# Patient Record
Sex: MALE | Race: WHITE | Employment: OTHER | URBAN - NONMETROPOLITAN AREA
[De-identification: names, ages, dates, MRNs, and addresses within clinical notes are randomized per-mention and may not be internally consistent; named-entity substitution may affect disease eponyms.]

---

## 2017-02-06 RX ORDER — TAMSULOSIN HYDROCHLORIDE 0.4 MG/1
CAPSULE ORAL
Qty: 60 CAPSULE | Refills: 0 | Status: SHIPPED | OUTPATIENT
Start: 2017-02-06 | End: 2017-04-23 | Stop reason: SDUPTHER

## 2017-03-02 RX ORDER — MELOXICAM 7.5 MG/1
TABLET ORAL
Qty: 30 TABLET | Refills: 5 | Status: SHIPPED | OUTPATIENT
Start: 2017-03-02 | End: 2017-06-19

## 2017-03-27 RX ORDER — CARVEDILOL 12.5 MG/1
TABLET ORAL
Qty: 60 TABLET | Refills: 6 | Status: SHIPPED | OUTPATIENT
Start: 2017-03-27 | End: 2017-10-09 | Stop reason: SDUPTHER

## 2017-04-24 RX ORDER — TAMSULOSIN HYDROCHLORIDE 0.4 MG/1
CAPSULE ORAL
Qty: 60 CAPSULE | Refills: 0 | Status: SHIPPED | OUTPATIENT
Start: 2017-04-24 | End: 2017-06-15 | Stop reason: SDUPTHER

## 2017-05-05 ENCOUNTER — OFFICE VISIT (OUTPATIENT)
Dept: FAMILY MEDICINE CLINIC | Age: 72
End: 2017-05-05

## 2017-05-05 VITALS
SYSTOLIC BLOOD PRESSURE: 120 MMHG | DIASTOLIC BLOOD PRESSURE: 76 MMHG | HEART RATE: 59 BPM | BODY MASS INDEX: 29.92 KG/M2 | HEIGHT: 69 IN | OXYGEN SATURATION: 96 % | WEIGHT: 202 LBS

## 2017-05-05 DIAGNOSIS — Z00.00 ROUTINE GENERAL MEDICAL EXAMINATION AT A HEALTH CARE FACILITY: ICD-10-CM

## 2017-05-05 DIAGNOSIS — I25.10 CORONARY ARTERY DISEASE INVOLVING NATIVE CORONARY ARTERY OF NATIVE HEART WITHOUT ANGINA PECTORIS: ICD-10-CM

## 2017-05-05 DIAGNOSIS — Z12.5 SPECIAL SCREENING FOR MALIGNANT NEOPLASM OF PROSTATE: ICD-10-CM

## 2017-05-05 DIAGNOSIS — N40.1 BENIGN NON-NODULAR PROSTATIC HYPERPLASIA WITH LOWER URINARY TRACT SYMPTOMS: ICD-10-CM

## 2017-05-05 DIAGNOSIS — I21.4 NON-ST ELEVATION (NSTEMI) MYOCARDIAL INFARCTION (HCC): ICD-10-CM

## 2017-05-05 DIAGNOSIS — E78.5 HYPERLIPIDEMIA, UNSPECIFIED HYPERLIPIDEMIA TYPE: Primary | ICD-10-CM

## 2017-05-05 DIAGNOSIS — I10 ESSENTIAL HYPERTENSION: ICD-10-CM

## 2017-05-05 PROCEDURE — 99214 OFFICE O/P EST MOD 30 MIN: CPT | Performed by: FAMILY MEDICINE

## 2017-05-05 ASSESSMENT — PATIENT HEALTH QUESTIONNAIRE - PHQ9
2. FEELING DOWN, DEPRESSED OR HOPELESS: 0
1. LITTLE INTEREST OR PLEASURE IN DOING THINGS: 0
SUM OF ALL RESPONSES TO PHQ9 QUESTIONS 1 & 2: 0
SUM OF ALL RESPONSES TO PHQ QUESTIONS 1-9: 0

## 2017-05-09 ENCOUNTER — OFFICE VISIT (OUTPATIENT)
Dept: UROLOGY | Age: 72
End: 2017-05-09

## 2017-05-09 VITALS
SYSTOLIC BLOOD PRESSURE: 120 MMHG | HEART RATE: 65 BPM | HEIGHT: 69 IN | WEIGHT: 196 LBS | BODY MASS INDEX: 29.03 KG/M2 | DIASTOLIC BLOOD PRESSURE: 72 MMHG

## 2017-05-09 DIAGNOSIS — N13.8 BPH WITH OBSTRUCTION/LOWER URINARY TRACT SYMPTOMS: Primary | ICD-10-CM

## 2017-05-09 DIAGNOSIS — N40.1 BPH WITH OBSTRUCTION/LOWER URINARY TRACT SYMPTOMS: Primary | ICD-10-CM

## 2017-05-09 LAB
BILIRUBIN, POC: ABNORMAL
BLOOD URINE, POC: ABNORMAL
CLARITY, POC: CLEAR
COLOR, POC: YELLOW
GLUCOSE URINE, POC: ABNORMAL
KETONES, POC: ABNORMAL
LEUKOCYTE EST, POC: ABNORMAL
NITRITE, POC: ABNORMAL
PH, POC: 6.5
PROTEIN, POC: 30
SPECIFIC GRAVITY, POC: 1.02
UROBILINOGEN, POC: 0.2

## 2017-05-09 PROCEDURE — 99214 OFFICE O/P EST MOD 30 MIN: CPT | Performed by: UROLOGY

## 2017-05-09 PROCEDURE — 51798 US URINE CAPACITY MEASURE: CPT | Performed by: UROLOGY

## 2017-05-09 PROCEDURE — 51741 ELECTRO-UROFLOWMETRY FIRST: CPT | Performed by: UROLOGY

## 2017-05-09 PROCEDURE — 81003 URINALYSIS AUTO W/O SCOPE: CPT | Performed by: UROLOGY

## 2017-05-09 RX ORDER — FINASTERIDE 5 MG/1
5 TABLET, FILM COATED ORAL DAILY
Qty: 90 TABLET | Refills: 3 | Status: SHIPPED | OUTPATIENT
Start: 2017-05-09 | End: 2018-05-10 | Stop reason: SDUPTHER

## 2017-05-09 ASSESSMENT — ENCOUNTER SYMPTOMS
SHORTNESS OF BREATH: 0
ABDOMINAL DISTENTION: 0
ABDOMINAL PAIN: 0

## 2017-05-10 ENCOUNTER — TELEPHONE (OUTPATIENT)
Dept: UROLOGY | Age: 72
End: 2017-05-10

## 2017-05-11 LAB — URINE CULTURE, ROUTINE: NORMAL

## 2017-05-12 ENCOUNTER — HOSPITAL ENCOUNTER (OUTPATIENT)
Dept: CT IMAGING | Age: 72
Discharge: HOME | End: 2017-05-12
Payer: MEDICARE

## 2017-05-12 VITALS
HEART RATE: 85 BPM | DIASTOLIC BLOOD PRESSURE: 76 MMHG | SYSTOLIC BLOOD PRESSURE: 124 MMHG | HEIGHT: 69 IN | BODY MASS INDEX: 28.88 KG/M2 | RESPIRATION RATE: 16 BRPM | WEIGHT: 195 LBS

## 2017-05-12 DIAGNOSIS — N13.8 BPH WITH OBSTRUCTION/LOWER URINARY TRACT SYMPTOMS: ICD-10-CM

## 2017-05-12 DIAGNOSIS — Z00.00 ROUTINE GENERAL MEDICAL EXAMINATION AT A HEALTH CARE FACILITY: ICD-10-CM

## 2017-05-12 DIAGNOSIS — I10 ESSENTIAL HYPERTENSION: ICD-10-CM

## 2017-05-12 DIAGNOSIS — Z12.5 SPECIAL SCREENING FOR MALIGNANT NEOPLASM OF PROSTATE: ICD-10-CM

## 2017-05-12 DIAGNOSIS — N40.1 BPH WITH OBSTRUCTION/LOWER URINARY TRACT SYMPTOMS: ICD-10-CM

## 2017-05-12 DIAGNOSIS — E78.5 HYPERLIPIDEMIA, UNSPECIFIED HYPERLIPIDEMIA TYPE: ICD-10-CM

## 2017-05-12 LAB
ALBUMIN SERPL-MCNC: 4 G/DL (ref 3.9–4.9)
ALP BLD-CCNC: 49 U/L (ref 35–104)
ALT SERPL-CCNC: 17 U/L (ref 0–41)
ANION GAP SERPL CALCULATED.3IONS-SCNC: 12 MEQ/L (ref 7–13)
AST SERPL-CCNC: 17 U/L (ref 0–40)
BILIRUB SERPL-MCNC: 0.5 MG/DL (ref 0–1.2)
BUN BLDV-MCNC: 18 MG/DL (ref 8–23)
CALCIUM SERPL-MCNC: 8.7 MG/DL (ref 8.6–10.2)
CHLORIDE BLD-SCNC: 102 MEQ/L (ref 98–107)
CHOLESTEROL, TOTAL: 174 MG/DL (ref 0–199)
CO2: 24 MEQ/L (ref 22–29)
CREAT SERPL-MCNC: 0.99 MG/DL (ref 0.7–1.2)
GFR AFRICAN AMERICAN: >60
GFR NON-AFRICAN AMERICAN: >60
GLOBULIN: 2.5 G/DL (ref 2.3–3.5)
GLUCOSE BLD-MCNC: 104 MG/DL (ref 74–109)
HCT VFR BLD CALC: 41.7 % (ref 42–52)
HDLC SERPL-MCNC: 43 MG/DL (ref 40–59)
HEMOGLOBIN: 14.1 G/DL (ref 14–18)
LDL CHOLESTEROL CALCULATED: 114 MG/DL (ref 0–129)
MCH RBC QN AUTO: 29.9 PG (ref 27–31.3)
MCHC RBC AUTO-ENTMCNC: 33.9 % (ref 33–37)
MCV RBC AUTO: 88 FL (ref 80–100)
PDW BLD-RTO: 13.4 % (ref 11.5–14.5)
PLATELET # BLD: 180 K/UL (ref 130–400)
POTASSIUM SERPL-SCNC: 4.6 MEQ/L (ref 3.5–5.1)
PROSTATE SPECIFIC ANTIGEN: 0.21 NG/ML (ref 0–6.22)
RBC # BLD: 4.74 M/UL (ref 4.7–6.1)
SODIUM BLD-SCNC: 138 MEQ/L (ref 132–144)
TOTAL PROTEIN: 6.5 G/DL (ref 6.4–8.1)
TRIGL SERPL-MCNC: 85 MG/DL (ref 0–200)
WBC # BLD: 5.5 K/UL (ref 4.8–10.8)

## 2017-05-12 PROCEDURE — 74176 CT ABD & PELVIS W/O CONTRAST: CPT

## 2017-05-17 ENCOUNTER — TELEPHONE (OUTPATIENT)
Dept: UROLOGY | Age: 72
End: 2017-05-17

## 2017-05-22 RX ORDER — LOSARTAN POTASSIUM 25 MG/1
TABLET ORAL
Qty: 30 TABLET | Refills: 5 | Status: SHIPPED | OUTPATIENT
Start: 2017-05-22 | End: 2017-11-12 | Stop reason: SDUPTHER

## 2017-05-22 RX ORDER — SIMVASTATIN 40 MG
TABLET ORAL
Qty: 30 TABLET | Refills: 5 | Status: SHIPPED | OUTPATIENT
Start: 2017-05-22 | End: 2017-11-12 | Stop reason: SDUPTHER

## 2017-05-22 RX ORDER — HYDROCHLOROTHIAZIDE 25 MG/1
TABLET ORAL
Qty: 30 TABLET | Refills: 5 | Status: SHIPPED | OUTPATIENT
Start: 2017-05-22 | End: 2017-11-12 | Stop reason: SDUPTHER

## 2017-06-06 ENCOUNTER — OFFICE VISIT (OUTPATIENT)
Dept: UROLOGY | Age: 72
End: 2017-06-06

## 2017-06-06 VITALS
HEART RATE: 70 BPM | HEIGHT: 69 IN | SYSTOLIC BLOOD PRESSURE: 122 MMHG | DIASTOLIC BLOOD PRESSURE: 62 MMHG | WEIGHT: 200 LBS | BODY MASS INDEX: 29.62 KG/M2

## 2017-06-06 DIAGNOSIS — R33.9 INCOMPLETE BLADDER EMPTYING: ICD-10-CM

## 2017-06-06 DIAGNOSIS — N13.8 BPH WITH OBSTRUCTION/LOWER URINARY TRACT SYMPTOMS: Primary | ICD-10-CM

## 2017-06-06 DIAGNOSIS — N40.1 BPH WITH OBSTRUCTION/LOWER URINARY TRACT SYMPTOMS: Primary | ICD-10-CM

## 2017-06-06 PROCEDURE — 99214 OFFICE O/P EST MOD 30 MIN: CPT | Performed by: UROLOGY

## 2017-06-06 PROCEDURE — 51798 US URINE CAPACITY MEASURE: CPT | Performed by: UROLOGY

## 2017-06-06 PROCEDURE — 51741 ELECTRO-UROFLOWMETRY FIRST: CPT | Performed by: UROLOGY

## 2017-06-06 ASSESSMENT — ENCOUNTER SYMPTOMS
ABDOMINAL PAIN: 0
SHORTNESS OF BREATH: 0
ABDOMINAL DISTENTION: 0

## 2017-06-13 ENCOUNTER — TELEPHONE (OUTPATIENT)
Dept: UROLOGY | Age: 72
End: 2017-06-13

## 2017-06-18 RX ORDER — TAMSULOSIN HYDROCHLORIDE 0.4 MG/1
CAPSULE ORAL
Qty: 180 CAPSULE | Refills: 3 | Status: SHIPPED | OUTPATIENT
Start: 2017-06-18 | End: 2018-06-18 | Stop reason: SDUPTHER

## 2017-06-19 ENCOUNTER — OFFICE VISIT (OUTPATIENT)
Dept: FAMILY MEDICINE CLINIC | Age: 72
End: 2017-06-19

## 2017-06-19 VITALS
HEIGHT: 69 IN | SYSTOLIC BLOOD PRESSURE: 126 MMHG | WEIGHT: 208.4 LBS | DIASTOLIC BLOOD PRESSURE: 64 MMHG | HEART RATE: 70 BPM | BODY MASS INDEX: 30.87 KG/M2 | OXYGEN SATURATION: 96 %

## 2017-06-19 DIAGNOSIS — M25.561 CHRONIC PAIN OF RIGHT KNEE: Primary | ICD-10-CM

## 2017-06-19 DIAGNOSIS — M25.551 RIGHT HIP PAIN: ICD-10-CM

## 2017-06-19 DIAGNOSIS — G89.29 CHRONIC PAIN OF RIGHT KNEE: Primary | ICD-10-CM

## 2017-06-19 DIAGNOSIS — M17.11 PRIMARY OSTEOARTHRITIS OF RIGHT KNEE: ICD-10-CM

## 2017-06-19 PROCEDURE — 99213 OFFICE O/P EST LOW 20 MIN: CPT | Performed by: FAMILY MEDICINE

## 2017-06-19 RX ORDER — NABUMETONE 500 MG/1
500 TABLET, FILM COATED ORAL 2 TIMES DAILY
Qty: 60 TABLET | Refills: 3 | Status: ON HOLD | OUTPATIENT
Start: 2017-06-19 | End: 2017-07-17 | Stop reason: HOSPADM

## 2017-06-20 ENCOUNTER — OFFICE VISIT (OUTPATIENT)
Dept: CARDIOLOGY | Age: 72
End: 2017-06-20

## 2017-06-20 VITALS
SYSTOLIC BLOOD PRESSURE: 120 MMHG | DIASTOLIC BLOOD PRESSURE: 60 MMHG | BODY MASS INDEX: 30.81 KG/M2 | WEIGHT: 208 LBS | HEART RATE: 83 BPM | HEIGHT: 69 IN

## 2017-06-20 DIAGNOSIS — Z01.810 PRE-OPERATIVE CARDIOVASCULAR EXAMINATION: Primary | ICD-10-CM

## 2017-06-20 PROCEDURE — 99213 OFFICE O/P EST LOW 20 MIN: CPT | Performed by: INTERNAL MEDICINE

## 2017-06-20 PROCEDURE — 93000 ELECTROCARDIOGRAM COMPLETE: CPT | Performed by: INTERNAL MEDICINE

## 2017-07-10 ENCOUNTER — HOSPITAL ENCOUNTER (OUTPATIENT)
Dept: PREADMISSION TESTING | Age: 72
Discharge: HOME | End: 2017-07-10
Payer: MEDICARE

## 2017-07-10 VITALS
OXYGEN SATURATION: 94 % | TEMPERATURE: 97.6 F | HEART RATE: 68 BPM | HEIGHT: 69 IN | DIASTOLIC BLOOD PRESSURE: 63 MMHG | WEIGHT: 198.2 LBS | RESPIRATION RATE: 16 BRPM | SYSTOLIC BLOOD PRESSURE: 136 MMHG | BODY MASS INDEX: 29.36 KG/M2

## 2017-07-10 LAB
ABO/RH: NORMAL
ANION GAP SERPL CALCULATED.3IONS-SCNC: 12 MEQ/L (ref 7–13)
ANTIBODY SCREEN: NORMAL
BACTERIA: ABNORMAL /HPF
BILIRUBIN URINE: NEGATIVE
BLOOD, URINE: NEGATIVE
BUN BLDV-MCNC: 18 MG/DL (ref 8–23)
CALCIUM SERPL-MCNC: 8.7 MG/DL (ref 8.6–10.2)
CHLORIDE BLD-SCNC: 97 MEQ/L (ref 98–107)
CLARITY: CLEAR
CO2: 27 MEQ/L (ref 22–29)
COLOR: YELLOW
CREAT SERPL-MCNC: 0.82 MG/DL (ref 0.7–1.2)
EPITHELIAL CELLS, UA: ABNORMAL /HPF
GFR AFRICAN AMERICAN: >60
GFR NON-AFRICAN AMERICAN: >60
GLUCOSE BLD-MCNC: 85 MG/DL (ref 74–109)
GLUCOSE URINE: NEGATIVE MG/DL
HCT VFR BLD CALC: 40 % (ref 42–52)
HEMOGLOBIN: 13.3 G/DL (ref 14–18)
INR BLD: 1
KETONES, URINE: NEGATIVE MG/DL
LEUKOCYTE ESTERASE, URINE: ABNORMAL
MCH RBC QN AUTO: 29.5 PG (ref 27–31.3)
MCHC RBC AUTO-ENTMCNC: 33.3 % (ref 33–37)
MCV RBC AUTO: 88.7 FL (ref 80–100)
NITRITE, URINE: NEGATIVE
PDW BLD-RTO: 13.4 % (ref 11.5–14.5)
PH UA: 7 (ref 5–9)
PLATELET # BLD: 255 K/UL (ref 130–400)
POTASSIUM SERPL-SCNC: 3.6 MEQ/L (ref 3.5–5.1)
PROTEIN UA: NEGATIVE MG/DL
PROTHROMBIN TIME: 10.8 SEC (ref 8.1–13.7)
RBC # BLD: 4.51 M/UL (ref 4.7–6.1)
RBC UA: ABNORMAL /HPF (ref 0–2)
SODIUM BLD-SCNC: 136 MEQ/L (ref 132–144)
SPECIFIC GRAVITY UA: 1.01 (ref 1–1.03)
UROBILINOGEN, URINE: 0.2 E.U./DL
WBC # BLD: 6.7 K/UL (ref 4.8–10.8)
WBC UA: ABNORMAL /HPF (ref 0–5)

## 2017-07-10 PROCEDURE — 87077 CULTURE AEROBIC IDENTIFY: CPT

## 2017-07-10 PROCEDURE — 85610 PROTHROMBIN TIME: CPT

## 2017-07-10 PROCEDURE — 85027 COMPLETE CBC AUTOMATED: CPT

## 2017-07-10 PROCEDURE — 86900 BLOOD TYPING SEROLOGIC ABO: CPT

## 2017-07-10 PROCEDURE — 86901 BLOOD TYPING SEROLOGIC RH(D): CPT

## 2017-07-10 PROCEDURE — 87186 SC STD MICRODIL/AGAR DIL: CPT

## 2017-07-10 PROCEDURE — 87086 URINE CULTURE/COLONY COUNT: CPT

## 2017-07-10 PROCEDURE — 81001 URINALYSIS AUTO W/SCOPE: CPT

## 2017-07-10 PROCEDURE — 86850 RBC ANTIBODY SCREEN: CPT

## 2017-07-10 PROCEDURE — 80048 BASIC METABOLIC PNL TOTAL CA: CPT

## 2017-07-10 RX ORDER — SODIUM CHLORIDE 0.9 % (FLUSH) 0.9 %
10 SYRINGE (ML) INJECTION PRN
Status: CANCELLED | OUTPATIENT
Start: 2017-07-10

## 2017-07-10 RX ORDER — SODIUM CHLORIDE, SODIUM LACTATE, POTASSIUM CHLORIDE, CALCIUM CHLORIDE 600; 310; 30; 20 MG/100ML; MG/100ML; MG/100ML; MG/100ML
INJECTION, SOLUTION INTRAVENOUS CONTINUOUS
Status: CANCELLED | OUTPATIENT
Start: 2017-07-10

## 2017-07-10 RX ORDER — SODIUM CHLORIDE 0.9 % (FLUSH) 0.9 %
10 SYRINGE (ML) INJECTION EVERY 12 HOURS SCHEDULED
Status: CANCELLED | OUTPATIENT
Start: 2017-07-10

## 2017-07-10 RX ORDER — LIDOCAINE HYDROCHLORIDE 10 MG/ML
1 INJECTION, SOLUTION EPIDURAL; INFILTRATION; INTRACAUDAL; PERINEURAL
Status: CANCELLED | OUTPATIENT
Start: 2017-07-10 | End: 2017-07-10

## 2017-07-10 RX ORDER — CIPROFLOXACIN 2 MG/ML
400 INJECTION, SOLUTION INTRAVENOUS ONCE
Status: CANCELLED | OUTPATIENT
Start: 2017-07-17

## 2017-07-12 DIAGNOSIS — N39.0 URINARY TRACT INFECTION, SITE UNSPECIFIED: Primary | ICD-10-CM

## 2017-07-12 LAB
ORGANISM: ABNORMAL
URINE CULTURE, ROUTINE: ABNORMAL

## 2017-07-12 RX ORDER — CIPROFLOXACIN 500 MG/1
500 TABLET, FILM COATED ORAL 2 TIMES DAILY
Qty: 14 TABLET | Refills: 0 | Status: SHIPPED | OUTPATIENT
Start: 2017-07-12 | End: 2017-07-27 | Stop reason: CLARIF

## 2017-07-17 ENCOUNTER — ANESTHESIA (OUTPATIENT)
Dept: OPERATING ROOM | Age: 72
End: 2017-07-17
Payer: MEDICARE

## 2017-07-17 ENCOUNTER — ANESTHESIA EVENT (OUTPATIENT)
Dept: OPERATING ROOM | Age: 72
End: 2017-07-17
Payer: MEDICARE

## 2017-07-17 ENCOUNTER — HOSPITAL ENCOUNTER (OUTPATIENT)
Age: 72
Setting detail: OUTPATIENT SURGERY
Discharge: HOME | End: 2017-07-17
Attending: UROLOGY | Admitting: UROLOGY
Payer: MEDICARE

## 2017-07-17 VITALS
SYSTOLIC BLOOD PRESSURE: 141 MMHG | DIASTOLIC BLOOD PRESSURE: 80 MMHG | RESPIRATION RATE: 18 BRPM | OXYGEN SATURATION: 97 % | HEART RATE: 59 BPM | TEMPERATURE: 97.7 F

## 2017-07-17 VITALS — OXYGEN SATURATION: 100 % | SYSTOLIC BLOOD PRESSURE: 119 MMHG | DIASTOLIC BLOOD PRESSURE: 75 MMHG | TEMPERATURE: 94.8 F

## 2017-07-17 PROCEDURE — 3700000000 HC ANESTHESIA ATTENDED CARE: Performed by: UROLOGY

## 2017-07-17 PROCEDURE — 6360000002 HC RX W HCPCS: Performed by: UROLOGY

## 2017-07-17 PROCEDURE — 3600000014 HC SURGERY LEVEL 4 ADDTL 15MIN: Performed by: UROLOGY

## 2017-07-17 PROCEDURE — 7100000000 HC PACU RECOVERY - FIRST 15 MIN: Performed by: UROLOGY

## 2017-07-17 PROCEDURE — 2580000003 HC RX 258: Performed by: STUDENT IN AN ORGANIZED HEALTH CARE EDUCATION/TRAINING PROGRAM

## 2017-07-17 PROCEDURE — 2500000003 HC RX 250 WO HCPCS: Performed by: NURSE ANESTHETIST, CERTIFIED REGISTERED

## 2017-07-17 PROCEDURE — 2580000003 HC RX 258: Performed by: UROLOGY

## 2017-07-17 PROCEDURE — 7100000001 HC PACU RECOVERY - ADDTL 15 MIN: Performed by: UROLOGY

## 2017-07-17 PROCEDURE — 52648 LASER SURGERY OF PROSTATE: CPT | Performed by: UROLOGY

## 2017-07-17 PROCEDURE — 7100000010 HC PHASE II RECOVERY - FIRST 15 MIN: Performed by: UROLOGY

## 2017-07-17 PROCEDURE — 2500000003 HC RX 250 WO HCPCS: Performed by: STUDENT IN AN ORGANIZED HEALTH CARE EDUCATION/TRAINING PROGRAM

## 2017-07-17 PROCEDURE — 7100000011 HC PHASE II RECOVERY - ADDTL 15 MIN: Performed by: UROLOGY

## 2017-07-17 PROCEDURE — 3600000004 HC SURGERY LEVEL 4 BASE: Performed by: UROLOGY

## 2017-07-17 PROCEDURE — 3700000001 HC ADD 15 MINUTES (ANESTHESIA): Performed by: UROLOGY

## 2017-07-17 PROCEDURE — 6360000002 HC RX W HCPCS: Performed by: NURSE ANESTHETIST, CERTIFIED REGISTERED

## 2017-07-17 PROCEDURE — 6370000000 HC RX 637 (ALT 250 FOR IP): Performed by: UROLOGY

## 2017-07-17 RX ORDER — LIDOCAINE HYDROCHLORIDE 20 MG/ML
INJECTION, SOLUTION INFILTRATION; PERINEURAL PRN
Status: DISCONTINUED | OUTPATIENT
Start: 2017-07-17 | End: 2017-07-17 | Stop reason: SDUPTHER

## 2017-07-17 RX ORDER — SODIUM CHLORIDE 0.9 % (FLUSH) 0.9 %
10 SYRINGE (ML) INJECTION PRN
Status: DISCONTINUED | OUTPATIENT
Start: 2017-07-17 | End: 2017-07-17 | Stop reason: HOSPADM

## 2017-07-17 RX ORDER — HYDROCODONE BITARTRATE AND ACETAMINOPHEN 5; 325 MG/1; MG/1
2 TABLET ORAL PRN
Status: DISCONTINUED | OUTPATIENT
Start: 2017-07-17 | End: 2017-07-17 | Stop reason: HOSPADM

## 2017-07-17 RX ORDER — SODIUM CHLORIDE, SODIUM LACTATE, POTASSIUM CHLORIDE, CALCIUM CHLORIDE 600; 310; 30; 20 MG/100ML; MG/100ML; MG/100ML; MG/100ML
INJECTION, SOLUTION INTRAVENOUS CONTINUOUS
Status: DISCONTINUED | OUTPATIENT
Start: 2017-07-17 | End: 2017-07-17 | Stop reason: HOSPADM

## 2017-07-17 RX ORDER — MEPERIDINE HYDROCHLORIDE 25 MG/ML
12.5 INJECTION INTRAMUSCULAR; INTRAVENOUS; SUBCUTANEOUS EVERY 5 MIN PRN
Status: DISCONTINUED | OUTPATIENT
Start: 2017-07-17 | End: 2017-07-17 | Stop reason: HOSPADM

## 2017-07-17 RX ORDER — LIDOCAINE HYDROCHLORIDE 10 MG/ML
1 INJECTION, SOLUTION EPIDURAL; INFILTRATION; INTRACAUDAL; PERINEURAL
Status: COMPLETED | OUTPATIENT
Start: 2017-07-17 | End: 2017-07-17

## 2017-07-17 RX ORDER — SODIUM CHLORIDE 0.9 % (FLUSH) 0.9 %
10 SYRINGE (ML) INJECTION EVERY 12 HOURS SCHEDULED
Status: DISCONTINUED | OUTPATIENT
Start: 2017-07-17 | End: 2017-07-17 | Stop reason: HOSPADM

## 2017-07-17 RX ORDER — ONDANSETRON 2 MG/ML
4 INJECTION INTRAMUSCULAR; INTRAVENOUS EVERY 6 HOURS PRN
Status: DISCONTINUED | OUTPATIENT
Start: 2017-07-17 | End: 2017-07-17 | Stop reason: HOSPADM

## 2017-07-17 RX ORDER — FENTANYL CITRATE 50 UG/ML
50 INJECTION, SOLUTION INTRAMUSCULAR; INTRAVENOUS EVERY 10 MIN PRN
Status: DISCONTINUED | OUTPATIENT
Start: 2017-07-17 | End: 2017-07-17 | Stop reason: HOSPADM

## 2017-07-17 RX ORDER — FUROSEMIDE 10 MG/ML
INJECTION INTRAMUSCULAR; INTRAVENOUS PRN
Status: DISCONTINUED | OUTPATIENT
Start: 2017-07-17 | End: 2017-07-17 | Stop reason: SDUPTHER

## 2017-07-17 RX ORDER — EPHEDRINE SULFATE 50 MG/ML
INJECTION, SOLUTION INTRAVENOUS PRN
Status: DISCONTINUED | OUTPATIENT
Start: 2017-07-17 | End: 2017-07-17 | Stop reason: SDUPTHER

## 2017-07-17 RX ORDER — MAGNESIUM HYDROXIDE 1200 MG/15ML
LIQUID ORAL PRN
Status: DISCONTINUED | OUTPATIENT
Start: 2017-07-17 | End: 2017-07-17 | Stop reason: HOSPADM

## 2017-07-17 RX ORDER — PROPOFOL 10 MG/ML
INJECTION, EMULSION INTRAVENOUS PRN
Status: DISCONTINUED | OUTPATIENT
Start: 2017-07-17 | End: 2017-07-17 | Stop reason: SDUPTHER

## 2017-07-17 RX ORDER — CIPROFLOXACIN 2 MG/ML
400 INJECTION, SOLUTION INTRAVENOUS ONCE
Status: COMPLETED | OUTPATIENT
Start: 2017-07-17 | End: 2017-07-17

## 2017-07-17 RX ORDER — ACETAMINOPHEN 325 MG/1
650 TABLET ORAL EVERY 4 HOURS PRN
Status: DISCONTINUED | OUTPATIENT
Start: 2017-07-17 | End: 2017-07-17 | Stop reason: HOSPADM

## 2017-07-17 RX ORDER — DIPHENHYDRAMINE HYDROCHLORIDE 50 MG/ML
12.5 INJECTION INTRAMUSCULAR; INTRAVENOUS
Status: DISCONTINUED | OUTPATIENT
Start: 2017-07-17 | End: 2017-07-17 | Stop reason: HOSPADM

## 2017-07-17 RX ORDER — ATROPA BELLADONNA AND OPIUM 16.2; 6 MG/1; MG/1
SUPPOSITORY RECTAL PRN
Status: DISCONTINUED | OUTPATIENT
Start: 2017-07-17 | End: 2017-07-17 | Stop reason: HOSPADM

## 2017-07-17 RX ORDER — METOCLOPRAMIDE HYDROCHLORIDE 5 MG/ML
10 INJECTION INTRAMUSCULAR; INTRAVENOUS
Status: DISCONTINUED | OUTPATIENT
Start: 2017-07-17 | End: 2017-07-17 | Stop reason: HOSPADM

## 2017-07-17 RX ORDER — HYDROCODONE BITARTRATE AND ACETAMINOPHEN 5; 325 MG/1; MG/1
1 TABLET ORAL PRN
Status: DISCONTINUED | OUTPATIENT
Start: 2017-07-17 | End: 2017-07-17 | Stop reason: HOSPADM

## 2017-07-17 RX ORDER — MAGNESIUM HYDROXIDE 1200 MG/15ML
LIQUID ORAL CONTINUOUS PRN
Status: DISCONTINUED | OUTPATIENT
Start: 2017-07-17 | End: 2017-07-17 | Stop reason: HOSPADM

## 2017-07-17 RX ORDER — ONDANSETRON 2 MG/ML
INJECTION INTRAMUSCULAR; INTRAVENOUS PRN
Status: DISCONTINUED | OUTPATIENT
Start: 2017-07-17 | End: 2017-07-17 | Stop reason: SDUPTHER

## 2017-07-17 RX ORDER — FENTANYL CITRATE 50 UG/ML
INJECTION, SOLUTION INTRAMUSCULAR; INTRAVENOUS PRN
Status: DISCONTINUED | OUTPATIENT
Start: 2017-07-17 | End: 2017-07-17 | Stop reason: SDUPTHER

## 2017-07-17 RX ORDER — ONDANSETRON 2 MG/ML
4 INJECTION INTRAMUSCULAR; INTRAVENOUS
Status: DISCONTINUED | OUTPATIENT
Start: 2017-07-17 | End: 2017-07-17 | Stop reason: HOSPADM

## 2017-07-17 RX ADMIN — FUROSEMIDE 10 MG: 10 INJECTION, SOLUTION INTRAMUSCULAR; INTRAVENOUS at 08:58

## 2017-07-17 RX ADMIN — FENTANYL CITRATE 25 MCG: 50 INJECTION, SOLUTION INTRAMUSCULAR; INTRAVENOUS at 08:19

## 2017-07-17 RX ADMIN — FUROSEMIDE 10 MG: 10 INJECTION, SOLUTION INTRAMUSCULAR; INTRAVENOUS at 08:50

## 2017-07-17 RX ADMIN — FENTANYL CITRATE 50 MCG: 50 INJECTION, SOLUTION INTRAMUSCULAR; INTRAVENOUS at 07:57

## 2017-07-17 RX ADMIN — CIPROFLOXACIN 400 MG: 2 INJECTION, SOLUTION INTRAVENOUS at 07:54

## 2017-07-17 RX ADMIN — EPHEDRINE SULFATE 10 MG: 50 INJECTION, SOLUTION INTRAMUSCULAR; INTRAVENOUS; SUBCUTANEOUS at 08:50

## 2017-07-17 RX ADMIN — FENTANYL CITRATE 25 MCG: 50 INJECTION, SOLUTION INTRAMUSCULAR; INTRAVENOUS at 08:41

## 2017-07-17 RX ADMIN — SODIUM CHLORIDE, POTASSIUM CHLORIDE, SODIUM LACTATE AND CALCIUM CHLORIDE: 600; 310; 30; 20 INJECTION, SOLUTION INTRAVENOUS at 07:26

## 2017-07-17 RX ADMIN — ONDANSETRON 4 MG: 2 INJECTION, SOLUTION INTRAMUSCULAR; INTRAVENOUS at 08:56

## 2017-07-17 RX ADMIN — PROPOFOL 150 MG: 10 INJECTION, EMULSION INTRAVENOUS at 07:57

## 2017-07-17 RX ADMIN — LIDOCAINE HYDROCHLORIDE 0.1 ML: 10 INJECTION, SOLUTION EPIDURAL; INFILTRATION; INTRACAUDAL; PERINEURAL at 07:25

## 2017-07-17 RX ADMIN — LIDOCAINE HYDROCHLORIDE 5 ML: 20 INJECTION, SOLUTION INFILTRATION; PERINEURAL at 07:57

## 2017-07-20 ENCOUNTER — TELEPHONE (OUTPATIENT)
Dept: UROLOGY | Age: 72
End: 2017-07-20

## 2017-07-25 ENCOUNTER — PROCEDURE VISIT (OUTPATIENT)
Dept: UROLOGY | Age: 72
End: 2017-07-25

## 2017-07-25 ENCOUNTER — TELEPHONE (OUTPATIENT)
Dept: UROLOGY | Age: 72
End: 2017-07-25

## 2017-07-25 VITALS
SYSTOLIC BLOOD PRESSURE: 128 MMHG | BODY MASS INDEX: 30.31 KG/M2 | HEART RATE: 75 BPM | HEIGHT: 68 IN | WEIGHT: 200 LBS | DIASTOLIC BLOOD PRESSURE: 78 MMHG

## 2017-07-25 DIAGNOSIS — N40.1 BENIGN PROSTATIC HYPERTROPHY (BPH) WITH INCOMPLETE BLADDER EMPTYING: Primary | ICD-10-CM

## 2017-07-25 DIAGNOSIS — R39.14 BENIGN PROSTATIC HYPERTROPHY (BPH) WITH INCOMPLETE BLADDER EMPTYING: Primary | ICD-10-CM

## 2017-07-25 PROCEDURE — 99024 POSTOP FOLLOW-UP VISIT: CPT | Performed by: UROLOGY

## 2017-07-25 RX ORDER — NABUMETONE 500 MG/1
TABLET, FILM COATED ORAL
Refills: 0 | COMMUNITY
Start: 2017-07-19 | End: 2017-10-20

## 2017-07-25 ASSESSMENT — ENCOUNTER SYMPTOMS: ABDOMINAL DISTENTION: 0

## 2017-07-27 ENCOUNTER — OFFICE VISIT (OUTPATIENT)
Dept: UROLOGY | Age: 72
End: 2017-07-27

## 2017-07-27 VITALS
DIASTOLIC BLOOD PRESSURE: 70 MMHG | SYSTOLIC BLOOD PRESSURE: 128 MMHG | HEART RATE: 66 BPM | WEIGHT: 197 LBS | BODY MASS INDEX: 29.18 KG/M2 | HEIGHT: 69 IN

## 2017-07-27 DIAGNOSIS — R33.9 URINARY RETENTION: Primary | ICD-10-CM

## 2017-07-27 PROCEDURE — 99024 POSTOP FOLLOW-UP VISIT: CPT | Performed by: UROLOGY

## 2017-07-27 PROCEDURE — 51798 US URINE CAPACITY MEASURE: CPT | Performed by: UROLOGY

## 2017-07-27 PROCEDURE — 51741 ELECTRO-UROFLOWMETRY FIRST: CPT | Performed by: UROLOGY

## 2017-07-27 ASSESSMENT — ENCOUNTER SYMPTOMS
ABDOMINAL DISTENTION: 0
ABDOMINAL PAIN: 0

## 2017-08-10 ENCOUNTER — TELEPHONE (OUTPATIENT)
Dept: FAMILY MEDICINE CLINIC | Age: 72
End: 2017-08-10

## 2017-09-13 ENCOUNTER — TELEPHONE (OUTPATIENT)
Dept: UROLOGY | Age: 72
End: 2017-09-13

## 2017-09-14 ENCOUNTER — OFFICE VISIT (OUTPATIENT)
Dept: UROLOGY | Age: 72
End: 2017-09-14

## 2017-09-14 VITALS
DIASTOLIC BLOOD PRESSURE: 64 MMHG | HEART RATE: 64 BPM | WEIGHT: 200 LBS | SYSTOLIC BLOOD PRESSURE: 122 MMHG | BODY MASS INDEX: 30.31 KG/M2 | HEIGHT: 68 IN

## 2017-09-14 DIAGNOSIS — N39.0 URINARY TRACT INFECTION WITHOUT HEMATURIA, SITE UNSPECIFIED: ICD-10-CM

## 2017-09-14 DIAGNOSIS — R33.9 INCOMPLETE EMPTYING OF BLADDER: Primary | ICD-10-CM

## 2017-09-14 LAB
BILIRUBIN, POC: ABNORMAL
BLOOD URINE, POC: ABNORMAL
CLARITY, POC: ABNORMAL
COLOR, POC: YELLOW
GLUCOSE URINE, POC: ABNORMAL
KETONES, POC: ABNORMAL
LEUKOCYTE EST, POC: ABNORMAL
NITRITE, POC: ABNORMAL
PH, POC: 5.5
PROTEIN, POC: ABNORMAL
SPECIFIC GRAVITY, POC: 1.01
UROBILINOGEN, POC: 0.2

## 2017-09-14 PROCEDURE — 81003 URINALYSIS AUTO W/O SCOPE: CPT | Performed by: UROLOGY

## 2017-09-14 PROCEDURE — 51798 US URINE CAPACITY MEASURE: CPT | Performed by: UROLOGY

## 2017-09-14 PROCEDURE — 99024 POSTOP FOLLOW-UP VISIT: CPT | Performed by: UROLOGY

## 2017-09-14 PROCEDURE — 51741 ELECTRO-UROFLOWMETRY FIRST: CPT | Performed by: UROLOGY

## 2017-09-14 RX ORDER — CIPROFLOXACIN 500 MG/1
500 TABLET, FILM COATED ORAL 2 TIMES DAILY
Qty: 20 TABLET | Refills: 0 | Status: SHIPPED | OUTPATIENT
Start: 2017-09-14 | End: 2017-09-27

## 2017-09-14 ASSESSMENT — ENCOUNTER SYMPTOMS
ABDOMINAL PAIN: 0
ABDOMINAL DISTENTION: 0

## 2017-09-17 LAB
ORGANISM: ABNORMAL
URINE CULTURE, ROUTINE: ABNORMAL

## 2017-09-27 ENCOUNTER — OFFICE VISIT (OUTPATIENT)
Dept: UROLOGY | Age: 72
End: 2017-09-27

## 2017-09-27 VITALS
WEIGHT: 200 LBS | HEIGHT: 69 IN | SYSTOLIC BLOOD PRESSURE: 130 MMHG | BODY MASS INDEX: 29.62 KG/M2 | HEART RATE: 62 BPM | DIASTOLIC BLOOD PRESSURE: 72 MMHG

## 2017-09-27 DIAGNOSIS — N13.8 BPH WITH OBSTRUCTION/LOWER URINARY TRACT SYMPTOMS: ICD-10-CM

## 2017-09-27 DIAGNOSIS — N39.0 URINARY TRACT INFECTION WITHOUT HEMATURIA, SITE UNSPECIFIED: ICD-10-CM

## 2017-09-27 DIAGNOSIS — R33.9 INCOMPLETE EMPTYING OF BLADDER: Primary | ICD-10-CM

## 2017-09-27 DIAGNOSIS — N40.1 BPH WITH OBSTRUCTION/LOWER URINARY TRACT SYMPTOMS: ICD-10-CM

## 2017-09-27 LAB
BILIRUBIN, POC: NORMAL
BLOOD URINE, POC: NORMAL
CLARITY, POC: CLEAR
COLOR, POC: YELLOW
GLUCOSE URINE, POC: NORMAL
KETONES, POC: NORMAL
LEUKOCYTE EST, POC: NORMAL
NITRITE, POC: NORMAL
PH, POC: 6.5
PROTEIN, POC: NORMAL
SPECIFIC GRAVITY, POC: 1.01
UROBILINOGEN, POC: 0.2

## 2017-09-27 PROCEDURE — 99024 POSTOP FOLLOW-UP VISIT: CPT | Performed by: UROLOGY

## 2017-09-27 PROCEDURE — 81003 URINALYSIS AUTO W/O SCOPE: CPT | Performed by: UROLOGY

## 2017-09-27 PROCEDURE — 51741 ELECTRO-UROFLOWMETRY FIRST: CPT | Performed by: UROLOGY

## 2017-09-27 PROCEDURE — 51798 US URINE CAPACITY MEASURE: CPT | Performed by: UROLOGY

## 2017-09-27 ASSESSMENT — ENCOUNTER SYMPTOMS
ABDOMINAL PAIN: 0
ABDOMINAL DISTENTION: 0

## 2017-09-29 LAB — URINE CULTURE, ROUTINE: NORMAL

## 2017-10-03 ENCOUNTER — PROCEDURE VISIT (OUTPATIENT)
Dept: UROLOGY | Age: 72
End: 2017-10-03

## 2017-10-03 VITALS
SYSTOLIC BLOOD PRESSURE: 110 MMHG | WEIGHT: 202 LBS | HEART RATE: 73 BPM | DIASTOLIC BLOOD PRESSURE: 70 MMHG | HEIGHT: 69 IN | BODY MASS INDEX: 29.92 KG/M2

## 2017-10-03 DIAGNOSIS — R31.9 HEMATURIA: ICD-10-CM

## 2017-10-03 DIAGNOSIS — N35.9 URETHRAL STRICTURE, UNSPECIFIED STRICTURE TYPE: ICD-10-CM

## 2017-10-03 DIAGNOSIS — N40.1 BPH WITH OBSTRUCTION/LOWER URINARY TRACT SYMPTOMS: Primary | ICD-10-CM

## 2017-10-03 DIAGNOSIS — N13.8 BPH WITH OBSTRUCTION/LOWER URINARY TRACT SYMPTOMS: Primary | ICD-10-CM

## 2017-10-03 LAB
BILIRUBIN, POC: ABNORMAL
BLOOD URINE, POC: ABNORMAL
CLARITY, POC: CLEAR
COLOR, POC: YELLOW
GLUCOSE URINE, POC: ABNORMAL
KETONES, POC: ABNORMAL
LEUKOCYTE EST, POC: ABNORMAL
NITRITE, POC: ABNORMAL
PH, POC: 6
PROTEIN, POC: ABNORMAL
SPECIFIC GRAVITY, POC: 1.01
UROBILINOGEN, POC: 0.2

## 2017-10-03 PROCEDURE — 81003 URINALYSIS AUTO W/O SCOPE: CPT | Performed by: UROLOGY

## 2017-10-03 PROCEDURE — 52281 CYSTOSCOPY AND TREATMENT: CPT | Performed by: UROLOGY

## 2017-10-03 PROCEDURE — 99024 POSTOP FOLLOW-UP VISIT: CPT | Performed by: UROLOGY

## 2017-10-03 RX ORDER — CIPROFLOXACIN 500 MG/1
500 TABLET, FILM COATED ORAL ONCE
Qty: 1 TABLET | Refills: 0 | COMMUNITY
Start: 2017-10-03 | End: 2017-10-03

## 2017-10-03 ASSESSMENT — ENCOUNTER SYMPTOMS
ABDOMINAL DISTENTION: 0
ABDOMINAL PAIN: 0

## 2017-10-03 NOTE — PROGRESS NOTES
Subjective:      Patient ID: Rhoda Reyes is a 71 y.o. male. HPI This is a 70 yo male with h/o HTN, OA, CAD/MI/ASA (Dr Fabrice Sims), and BPH with LUTs (flomax BID and Proscar) and 3 prior episodes of urinary retention back in follow-up after Laser TURP on 7/17/17 and back with several weeks of hesitancy and slower stream with some intermittent dribbling. He was diagnosed with a UTI and started on Cipro and used CIC a few times with some resistance in the distal urethra in two areas and then passes into bladder. He notes that after he used the catheter, the flow improves for some time. He has no pain, no hematuria or dysuria. He finished Cipro. He has no incontinence. He is taking Proscar and Flomax BID.  He has no other complaints and wants to proceed with cystoscopy today.      Past Medical History:   Diagnosis Date    Actinic keratosis     BPH (benign prostatic hyperplasia)     CAD (coronary artery disease) 2012    Heart attack ([facility] [address]     Hyperlipidemia     Hypertension     Myocardial infarct ([facility] [address] 11/2012    Osteoarthritis     Status post coronary angioplasty      Past Surgical History:   Procedure Laterality Date    CARPAL TUNNEL RELEASE Bilateral 2015    COLONOSCOPY  3/23/15        CORONARY ANGIOPLASTY WITH STENT PLACEMENT  2012    CYSTOSCOPY  05/17/2017    W/COMPLEX CYSTOMETROGRAM-COMPLEX UROFLOW-TRANSRECTAL US PROSTATE    JOINT REPLACEMENT Left     ROTATOR CUFF REPAIR Right     twice    SHOULDER SURGERY Right     TURP N/A 7/17/2017    GREEN LIGHT LASER TRANSURETHRAL RESECTIOIN PROSTATE performed by Ana Shaw MD at [address] [place] [address] PROSTATE/TRANSRECTAL  06/03/15    Cystoscop, cath rem, US prostate     Social History     Social History    Marital status:      Spouse name: N/A    Number of children: N/A    Years of education: N/A     Social History Main Topics    Smoking status: Former Smoker     Packs/day: 1.00     Years: 10.00     Start date: 6/22/1961 Quit date: 3/17/1967    Smokeless tobacco: Never Used      Comment: Quit for [employer]    Alcohol use Yes     0 Glasses of wine, 30 Cans of beer, 0 Shots of liquor, 0 Standard drinks or equivalent per week      Comment: No alcohol  since February, 1987    Drug use: No    Sexual activity: Not Asked     Other Topics Concern    None     Social History Narrative    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     Family History   Problem Relation Age of Onset    Kidney Disease Father     Other Mother      Perforated intestine    No Known Problems Sister     No Known Problems Brother     No Known Problems Sister     No Known Problems Brother     No Known Problems Son      Current Outpatient Prescriptions   Medication Sig Dispense Refill    nabumetone (RELAFEN) 500 MG tablet   0    tamsulosin (FLOMAX) 0.4 MG capsule take 1 capsule by mouth twice a day 180 capsule 3    losartan (COZAAR) 25 MG tablet take 1 tablet by mouth once daily 30 tablet 5    hydrochlorothiazide (HYDRODIURIL) 25 MG tablet take 1 tablet by mouth once daily 30 tablet 5    simvastatin (ZOCOR) 40 MG tablet take 1 tablet by mouth every evening 30 tablet 5    finasteride (PROSCAR) 5 MG tablet Take 1 tablet by mouth daily 90 tablet 3    carvedilol (COREG) 12.5 MG tablet take 1 tablet by mouth twice a day with meals 60 tablet 6    nitroGLYCERIN (NITROSTAT) 0.4 MG SL tablet Place 1 tablet under the tongue every 5 minutes as needed for Chest pain Dissolve 1 tab under tongue at first sign of chest pain every 5 minutes as needed. If pain persists after taking 3 tabs in a 15-minute period, or the pain is different than is typically experienced, call 9-1-1 immediately. 25 tablet 1     No current facility-administered medications for this visit. Ace inhibitors and Lisinopril  reviewed      Review of Systems   Constitutional: Negative for fever. Gastrointestinal: Negative for abdominal distention and abdominal pain. Genitourinary: Negative for dysuria, enuresis, flank pain and hematuria. Objective:   Physical Exam   Constitutional: He appears well-developed and well-nourished. Abdominal: Soft. He exhibits no distension. Neurological: He is alert. Psychiatric: He has a normal mood and affect. His behavior is normal.       9/29/2017  7:17 AM - Paot, Chpo Incoming Lab Results From Soft   Component Results   Component Collected Lab   Urine Culture, Routine 09/27/2017 10:24 AM  - [facility]   No growth 24 hours     10/3/2017  3:44 PM - Laura Barronphy   Component Results   Component Collected Lab   Color, UA 10/03/2017  3:44 PM Unknown   yellow   Clarity, UA 10/03/2017  3:44 PM Unknown   clear   Glucose, UA POC 10/03/2017  3:44 PM Unknown   neg   Bilirubin, UA 10/03/2017  3:44 PM Unknown   neg   Ketones, UA 10/03/2017  3:44 PM Unknown   neg   Spec Grav, UA 10/03/2017  3:44 PM Unknown   1.015   Blood, UA POC 10/03/2017  3:44 PM Unknown   small   pH, UA 10/03/2017  3:44 PM Unknown   6.0   Protein, UA POC 10/03/2017  3:44 PM Unknown   neg   Urobilinogen, UA 10/03/2017  3:44 PM Unknown   0.2   Leukocytes, UA 10/03/2017  3:44 PM Unknown   moderate   Nitrite, UA 10/03/2017  3:44 PM Unknown   neg     Cystoscopy Procedure Note    Pre-operative Diagnosis: difficulty voiding possible stricture    Post-operative Diagnosis: same plus meatal stenosis and bulbous stricture both dilated    Surgeon: Christina Garcia     Assistants: Althea Martines MA    Anesthesia: Local anesthesia topical 2% lidocaine gel    Procedure Details   The risks, benefits, complications, treatment options, and expected outcomes were discussed with the patient. The patient concurred with the proposed plan, giving informed consent. Cystoscopy was performed today under local anesthesia, using sterile technique. The patient was placed in the supine position, prepped and draped in the usual sterile fashion.  A flexible cystoscope was used to inspect

## 2017-10-03 NOTE — MR AVS SNAPSHOT
estimate of body fat, calculated from your height and weight. The higher your BMI, the greater your risk of heart disease, high blood pressure, type 2 diabetes, stroke, gallstones, arthritis, sleep apnea, and certain cancers. BMI is not perfect. It may overestimate body fat in athletes and people who are more muscular. If your body fat is high you can improve your BMI by decreasing your calorie intake and becoming more physically active. Learn more at: [employer].             Medications and Orders      Your Current Medications Are              nabumetone (RELAFEN) 500 MG tablet     tamsulosin (FLOMAX) 0.4 MG capsule take 1 capsule by mouth twice a day    losartan (COZAAR) 25 MG tablet take 1 tablet by mouth once daily    hydrochlorothiazide (HYDRODIURIL) 25 MG tablet take 1 tablet by mouth once daily    simvastatin (ZOCOR) 40 MG tablet take 1 tablet by mouth every evening    finasteride (PROSCAR) 5 MG tablet Take 1 tablet by mouth daily    carvedilol (COREG) 12.5 MG tablet take 1 tablet by mouth twice a day with meals    nitroGLYCERIN (NITROSTAT) 0.4 MG SL tablet Place 1 tablet under the tongue every 5 minutes as needed for Chest pain Dissolve 1 tab under tongue at first sign of chest pain every 5 minutes as needed. If pain persists after taking 3 tabs in a 15-minute period, or the pain is different than is typically experienced, call 9-1-1 immediately.       Allergies              Ace Inhibitors Other (See Comments)    Cough    Lisinopril Other (See Comments)    Cough      We Ordered/Performed the following           POCT Urinalysis No Micro (Auto)     CT CYSTOSCOPY,DIL URETHRAL STRICTURE          Result Summary for POCT Urinalysis No Micro (Auto)      Result Information       Status          Abnormal Final result (Collected: 10/3/2017  3:44 PM)           10/3/2017  3:44 PM      Component Results     Component Value    Color, UA yellow    Clarity, UA clear Glucose, UA POC neg    Bilirubin, UA neg    Ketones, UA neg    Spec Grav, UA 1.015    Blood, UA POC small    pH, UA 6.0    Protein, UA POC neg    Urobilinogen, UA 0.2    Leukocytes, UA moderate    Nitrite, UA neg               Additional Information        Basic Information     Date Of Birth Sex Race Ethnicity Preferred Language    1945 Male White Non-/Non  English      Problem List as of 10/3/2017  Date Reviewed: 10/3/2017                BPH with obstruction/lower urinary tract symptoms    Urinary retention    Actinic keratosis    Hyperlipidemia    Hypertension    BPH (benign prostatic hyperplasia)    Osteoarthritis    Myocardial infarct (HCC)    CAD (coronary artery disease)      Immunizations as of 10/3/2017     Name Date    Influenza, High Dose 9/20/2016, 8/27/2015    Pneumococcal Polysaccharide ([id number]) 5/12/2016, 2/18/2015    Zoster 7/11/2015      Preventive Care        Date Due    Tetanus Combination Vaccine (1 - Tdap) 6/22/1965    Pneumococcal Vaccines (two) for all adults aged 72 and over (2 of 2 - PCV13) 5/12/2017    Yearly Flu Vaccine (1) 9/1/2017    Cholesterol Screening 5/12/2022    Colonoscopy 3/23/2025            [employer] Signup           Our records indicate that you have an active [employer] account. You can view your After Visit Summary by going to [email or web address] and logging in with your [employer] username and password. If you don't have a [employer] username and password but a parent or guardian has access to your record, the parent or guardian should login with their own [employer] username and password and access your record to view the After Visit Summary. Additional Information  If you have questions, please contact the physician practice where you receive care. Remember, [employer] is NOT to be used for urgent needs. For medical emergencies, dial 911. For questions regarding your [employer] account call [phone].  If you

## 2017-10-09 DIAGNOSIS — M17.11 PRIMARY OSTEOARTHRITIS OF RIGHT KNEE: ICD-10-CM

## 2017-10-09 DIAGNOSIS — M25.551 RIGHT HIP PAIN: ICD-10-CM

## 2017-10-09 DIAGNOSIS — G89.29 CHRONIC PAIN OF RIGHT KNEE: ICD-10-CM

## 2017-10-09 DIAGNOSIS — M25.561 CHRONIC PAIN OF RIGHT KNEE: ICD-10-CM

## 2017-10-09 RX ORDER — CARVEDILOL 12.5 MG/1
TABLET ORAL
Qty: 60 TABLET | Refills: 6 | Status: SHIPPED | OUTPATIENT
Start: 2017-10-09 | End: 2018-05-09 | Stop reason: SDUPTHER

## 2017-10-09 RX ORDER — NABUMETONE 500 MG/1
TABLET, FILM COATED ORAL
Qty: 60 TABLET | Refills: 3 | Status: SHIPPED | OUTPATIENT
Start: 2017-10-09 | End: 2017-10-20

## 2017-10-20 ENCOUNTER — OFFICE VISIT (OUTPATIENT)
Dept: FAMILY MEDICINE CLINIC | Age: 72
End: 2017-10-20

## 2017-10-20 VITALS
HEART RATE: 65 BPM | BODY MASS INDEX: 30.96 KG/M2 | SYSTOLIC BLOOD PRESSURE: 130 MMHG | WEIGHT: 209 LBS | DIASTOLIC BLOOD PRESSURE: 69 MMHG | HEIGHT: 69 IN | OXYGEN SATURATION: 96 %

## 2017-10-20 DIAGNOSIS — G89.29 CHRONIC PAIN OF RIGHT KNEE: ICD-10-CM

## 2017-10-20 DIAGNOSIS — R06.81 WITNESSED APNEIC SPELLS: Primary | ICD-10-CM

## 2017-10-20 DIAGNOSIS — M19.90 OSTEOARTHRITIS, UNSPECIFIED OSTEOARTHRITIS TYPE, UNSPECIFIED SITE: ICD-10-CM

## 2017-10-20 DIAGNOSIS — M25.561 CHRONIC PAIN OF RIGHT KNEE: ICD-10-CM

## 2017-10-20 PROCEDURE — 99213 OFFICE O/P EST LOW 20 MIN: CPT | Performed by: FAMILY MEDICINE

## 2017-10-20 PROCEDURE — 20610 DRAIN/INJ JOINT/BURSA W/O US: CPT | Performed by: FAMILY MEDICINE

## 2017-10-20 RX ORDER — TRIAMCINOLONE ACETONIDE 40 MG/ML
40 INJECTION, SUSPENSION INTRA-ARTICULAR; INTRAMUSCULAR ONCE
Status: COMPLETED | OUTPATIENT
Start: 2017-10-20 | End: 2017-10-20

## 2017-10-20 RX ORDER — ETODOLAC 400 MG/1
400 TABLET, FILM COATED ORAL 2 TIMES DAILY
Qty: 60 TABLET | Refills: 3 | Status: SHIPPED | OUTPATIENT
Start: 2017-10-20 | End: 2017-12-14

## 2017-10-20 RX ADMIN — TRIAMCINOLONE ACETONIDE 40 MG: 40 INJECTION, SUSPENSION INTRA-ARTICULAR; INTRAMUSCULAR at 15:27

## 2017-10-20 NOTE — PROGRESS NOTES
Subjective:     Chief Complaint   Patient presents with    Knee Pain     right knee pain, x 1.5 year, corstisone wearing off     Fatigue     sleep apnea, wife states he stops breathing at night        Zahra Ochoa is a 71 y.o. male who presents with knee pain involving the right knee. Also has been having snoring and witnessed apeneic episodes    Some SOB and feels heart pounding at night sometimes    Patient's medications, allergies, past medical, surgical, social and family histories were reviewed and updated as appropriate. Review of Systems  Otherwise physically feels healthy without sob/palpitations/chest pain/f/c/n/v/weight gain/weight loss/abd pain      Objective:     Physical Exam:  /69 (Site: Left Arm)   Pulse 65   Ht 5' 9\" (1.753 m)   Wt 209 lb (94.8 kg)   SpO2 96%   BMI 30.86 kg/m²     Gen: Well, NAD, Alert, Oriented x 3   HEENT: EOMI, eyes clear, MMM  Skin: without rash or jaundice  Neck: no significant lymphadenopathy or thyromegaly  Lungs: CTA B w/out Rales/Wheezes/Rhonchi, Good respiratory effort   Heart: RRR, S1S2, w/out M/R/G, non-displaced PMI            Assessment:     1. Witnessed apneic spells     2. Chronic pain of right knee  External Referral - Ivon Rice MD - Joint Replacement, Arthroscopic Surgery, Sports Med    32868 - [place] DRAIN/INJECT LARGE JOINT/BURSA    triamcinolone acetonide (KENALOG-40) injection 40 mg   3. Osteoarthritis, unspecified osteoarthritis type, unspecified site  External Referral - Ivon Rice MD - Joint Replacement, Arthroscopic Surgery, Sports Med    18835 - [place] DRAIN/INJECT LARGE JOINT/BURSA    triamcinolone acetonide (KENALOG-40) injection 40 mg        Plan:      Rest, ice, compression, and elevation (RICE) therapy. Reduction in offending activity. OTC analgesics as needed. Arthrocentesis. See procedure note.       Injection Procedure Note    Pre-operative Diagnosis:  knee DJD with pain    Post-operative Diagnosis:

## 2017-11-13 RX ORDER — SIMVASTATIN 40 MG
TABLET ORAL
Qty: 30 TABLET | Refills: 5 | Status: SHIPPED | OUTPATIENT
Start: 2017-11-13 | End: 2018-05-09 | Stop reason: SDUPTHER

## 2017-11-13 RX ORDER — LOSARTAN POTASSIUM 25 MG/1
TABLET ORAL
Qty: 30 TABLET | Refills: 5 | Status: SHIPPED | OUTPATIENT
Start: 2017-11-13 | End: 2018-05-09 | Stop reason: SDUPTHER

## 2017-11-13 RX ORDER — HYDROCHLOROTHIAZIDE 25 MG/1
TABLET ORAL
Qty: 30 TABLET | Refills: 5 | Status: SHIPPED | OUTPATIENT
Start: 2017-11-13 | End: 2018-05-09 | Stop reason: SDUPTHER

## 2017-11-28 ENCOUNTER — OFFICE VISIT (OUTPATIENT)
Dept: UROLOGY | Age: 72
End: 2017-11-28

## 2017-11-28 VITALS
DIASTOLIC BLOOD PRESSURE: 76 MMHG | SYSTOLIC BLOOD PRESSURE: 120 MMHG | BODY MASS INDEX: 30.31 KG/M2 | WEIGHT: 200 LBS | HEIGHT: 68 IN | HEART RATE: 71 BPM

## 2017-11-28 DIAGNOSIS — N13.8 BPH WITH OBSTRUCTION/LOWER URINARY TRACT SYMPTOMS: Primary | ICD-10-CM

## 2017-11-28 DIAGNOSIS — N40.1 BPH WITH OBSTRUCTION/LOWER URINARY TRACT SYMPTOMS: Primary | ICD-10-CM

## 2017-11-28 PROCEDURE — 99213 OFFICE O/P EST LOW 20 MIN: CPT | Performed by: UROLOGY

## 2017-11-28 PROCEDURE — 51798 US URINE CAPACITY MEASURE: CPT | Performed by: UROLOGY

## 2017-11-28 PROCEDURE — 51741 ELECTRO-UROFLOWMETRY FIRST: CPT | Performed by: UROLOGY

## 2017-11-28 ASSESSMENT — ENCOUNTER SYMPTOMS
ABDOMINAL PAIN: 0
ABDOMINAL DISTENTION: 0

## 2017-11-28 NOTE — PROGRESS NOTES
Subjective:      Patient ID: Chen Kwon is a 71 y.o. male. HPI   This is a 70 yo male with h/o HTN, OA, CAD/MI/ASA (Dr Isis Adhikari), and BPH with LUTs (flomax BID and Proscar) and 3 prior episodes of urinary retention back in follow-up after Laser TURP on 7/17/17. Since last seen on 10/3/17, he feels the flow has stabilized with intermittent use of CIC and is having less difficult placing the catheter. He is now doing this every 2 weeks. He has no hematuria or UTI's. When last seen he had cystoscopy that confirmed meatal stenosis and bulbous stricture both dilated at the time of cystoscopy. He has no incontinence or dysuria or pain. He has no interval medical or surgical problems.        Past Medical History:   Diagnosis Date    Actinic keratosis     BPH (benign prostatic hyperplasia)     CAD (coronary artery disease) 2012    Heart attack     Hyperlipidemia     Hypertension     Myocardial infarct 11/2012    Osteoarthritis     Status post coronary angioplasty      Past Surgical History:   Procedure Laterality Date    CARPAL TUNNEL RELEASE Bilateral 2015    COLONOSCOPY  3/23/15        CORONARY ANGIOPLASTY WITH STENT PLACEMENT  2012    CYSTOSCOPY  05/17/2017    W/COMPLEX CYSTOMETROGRAM-COMPLEX UROFLOW-TRANSRECTAL US PROSTATE    JOINT REPLACEMENT Left     ROTATOR CUFF REPAIR Right     twice    SHOULDER SURGERY Right     TURP N/A 7/17/2017    GREEN LIGHT LASER TRANSURETHRAL RESECTIOIN PROSTATE performed by Ellyn Kramer MD at [address] [place] [address] PROSTATE/TRANSRECTAL  06/03/15    Cystoscop, cath rem, US prostate     Social History     Social History    Marital status:      Spouse name: N/A    Number of children: N/A    Years of education: N/A     Social History Main Topics    Smoking status: Former Smoker     Packs/day: 1.00     Years: 10.00     Start date: 6/22/1961     Quit date: 3/17/1967    Smokeless tobacco: Never Used      Comment: Quit for [employer]  Alcohol use Yes     30 Cans of beer per week      Comment: No alcohol  since February, 1987    Drug use: No    Sexual activity: Not Asked     Other Topics Concern    None     Social History Narrative    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     Family History   Problem Relation Age of Onset    Kidney Disease Father     Other Mother      Perforated intestine    No Known Problems Sister     No Known Problems Brother     No Known Problems Sister     No Known Problems Brother     No Known Problems Son      Current Outpatient Prescriptions   Medication Sig Dispense Refill    losartan (COZAAR) 25 MG tablet take 1 tablet by mouth once daily 30 tablet 5    simvastatin (ZOCOR) 40 MG tablet take 1 tablet by mouth every evening 30 tablet 5    hydrochlorothiazide (HYDRODIURIL) 25 MG tablet take 1 tablet by mouth once daily 30 tablet 5    etodolac (LODINE) 400 MG tablet Take 1 tablet by mouth 2 times daily 60 tablet 3    carvedilol (COREG) 12.5 MG tablet take 1 tablet by mouth twice a day with food 60 tablet 6    tamsulosin (FLOMAX) 0.4 MG capsule take 1 capsule by mouth twice a day 180 capsule 3    finasteride (PROSCAR) 5 MG tablet Take 1 tablet by mouth daily 90 tablet 3    nitroGLYCERIN (NITROSTAT) 0.4 MG SL tablet Place 1 tablet under the tongue every 5 minutes as needed for Chest pain Dissolve 1 tab under tongue at first sign of chest pain every 5 minutes as needed. If pain persists after taking 3 tabs in a 15-minute period, or the pain is different than is typically experienced, call 9-1-1 immediately. 25 tablet 1     No current facility-administered medications for this visit. Ace inhibitors and Lisinopril  reviewed    Review of Systems   Constitutional: Negative for fever and unexpected weight change. Gastrointestinal: Negative for abdominal distention and abdominal pain. Genitourinary: Negative for dysuria, enuresis, flank pain and hematuria.        Objective:   Physical Exam

## 2017-12-04 ENCOUNTER — OFFICE VISIT (OUTPATIENT)
Dept: FAMILY MEDICINE CLINIC | Age: 72
End: 2017-12-04

## 2017-12-04 VITALS
BODY MASS INDEX: 31.25 KG/M2 | HEART RATE: 69 BPM | SYSTOLIC BLOOD PRESSURE: 126 MMHG | DIASTOLIC BLOOD PRESSURE: 72 MMHG | WEIGHT: 211 LBS | HEIGHT: 69 IN | OXYGEN SATURATION: 96 %

## 2017-12-04 DIAGNOSIS — C44.519 BASAL CELL CARCINOMA, TRUNK: ICD-10-CM

## 2017-12-04 DIAGNOSIS — L57.0 ACTINIC KERATOSIS: Primary | ICD-10-CM

## 2017-12-04 PROCEDURE — 17000 DESTRUCT PREMALG LESION: CPT | Performed by: FAMILY MEDICINE

## 2017-12-04 PROCEDURE — 17003 DESTRUCT PREMALG LES 2-14: CPT | Performed by: FAMILY MEDICINE

## 2017-12-04 ASSESSMENT — ENCOUNTER SYMPTOMS
SHORTNESS OF BREATH: 0
ABDOMINAL PAIN: 0

## 2017-12-04 NOTE — PROGRESS NOTES
Subjective  Erica Ar, 71 y.o. male presents today with:  Chief Complaint   Patient presents with    Lesion(s)    1 Year Follow Up       HPI    Pt of Dr. Zhou Samples here for 1 year  f/u on AK's. Had LN2 treatment of AK's in the past. Denies personal/family h/o skin cancer. Also spot on left upper back. Review of Systems   Constitutional: Negative for fever. Respiratory: Negative for shortness of breath. Cardiovascular: Negative for chest pain. Gastrointestinal: Negative for abdominal pain. Skin: Negative for rash. Past Medical History:   Diagnosis Date    Actinic keratosis     Basal cell carcinoma, trunk 12/2017    left upper back    BPH (benign prostatic hyperplasia)     CAD (coronary artery disease) 2012    Heart attack     Hyperlipidemia     Hypertension     Myocardial infarct 11/2012    Osteoarthritis     Status post coronary angioplasty      Past Surgical History:   Procedure Laterality Date    CARPAL TUNNEL RELEASE Bilateral 2015    COLONOSCOPY  3/23/15        CORONARY ANGIOPLASTY WITH STENT PLACEMENT  2012    CYSTOSCOPY  05/17/2017    W/COMPLEX CYSTOMETROGRAM-COMPLEX UROFLOW-TRANSRECTAL US PROSTATE    JOINT REPLACEMENT Left     ROTATOR CUFF REPAIR Right     twice    SHOULDER SURGERY Right     TURP N/A 7/17/2017    GREEN LIGHT LASER TRANSURETHRAL RESECTIOIN PROSTATE performed by Oli Ballard MD at [address] [place] [address] PROSTATE/TRANSRECTAL  06/03/15    Cystoscop, cath rem, US prostate     Social History     Social History    Marital status:      Spouse name: N/A    Number of children: N/A    Years of education: N/A     Occupational History    Not on file.      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     Family History   Problem Relation Age of Onset    Kidney Disease Father     Other Mother      Perforated intestine    No Known Problems Sister     No Known Problems Brother     No Known Problems Sister     No Known Problems Brother     No Known Problems Son      Allergies   Allergen Reactions    Ace Inhibitors Other (See Comments)     Cough      Lisinopril Other (See Comments)     Cough       Current Outpatient Prescriptions   Medication Sig Dispense Refill    aspirin 81 MG tablet Take 81 mg by mouth daily      losartan (COZAAR) 25 MG tablet take 1 tablet by mouth once daily 30 tablet 5    simvastatin (ZOCOR) 40 MG tablet take 1 tablet by mouth every evening 30 tablet 5    hydrochlorothiazide (HYDRODIURIL) 25 MG tablet take 1 tablet by mouth once daily 30 tablet 5    carvedilol (COREG) 12.5 MG tablet take 1 tablet by mouth twice a day with food 60 tablet 6    tamsulosin (FLOMAX) 0.4 MG capsule take 1 capsule by mouth twice a day 180 capsule 3    finasteride (PROSCAR) 5 MG tablet Take 1 tablet by mouth daily 90 tablet 3    nitroGLYCERIN (NITROSTAT) 0.4 MG SL tablet Place 1 tablet under the tongue every 5 minutes as needed for Chest pain Dissolve 1 tab under tongue at first sign of chest pain every 5 minutes as needed. If pain persists after taking 3 tabs in a 15-minute period, or the pain is different than is typically experienced, call 9-1-1 immediately. 25 tablet 1    etodolac (LODINE) 400 MG tablet Take 1 tablet by mouth 2 times daily 60 tablet 3     No current facility-administered medications for this visit.         Objective    Vitals:    12/04/17 0803   BP: 126/72   Pulse: 69   SpO2: 96%   Weight: 211 lb (95.7 kg)   Height: 5' 9\" (1.753 m)       Physical Exam   Constitutional: He appears well-developed and well-nourished. HENT:   Head: Normocephalic and atraumatic. Skin: Skin is warm and dry. Erythematous scaly papules in the following locations:  Scalp x 11, right earlobe  X 1, left temple x 2, left cheek x 2, left temple x 4, left cheek x 3. Left upper back with 0.8 cm pink scaly papule       Assessment & Plan   1. Actinic keratosis  61094 - GA DESTRUC PREMALIGNANT,2-14 LESIONS    CANCELED: 00764 - GA DESTRUCTION BENIGN LESIONS UP TO 14   2. Basal cell carcinoma, trunk  Specimen to Pathology Outpatient     LN2 times 3 seconds to affected areas times 23 lesion(s). Informed consent given was given. Risks including infection, bleeding, scarring discussed. No guarantee how scars will look. Post op instructions given. Best to leave blisters alone if they form. If blister(s) pop or patient pops with a sterilized needed, apply antibiotic ointment and a bandage to affected area(s). Left upper back excision, r/o BCC. 1% Lidocaine with epi used for anesthesia. A 15 blade scalpel was used to remove the lesion. A 0.3 cm margin surrounded the entire lesion creating a total surgical size of 1.4 cm in diameter. Hyfercation at the base of the lesion. Informed consent given by the patient. Risks including infection, bleeding and scarring were discussed with the patient. No guarantee how the scar will look. Post op instructions were given. The patient will clean the area daily with antibacterial soap and water. Apply vaseline or antibiotic ointment and a clean bandage daily for 2 weeks. The use of sunscreen with an SPF of 30 or higher was discussed and recommended.      Orders Placed This Encounter   Procedures    Specimen to Pathology Outpatient     Excision left upper back  Margins requested on all specimens  R/o SCC     Order Specific Question:   PREVIOUS BIOPSY     Answer:   No     Order Specific Question:   PREOP DIAGNOSIS     Answer:   r/o SCC     Order Specific Question:

## 2017-12-09 PROBLEM — C44.519 BASAL CELL CARCINOMA, TRUNK: Status: ACTIVE | Noted: 2017-12-01

## 2017-12-14 ENCOUNTER — OFFICE VISIT (OUTPATIENT)
Dept: FAMILY MEDICINE CLINIC | Age: 72
End: 2017-12-14

## 2017-12-14 VITALS
BODY MASS INDEX: 30.96 KG/M2 | HEIGHT: 69 IN | DIASTOLIC BLOOD PRESSURE: 80 MMHG | OXYGEN SATURATION: 96 % | WEIGHT: 209 LBS | SYSTOLIC BLOOD PRESSURE: 134 MMHG | HEART RATE: 68 BPM

## 2017-12-14 DIAGNOSIS — Z81.8 FAMILY HISTORY OF DEMENTIA: ICD-10-CM

## 2017-12-14 DIAGNOSIS — R41.3 MEMORY LOSS: Primary | ICD-10-CM

## 2017-12-14 DIAGNOSIS — R41.3 MEMORY LOSS: ICD-10-CM

## 2017-12-14 PROCEDURE — 99213 OFFICE O/P EST LOW 20 MIN: CPT | Performed by: FAMILY MEDICINE

## 2017-12-14 RX ORDER — ETODOLAC 400 MG/1
400 TABLET, FILM COATED ORAL 2 TIMES DAILY
Qty: 60 TABLET | Refills: 3 | Status: SHIPPED | OUTPATIENT
Start: 2017-12-14 | End: 2018-05-17 | Stop reason: SDUPTHER

## 2017-12-14 RX ORDER — DONEPEZIL HYDROCHLORIDE 5 MG/1
5 TABLET, FILM COATED ORAL NIGHTLY
Qty: 30 TABLET | Refills: 3 | Status: SHIPPED | OUTPATIENT
Start: 2017-12-14 | End: 2018-02-27 | Stop reason: ALTCHOICE

## 2017-12-14 NOTE — PROGRESS NOTES
Chief Complaint   Patient presents with    Memory Loss     has been getting more forgetful, mother had dementia, started to notice 2 years ago       HPI:  Lisbeth Valencia is a 71 y.o. 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    He describes the situations well and in great detail and doesn't report any worrisome behaviors or agitation      Past Medical History:   Diagnosis Date    Actinic keratosis     Basal cell carcinoma, trunk 12/2017    left upper back    BPH (benign prostatic hyperplasia)     CAD (coronary artery disease) 2012    Heart attack     Hyperlipidemia     Hypertension     Myocardial infarct 11/2012    Osteoarthritis     Status post coronary angioplasty      Past Surgical History:   Procedure Laterality Date    CARPAL TUNNEL RELEASE Bilateral 2015    COLONOSCOPY  3/23/15        CORONARY ANGIOPLASTY WITH STENT PLACEMENT  2012    CYSTOSCOPY  05/17/2017    W/COMPLEX CYSTOMETROGRAM-COMPLEX UROFLOW-TRANSRECTAL US PROSTATE    JOINT REPLACEMENT Left     ROTATOR CUFF REPAIR Right     twice    SHOULDER SURGERY Right     TURP N/A 7/17/2017    GREEN LIGHT LASER TRANSURETHRAL RESECTIOIN PROSTATE performed by Bruce Nguyen MD at [address] [place] [address] PROSTATE/TRANSRECTAL  06/03/15    Cystoscop, cath rem, US prostate     Family History   Problem Relation Age of Onset    Kidney Disease Father     Other Mother      Perforated intestine    No Known Problems Sister     No Known Problems Brother     No Known Problems Sister     No Known Problems Brother     No Known Problems Son      Social History     Social History    Marital status:      Spouse name: N/A    Number of children: N/A    Years of education: N/A     Social History Main Topics    Smoking status: Former Smoker     Packs/day: 1.00     Years: 10.00 Start date: 6/22/1961     Quit date: 3/17/1967    Smokeless tobacco: Never Used      Comment: Quit for [email or web address]    Alcohol use Yes     30 Cans of beer per week      Comment: No alcohol  since February, 1987    Drug use: No    Sexual activity: Not Asked     Other Topics Concern    None     Social History Narrative    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     Current Outpatient Prescriptions   Medication Sig Dispense Refill    etodolac (LODINE) 400 MG tablet Take 1 tablet by mouth 2 times daily 60 tablet 3    aspirin 81 MG tablet Take 81 mg by mouth daily      losartan (COZAAR) 25 MG tablet take 1 tablet by mouth once daily 30 tablet 5    simvastatin (ZOCOR) 40 MG tablet take 1 tablet by mouth every evening 30 tablet 5    hydrochlorothiazide (HYDRODIURIL) 25 MG tablet take 1 tablet by mouth once daily 30 tablet 5    carvedilol (COREG) 12.5 MG tablet take 1 tablet by mouth twice a day with food 60 tablet 6    tamsulosin (FLOMAX) 0.4 MG capsule take 1 capsule by mouth twice a day 180 capsule 3    finasteride (PROSCAR) 5 MG tablet Take 1 tablet by mouth daily 90 tablet 3    nitroGLYCERIN (NITROSTAT) 0.4 MG SL tablet Place 1 tablet under the tongue every 5 minutes as needed for Chest pain Dissolve 1 tab under tongue at first sign of chest pain every 5 minutes as needed. If pain persists after taking 3 tabs in a 15-minute period, or the pain is different than is typically experienced, call 9-1-1 immediately. 25 tablet 1     No current facility-administered medications for this visit.       Allergies   Allergen Reactions    Ace Inhibitors Other (See Comments)     Cough      Lisinopril Other (See Comments)     Cough         Review of Systems:   General ROS: some fatigue negative for - chills,fever, malaise, weight gain or weight loss  Respiratory ROS: no cough, shortness of breath, or wheezing  Cardiovascular ROS: no chest pain or dyspnea on exertion  Gastrointestinal ROS: no abdominal

## 2017-12-15 DIAGNOSIS — E03.9 HYPOTHYROIDISM, UNSPECIFIED TYPE: Primary | ICD-10-CM

## 2017-12-15 LAB
C-REACTIVE PROTEIN: 1.9 MG/L (ref 0–5)
TSH SERPL DL<=0.05 MIU/L-ACNC: 5.25 UIU/ML (ref 0.27–4.2)
VITAMIN B-12: 441 PG/ML (ref 211–946)

## 2017-12-15 RX ORDER — LEVOTHYROXINE SODIUM 0.05 MG/1
50 TABLET ORAL DAILY
Qty: 30 TABLET | Refills: 3 | Status: SHIPPED | OUTPATIENT
Start: 2017-12-15 | End: 2018-04-10 | Stop reason: SDUPTHER

## 2017-12-17 LAB — RPR: NORMAL

## 2017-12-22 ENCOUNTER — OFFICE VISIT (OUTPATIENT)
Dept: CARDIOLOGY | Age: 72
End: 2017-12-22

## 2017-12-22 VITALS
HEIGHT: 69 IN | HEART RATE: 64 BPM | OXYGEN SATURATION: 96 % | BODY MASS INDEX: 31.84 KG/M2 | SYSTOLIC BLOOD PRESSURE: 130 MMHG | WEIGHT: 215 LBS | DIASTOLIC BLOOD PRESSURE: 80 MMHG

## 2017-12-22 DIAGNOSIS — E66.3 OVERWEIGHT: ICD-10-CM

## 2017-12-22 DIAGNOSIS — E78.5 HYPERLIPIDEMIA, UNSPECIFIED HYPERLIPIDEMIA TYPE: Primary | ICD-10-CM

## 2017-12-22 DIAGNOSIS — I25.10 CORONARY ARTERY DISEASE INVOLVING NATIVE CORONARY ARTERY OF NATIVE HEART WITHOUT ANGINA PECTORIS: ICD-10-CM

## 2017-12-22 DIAGNOSIS — I10 ESSENTIAL HYPERTENSION: ICD-10-CM

## 2017-12-22 PROCEDURE — 99213 OFFICE O/P EST LOW 20 MIN: CPT | Performed by: INTERNAL MEDICINE

## 2017-12-22 NOTE — PROGRESS NOTES
Chief Complaint   Patient presents with    Coronary Artery Disease    Hypertension    Hyperlipidemia       3-20-15: Patient presents for initial medical evaluation. Patient is followed on a regular basis by Dr. Jeremy Cobb MD. Hx of MI/CAD in 2012 in [facility] s/p PCIx 4 stents. Does feel cold at times and numbness in hands at times. Pt denies chest pain, dyspnea, dyspnea on exertion, change in exercise capacity,  nausea, vomiting, diarrhea, constipation, motor weakness, insomnia, weight loss, syncope, dizziness, lightheadedness, palpitations, PND, orthopnea. No LE ulcers or discoloration. 4-24-15: as above, s/p echo with EF of 60%, mild MR, no PTHN. States he feels tired at times. Pt denies chest pain, dyspnea, dyspnea on exertion, change in exercise capacity, nausea, vomiting, diarrhea, constipation, motor weakness, insomnia, weight loss, syncope, dizziness, lightheadedness, palpitations, PND, orthopnea, or claudication. 10-30-15: as above, doing well overall. Pt denies chest pain, dyspnea, dyspnea on exertion, change in exercise capacity, fatigue,  nausea, vomiting, diarrhea, constipation, motor weakness, insomnia, weight loss, syncope, dizziness, lightheadedness, palpitations, PND, orthopnea. BP is under control. Did have a cough with Lisinopril. Doing fine with losartan. No bleeding issues. Having some low back pain. Lipid profile in normal range. 6-20-17: Pt denies chest pain, dyspnea, dyspnea on exertion, change in exercise capacity, fatigue,  nausea, vomiting, diarrhea, constipation, motor weakness, insomnia, weight loss, syncope, dizziness, lightheadedness, palpitations, PND, orthopnea, or claudication. No nitro use. BP and hr are good. CAD is stable. No LE discoloration or ulcers. No LE edema. No CHF type symptoms. Lipid profile is normal.  Needs pre op clearance for urological surgery with dr. Fred Murray. Compliant with meds. Not having any symptoms with doing heavy yard work.        12-22-17: involving native coronary artery of native heart without angina pectoris     4. Overweight           PLAN:     Patient will need to continue to follow up with you for their general medical care     As always, aggressive risk factor modification is strongly recommended. We should adhere to the [address] VII guidelines for HTN management and the NCEP ATP III guidelines for LDL-C management. Cardiac diet is always recommended with low fat, cholesterol, calories and sodium. Continue medications at current doses. Patient is an intermediate risk patient for the proposed procedure/surgery. No active cardiac conditions such as ischemia, CHF or arrhythmias. Recomment peripoperative BBlocker, GI/DVT prophylaxis. Patient was advised and encouraged to check blood pressure at home or at a pharmacy, maintain a logbook, and also call us back if blood pressure are above the target ranges or if it is low. Patient clearly understands and agrees to the instructions. We will need to continue to monitor muscle and liver enzymes, BUN, CR, and electrolytes. Details of medical condition explained and patient was warned about adverse consequences of uncontrolled medical conditions and possible side effects of prescribed medications. Patient was advised to go to the ER if he starts experiencing adverse effects of the medications. patient was instructed to call us back or go to nearby emergency room immediately if symptoms get worse or do not improve. Thank you for allowing me to participate in the care of your patient, please don't hesitate to contact me if you have any further questions.

## 2018-02-01 ENCOUNTER — OFFICE VISIT (OUTPATIENT)
Dept: FAMILY MEDICINE CLINIC | Age: 73
End: 2018-02-01
Payer: MEDICARE

## 2018-02-01 VITALS
OXYGEN SATURATION: 98 % | HEART RATE: 77 BPM | HEIGHT: 69 IN | TEMPERATURE: 97.3 F | DIASTOLIC BLOOD PRESSURE: 72 MMHG | WEIGHT: 216 LBS | SYSTOLIC BLOOD PRESSURE: 122 MMHG | RESPIRATION RATE: 18 BRPM | BODY MASS INDEX: 31.99 KG/M2

## 2018-02-01 DIAGNOSIS — L03.031 PARONYCHIA OF GREAT TOE, RIGHT: Primary | ICD-10-CM

## 2018-02-01 PROCEDURE — 99213 OFFICE O/P EST LOW 20 MIN: CPT | Performed by: NURSE PRACTITIONER

## 2018-02-01 RX ORDER — DICLOXACILLIN SODIUM 250 MG/1
250 CAPSULE ORAL 4 TIMES DAILY
Qty: 28 CAPSULE | Refills: 0 | Status: SHIPPED | OUTPATIENT
Start: 2018-02-01 | End: 2018-02-08

## 2018-02-01 NOTE — PROGRESS NOTES
MD at [address] [place] [address] PROSTATE/TRANSRECTAL  06/03/15    Cystoscop, cath rem, US prostate     Social History     Social History    Marital status:      Spouse name: N/A    Number of children: N/A    Years of education: N/A     Occupational History    Not on file. 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  1 Tube 0    dicloxacillin (DYNAPEN) 250 MG capsule Take 1 capsule by mouth 4 times daily for 7 days 28 capsule 0    levothyroxine (SYNTHROID) 50 MCG tablet Take 1 tablet by mouth Daily 30 tablet 3    etodolac (LODINE) 400 MG tablet Take 1 tablet by mouth 2 times daily 60 tablet 3    donepezil (ARICEPT) 5 MG tablet Take 1 tablet by mouth nightly 30 tablet 3    aspirin 81 MG tablet Take 81 mg by mouth daily      losartan (COZAAR) 25 MG tablet take 1 tablet by mouth once daily 30 tablet 5    simvastatin (ZOCOR) 40 MG tablet take 1 tablet by mouth every evening 30 tablet 5    hydrochlorothiazide (HYDRODIURIL) 25 Dispense:  1 Tube     Refill:  0    dicloxacillin (DYNAPEN) 250 MG capsule     Sig: Take 1 capsule by mouth 4 times daily for 7 days     Dispense:  28 capsule     Refill:  0     There are no discontinued medications. Return in about 1 week (around 2/8/2018), or if symptoms worsen or fail to improve. Reviewed with the patient: current clinical status, medications, activities and diet. Side effects, adverse effects of the medication prescribed today, as well as treatment plan/ rationale and result expectations have been discussed with the patient who expresses understanding and desires to proceed. Close follow up to evaluate treatment results and for coordination of care. I have reviewed the patient's medical history in detail and updated the computerized patient record.     Layman Dakins, NP

## 2018-02-06 ENCOUNTER — OFFICE VISIT (OUTPATIENT)
Dept: FAMILY MEDICINE CLINIC | Age: 73
End: 2018-02-06
Payer: MEDICARE

## 2018-02-06 VITALS
SYSTOLIC BLOOD PRESSURE: 138 MMHG | WEIGHT: 218 LBS | HEIGHT: 69 IN | BODY MASS INDEX: 32.29 KG/M2 | OXYGEN SATURATION: 93 % | HEART RATE: 73 BPM | DIASTOLIC BLOOD PRESSURE: 52 MMHG

## 2018-02-06 DIAGNOSIS — L57.0 ACTINIC KERATOSIS: ICD-10-CM

## 2018-02-06 DIAGNOSIS — C44.519 BASAL CELL CARCINOMA, TRUNK: Primary | ICD-10-CM

## 2018-02-06 PROCEDURE — 99212 OFFICE O/P EST SF 10 MIN: CPT | Performed by: FAMILY MEDICINE

## 2018-02-06 PROCEDURE — 17004 DESTROY PREMAL LESIONS 15/>: CPT | Performed by: FAMILY MEDICINE

## 2018-02-06 ASSESSMENT — ENCOUNTER SYMPTOMS
SHORTNESS OF BREATH: 0
ABDOMINAL PAIN: 0

## 2018-02-06 NOTE — PATIENT INSTRUCTIONS
For new scars, use mederma or vitamin E capsules broken open to massage scar daily for one month. Do not get a sun burn on a new scar. Patient Education   Patient Education     The use of sunscreen with an SPF of 30 or higher was discussed and recommended. If you have a history of precancers or skin cancer, you should be seen AT LEAST once yearly for a skin check or sooner if recommended by your doctor. You should follow up immediately for any new or changing lesions. Instructions after treatment with liquid nitrogen. Clean the area(s) once daily with antibacterial soap and water. Keep the area(s) moist with vaseline or polysporin. If the area(s) blister, it is best to leave them alone. However, you may take a sterilzed needle and pop the blister or if the blister pops on its own, apply polysporin and cover with a bandage. Change the bandage daily. Learning About Basal Cell Skin Cancer  Your Care Instructions    Basal cell skin cancer (carcinoma) is a type of skin cancer. It most often appears on areas of the body that have been exposed to the sun. These areas include the head, face, neck, back, chest, or shoulders. The nose is the most common site. This cancer grows slowly and does not usually spread, or metastasize, to other parts of the body. It is almost always cured when it is found early and treated. This skin cancer is usually caused by too much sun. Using tanning beds or sunlamps can also cause it. What are the symptoms? Signs of basal cell carcinoma include:  · Any firm, pearly bump with tiny blood vessels that look spidery. · Any red, tender, flat spot that bleeds easily. · Any small, fleshy bump with a smooth, pearly appearance. It may have a sunken center. · Any smooth, shiny bump that may look like a mole or cyst.  · Any patch of skin, especially on the face, that looks like a scar and is firm to the touch.   · Any bump that itches, bleeds, crusts over, and then repeats the cycle growth or mole that:  ¨ Grows bigger. This may happen slowly. ¨ Changes color. ¨ Changes shape. ¨ Starts to bleed easily. Follow-up care is a key part of your treatment and safety. Be sure to make and go to all appointments, and call your doctor if you are having problems. It's also a good idea to know your test results and keep a list of the medicines you take. Where can you learn more? Go to https://[employer] Evianpepiceweb.Spitog[employer].gr. org and sign in to your [employer] account. Enter [phone] in the mGenerator box to learn more about \"Learning About Basal Cell Skin Cancer. \"     If you do not have an account, please click on the \"Sign Up Now\" link. Current as of: May 12, 2017  Content Version: 11.5  © 4268-1367 [employer]. Care instructions adapted under license by [facility] ([facility]). If you have questions about a medical condition or this instruction, always ask your healthcare professional. [place] [address] any warranty or liability for your use of this information. Actinic Keratosis: Care Instructions  Your Care Instructions  Actinic keratosis is a skin growth caused by sun damage. It can turn into skin cancer, but this isn't common. Actinic keratoses, also called solar keratoses, are small red, brown, or skin-colored scaly patches. They are most common on the face, neck, hands, and forearms. Your doctor can remove these growths by freezing or scraping them off or by putting medicines on them. Follow-up care is a key part of your treatment and safety. Be sure to make and go to all appointments, and call your doctor if you are having problems. It's also a good idea to know your test results and keep a list of the medicines you take. How can you care for yourself at home? · If your doctor told you how to care for your cut (incision), follow your doctor's instructions.  If you did not get instructions, follow this general advice:  ¨ Wash around the incision with clean WDL

## 2018-02-16 LAB
FOLATE: 6.3 NG/ML (ref 7.3–26.1)
HBA1C MFR BLD: 5.6 % (ref 4.8–5.9)
TSH SERPL DL<=0.05 MIU/L-ACNC: 3.08 UIU/ML (ref 0.27–4.2)
VITAMIN B-12: 429 PG/ML (ref 232–1245)
VITAMIN D 25-HYDROXY: 20.5 NG/ML (ref 30–100)

## 2018-02-27 ENCOUNTER — OFFICE VISIT (OUTPATIENT)
Dept: FAMILY MEDICINE CLINIC | Age: 73
End: 2018-02-27
Payer: MEDICARE

## 2018-02-27 ENCOUNTER — OFFICE VISIT (OUTPATIENT)
Dept: CARDIOLOGY CLINIC | Age: 73
End: 2018-02-27
Payer: MEDICARE

## 2018-02-27 VITALS
WEIGHT: 209.4 LBS | RESPIRATION RATE: 16 BRPM | OXYGEN SATURATION: 96 % | TEMPERATURE: 96.2 F | DIASTOLIC BLOOD PRESSURE: 74 MMHG | HEART RATE: 67 BPM | HEIGHT: 69 IN | SYSTOLIC BLOOD PRESSURE: 132 MMHG | BODY MASS INDEX: 31.01 KG/M2

## 2018-02-27 VITALS
HEART RATE: 71 BPM | HEIGHT: 69 IN | BODY MASS INDEX: 30.84 KG/M2 | SYSTOLIC BLOOD PRESSURE: 130 MMHG | DIASTOLIC BLOOD PRESSURE: 86 MMHG | WEIGHT: 208.2 LBS | OXYGEN SATURATION: 95 %

## 2018-02-27 DIAGNOSIS — Z01.818 PRE-OP EXAM: ICD-10-CM

## 2018-02-27 DIAGNOSIS — I25.10 CORONARY ARTERY DISEASE INVOLVING NATIVE CORONARY ARTERY OF NATIVE HEART WITHOUT ANGINA PECTORIS: Primary | ICD-10-CM

## 2018-02-27 DIAGNOSIS — E78.5 HYPERLIPIDEMIA, UNSPECIFIED HYPERLIPIDEMIA TYPE: ICD-10-CM

## 2018-02-27 DIAGNOSIS — I25.10 CORONARY ARTERY DISEASE INVOLVING NATIVE CORONARY ARTERY OF NATIVE HEART WITHOUT ANGINA PECTORIS: ICD-10-CM

## 2018-02-27 DIAGNOSIS — N13.8 BPH WITH OBSTRUCTION/LOWER URINARY TRACT SYMPTOMS: ICD-10-CM

## 2018-02-27 DIAGNOSIS — Z23 NEED FOR PROPHYLACTIC VACCINATION AGAINST STREPTOCOCCUS PNEUMONIAE (PNEUMOCOCCUS): ICD-10-CM

## 2018-02-27 DIAGNOSIS — R33.9 URINARY RETENTION: ICD-10-CM

## 2018-02-27 DIAGNOSIS — M17.11 PRIMARY OSTEOARTHRITIS OF RIGHT KNEE: Primary | ICD-10-CM

## 2018-02-27 DIAGNOSIS — I10 ESSENTIAL HYPERTENSION: ICD-10-CM

## 2018-02-27 DIAGNOSIS — N40.1 BPH WITH OBSTRUCTION/LOWER URINARY TRACT SYMPTOMS: ICD-10-CM

## 2018-02-27 PROCEDURE — 99213 OFFICE O/P EST LOW 20 MIN: CPT | Performed by: FAMILY MEDICINE

## 2018-02-27 PROCEDURE — G0009 ADMIN PNEUMOCOCCAL VACCINE: HCPCS | Performed by: FAMILY MEDICINE

## 2018-02-27 PROCEDURE — 99213 OFFICE O/P EST LOW 20 MIN: CPT | Performed by: INTERNAL MEDICINE

## 2018-02-27 PROCEDURE — 90670 PCV13 VACCINE IM: CPT | Performed by: FAMILY MEDICINE

## 2018-02-27 PROCEDURE — 93000 ELECTROCARDIOGRAM COMPLETE: CPT | Performed by: INTERNAL MEDICINE

## 2018-02-27 NOTE — PROGRESS NOTES
Chief Complaint   Patient presents with    Pre-op Exam     surgery clearence        HPI:  Frida Sands is a 71 y.o. male    Pre op exam  Right TKA for 3/15/18 at [address] [facility], Dr. Beatrice Hall    Had cardiac clearance earlier today    HTN, hyperlipidemia, BPH, arthritis pain  meds reviewed  Refills utd  Compliant with meds  Denies cardiac symptoms  Refer to ROS      Wt Readings from Last 3 Encounters:   02/27/18 208 lb 3.2 oz (94.4 kg)   02/27/18 209 lb 6.4 oz (95 kg)   02/06/18 218 lb (98.9 kg)     Follows with urology for BPH  Follows with Dr. Maricarmen Richardson for cardiology          Past Medical History:   Diagnosis Date    Actinic keratosis     Basal cell carcinoma, trunk 12/2017    left upper back    Basal cell carcinoma, trunk     BPH (benign prostatic hyperplasia)     CAD (coronary artery disease) 2012    Heart attack     Hyperlipidemia     Hypertension     Myocardial infarct 11/2012    Osteoarthritis     Overweight 12/22/2017    Status post coronary angioplasty      Past Surgical History:   Procedure Laterality Date    CARPAL TUNNEL RELEASE Bilateral 2015    COLONOSCOPY  3/23/15        CORONARY ANGIOPLASTY WITH STENT PLACEMENT  2012    CYSTOSCOPY  05/17/2017    W/COMPLEX CYSTOMETROGRAM-COMPLEX UROFLOW-TRANSRECTAL US PROSTATE    JOINT REPLACEMENT Left     ROTATOR CUFF REPAIR Right     twice    SHOULDER SURGERY Right     TURP N/A 7/17/2017    GREEN LIGHT LASER TRANSURETHRAL RESECTIOIN PROSTATE performed by Franchesca Lino MD at [address] [place] [address] PROSTATE/TRANSRECTAL  06/03/15    Cystoscop, cath rem, US prostate     Family History   Problem Relation Age of Onset    Kidney Disease Father     Other Mother      Perforated intestine    No Known Problems Sister     No Known Problems Brother     No Known Problems Sister     No Known Problems Brother     No Known Problems Son      Social History     Social History    Marital status:      Spouse name: N/A    Number of children: N/A    Years of education: N/A     Social History Main Topics    Smoking status: Former Smoker     Packs/day: 1.00     Years: 10.00     Start date: 6/22/1961     Quit date: 3/17/1967    Smokeless tobacco: Never Used      Comment: Quit for [employer]    Alcohol use No      Comment: No alcohol  since February, 1987    Drug use: No    Sexual activity: Not Asked     Other Topics Concern    None     Social History Narrative    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     Current Outpatient Prescriptions   Medication Sig Dispense Refill    levothyroxine (SYNTHROID) 50 MCG tablet Take 1 tablet by mouth Daily 30 tablet 3    etodolac (LODINE) 400 MG tablet Take 1 tablet by mouth 2 times daily 60 tablet 3    aspirin 81 MG tablet Take 81 mg by mouth daily      losartan (COZAAR) 25 MG tablet take 1 tablet by mouth once daily 30 tablet 5    simvastatin (ZOCOR) 40 MG tablet take 1 tablet by mouth every evening 30 tablet 5    hydrochlorothiazide (HYDRODIURIL) 25 MG tablet take 1 tablet by mouth once daily 30 tablet 5    carvedilol (COREG) 12.5 MG tablet take 1 tablet by mouth twice a day with food 60 tablet 6    tamsulosin (FLOMAX) 0.4 MG capsule take 1 capsule by mouth twice a day 180 capsule 3    finasteride (PROSCAR) 5 MG tablet Take 1 tablet by mouth daily 90 tablet 3    nitroGLYCERIN (NITROSTAT) 0.4 MG SL tablet Place 1 tablet under the tongue every 5 minutes as needed for Chest pain Dissolve 1 tab under tongue at first sign of chest pain every 5 minutes as needed. If pain persists after taking 3 tabs in a 15-minute period, or the pain is different than is typically experienced, call 9-1-1 immediately. 25 tablet 1     No current facility-administered medications for this visit.       Allergies   Allergen Reactions    Ace Inhibitors Other (See Comments)     Cough      Lisinopril Other (See Comments)     Cough         Review of Systems:   General ROS:negative for - chills,fever,

## 2018-02-27 NOTE — PROGRESS NOTES
Chief Complaint   Patient presents with    Cardiac Clearance     Surgery for R knee on 3/15/18 with Dr. Herb Fernandez       3-20-15: Patient presents for initial medical evaluation. Patient is followed on a regular basis by Dr. Hesham Rodriguez MD. Hx of MI/CAD in 2012 in [facility] s/p PCIx 4 stents. Does feel cold at times and numbness in hands at times. Pt denies chest pain, dyspnea, dyspnea on exertion, change in exercise capacity,  nausea, vomiting, diarrhea, constipation, motor weakness, insomnia, weight loss, syncope, dizziness, lightheadedness, palpitations, PND, orthopnea. No LE ulcers or discoloration. 4-24-15: as above, s/p echo with EF of 60%, mild MR, no PTHN. States he feels tired at times. Pt denies chest pain, dyspnea, dyspnea on exertion, change in exercise capacity, nausea, vomiting, diarrhea, constipation, motor weakness, insomnia, weight loss, syncope, dizziness, lightheadedness, palpitations, PND, orthopnea, or claudication. 10-30-15: as above, doing well overall. Pt denies chest pain, dyspnea, dyspnea on exertion, change in exercise capacity, fatigue,  nausea, vomiting, diarrhea, constipation, motor weakness, insomnia, weight loss, syncope, dizziness, lightheadedness, palpitations, PND, orthopnea. BP is under control. Did have a cough with Lisinopril. Doing fine with losartan. No bleeding issues. Having some low back pain. Lipid profile in normal range. 6-20-17: Pt denies chest pain, dyspnea, dyspnea on exertion, change in exercise capacity, fatigue,  nausea, vomiting, diarrhea, constipation, motor weakness, insomnia, weight loss, syncope, dizziness, lightheadedness, palpitations, PND, orthopnea, or claudication. No nitro use. BP and hr are good. CAD is stable. No LE discoloration or ulcers. No LE edema. No CHF type symptoms. Lipid profile is normal.  Needs pre op clearance for urological surgery with dr. Chuy Ford. Compliant with meds. Not having any symptoms with doing heavy yard work. Bita Vazquez MD at [address] [place] [address] PROSTATE/TRANSRECTAL  06/03/15    Cystoscop, cath rem,  prostate       Social History     Social History    Marital status:      Spouse name: N/A    Number of children: N/A    Years of education: N/A     Social History Main Topics    Smoking status: Former Smoker     Packs/day: 1.00     Years: 10.00     Start date: 6/22/1961     Quit date: 3/17/1967    Smokeless tobacco: Never Used      Comment: Quit for [address] [facility] [employer]    Alcohol use No      Comment: No alcohol  since February, 1987    Drug use: No    Sexual activity: Not Asked     Other Topics Concern    None     Social History Narrative    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       Family History   Problem Relation Age of Onset    Kidney Disease Father     Other Mother      Perforated intestine    No Known Problems Sister     No Known Problems Brother     No Known Problems Sister     No Known Problems Brother     No Known Problems Son        Current Outpatient Prescriptions   Medication Sig Dispense Refill    levothyroxine (SYNTHROID) 50 MCG tablet Take 1 tablet by mouth Daily 30 tablet 3    etodolac (LODINE) 400 MG tablet Take 1 tablet by mouth 2 times daily 60 tablet 3    aspirin 81 MG tablet Take 81 mg by mouth daily      losartan (COZAAR) 25 MG tablet take 1 tablet by mouth once daily 30 tablet 5    simvastatin (ZOCOR) 40 MG tablet take 1 tablet by mouth every evening 30 tablet 5    hydrochlorothiazide (HYDRODIURIL) 25 MG tablet take 1 tablet by mouth once daily 30 tablet 5    carvedilol (COREG) 12.5 MG tablet take 1 tablet by mouth twice a day with food 60 tablet 6    tamsulosin (FLOMAX) 0.4 MG capsule take 1 capsule by mouth twice a day 180 capsule 3    finasteride (PROSCAR) 5 MG tablet Take 1 tablet by mouth daily 90 tablet 3    nitroGLYCERIN (NITROSTAT) 0.4 MG SL tablet Place 1 tablet under the tongue every 5 minutes as needed for Chest pain Dissolve 1

## 2018-03-01 ENCOUNTER — TELEPHONE (OUTPATIENT)
Dept: FAMILY MEDICINE CLINIC | Age: 73
End: 2018-03-01

## 2018-03-05 ENCOUNTER — HOSPITAL ENCOUNTER (OUTPATIENT)
Dept: MRI IMAGING | Age: 73
Discharge: HOME | End: 2018-03-07
Payer: MEDICARE

## 2018-03-05 ENCOUNTER — HOSPITAL ENCOUNTER (OUTPATIENT)
Dept: NEUROLOGY | Age: 73
Discharge: HOME | End: 2018-03-05
Payer: MEDICARE

## 2018-03-05 ENCOUNTER — OFFICE VISIT (OUTPATIENT)
Dept: FAMILY MEDICINE CLINIC | Age: 73
End: 2018-03-05
Payer: MEDICARE

## 2018-03-05 VITALS
OXYGEN SATURATION: 98 % | WEIGHT: 208 LBS | HEIGHT: 69 IN | HEART RATE: 58 BPM | BODY MASS INDEX: 30.81 KG/M2 | DIASTOLIC BLOOD PRESSURE: 74 MMHG | SYSTOLIC BLOOD PRESSURE: 120 MMHG

## 2018-03-05 DIAGNOSIS — G93.40 ENCEPHALOPATHY ACUTE: ICD-10-CM

## 2018-03-05 DIAGNOSIS — L03.031 PARONYCHIA OF GREAT TOE, RIGHT: Primary | ICD-10-CM

## 2018-03-05 PROCEDURE — 99213 OFFICE O/P EST LOW 20 MIN: CPT | Performed by: FAMILY MEDICINE

## 2018-03-05 PROCEDURE — 95816 EEG AWAKE AND DROWSY: CPT

## 2018-03-05 PROCEDURE — 70551 MRI BRAIN STEM W/O DYE: CPT

## 2018-03-05 RX ORDER — SULFAMETHOXAZOLE AND TRIMETHOPRIM 800; 160 MG/1; MG/1
1 TABLET ORAL 2 TIMES DAILY
Qty: 20 TABLET | Refills: 0 | Status: SHIPPED | OUTPATIENT
Start: 2018-03-05 | End: 2018-03-15

## 2018-03-05 NOTE — PROGRESS NOTES
SHOULDER SURGERY Right     TURP N/A 7/17/2017    GREEN LIGHT LASER TRANSURETHRAL RESECTIOIN PROSTATE performed by Warren Desouza MD at [address]  06/03/15    Cystoscop, cath rem,  prostate     Social History     Social History    Marital status:      Spouse name: N/A    Number of children: N/A    Years of education: N/A     Occupational History    Not on file.      Social History Main Topics    Smoking status: Former Smoker     Packs/day: 1.00     Years: 10.00     Start date: 6/22/1961     Quit date: 3/17/1967    Smokeless tobacco: Never Used      Comment: Quit for [employer]   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     Family History   Problem Relation Age of Onset    Kidney Disease Father     Other Mother      Perforated intestine    No Known Problems Sister     No Known Problems Brother     No Known Problems Sister     No Known Problems Brother     No Known Problems Son      Allergies   Allergen Reactions    Ace Inhibitors Other (See Comments)     Cough      Lisinopril Other (See Comments)     Cough       Current Outpatient Prescriptions   Medication Sig Dispense Refill    sulfamethoxazole-trimethoprim (BACTRIM DS) 800-160 MG per tablet Take 1 tablet by mouth 2 times daily for 10 days 20 tablet 0    levothyroxine (SYNTHROID) 50 MCG tablet Take 1 tablet by mouth Daily 30 tablet 3    etodolac (LODINE) 400 MG tablet Take 1 tablet by mouth 2 times daily 60 tablet 3    aspirin 81 MG tablet Take 81 mg by mouth daily      losartan (COZAAR) 25 MG tablet take 1 tablet by mouth once daily 30 tablet 5    simvastatin (ZOCOR) 40 MG tablet take 1 tablet by mouth every evening 30 tablet 5    hydrochlorothiazide (HYDRODIURIL) 25 MG tablet take 1 tablet by

## 2018-03-05 NOTE — PATIENT INSTRUCTIONS
Patient Education        Paronychia: Care Instructions  Your Care Instructions  Paronychia (say \"vqvk-tr-XA-sarah-uh\") is an infection of the skin around a fingernail or toenail. It happens when germs enter through a break in the skin. The doctor may have made a small cut in the infected area to drain the pus. Most cases of paronychia improve in a few days. But watch your symptoms and follow your doctor's advice. Though rare, a mild case can turn into something more serious and infect your entire finger or toe. Also, it is possible for an infection to return. Follow-up care is a key part of your treatment and safety. Be sure to make and go to all appointments, and call your doctor if you are having problems. It's also a good idea to know your test results and keep a list of the medicines you take. How can you care for yourself at home? · If your doctor told you how to care for your infected nail, follow the doctor's instructions. If you did not get instructions, follow this general advice:  ¨ Wash the area with clean water 2 times a day. Don't use hydrogen peroxide or alcohol, which can slow healing. ¨ You may cover the area with a thin layer of petroleum jelly, such as Vaseline, and a nonstick bandage. ¨ Apply more petroleum jelly and replace the bandage as needed. · If your doctor prescribed antibiotics, take them as directed. Do not stop taking them just because you feel better. You need to take the full course of antibiotics. · Take an over-the-counter pain medicine, such as acetaminophen (Tylenol), ibuprofen (Advil, Motrin), or naproxen (Aleve). Read and follow all instructions on the label. · Do not take two or more pain medicines at the same time unless the doctor told you to. Many pain medicines have acetaminophen, which is Tylenol. Too much acetaminophen (Tylenol) can be harmful. · Prop up the toe or finger so that it is higher than the level of your heart.  This will help with pain and

## 2018-03-06 ENCOUNTER — HOSPITAL ENCOUNTER (OUTPATIENT)
Dept: PREADMISSION TESTING | Age: 73
Discharge: HOME | End: 2018-03-10
Payer: MEDICARE

## 2018-03-06 VITALS
HEART RATE: 62 BPM | OXYGEN SATURATION: 94 % | WEIGHT: 209 LBS | SYSTOLIC BLOOD PRESSURE: 128 MMHG | HEIGHT: 67 IN | DIASTOLIC BLOOD PRESSURE: 66 MMHG | BODY MASS INDEX: 32.8 KG/M2 | TEMPERATURE: 97.2 F

## 2018-03-06 DIAGNOSIS — L08.9 INFECTION OF TOE: ICD-10-CM

## 2018-03-06 PROBLEM — M17.11 OSTEOARTHRITIS OF RIGHT KNEE: Status: ACTIVE | Noted: 2018-03-06

## 2018-03-06 LAB
ABO/RH: NORMAL
ANION GAP SERPL CALCULATED.3IONS-SCNC: 11 MEQ/L (ref 7–13)
ANTIBODY SCREEN: NORMAL
BACTERIA: ABNORMAL /HPF
BILIRUBIN URINE: ABNORMAL
BLOOD, URINE: ABNORMAL
BUN BLDV-MCNC: 22 MG/DL (ref 8–23)
CALCIUM SERPL-MCNC: 8.6 MG/DL (ref 8.6–10.2)
CHLORIDE BLD-SCNC: 97 MEQ/L (ref 98–107)
CLARITY: CLEAR
CO2: 28 MEQ/L (ref 22–29)
COLOR: YELLOW
CREAT SERPL-MCNC: 1.01 MG/DL (ref 0.7–1.2)
GFR AFRICAN AMERICAN: >60
GFR NON-AFRICAN AMERICAN: >60
GLUCOSE BLD-MCNC: 100 MG/DL (ref 74–109)
GLUCOSE URINE: NEGATIVE MG/DL
HCT VFR BLD CALC: 41.6 % (ref 42–52)
HEMOGLOBIN: 13.7 G/DL (ref 14–18)
KETONES, URINE: NEGATIVE MG/DL
LEUKOCYTE ESTERASE, URINE: ABNORMAL
MCH RBC QN AUTO: 29.8 PG (ref 27–31.3)
MCHC RBC AUTO-ENTMCNC: 33 % (ref 33–37)
MCV RBC AUTO: 90.3 FL (ref 80–100)
NITRITE, URINE: NEGATIVE
PDW BLD-RTO: 13.6 % (ref 11.5–14.5)
PH UA: 6.5 (ref 5–9)
PLATELET # BLD: 178 K/UL (ref 130–400)
POTASSIUM SERPL-SCNC: 4.5 MEQ/L (ref 3.5–5.1)
PROTEIN UA: NEGATIVE MG/DL
RBC # BLD: 4.61 M/UL (ref 4.7–6.1)
RBC UA: ABNORMAL /HPF (ref 0–2)
RENAL EPITHELIAL, UA: ABNORMAL /HPF
SODIUM BLD-SCNC: 136 MEQ/L (ref 132–144)
SPECIFIC GRAVITY UA: 1.01 (ref 1–1.03)
URINE REFLEX TO CULTURE: YES
UROBILINOGEN, URINE: 0.2 E.U./DL
WBC # BLD: 5.9 K/UL (ref 4.8–10.8)
WBC UA: ABNORMAL /HPF (ref 0–5)

## 2018-03-06 PROCEDURE — 85027 COMPLETE CBC AUTOMATED: CPT

## 2018-03-06 PROCEDURE — 87086 URINE CULTURE/COLONY COUNT: CPT

## 2018-03-06 PROCEDURE — 80048 BASIC METABOLIC PNL TOTAL CA: CPT

## 2018-03-06 PROCEDURE — 86850 RBC ANTIBODY SCREEN: CPT

## 2018-03-06 PROCEDURE — 86900 BLOOD TYPING SEROLOGIC ABO: CPT

## 2018-03-06 PROCEDURE — 81001 URINALYSIS AUTO W/SCOPE: CPT

## 2018-03-06 PROCEDURE — 86901 BLOOD TYPING SEROLOGIC RH(D): CPT

## 2018-03-06 RX ORDER — LIDOCAINE HYDROCHLORIDE 10 MG/ML
1 INJECTION, SOLUTION EPIDURAL; INFILTRATION; INTRACAUDAL; PERINEURAL
Status: CANCELLED | OUTPATIENT
Start: 2018-03-06 | End: 2018-03-06

## 2018-03-06 RX ORDER — SODIUM CHLORIDE 0.9 % (FLUSH) 0.9 %
10 SYRINGE (ML) INJECTION PRN
Status: CANCELLED | OUTPATIENT
Start: 2018-03-06

## 2018-03-06 RX ORDER — SODIUM CHLORIDE 0.9 % (FLUSH) 0.9 %
10 SYRINGE (ML) INJECTION EVERY 12 HOURS SCHEDULED
Status: CANCELLED | OUTPATIENT
Start: 2018-03-06

## 2018-03-06 RX ORDER — SODIUM CHLORIDE, SODIUM LACTATE, POTASSIUM CHLORIDE, CALCIUM CHLORIDE 600; 310; 30; 20 MG/100ML; MG/100ML; MG/100ML; MG/100ML
INJECTION, SOLUTION INTRAVENOUS CONTINUOUS
Status: CANCELLED | OUTPATIENT
Start: 2018-03-06

## 2018-03-07 LAB — URINE CULTURE, ROUTINE: NORMAL

## 2018-03-07 NOTE — PATIENT CARE CONFERENCE
Contraindications to TXA Therapy- per patient  no  1) Hypersensitivity to tranexamic acid?    no  2) Defective color vision at baseline?    no  3) Chronic degenerative conjunctivitis with fibrin deposits?    no  4) Blood clot WITHIN the vessels of the eye?     no  5) Recent hx of thromboembolism (DVT, PE, MI, CABG, stents, CVA,    etc.)? ( he does have hx of MI and stents form 2012)  no  6) Heart valve disease?    no  7) Hemorrhage in the subarachnoid space of brain?  no  8) Fluid accumulation in the brain?  no  9) Recent hx of seizures (well controlled seizures on meds are not a    contraindication)?  no  10) Hypercoagulable state?  no  11) Kidney impairment? Patients with serum creat gr than 1.5 will receive ONLY a single dose of TXA in O.R      Additional question for DVT prophylaxis    no         Do you have a recent history of cancer? no         Recent chemotherapy?  no         Low Ejection Fraction? no         Pulmonary Hypertension?     Per cariology note:    Patient is an intermediate risk patient for the proposed procedure/surgery. No active cardiac conditions such as ischemia, CHF or arrhythmias. Recomment peripoperative BBlocker, GI/DVT prophylaxis. He is asymptomatic and EKG is normal. Ok to proceed with surgery.      Faina Mccurdy for IV TXA  Will plan on tele monitor post op   He would like to return home with [facility]  He will need a walker

## 2018-03-09 ENCOUNTER — HOSPITAL ENCOUNTER (OUTPATIENT)
Dept: SLEEP CENTER | Age: 73
Discharge: HOME | End: 2018-03-11
Payer: MEDICARE

## 2018-03-09 PROCEDURE — 95810 POLYSOM 6/> YRS 4/> PARAM: CPT

## 2018-03-12 ENCOUNTER — PROCEDURE VISIT (OUTPATIENT)
Dept: FAMILY MEDICINE CLINIC | Age: 73
End: 2018-03-12
Payer: MEDICARE

## 2018-03-12 VITALS
BODY MASS INDEX: 30.51 KG/M2 | HEART RATE: 65 BPM | SYSTOLIC BLOOD PRESSURE: 102 MMHG | DIASTOLIC BLOOD PRESSURE: 62 MMHG | WEIGHT: 206 LBS | HEIGHT: 69 IN | OXYGEN SATURATION: 96 %

## 2018-03-12 DIAGNOSIS — L91.0 HYPERTROPHIC SCAR: Primary | ICD-10-CM

## 2018-03-12 DIAGNOSIS — L57.0 ACTINIC KERATOSIS: ICD-10-CM

## 2018-03-12 DIAGNOSIS — D23.30 BENIGN NEOPLASM OF SKIN OF FACE: ICD-10-CM

## 2018-03-12 PROCEDURE — 11100 PR BIOPSY OF SKIN LESION: CPT | Performed by: FAMILY MEDICINE

## 2018-03-12 PROCEDURE — 99212 OFFICE O/P EST SF 10 MIN: CPT | Performed by: FAMILY MEDICINE

## 2018-03-12 PROCEDURE — 17003 DESTRUCT PREMALG LES 2-14: CPT | Performed by: FAMILY MEDICINE

## 2018-03-12 PROCEDURE — 17000 DESTRUCT PREMALG LESION: CPT | Performed by: FAMILY MEDICINE

## 2018-03-12 RX ORDER — CLOBETASOL PROPIONATE 0.5 MG/G
CREAM TOPICAL
Qty: 30 G | Refills: 0 | Status: SHIPPED | OUTPATIENT
Start: 2018-03-12 | End: 2018-07-31

## 2018-03-12 ASSESSMENT — ENCOUNTER SYMPTOMS
SHORTNESS OF BREATH: 0
ABDOMINAL PAIN: 0

## 2018-03-12 NOTE — PROGRESS NOTES
Subjective  Adilene Pemberton, 71 y.o. male presents today with:  Chief Complaint   Patient presents with    Procedure       HPI    Pt of Dr. Verna Kothari here for 1 year  f/u on AK's. Had LN2 treatment of AK's in the past. Denies personal/family h/o skin cancer. Also spot on left upper back. Review of Systems   Constitutional: Negative for fever. Respiratory: Negative for shortness of breath. Cardiovascular: Negative for chest pain. Gastrointestinal: Negative for abdominal pain. Skin: Negative for rash.        Past Medical History:   Diagnosis Date    Actinic keratosis     Basal cell carcinoma, trunk 12/2017    left upper back    Basal cell carcinoma, trunk     BPH (benign prostatic hyperplasia)     CAD (coronary artery disease) 2012    has 4 cardiac stents    Heart attack     Hyperlipidemia     meds > 15 yrs    Hypertension     meds > 6 yrs / denies TIA or stroke    Hypertrophic scar     Myocardial infarct 11/2012    Osteoarthritis     all joints    Overweight 12/22/2017    Status post coronary angioplasty     Thyroid disease     meds > 1 month     Past Surgical History:   Procedure Laterality Date    CARPAL TUNNEL RELEASE Bilateral 2015    COLONOSCOPY  3/23/15        CORONARY ANGIOPLASTY WITH STENT PLACEMENT  2012    CYSTOSCOPY  05/17/2017    W/COMPLEX CYSTOMETROGRAM-COMPLEX UROFLOW-TRANSRECTAL US PROSTATE    EYE SURGERY      Lasik OU    JOINT REPLACEMENT Left 2010    LTKR    WA TOTAL KNEE ARTHROPLASTY Right 3/15/2018    RIGHT KNEE TOTAL KNEE  ARTHROPLASTY performed by Lv Reyes MD at [place] [address] Right     twice    SHOULDER SURGERY Right      RCR    TONSILLECTOMY      TURP N/A 7/17/2017    GREEN LIGHT LASER TRANSURETHRAL RESECTIOIN PROSTATE performed by Nitesh Moulton MD at [address] [place] [address] PROSTATE/TRANSRECTAL  06/03/15    Cystoscop, cath rem, US prostate    UVULECTOMY  1998    due to snoring     Social History     Social

## 2018-03-15 ENCOUNTER — ANESTHESIA EVENT (OUTPATIENT)
Dept: OPERATING ROOM | Age: 73
DRG: 470 | End: 2018-03-15
Payer: MEDICARE

## 2018-03-15 ENCOUNTER — HOSPITAL ENCOUNTER (INPATIENT)
Age: 73
LOS: 1 days | Discharge: HOME HEALTH | DRG: 470 | End: 2018-03-16
Attending: ORTHOPAEDIC SURGERY | Admitting: ORTHOPAEDIC SURGERY
Payer: MEDICARE

## 2018-03-15 ENCOUNTER — APPOINTMENT (OUTPATIENT)
Dept: GENERAL RADIOLOGY | Age: 73
DRG: 470 | End: 2018-03-15
Attending: ORTHOPAEDIC SURGERY
Payer: MEDICARE

## 2018-03-15 ENCOUNTER — ANESTHESIA (OUTPATIENT)
Dept: OPERATING ROOM | Age: 73
DRG: 470 | End: 2018-03-15
Payer: MEDICARE

## 2018-03-15 VITALS — DIASTOLIC BLOOD PRESSURE: 50 MMHG | SYSTOLIC BLOOD PRESSURE: 94 MMHG | OXYGEN SATURATION: 94 %

## 2018-03-15 DIAGNOSIS — M17.11 PRIMARY OSTEOARTHRITIS OF RIGHT KNEE: Primary | ICD-10-CM

## 2018-03-15 DIAGNOSIS — Z96.651 STATUS POST TOTAL RIGHT KNEE REPLACEMENT: ICD-10-CM

## 2018-03-15 PROCEDURE — 2500000003 HC RX 250 WO HCPCS: Performed by: NURSE PRACTITIONER

## 2018-03-15 PROCEDURE — 6370000000 HC RX 637 (ALT 250 FOR IP): Performed by: ORTHOPAEDIC SURGERY

## 2018-03-15 PROCEDURE — C1776 JOINT DEVICE (IMPLANTABLE): HCPCS | Performed by: ORTHOPAEDIC SURGERY

## 2018-03-15 PROCEDURE — 1210000000 HC MED SURG R&B

## 2018-03-15 PROCEDURE — 0SRC0J9 REPLACEMENT OF RIGHT KNEE JOINT WITH SYNTHETIC SUBSTITUTE, CEMENTED, OPEN APPROACH: ICD-10-PCS | Performed by: ORTHOPAEDIC SURGERY

## 2018-03-15 PROCEDURE — 97167 OT EVAL HIGH COMPLEX 60 MIN: CPT

## 2018-03-15 PROCEDURE — 2720000010 HC SURG SUPPLY STERILE: Performed by: ORTHOPAEDIC SURGERY

## 2018-03-15 PROCEDURE — 6360000002 HC RX W HCPCS: Performed by: ANESTHESIOLOGY

## 2018-03-15 PROCEDURE — 7100000001 HC PACU RECOVERY - ADDTL 15 MIN: Performed by: ORTHOPAEDIC SURGERY

## 2018-03-15 PROCEDURE — G8988 SELF CARE GOAL STATUS: HCPCS

## 2018-03-15 PROCEDURE — 3600000004 HC SURGERY LEVEL 4 BASE: Performed by: ORTHOPAEDIC SURGERY

## 2018-03-15 PROCEDURE — 2580000003 HC RX 258: Performed by: ORTHOPAEDIC SURGERY

## 2018-03-15 PROCEDURE — 2500000003 HC RX 250 WO HCPCS: Performed by: NURSE ANESTHETIST, CERTIFIED REGISTERED

## 2018-03-15 PROCEDURE — 6360000002 HC RX W HCPCS: Performed by: NURSE PRACTITIONER

## 2018-03-15 PROCEDURE — 2580000003 HC RX 258: Performed by: NURSE PRACTITIONER

## 2018-03-15 PROCEDURE — 73560 X-RAY EXAM OF KNEE 1 OR 2: CPT

## 2018-03-15 PROCEDURE — 6370000000 HC RX 637 (ALT 250 FOR IP): Performed by: NURSE PRACTITIONER

## 2018-03-15 PROCEDURE — 3600000014 HC SURGERY LEVEL 4 ADDTL 15MIN: Performed by: ORTHOPAEDIC SURGERY

## 2018-03-15 PROCEDURE — 3700000000 HC ANESTHESIA ATTENDED CARE: Performed by: ORTHOPAEDIC SURGERY

## 2018-03-15 PROCEDURE — 3700000001 HC ADD 15 MINUTES (ANESTHESIA): Performed by: ORTHOPAEDIC SURGERY

## 2018-03-15 PROCEDURE — 6360000002 HC RX W HCPCS: Performed by: ORTHOPAEDIC SURGERY

## 2018-03-15 PROCEDURE — 6360000002 HC RX W HCPCS: Performed by: NURSE ANESTHETIST, CERTIFIED REGISTERED

## 2018-03-15 PROCEDURE — G8979 MOBILITY GOAL STATUS: HCPCS

## 2018-03-15 PROCEDURE — 64445 NJX AA&/STRD SCIATIC NRV IMG: CPT | Performed by: ANESTHESIOLOGY

## 2018-03-15 PROCEDURE — G8987 SELF CARE CURRENT STATUS: HCPCS

## 2018-03-15 PROCEDURE — 97162 PT EVAL MOD COMPLEX 30 MIN: CPT

## 2018-03-15 PROCEDURE — C1713 ANCHOR/SCREW BN/BN,TIS/BN: HCPCS | Performed by: ORTHOPAEDIC SURGERY

## 2018-03-15 PROCEDURE — 2500000003 HC RX 250 WO HCPCS: Performed by: ORTHOPAEDIC SURGERY

## 2018-03-15 PROCEDURE — 7100000000 HC PACU RECOVERY - FIRST 15 MIN: Performed by: ORTHOPAEDIC SURGERY

## 2018-03-15 PROCEDURE — G8978 MOBILITY CURRENT STATUS: HCPCS

## 2018-03-15 DEVICE — COMPONENT FEM SZ 6 R KNEE POST STBL CEM TRIATHLON: Type: IMPLANTABLE DEVICE | Site: KNEE | Status: FUNCTIONAL

## 2018-03-15 DEVICE — BASEPLATE TIB SZ 6 KNEE TRITANIUM CEM PRI LO PROF TRIATHLON: Type: IMPLANTABLE DEVICE | Site: KNEE | Status: FUNCTIONAL

## 2018-03-15 DEVICE — CEMENT BNE 20ML 40GM ACRYL RESIN HI VISC RADPQ FAST SET: Type: IMPLANTABLE DEVICE | Site: KNEE | Status: FUNCTIONAL

## 2018-03-15 DEVICE — INSERT TIB SZ 6 THK9MM UNIV KNEE CONVENTIONAL POLYETH POST: Type: IMPLANTABLE DEVICE | Site: KNEE | Status: FUNCTIONAL

## 2018-03-15 DEVICE — COMPONENT TOT KNEE CAPPED STD STRYKNEES] STRYKER CORP]: Type: IMPLANTABLE DEVICE | Site: KNEE | Status: FUNCTIONAL

## 2018-03-15 DEVICE — COMPONENT PAT DIA35MM THK10MM SUP INFERIOR ASYM TRIATHLON: Type: IMPLANTABLE DEVICE | Site: KNEE | Status: FUNCTIONAL

## 2018-03-15 RX ORDER — ASPIRIN 81 MG/1
81 TABLET ORAL 2 TIMES DAILY
Status: DISCONTINUED | OUTPATIENT
Start: 2018-03-16 | End: 2018-03-16 | Stop reason: HOSPADM

## 2018-03-15 RX ORDER — LIDOCAINE HYDROCHLORIDE 20 MG/ML
INJECTION, SOLUTION INFILTRATION; PERINEURAL PRN
Status: DISCONTINUED | OUTPATIENT
Start: 2018-03-15 | End: 2018-03-15 | Stop reason: SDUPTHER

## 2018-03-15 RX ORDER — LIDOCAINE HYDROCHLORIDE 10 MG/ML
1 INJECTION, SOLUTION EPIDURAL; INFILTRATION; INTRACAUDAL; PERINEURAL
Status: COMPLETED | OUTPATIENT
Start: 2018-03-15 | End: 2018-03-15

## 2018-03-15 RX ORDER — SODIUM CHLORIDE, SODIUM LACTATE, POTASSIUM CHLORIDE, CALCIUM CHLORIDE 600; 310; 30; 20 MG/100ML; MG/100ML; MG/100ML; MG/100ML
INJECTION, SOLUTION INTRAVENOUS CONTINUOUS
Status: DISCONTINUED | OUTPATIENT
Start: 2018-03-15 | End: 2018-03-15

## 2018-03-15 RX ORDER — OXYCODONE HCL 10 MG/1
10 TABLET, FILM COATED, EXTENDED RELEASE ORAL ONCE
Status: COMPLETED | OUTPATIENT
Start: 2018-03-15 | End: 2018-03-15

## 2018-03-15 RX ORDER — FENTANYL CITRATE 50 UG/ML
50 INJECTION, SOLUTION INTRAMUSCULAR; INTRAVENOUS EVERY 10 MIN PRN
Status: DISCONTINUED | OUTPATIENT
Start: 2018-03-15 | End: 2018-03-15 | Stop reason: HOSPADM

## 2018-03-15 RX ORDER — CELECOXIB 200 MG/1
400 CAPSULE ORAL ONCE
Status: COMPLETED | OUTPATIENT
Start: 2018-03-15 | End: 2018-03-15

## 2018-03-15 RX ORDER — SODIUM CHLORIDE 0.9 % (FLUSH) 0.9 %
10 SYRINGE (ML) INJECTION EVERY 12 HOURS SCHEDULED
Status: DISCONTINUED | OUTPATIENT
Start: 2018-03-15 | End: 2018-03-15 | Stop reason: HOSPADM

## 2018-03-15 RX ORDER — MORPHINE SULFATE 4 MG/ML
4 INJECTION, SOLUTION INTRAMUSCULAR; INTRAVENOUS
Status: DISCONTINUED | OUTPATIENT
Start: 2018-03-15 | End: 2018-03-16 | Stop reason: HOSPADM

## 2018-03-15 RX ORDER — ACETAMINOPHEN 500 MG
1000 TABLET ORAL ONCE
Status: COMPLETED | OUTPATIENT
Start: 2018-03-15 | End: 2018-03-15

## 2018-03-15 RX ORDER — MORPHINE SULFATE 2 MG/ML
2 INJECTION, SOLUTION INTRAMUSCULAR; INTRAVENOUS
Status: DISCONTINUED | OUTPATIENT
Start: 2018-03-15 | End: 2018-03-16 | Stop reason: HOSPADM

## 2018-03-15 RX ORDER — MIDAZOLAM HYDROCHLORIDE 1 MG/ML
INJECTION INTRAMUSCULAR; INTRAVENOUS PRN
Status: DISCONTINUED | OUTPATIENT
Start: 2018-03-15 | End: 2018-03-15 | Stop reason: SDUPTHER

## 2018-03-15 RX ORDER — ONDANSETRON 2 MG/ML
4 INJECTION INTRAMUSCULAR; INTRAVENOUS EVERY 6 HOURS PRN
Status: DISCONTINUED | OUTPATIENT
Start: 2018-03-15 | End: 2018-03-16 | Stop reason: HOSPADM

## 2018-03-15 RX ORDER — SENNA AND DOCUSATE SODIUM 50; 8.6 MG/1; MG/1
1 TABLET, FILM COATED ORAL DAILY
Status: DISCONTINUED | OUTPATIENT
Start: 2018-03-15 | End: 2018-03-16 | Stop reason: HOSPADM

## 2018-03-15 RX ORDER — HYDROCODONE BITARTRATE AND ACETAMINOPHEN 5; 325 MG/1; MG/1
1 TABLET ORAL PRN
Status: DISCONTINUED | OUTPATIENT
Start: 2018-03-15 | End: 2018-03-15 | Stop reason: HOSPADM

## 2018-03-15 RX ORDER — SODIUM CHLORIDE 0.9 % (FLUSH) 0.9 %
10 SYRINGE (ML) INJECTION PRN
Status: DISCONTINUED | OUTPATIENT
Start: 2018-03-15 | End: 2018-03-16 | Stop reason: HOSPADM

## 2018-03-15 RX ORDER — KETOROLAC TROMETHAMINE 15 MG/ML
15 INJECTION, SOLUTION INTRAMUSCULAR; INTRAVENOUS EVERY 6 HOURS
Status: COMPLETED | OUTPATIENT
Start: 2018-03-15 | End: 2018-03-15

## 2018-03-15 RX ORDER — DIPHENHYDRAMINE HYDROCHLORIDE 50 MG/ML
12.5 INJECTION INTRAMUSCULAR; INTRAVENOUS
Status: DISCONTINUED | OUTPATIENT
Start: 2018-03-15 | End: 2018-03-15 | Stop reason: HOSPADM

## 2018-03-15 RX ORDER — SODIUM CHLORIDE 0.9 % (FLUSH) 0.9 %
10 SYRINGE (ML) INJECTION EVERY 12 HOURS SCHEDULED
Status: DISCONTINUED | OUTPATIENT
Start: 2018-03-15 | End: 2018-03-16 | Stop reason: HOSPADM

## 2018-03-15 RX ORDER — SODIUM CHLORIDE 0.9 % (FLUSH) 0.9 %
10 SYRINGE (ML) INJECTION PRN
Status: DISCONTINUED | OUTPATIENT
Start: 2018-03-15 | End: 2018-03-15 | Stop reason: HOSPADM

## 2018-03-15 RX ORDER — BISACODYL 10 MG
10 SUPPOSITORY, RECTAL RECTAL DAILY PRN
Status: DISCONTINUED | OUTPATIENT
Start: 2018-03-15 | End: 2018-03-16 | Stop reason: HOSPADM

## 2018-03-15 RX ORDER — HYDROCODONE BITARTRATE AND ACETAMINOPHEN 5; 325 MG/1; MG/1
2 TABLET ORAL PRN
Status: DISCONTINUED | OUTPATIENT
Start: 2018-03-15 | End: 2018-03-15 | Stop reason: HOSPADM

## 2018-03-15 RX ORDER — SODIUM CHLORIDE 9 MG/ML
INJECTION, SOLUTION INTRAVENOUS CONTINUOUS
Status: DISCONTINUED | OUTPATIENT
Start: 2018-03-15 | End: 2018-03-16 | Stop reason: HOSPADM

## 2018-03-15 RX ORDER — ROPIVACAINE HYDROCHLORIDE 5 MG/ML
INJECTION, SOLUTION EPIDURAL; INFILTRATION; PERINEURAL PRN
Status: DISCONTINUED | OUTPATIENT
Start: 2018-03-15 | End: 2018-03-15 | Stop reason: SDUPTHER

## 2018-03-15 RX ORDER — OXYCODONE HYDROCHLORIDE 5 MG/1
5 TABLET ORAL EVERY 4 HOURS PRN
Status: DISCONTINUED | OUTPATIENT
Start: 2018-03-15 | End: 2018-03-16 | Stop reason: HOSPADM

## 2018-03-15 RX ORDER — METOCLOPRAMIDE HYDROCHLORIDE 5 MG/ML
10 INJECTION INTRAMUSCULAR; INTRAVENOUS
Status: DISCONTINUED | OUTPATIENT
Start: 2018-03-15 | End: 2018-03-15 | Stop reason: HOSPADM

## 2018-03-15 RX ORDER — ACETAMINOPHEN 325 MG/1
650 TABLET ORAL EVERY 6 HOURS
Status: DISCONTINUED | OUTPATIENT
Start: 2018-03-15 | End: 2018-03-16 | Stop reason: HOSPADM

## 2018-03-15 RX ORDER — TRAMADOL HYDROCHLORIDE 50 MG/1
50 TABLET ORAL EVERY 6 HOURS PRN
Status: DISCONTINUED | OUTPATIENT
Start: 2018-03-15 | End: 2018-03-16 | Stop reason: HOSPADM

## 2018-03-15 RX ORDER — ONDANSETRON 2 MG/ML
4 INJECTION INTRAMUSCULAR; INTRAVENOUS
Status: DISCONTINUED | OUTPATIENT
Start: 2018-03-15 | End: 2018-03-15 | Stop reason: HOSPADM

## 2018-03-15 RX ORDER — PROPOFOL 10 MG/ML
INJECTION, EMULSION INTRAVENOUS CONTINUOUS PRN
Status: DISCONTINUED | OUTPATIENT
Start: 2018-03-15 | End: 2018-03-15 | Stop reason: SDUPTHER

## 2018-03-15 RX ORDER — DOCUSATE SODIUM 100 MG/1
100 CAPSULE, LIQUID FILLED ORAL 2 TIMES DAILY
Status: DISCONTINUED | OUTPATIENT
Start: 2018-03-15 | End: 2018-03-16 | Stop reason: HOSPADM

## 2018-03-15 RX ORDER — DEXAMETHASONE SODIUM PHOSPHATE 10 MG/ML
INJECTION INTRAMUSCULAR; INTRAVENOUS PRN
Status: DISCONTINUED | OUTPATIENT
Start: 2018-03-15 | End: 2018-03-15 | Stop reason: SDUPTHER

## 2018-03-15 RX ORDER — MEPERIDINE HYDROCHLORIDE 25 MG/ML
12.5 INJECTION INTRAMUSCULAR; INTRAVENOUS; SUBCUTANEOUS EVERY 5 MIN PRN
Status: DISCONTINUED | OUTPATIENT
Start: 2018-03-15 | End: 2018-03-15 | Stop reason: HOSPADM

## 2018-03-15 RX ADMIN — PROPOFOL 100 MCG/KG/MIN: 10 INJECTION, EMULSION INTRAVENOUS at 11:09

## 2018-03-15 RX ADMIN — CEFAZOLIN SODIUM 2 G: 2 SOLUTION INTRAVENOUS at 11:06

## 2018-03-15 RX ADMIN — CELECOXIB 400 MG: 200 CAPSULE ORAL at 09:45

## 2018-03-15 RX ADMIN — MIDAZOLAM HYDROCHLORIDE 4 MG: 1 INJECTION, SOLUTION INTRAMUSCULAR; INTRAVENOUS at 10:42

## 2018-03-15 RX ADMIN — KETOROLAC TROMETHAMINE 15 MG: 15 INJECTION, SOLUTION INTRAMUSCULAR; INTRAVENOUS at 20:34

## 2018-03-15 RX ADMIN — SODIUM CHLORIDE, POTASSIUM CHLORIDE, SODIUM LACTATE AND CALCIUM CHLORIDE 1000 ML: 600; 310; 30; 20 INJECTION, SOLUTION INTRAVENOUS at 09:57

## 2018-03-15 RX ADMIN — TRANEXAMIC ACID 1000 MG: 1 INJECTION, SOLUTION INTRAVENOUS at 13:20

## 2018-03-15 RX ADMIN — ROPIVACAINE HYDROCHLORIDE 15 ML: 5 INJECTION, SOLUTION EPIDURAL; INFILTRATION; PERINEURAL at 10:47

## 2018-03-15 RX ADMIN — TRANEXAMIC ACID 1000 MG: 1 INJECTION, SOLUTION INTRAVENOUS at 11:20

## 2018-03-15 RX ADMIN — SODIUM CHLORIDE, POTASSIUM CHLORIDE, SODIUM LACTATE AND CALCIUM CHLORIDE: 600; 310; 30; 20 INJECTION, SOLUTION INTRAVENOUS at 11:55

## 2018-03-15 RX ADMIN — DEXAMETHASONE SODIUM PHOSPHATE 5 MG: 10 INJECTION INTRAMUSCULAR; INTRAVENOUS at 10:47

## 2018-03-15 RX ADMIN — DOCUSATE SODIUM AND SENNOSIDES 1 TABLET: 8.6; 5 TABLET, FILM COATED ORAL at 20:34

## 2018-03-15 RX ADMIN — OXYCODONE HYDROCHLORIDE 10 MG: 10 TABLET, FILM COATED, EXTENDED RELEASE ORAL at 09:45

## 2018-03-15 RX ADMIN — LIDOCAINE HYDROCHLORIDE 700 MG: 20 INJECTION, SOLUTION INFILTRATION; PERINEURAL at 12:45

## 2018-03-15 RX ADMIN — SODIUM CHLORIDE: 9 INJECTION, SOLUTION INTRAVENOUS at 14:51

## 2018-03-15 RX ADMIN — ACETAMINOPHEN 650 MG: 325 TABLET ORAL at 20:34

## 2018-03-15 RX ADMIN — KETOROLAC TROMETHAMINE 15 MG: 15 INJECTION, SOLUTION INTRAMUSCULAR; INTRAVENOUS at 14:51

## 2018-03-15 RX ADMIN — ACETAMINOPHEN 650 MG: 325 TABLET ORAL at 14:52

## 2018-03-15 RX ADMIN — LIDOCAINE HYDROCHLORIDE 60 MG: 20 INJECTION, SOLUTION INFILTRATION; PERINEURAL at 11:09

## 2018-03-15 RX ADMIN — ACETAMINOPHEN 1000 MG: 500 TABLET ORAL at 09:44

## 2018-03-15 RX ADMIN — LIDOCAINE HYDROCHLORIDE 1 ML: 10 INJECTION, SOLUTION EPIDURAL; INFILTRATION; INTRACAUDAL; PERINEURAL at 09:57

## 2018-03-15 RX ADMIN — CEFAZOLIN SODIUM 2 G: 2 SOLUTION INTRAVENOUS at 18:51

## 2018-03-15 RX ADMIN — DOCUSATE SODIUM 100 MG: 100 CAPSULE, LIQUID FILLED ORAL at 20:34

## 2018-03-15 ASSESSMENT — PULMONARY FUNCTION TESTS
PIF_VALUE: 0
PIF_VALUE: 1
PIF_VALUE: 0
PIF_VALUE: 1
PIF_VALUE: 0

## 2018-03-15 ASSESSMENT — PAIN SCALES - GENERAL
PAINLEVEL_OUTOF10: 2
PAINLEVEL_OUTOF10: 0
PAINLEVEL_OUTOF10: 0

## 2018-03-15 ASSESSMENT — PAIN DESCRIPTION - LOCATION
LOCATION: KNEE
LOCATION: KNEE

## 2018-03-15 ASSESSMENT — PAIN - FUNCTIONAL ASSESSMENT: PAIN_FUNCTIONAL_ASSESSMENT: 0-10

## 2018-03-15 ASSESSMENT — PAIN DESCRIPTION - DESCRIPTORS
DESCRIPTORS: THROBBING
DESCRIPTORS: ACHING;CONSTANT

## 2018-03-15 ASSESSMENT — PAIN DESCRIPTION - ORIENTATION
ORIENTATION: RIGHT
ORIENTATION: RIGHT

## 2018-03-15 NOTE — CONSULTS
Inpatient consult to Hospitalist  Consult performed by: Sheron Pedro ordered by: Roberta Brito        Consult Note    Reason for Consult:  Management of htn, hld, CAD, bph    Requesting Physician:  Megha Mora MD    HISTORY OF PRESENT ILLNESS:    The patient is a 71 y.o. male who is s/p right TKA per Dr. Carlton Dent on 3/15/18. Denies cp sob ha rash anx n v d f      Past Medical History:   Diagnosis Date    Actinic keratosis     Basal cell carcinoma, trunk 12/2017    left upper back    Basal cell carcinoma, trunk     BPH (benign prostatic hyperplasia)     CAD (coronary artery disease) 2012    has 4 cardiac stents    Heart attack     Hyperlipidemia     meds > 15 yrs    Hypertension     meds > 6 yrs / denies TIA or stroke    Hypertrophic scar     Myocardial infarct 11/2012    Osteoarthritis     all joints    Overweight 12/22/2017    Status post coronary angioplasty     Thyroid disease     meds > 1 month       Past Surgical History:   Procedure Laterality Date    CARPAL TUNNEL RELEASE Bilateral 2015    COLONOSCOPY  3/23/15        CORONARY ANGIOPLASTY WITH STENT PLACEMENT  2012    CYSTOSCOPY  05/17/2017    W/COMPLEX CYSTOMETROGRAM-COMPLEX UROFLOW-TRANSRECTAL US PROSTATE    EYE SURGERY      Lasik OU    JOINT REPLACEMENT Left 2010    LTKR    ROTATOR CUFF REPAIR Right     twice    SHOULDER SURGERY Right      RCR    TONSILLECTOMY      TURP N/A 7/17/2017    GREEN LIGHT LASER TRANSURETHRAL RESECTIOIN PROSTATE performed by René Alves MD at [address] [place] [address] PROSTATE/TRANSRECTAL  06/03/15    Cystoscop, cath rem, US prostate    UVULECTOMY  1998    due to snoring       Prior to Admission medications    Medication Sig Start Date End Date Taking? Authorizing Provider   clobetasol prop emollient base (CLOBETASOL PROPIONATE E) 0.05 % CREA Apply bid to affected area(s)  Mon thru Friday only, take weekends off.  Do not use on face, groin, armpits or

## 2018-03-15 NOTE — H&P
History and physical is reviewed, patient re evaluated, no changes. Procedure, risks, benefits, and postoperative course were discussed with the patient and consent is reviewed.   Ravi Smith MD

## 2018-03-15 NOTE — PROGRESS NOTES
Physical Therapy Med Surg Initial Assessment  Facility/Department: [facility]  Room: N229/N229-01       NAME: Hetal Argueta  : 1945 (49 y.o.)  MRN: [id number]  CODE STATUS: Full Code    Date of Service: 3/15/2018    Patient Diagnosis(es): Status post total right knee replacement [Z96.651]   No chief complaint on file.     Patient Active Problem List    Diagnosis Date Noted    Status post total right knee replacement 03/15/2018    Hypertrophic scar     Osteoarthritis of right knee 2018    Infection of toe 2018    Overweight 2017    Basal cell carcinoma, trunk 2017    BPH with obstruction/lower urinary tract symptoms     Urinary retention     Actinic keratosis     Status post LASIK surgery 2015    Hyperlipidemia     Hypertension     BPH (benign prostatic hyperplasia)     Osteoarthritis     Myocardial infarct     CAD (coronary artery disease)         Past Medical History:   Diagnosis Date    Actinic keratosis     Basal cell carcinoma, trunk 2017    left upper back    Basal cell carcinoma, trunk     BPH (benign prostatic hyperplasia)     CAD (coronary artery disease)     has 4 cardiac stents    Heart attack     Hyperlipidemia     meds > 15 yrs    Hypertension     meds > 6 yrs / denies TIA or stroke    Hypertrophic scar     Myocardial infarct 2012    Osteoarthritis     all joints    Overweight 2017    Status post coronary angioplasty     Thyroid disease     meds > 1 month     Past Surgical History:   Procedure Laterality Date    CARPAL TUNNEL RELEASE Bilateral     COLONOSCOPY  3/23/15        CORONARY ANGIOPLASTY WITH STENT PLACEMENT      CYSTOSCOPY  2017    W/COMPLEX CYSTOMETROGRAM-COMPLEX UROFLOW-TRANSRECTAL US PROSTATE    EYE SURGERY      Lasik OU    JOINT REPLACEMENT Left     LTKR    ROTATOR CUFF REPAIR Right     twice    SHOULDER SURGERY Right      RCR    TONSILLECTOMY      TURP N/A 2017 Status: Impaired (diminshed R LE sensaton from anasthesia, motor control intact)    Bed mobility  Supine to Sit: Stand by assistance    Transfers  Sit to Stand: Minimal Assistance  Stand to sit: Moderate Assistance    Ambulation  Ambulation?: Yes  Ambulation 1  Surface: level tile  Device: Rolling Walker  Assistance: Moderate assistance  Quality of Gait: step to antalic  Distance: 5ft forward, turning, backwards  Comments: VC for sequencing with 2ww with fair follow through. L knee \"unsteady\" 1 post LOB with approach to chair requiring mod A for recovery    Activity Tolerance  Activity Tolerance: Patient Tolerated treatment well  PT Equipment Recommendations  Equipment Needed: Yes  Mobility Devices: Dayla Alberts: Rolling    Exercises  Comments: instruction in anti embolics     ASSESSMENT:   Body structures, Functions, Activity limitations: Decreased functional mobility ; Decreased strength;Decreased safe awareness;Decreased balance;Decreased ROM  Decision Making: Medium Complexity    Prognosis: Good  Patient Education: Pt educated in role of acute care PT, PT POC, benefits of mobility while in house, safety techniques, use of call light for assistance, d/c planning, d/c recommendation, WBAT, knee immob, anti embolics    Barriers to Learning: none    DISCHARGE RECOMMENDATIONS:  Discharge Recommendations: Continue to assess pending progress    Assessment: Pt demonstrates the above deficits s/p R TKA. Pt would benefit from skilled physical therapyto improve safety & independence with all functional mobility, improve LE ROM and strength,  improve standing balance, improve endurance to allow for decrease risk for falls, return to previous level of function, and safe return to home with decreased level of assistance, and improve QOL.     REQUIRES PT FOLLOW UP: Yes      PLAN OF CARE:  Plan  Times per week: 7  Times per day: Twice a day  Current Treatment Recommendations: Strengthening, Functional Mobility Training,

## 2018-03-15 NOTE — BRIEF OP NOTE
Brief Postoperative Note  ______________________________________________________________    Patient: Mariana Tiwari  YOB: 1945  MRN: [id number]  Date of Procedure: 3/15/2018    Pre-Op Diagnosis: RIGHT KNEE: KNEE OSTEOARTHRITIS    Post-Op Diagnosis: Same       Procedure(s):  RIGHT KNEE TOTAL KNEE  ARTHROPLASTY    Anesthesia: Spinal    Surgeon(s): Gianni Arceo MD    Staff:  Scrub Person First: April Spring Christin Allen  Physician Assistant: DEAN James     Estimated Blood Loss: * No values recorded between 3/15/2018 11:02 AM and 3/15/2018 47:87 PM * mL    Complications: None    Specimens:   * No specimens in log *    Implants:    Implant Name Type Inv.  Item Serial No.  Lot No. LRB No. Used   CEMENT FULL DOSE SIMPLEX  - -3-763 Fastener CEMENT FULL DOSE SIMPLEX HV [id number] [employer]: [employer] [id number] Right 1   CEMENT FULL DOSE SIMPLEX HV - -6-779 [employer]ener CEMENT FULL DOSE SIMPLEX  [id number] [employer] [id number] Right 1   IMPL KNEE TIB INSRT TRIATHLON SZ 6 9MM - -V-354 Knee IMPL KNEE TIB INSRT TRIATHLON SZ 6 9MM [id number] [employer] [id number] Right 1   IMPL KNEE FEM COMP CMNTD SZ 6 - -L-548 Knee IMPL KNEE FEM COMP CMNTD SZ 6 5515-F-[id number] [employer] [id number] Right 1   IMPL KNEE PATELLA ASYM [id number] - -L-430 Knee IMPL KNEE PATELLA ASYM [id number] 5551-L-350 [employer] [id number] Right 1   IMPL KNEE TIB BASEPLT PRIME SZ 6 - -C-973 Knee IMPL KNEE TIB BASEPLT PRIME SZ 6 5520-B-600 [employer] [id number] Right 1         Drains:   Urethral Catheter Straight-tip 20 fr (Active)       Findings: raj Arceo MD  Date: 3/15/2018  Time: 12:14 PM

## 2018-03-15 NOTE — ANESTHESIA PRE PROCEDURE
Department of Anesthesiology  Preprocedure Note       Name:  Blessing Hayden   Age:  71 y.o.  :  1945                                          MRN:  [id number]         Date:  3/15/2018      Surgeon: John Hahn): Desiree Montanez MD    Procedure: Procedure(s):  RIGHT KNEE TOTAL KNEE  ARTHROPLASTY, WHITNEY PS, NERVE BLOCK    Medications prior to admission:   Prior to Admission medications    Medication Sig Start Date End Date Taking? Authorizing Provider   clobetasol prop emollient base (CLOBETASOL PROPIONATE E) 0.05 % CREA Apply bid to affected area(s)  Mon thru Friday only, take weekends off. Do not use on face, groin, armpits or breast tissue. 3/12/18   Donna Thrasher MD   sulfamethoxazole-trimethoprim (BACTRIM DS) 800-160 MG per tablet Take 1 tablet by mouth 2 times daily for 10 days 3/5/18 3/15/18  Tomasa Romero MD   levothyroxine (SYNTHROID) 50 MCG tablet Take 1 tablet by mouth Daily 12/15/17   Tomasa Romero MD   etodolac (LODINE) 400 MG tablet Take 1 tablet by mouth 2 times daily 17  Tomasa Romero MD   aspirin 81 MG tablet Take 81 mg by mouth daily    Historical Provider, MD   losartan (COZAAR) 25 MG tablet take 1 tablet by mouth once daily 17   Tomasa Romero MD   simvastatin (ZOCOR) 40 MG tablet take 1 tablet by mouth every evening 17   Tomasa Romero MD   hydrochlorothiazide (HYDRODIURIL) 25 MG tablet take 1 tablet by mouth once daily 17   Tomasa Romero MD   carvedilol (COREG) 12.5 MG tablet take 1 tablet by mouth twice a day with food 10/9/17   Tomasa Romero MD   tamsulosin [facility]) 0.4 MG capsule take 1 capsule by mouth twice a day 17   Arnette Merlin, MD   finasteride (PROSCAR) 5 MG tablet Take 1 tablet by mouth daily 17   Arnette Merlin, MD   nitroGLYCERIN (NITROSTAT) 0.4 MG SL tablet Place 1 tablet under the tongue every 5 minutes as needed for Chest pain Dissolve 1 tab under tongue at first sign of chest pain every 5 minutes as needed.  If pain

## 2018-03-15 NOTE — OP NOTE
marginal cement  was removed and a deep drain placed in the knee joint. The capsule was  closed using interrupted #1, the subcutaneous tissues were closed using  #3-0 Monocryl and _____ staples were then used for the skin. Dry sterile  bulky dressing applied. The patient tolerated the procedure well, taken to  the PACU good condition without complication.         Sun Zurita MD    D: 03/15/2018 14:01:25       T: 03/15/2018 14:22:52     [email or web address]  Job#: [id number]     Doc#: [id number]    CC:

## 2018-03-16 VITALS
OXYGEN SATURATION: 97 % | DIASTOLIC BLOOD PRESSURE: 59 MMHG | TEMPERATURE: 98.1 F | BODY MASS INDEX: 32.8 KG/M2 | HEART RATE: 67 BPM | SYSTOLIC BLOOD PRESSURE: 124 MMHG | HEIGHT: 67 IN | WEIGHT: 209 LBS | RESPIRATION RATE: 18 BRPM

## 2018-03-16 LAB
ANION GAP SERPL CALCULATED.3IONS-SCNC: 11 MEQ/L (ref 7–13)
BUN BLDV-MCNC: 15 MG/DL (ref 8–23)
CALCIUM SERPL-MCNC: 7.9 MG/DL (ref 8.6–10.2)
CHLORIDE BLD-SCNC: 101 MEQ/L (ref 98–107)
CO2: 23 MEQ/L (ref 22–29)
CREAT SERPL-MCNC: 1.01 MG/DL (ref 0.7–1.2)
GFR AFRICAN AMERICAN: >60
GFR NON-AFRICAN AMERICAN: >60
GLUCOSE BLD-MCNC: 165 MG/DL (ref 74–109)
HCT VFR BLD CALC: 35.2 % (ref 42–52)
HEMOGLOBIN: 12 G/DL (ref 14–18)
MCH RBC QN AUTO: 30.3 PG (ref 27–31.3)
MCHC RBC AUTO-ENTMCNC: 34.1 % (ref 33–37)
MCV RBC AUTO: 89.1 FL (ref 80–100)
PDW BLD-RTO: 13 % (ref 11.5–14.5)
PLATELET # BLD: 178 K/UL (ref 130–400)
POTASSIUM REFLEX MAGNESIUM: 3.9 MEQ/L (ref 3.5–5.1)
RBC # BLD: 3.95 M/UL (ref 4.7–6.1)
SODIUM BLD-SCNC: 135 MEQ/L (ref 132–144)
WBC # BLD: 13.6 K/UL (ref 4.8–10.8)

## 2018-03-16 PROCEDURE — 2700000000 HC OXYGEN THERAPY PER DAY

## 2018-03-16 PROCEDURE — 6360000002 HC RX W HCPCS: Performed by: ORTHOPAEDIC SURGERY

## 2018-03-16 PROCEDURE — 36415 COLL VENOUS BLD VENIPUNCTURE: CPT

## 2018-03-16 PROCEDURE — 85027 COMPLETE CBC AUTOMATED: CPT

## 2018-03-16 PROCEDURE — 97535 SELF CARE MNGMENT TRAINING: CPT

## 2018-03-16 PROCEDURE — 6370000000 HC RX 637 (ALT 250 FOR IP): Performed by: ORTHOPAEDIC SURGERY

## 2018-03-16 PROCEDURE — 97112 NEUROMUSCULAR REEDUCATION: CPT

## 2018-03-16 PROCEDURE — 80048 BASIC METABOLIC PNL TOTAL CA: CPT

## 2018-03-16 PROCEDURE — 97116 GAIT TRAINING THERAPY: CPT

## 2018-03-16 RX ORDER — TRAMADOL HYDROCHLORIDE 50 MG/1
50 TABLET ORAL EVERY 6 HOURS PRN
Qty: 40 TABLET | Refills: 0 | Status: SHIPPED | OUTPATIENT
Start: 2018-03-16 | End: 2018-03-23

## 2018-03-16 RX ORDER — ASPIRIN 81 MG/1
81 TABLET ORAL 2 TIMES DAILY
Qty: 60 TABLET | Refills: 0 | Status: SHIPPED | OUTPATIENT
Start: 2018-03-16 | End: 2021-02-05 | Stop reason: SINTOL

## 2018-03-16 RX ADMIN — TRAMADOL HYDROCHLORIDE 50 MG: 50 TABLET, FILM COATED ORAL at 00:14

## 2018-03-16 RX ADMIN — ACETAMINOPHEN 650 MG: 325 TABLET ORAL at 08:45

## 2018-03-16 RX ADMIN — ACETAMINOPHEN 650 MG: 325 TABLET ORAL at 15:00

## 2018-03-16 RX ADMIN — TRAMADOL HYDROCHLORIDE 50 MG: 50 TABLET, FILM COATED ORAL at 12:02

## 2018-03-16 RX ADMIN — ASPIRIN 81 MG: 81 TABLET, COATED ORAL at 08:46

## 2018-03-16 RX ADMIN — TRAMADOL HYDROCHLORIDE 50 MG: 50 TABLET, FILM COATED ORAL at 06:14

## 2018-03-16 RX ADMIN — DOCUSATE SODIUM 100 MG: 100 CAPSULE, LIQUID FILLED ORAL at 08:46

## 2018-03-16 RX ADMIN — ACETAMINOPHEN 650 MG: 325 TABLET ORAL at 02:59

## 2018-03-16 RX ADMIN — CEFAZOLIN SODIUM 2 G: 2 SOLUTION INTRAVENOUS at 04:50

## 2018-03-16 ASSESSMENT — PAIN DESCRIPTION - LOCATION
LOCATION: KNEE

## 2018-03-16 ASSESSMENT — PAIN DESCRIPTION - ORIENTATION
ORIENTATION: RIGHT

## 2018-03-16 ASSESSMENT — PAIN DESCRIPTION - PAIN TYPE
TYPE: SURGICAL PAIN

## 2018-03-16 ASSESSMENT — PAIN SCALES - GENERAL
PAINLEVEL_OUTOF10: 4
PAINLEVEL_OUTOF10: 2
PAINLEVEL_OUTOF10: 3
PAINLEVEL_OUTOF10: 2
PAINLEVEL_OUTOF10: 0
PAINLEVEL_OUTOF10: 1

## 2018-03-16 NOTE — FLOWSHEET NOTE
ELOISE Figueroa wheeled patient out by wheelchair with wife at side. Patient states he is satisfied with his care and he borrowed walker from [facility] and  set it up for him to return it. Patient now leaving and discharged.

## 2018-03-16 NOTE — PROGRESS NOTES
Progress Note   TKA    Subjective:     Post-Operative Day: 1 Status Post right Total Knee Arthroplasty  Systemic or Specific Complaints:No Complaints    Objective:     CURRENT VITALS:  /63   Pulse 79   Temp 97.2 °F (36.2 °C) (Oral)   Resp 16   Ht 5' 7\" (1.702 m)   Wt 209 lb (94.8 kg)   SpO2 94%   BMI 32.73 kg/m²     General: alert, appears stated age and cooperative   Wound: Wound clean and dry no evidence of infection. Aquacel on    Motion: Painful range of Motion   DVT Exam: No evidence of DVT seen on physical exam.       Knee swollen but thigh soft to palpation. Moving foot and ankle. Good distal pulses. Data Review  Recent Labs      03/16/18   0600   WBC  13.6*   RBC  3.95*   HGB  12.0*   HCT  35.2*   MCV  89.1   MCH  30.3   MCHC  34.1   RDW  13.0   PLT  178         Assessment:     Status Post right Total Knee Arthroplasty. Doing well postoperatively.      Plan:      1: Continues current post-op course : plan to d/c home with [facility]  2:  Continue Deep venous thrombosis prophylaxis  3:  Continue physical therapy  4:  Continue Pain Control

## 2018-03-16 NOTE — PROGRESS NOTES
after donning underpants. Pt donned right sock with AE with VC not to twist knee. No physical assistance for dressing. Pt ambulated to bed with VC's to decrease pace. Plan:  [x] Continue OT per POC  [] D/C OT  [] Other:     Goals/Plan:   [x] Improve balance  [] Improve Strength   [] Improve Tate with functional transfers   []  Improve Tate with ADLs     Time In: 9:45  Time Out[de-identified]  10:40     Electronically signed by:     KELVIN Medina    3/16/2018, 10:59 AM

## 2018-03-16 NOTE — PROGRESS NOTES
Physical Therapy  Facility/Department: [facility] N229/N229-01  Daily Progress Note    NAME: Merrill Abreu    : 1945 (52 y.o.)  MRN: [id number]    Account: [de-identified]  Gender: male    Date of Service: 18    Patient Diagnosis(es):   Patient Active Problem List    Diagnosis Date Noted    Status post total right knee replacement 03/15/2018    Hypertrophic scar     Osteoarthritis of right knee 2018    Infection of toe 2018    Overweight 2017    Basal cell carcinoma, trunk 2017    BPH with obstruction/lower urinary tract symptoms     Urinary retention     Actinic keratosis     Status post LASIK surgery 2015    Hyperlipidemia     Hypertension     BPH (benign prostatic hyperplasia)     Osteoarthritis     Myocardial infarct     CAD (coronary artery disease)        Precautions/Weight Bearing Restrictions: Full weight bearing, POD #1 R TKA   Restrictions/Precautions: Weight Bearing, Fall Risk  Falls Safety Armband Present:  [x] Yes  [] No    SUBJECTIVE: I've had my L knee done so I know what to expect. Pain:   Initial Pain level: 2 /10   Location: R Knee   Description: aching  Action:  []  Pt declined intervention  [] Nursing notified     [x] Pain acceptable level for treatment  [] Other:      Reassessment of Pain: Same as caitlin     OBJECTIVE:     Bed Mobility:   Rolling: - Independent  Supine to Sit: -  Independent   Sit to Supine: -  Independent    Steady    Transfers: @WW  Sit to Stand: - Supervision/Independent   Stand to Sit: - Independent   vc's for safety to not stand or take step until he has walker   Good technique    Gait/Ambulation:   Assistive Device: Rolling Walker  Assist Level:  Independent  Distance: 200'  Surface: linoleum/hardwood  Gait Deviations: reciprocal gait vc's for emphasis on increased heel strike and scanning environment for increased safety    Exercise:  HEP - Indep    Neuromuscular Re-Education/Balance:  Wall slides with GS

## 2018-03-16 NOTE — HOME CARE
Spoke to patient regarding discharge home with home health care. Discussed home health care process. Patient asked a few questions, and verbalized understanding. Offered a list of home health care agencies, but the patient stated, \"Why would I use anyone besides [facility]. I love [facility]. \" Using [place] [facility] Choice, the patient chose [facility]. The patient will be discharged home with [facility]: SN, PT, OT.  Electronically signed by Radha Steinberg RN on 3/16/18 at 11:24 AM

## 2018-03-19 NOTE — PROGRESS NOTES
Physical Therapy  Facility/Department: [facility] N229/N229-01  Physical Therapy Discharge      NAME: Margarita Sullivan    : 1945 (65 y.o.)  MRN: [id number]    Account: [de-identified]  Gender: male      Patient has been discharged from acute care hospital. DC patient from current PT program.    Electronically signed by Rebbeca Epley, PT on 3/19/18 at 12:23 PM

## 2018-03-20 DIAGNOSIS — L57.0 SOLAR KERATOSIS: Primary | ICD-10-CM

## 2018-03-26 ENCOUNTER — OFFICE VISIT (OUTPATIENT)
Dept: FAMILY MEDICINE CLINIC | Age: 73
End: 2018-03-26
Payer: MEDICARE

## 2018-03-26 VITALS
DIASTOLIC BLOOD PRESSURE: 72 MMHG | HEIGHT: 69 IN | HEART RATE: 84 BPM | OXYGEN SATURATION: 98 % | BODY MASS INDEX: 30.96 KG/M2 | SYSTOLIC BLOOD PRESSURE: 120 MMHG | WEIGHT: 209 LBS

## 2018-03-26 DIAGNOSIS — I10 ESSENTIAL HYPERTENSION: ICD-10-CM

## 2018-03-26 DIAGNOSIS — N40.1 BPH WITH OBSTRUCTION/LOWER URINARY TRACT SYMPTOMS: ICD-10-CM

## 2018-03-26 DIAGNOSIS — N13.8 BPH WITH OBSTRUCTION/LOWER URINARY TRACT SYMPTOMS: ICD-10-CM

## 2018-03-26 DIAGNOSIS — Z96.651 STATUS POST TOTAL RIGHT KNEE REPLACEMENT: ICD-10-CM

## 2018-03-26 DIAGNOSIS — E78.5 HYPERLIPIDEMIA, UNSPECIFIED HYPERLIPIDEMIA TYPE: ICD-10-CM

## 2018-03-26 DIAGNOSIS — M17.11 PRIMARY OSTEOARTHRITIS OF RIGHT KNEE: Primary | ICD-10-CM

## 2018-03-26 PROCEDURE — 99213 OFFICE O/P EST LOW 20 MIN: CPT | Performed by: FAMILY MEDICINE

## 2018-03-27 ENCOUNTER — HOSPITAL ENCOUNTER (OUTPATIENT)
Dept: ORTHOPEDIC SURGERY | Age: 73
Discharge: HOME | End: 2018-03-29
Payer: MEDICARE

## 2018-03-27 DIAGNOSIS — M25.561 ARTHRALGIA OF RIGHT LOWER LEG: ICD-10-CM

## 2018-03-27 PROCEDURE — 73562 X-RAY EXAM OF KNEE 3: CPT

## 2018-04-10 DIAGNOSIS — E03.9 HYPOTHYROIDISM, UNSPECIFIED TYPE: ICD-10-CM

## 2018-04-10 RX ORDER — LEVOTHYROXINE SODIUM 0.05 MG/1
TABLET ORAL
Qty: 30 TABLET | Refills: 3 | Status: SHIPPED | OUTPATIENT
Start: 2018-04-10 | End: 2018-07-31 | Stop reason: SDUPTHER

## 2018-04-17 ENCOUNTER — TELEPHONE (OUTPATIENT)
Dept: PULMONOLOGY | Age: 73
End: 2018-04-17

## 2018-05-09 RX ORDER — CARVEDILOL 12.5 MG/1
TABLET ORAL
Qty: 60 TABLET | Refills: 6 | Status: SHIPPED | OUTPATIENT
Start: 2018-05-09 | End: 2018-12-09 | Stop reason: SDUPTHER

## 2018-05-09 RX ORDER — LOSARTAN POTASSIUM 25 MG/1
TABLET ORAL
Qty: 30 TABLET | Refills: 5 | Status: SHIPPED | OUTPATIENT
Start: 2018-05-09 | End: 2018-06-14

## 2018-05-09 RX ORDER — HYDROCHLOROTHIAZIDE 25 MG/1
TABLET ORAL
Qty: 30 TABLET | Refills: 5 | Status: SHIPPED | OUTPATIENT
Start: 2018-05-09 | End: 2018-10-24 | Stop reason: SDUPTHER

## 2018-05-09 RX ORDER — SIMVASTATIN 40 MG
TABLET ORAL
Qty: 30 TABLET | Refills: 5 | Status: SHIPPED | OUTPATIENT
Start: 2018-05-09 | End: 2018-06-22

## 2018-05-10 DIAGNOSIS — N40.1 BPH WITH OBSTRUCTION/LOWER URINARY TRACT SYMPTOMS: ICD-10-CM

## 2018-05-10 DIAGNOSIS — N13.8 BPH WITH OBSTRUCTION/LOWER URINARY TRACT SYMPTOMS: ICD-10-CM

## 2018-05-10 RX ORDER — FINASTERIDE 5 MG/1
TABLET, FILM COATED ORAL
Qty: 90 TABLET | Refills: 3 | Status: SHIPPED | OUTPATIENT
Start: 2018-05-10 | End: 2019-04-25 | Stop reason: SDUPTHER

## 2018-05-14 ENCOUNTER — OFFICE VISIT (OUTPATIENT)
Dept: FAMILY MEDICINE CLINIC | Age: 73
End: 2018-05-14
Payer: MEDICARE

## 2018-05-14 VITALS
WEIGHT: 212 LBS | BODY MASS INDEX: 31.4 KG/M2 | SYSTOLIC BLOOD PRESSURE: 108 MMHG | OXYGEN SATURATION: 98 % | HEART RATE: 70 BPM | HEIGHT: 69 IN | DIASTOLIC BLOOD PRESSURE: 60 MMHG

## 2018-05-14 DIAGNOSIS — L91.0 HYPERTROPHIC SCAR: ICD-10-CM

## 2018-05-14 DIAGNOSIS — L57.0 ACTINIC KERATOSIS: Primary | ICD-10-CM

## 2018-05-14 PROCEDURE — 17004 DESTROY PREMAL LESIONS 15/>: CPT | Performed by: FAMILY MEDICINE

## 2018-05-14 RX ORDER — AVOBENZONE, HOMOSALATE, OCTISALATE, OCTOCRYLENE 30; 100; 50; 25 MG/ML; MG/ML; MG/ML; MG/ML
SPRAY TOPICAL
Refills: 0 | COMMUNITY
Start: 2018-05-11 | End: 2020-04-30

## 2018-05-14 RX ORDER — FOLIC ACID 1 MG/1
TABLET ORAL
Refills: 0 | COMMUNITY
Start: 2018-05-11 | End: 2020-04-30

## 2018-05-14 ASSESSMENT — PATIENT HEALTH QUESTIONNAIRE - PHQ9
SUM OF ALL RESPONSES TO PHQ QUESTIONS 1-9: 0
2. FEELING DOWN, DEPRESSED OR HOPELESS: 0
SUM OF ALL RESPONSES TO PHQ9 QUESTIONS 1 & 2: 0
1. LITTLE INTEREST OR PLEASURE IN DOING THINGS: 0

## 2018-05-14 ASSESSMENT — ENCOUNTER SYMPTOMS
SHORTNESS OF BREATH: 0
ABDOMINAL PAIN: 0

## 2018-05-15 LAB
FOLATE: >20 NG/ML (ref 7.3–26.1)
VITAMIN D 25-HYDROXY: 20.6 NG/ML (ref 30–100)

## 2018-05-17 RX ORDER — ETODOLAC 400 MG/1
TABLET, FILM COATED ORAL
Qty: 60 TABLET | Refills: 3 | Status: SHIPPED | OUTPATIENT
Start: 2018-05-17 | End: 2018-08-29 | Stop reason: SDUPTHER

## 2018-05-29 ENCOUNTER — HOSPITAL ENCOUNTER (OUTPATIENT)
Dept: ORTHOPEDIC SURGERY | Age: 73
Discharge: HOME | End: 2018-05-31
Payer: MEDICARE

## 2018-05-29 DIAGNOSIS — M25.561 ARTHRALGIA OF RIGHT LOWER LEG: ICD-10-CM

## 2018-05-29 PROCEDURE — 73562 X-RAY EXAM OF KNEE 3: CPT

## 2018-06-04 ENCOUNTER — TELEPHONE (OUTPATIENT)
Dept: UROLOGY | Age: 73
End: 2018-06-04

## 2018-06-04 DIAGNOSIS — N13.8 BPH WITH OBSTRUCTION/LOWER URINARY TRACT SYMPTOMS: ICD-10-CM

## 2018-06-04 DIAGNOSIS — N13.8 BPH WITH OBSTRUCTION/LOWER URINARY TRACT SYMPTOMS: Primary | ICD-10-CM

## 2018-06-04 DIAGNOSIS — N40.1 BPH WITH OBSTRUCTION/LOWER URINARY TRACT SYMPTOMS: Primary | ICD-10-CM

## 2018-06-04 DIAGNOSIS — N40.1 BPH WITH OBSTRUCTION/LOWER URINARY TRACT SYMPTOMS: ICD-10-CM

## 2018-06-04 LAB — PROSTATE SPECIFIC ANTIGEN: 0.31 NG/ML (ref 0–6.22)

## 2018-06-05 ENCOUNTER — OFFICE VISIT (OUTPATIENT)
Dept: UROLOGY | Age: 73
End: 2018-06-05
Payer: MEDICARE

## 2018-06-05 VITALS
BODY MASS INDEX: 30.36 KG/M2 | DIASTOLIC BLOOD PRESSURE: 64 MMHG | HEIGHT: 69 IN | WEIGHT: 205 LBS | HEART RATE: 70 BPM | SYSTOLIC BLOOD PRESSURE: 112 MMHG

## 2018-06-05 DIAGNOSIS — N13.8 BPH WITH OBSTRUCTION/LOWER URINARY TRACT SYMPTOMS: Primary | ICD-10-CM

## 2018-06-05 DIAGNOSIS — N40.1 BPH WITH OBSTRUCTION/LOWER URINARY TRACT SYMPTOMS: Primary | ICD-10-CM

## 2018-06-05 PROCEDURE — 99213 OFFICE O/P EST LOW 20 MIN: CPT | Performed by: UROLOGY

## 2018-06-05 ASSESSMENT — ENCOUNTER SYMPTOMS
SHORTNESS OF BREATH: 0
ABDOMINAL PAIN: 0
ABDOMINAL DISTENTION: 0

## 2018-06-14 ENCOUNTER — OFFICE VISIT (OUTPATIENT)
Dept: FAMILY MEDICINE CLINIC | Age: 73
End: 2018-06-14
Payer: MEDICARE

## 2018-06-14 VITALS
WEIGHT: 212.8 LBS | HEIGHT: 69 IN | BODY MASS INDEX: 31.52 KG/M2 | OXYGEN SATURATION: 96 % | DIASTOLIC BLOOD PRESSURE: 68 MMHG | HEART RATE: 68 BPM | SYSTOLIC BLOOD PRESSURE: 110 MMHG

## 2018-06-14 DIAGNOSIS — E78.5 HYPERLIPIDEMIA, UNSPECIFIED HYPERLIPIDEMIA TYPE: ICD-10-CM

## 2018-06-14 DIAGNOSIS — E03.9 HYPOTHYROIDISM, UNSPECIFIED TYPE: ICD-10-CM

## 2018-06-14 DIAGNOSIS — I10 ESSENTIAL HYPERTENSION: ICD-10-CM

## 2018-06-14 DIAGNOSIS — R73.9 HYPERGLYCEMIA: Primary | ICD-10-CM

## 2018-06-14 DIAGNOSIS — I25.10 CORONARY ARTERY DISEASE INVOLVING NATIVE CORONARY ARTERY OF NATIVE HEART WITHOUT ANGINA PECTORIS: ICD-10-CM

## 2018-06-14 DIAGNOSIS — Z78.9 ARB INTOLERANCE: ICD-10-CM

## 2018-06-14 PROCEDURE — 99213 OFFICE O/P EST LOW 20 MIN: CPT | Performed by: FAMILY MEDICINE

## 2018-06-15 DIAGNOSIS — E03.9 HYPOTHYROIDISM, UNSPECIFIED TYPE: ICD-10-CM

## 2018-06-15 DIAGNOSIS — I25.10 CORONARY ARTERY DISEASE INVOLVING NATIVE CORONARY ARTERY OF NATIVE HEART WITHOUT ANGINA PECTORIS: ICD-10-CM

## 2018-06-15 LAB
ALBUMIN SERPL-MCNC: 4 G/DL (ref 3.9–4.9)
ALP BLD-CCNC: 61 U/L (ref 35–104)
ALT SERPL-CCNC: 18 U/L (ref 0–41)
ANION GAP SERPL CALCULATED.3IONS-SCNC: 18 MEQ/L (ref 7–13)
AST SERPL-CCNC: 20 U/L (ref 0–40)
BILIRUB SERPL-MCNC: 0.7 MG/DL (ref 0–1.2)
BUN BLDV-MCNC: 22 MG/DL (ref 8–23)
CALCIUM SERPL-MCNC: 9.5 MG/DL (ref 8.6–10.2)
CHLORIDE BLD-SCNC: 99 MEQ/L (ref 98–107)
CHOLESTEROL, TOTAL: 168 MG/DL (ref 0–199)
CO2: 25 MEQ/L (ref 22–29)
CREAT SERPL-MCNC: 1.03 MG/DL (ref 0.7–1.2)
GFR AFRICAN AMERICAN: >60
GFR NON-AFRICAN AMERICAN: >60
GLOBULIN: 2.9 G/DL (ref 2.3–3.5)
GLUCOSE BLD-MCNC: 95 MG/DL (ref 74–109)
HCT VFR BLD CALC: 43.1 % (ref 42–52)
HDLC SERPL-MCNC: 38 MG/DL (ref 40–59)
HEMOGLOBIN: 14.3 G/DL (ref 14–18)
LDL CHOLESTEROL CALCULATED: 104 MG/DL (ref 0–129)
MCH RBC QN AUTO: 29.7 PG (ref 27–31.3)
MCHC RBC AUTO-ENTMCNC: 33.1 % (ref 33–37)
MCV RBC AUTO: 89.7 FL (ref 80–100)
PDW BLD-RTO: 14 % (ref 11.5–14.5)
PLATELET # BLD: 195 K/UL (ref 130–400)
POTASSIUM SERPL-SCNC: 4.6 MEQ/L (ref 3.5–5.1)
RBC # BLD: 4.8 M/UL (ref 4.7–6.1)
SODIUM BLD-SCNC: 142 MEQ/L (ref 132–144)
TOTAL PROTEIN: 6.9 G/DL (ref 6.4–8.1)
TRIGL SERPL-MCNC: 132 MG/DL (ref 0–200)
TSH SERPL DL<=0.05 MIU/L-ACNC: 3.42 UIU/ML (ref 0.27–4.2)
WBC # BLD: 6.2 K/UL (ref 4.8–10.8)

## 2018-06-18 RX ORDER — TAMSULOSIN HYDROCHLORIDE 0.4 MG/1
CAPSULE ORAL
Qty: 180 CAPSULE | Refills: 3 | Status: SHIPPED | OUTPATIENT
Start: 2018-06-18 | End: 2019-04-25 | Stop reason: SDUPTHER

## 2018-06-19 LAB — VITAMIN D 25-HYDROXY: 22.8 NG/ML (ref 30–100)

## 2018-06-22 ENCOUNTER — OFFICE VISIT (OUTPATIENT)
Dept: CARDIOLOGY CLINIC | Age: 73
End: 2018-06-22
Payer: MEDICARE

## 2018-06-22 VITALS
OXYGEN SATURATION: 97 % | RESPIRATION RATE: 18 BRPM | WEIGHT: 210.6 LBS | SYSTOLIC BLOOD PRESSURE: 114 MMHG | HEIGHT: 69 IN | TEMPERATURE: 97.6 F | BODY MASS INDEX: 31.19 KG/M2 | DIASTOLIC BLOOD PRESSURE: 68 MMHG | HEART RATE: 69 BPM

## 2018-06-22 DIAGNOSIS — Z86.19 HISTORY OF MYCOBACTERIAL INFECTION: ICD-10-CM

## 2018-06-22 DIAGNOSIS — R53.83 OTHER FATIGUE: ICD-10-CM

## 2018-06-22 DIAGNOSIS — E78.5 HYPERLIPIDEMIA, UNSPECIFIED HYPERLIPIDEMIA TYPE: ICD-10-CM

## 2018-06-22 DIAGNOSIS — I25.10 CORONARY ARTERY DISEASE INVOLVING NATIVE CORONARY ARTERY OF NATIVE HEART WITHOUT ANGINA PECTORIS: ICD-10-CM

## 2018-06-22 DIAGNOSIS — I10 ESSENTIAL HYPERTENSION: Primary | ICD-10-CM

## 2018-06-22 PROCEDURE — 99214 OFFICE O/P EST MOD 30 MIN: CPT | Performed by: INTERNAL MEDICINE

## 2018-06-22 RX ORDER — ATORVASTATIN CALCIUM 40 MG/1
40 TABLET, FILM COATED ORAL DAILY
Qty: 30 TABLET | Refills: 5 | Status: SHIPPED | OUTPATIENT
Start: 2018-06-22 | End: 2019-01-08 | Stop reason: SDUPTHER

## 2018-07-31 ENCOUNTER — OFFICE VISIT (OUTPATIENT)
Dept: FAMILY MEDICINE CLINIC | Age: 73
End: 2018-07-31
Payer: MEDICARE

## 2018-07-31 VITALS
DIASTOLIC BLOOD PRESSURE: 66 MMHG | TEMPERATURE: 97.6 F | OXYGEN SATURATION: 97 % | SYSTOLIC BLOOD PRESSURE: 122 MMHG | HEART RATE: 72 BPM | WEIGHT: 212 LBS | BODY MASS INDEX: 31.31 KG/M2

## 2018-07-31 DIAGNOSIS — E03.9 HYPOTHYROIDISM, UNSPECIFIED TYPE: ICD-10-CM

## 2018-07-31 DIAGNOSIS — L23.7 POISON IVY: Primary | ICD-10-CM

## 2018-07-31 PROCEDURE — 99213 OFFICE O/P EST LOW 20 MIN: CPT | Performed by: NURSE PRACTITIONER

## 2018-07-31 RX ORDER — PREDNISONE 20 MG/1
40 TABLET ORAL DAILY
Qty: 8 TABLET | Refills: 0 | Status: SHIPPED | OUTPATIENT
Start: 2018-07-31 | End: 2018-08-04

## 2018-07-31 RX ORDER — METHYLPREDNISOLONE ACETATE 80 MG/ML
80 INJECTION, SUSPENSION INTRA-ARTICULAR; INTRALESIONAL; INTRAMUSCULAR; SOFT TISSUE ONCE
Status: DISCONTINUED | OUTPATIENT
Start: 2018-07-31 | End: 2020-11-13

## 2018-07-31 RX ORDER — LEVOTHYROXINE SODIUM 0.05 MG/1
TABLET ORAL
Qty: 30 TABLET | Refills: 3 | Status: SHIPPED | OUTPATIENT
Start: 2018-07-31 | End: 2018-12-09 | Stop reason: SDUPTHER

## 2018-07-31 ASSESSMENT — ENCOUNTER SYMPTOMS
SORE THROAT: 0
RHINORRHEA: 0
VOMITING: 0
COUGH: 0
DIARRHEA: 0

## 2018-07-31 NOTE — PROGRESS NOTES
Poison basilia  predniSONE (DELTASONE) 20 MG tablet    methylPREDNISolone acetate (DEPO-MEDROL) injection 80 mg           Plan:      No orders of the defined types were placed in this encounter. Orders Placed This Encounter   Medications    predniSONE (DELTASONE) 20 MG tablet     Sig: Take 2 tablets by mouth daily for 4 days     Dispense:  8 tablet     Refill:  0    methylPREDNISolone acetate (DEPO-MEDROL) injection 80 mg     Treated for poison ivy dermatitis. Advised that steroids can help with symptoms temporarily. However there is possibility of rebound rash and itching once the steroid has left body. If this occurs patient can take oral steroids sent to pharmacy. Advised to avoid touching rash areas as this can spread rash to other parts of body. If no improvement after treatment, follow up with PCP. Patient verbalized understanding. Return if symptoms worsen or fail to improve.     Harriet Bernardo, APRN - CNP

## 2018-07-31 NOTE — PATIENT INSTRUCTIONS
X Pre-Contact Skin Solution, come in lotions, sprays, or towelettes. You put the product on your skin right before you go outdoors. · If you did not use a preventive product and you have had contact with plant oil, clean it off your skin as soon as possible. Use a product such as [employer] Original Outdoor Skin Cleanser. These products can also be used to clean plant oil from clothing or tools. When should you call for help? Call your doctor now or seek immediate medical care if:    · Your rash gets worse, and you start to feel bad and have a fever, a stiff neck, nausea, and vomiting.     · You have signs of infection, such as:  ¨ Increased pain, swelling, warmth, or redness. ¨ Red streaks leading from the rash. ¨ Pus draining from the rash. ¨ A fever.    Watch closely for changes in your health, and be sure to contact your doctor if:    · You have new blisters or bruises, or the rash spreads and looks like a sunburn.     · The rash gets worse, or it comes back after nearly disappearing.     · You think a medicine you are using is making your rash worse.     · Your rash does not clear up after 1 to 2 weeks of home treatment.     · You have joint aches or body aches with your rash. Where can you learn more? Go to [email or web address]://[email or web address].TeraView. org and sign in to your [employer] account. Enter B245 in the [facility] box to learn more about \"[place] [place], [place], and [place]: Care Instructions. \"     If you do not have an account, please click on the \"Sign Up Now\" link. Current as of: October 5, 2017  Content Version: 11.6  © 3259-5499 [employer]. Care instructions adapted under license by [email or web address] ([facility]). If you have questions about a medical condition or this instruction, always ask your healthcare professional. [address]  any warranty or liability for your use of this information.

## 2018-08-14 ENCOUNTER — OFFICE VISIT (OUTPATIENT)
Dept: FAMILY MEDICINE CLINIC | Age: 73
End: 2018-08-14
Payer: MEDICARE

## 2018-08-14 VITALS
SYSTOLIC BLOOD PRESSURE: 118 MMHG | WEIGHT: 203 LBS | HEART RATE: 66 BPM | HEIGHT: 69 IN | OXYGEN SATURATION: 98 % | DIASTOLIC BLOOD PRESSURE: 76 MMHG | BODY MASS INDEX: 30.07 KG/M2

## 2018-08-14 DIAGNOSIS — L57.0 ACTINIC KERATOSIS: Primary | ICD-10-CM

## 2018-08-14 DIAGNOSIS — L29.9 PRURITUS OF SKIN: ICD-10-CM

## 2018-08-14 PROCEDURE — 17110 DESTRUCTION B9 LES UP TO 14: CPT | Performed by: FAMILY MEDICINE

## 2018-08-14 RX ORDER — FLUOROURACIL 50 MG/G
CREAM TOPICAL
Qty: 40 G | Refills: 0 | Status: SHIPPED | OUTPATIENT
Start: 2018-08-14 | End: 2018-11-23 | Stop reason: ALTCHOICE

## 2018-08-14 ASSESSMENT — ENCOUNTER SYMPTOMS
SHORTNESS OF BREATH: 0
ABDOMINAL PAIN: 0

## 2018-08-14 NOTE — PROGRESS NOTES
Subjective  Brie Staples, 68 y.o. male presents today with:  Chief Complaint   Patient presents with    3 Month Follow-Up       HPI    Pt of Dr. Sherryle Timber[place] here for 3 month  f/u on LN2. Has new itchy spots. Review of Systems   Constitutional: Negative for fever. Respiratory: Negative for shortness of breath. Cardiovascular: Negative for chest pain. Gastrointestinal: Negative for abdominal pain. Skin: Negative for rash.        Past Medical History:   Diagnosis Date    Actinic keratosis     Basal cell carcinoma, trunk 12/2017    left upper back    Basal cell carcinoma, trunk     BPH (benign prostatic hyperplasia)     CAD (coronary artery disease) 2012    has 4 cardiac stents    [facility] [address]     History of mycobacterial infection 6/22/2018    Hyperlipidemia     meds > 15 yrs    Hypertension     meds > 6 yrs / denies TIA or stroke    Hypertrophic scar     Myocardial infarct ([facility] [address] 11/2012    Osteoarthritis     all joints    Overweight 12/22/2017    Status post coronary angioplasty     Thyroid disease     meds > 1 month     Past Surgical History:   Procedure Laterality Date    CARPAL TUNNEL RELEASE Bilateral 2015    COLONOSCOPY  3/23/15        CORONARY ANGIOPLASTY WITH STENT PLACEMENT  2012    CYSTOSCOPY  05/17/2017    W/COMPLEX CYSTOMETROGRAM-COMPLEX UROFLOW-TRANSRECTAL US PROSTATE    EYE SURGERY      Lasik OU    JOINT REPLACEMENT Left 2010    LTKR    GA TOTAL KNEE ARTHROPLASTY Right 3/15/2018    RIGHT KNEE TOTAL KNEE  ARTHROPLASTY performed by Rey Barry MD at [place] [address] Right     twice    SHOULDER SURGERY Right      RCR    TONSILLECTOMY      TURP N/A 7/17/2017    GREEN LIGHT LASER TRANSURETHRAL RESECTIOIN PROSTATE performed by Agustin Carroll MD at [address] [place] [address] PROSTATE/TRANSRECTAL  06/03/15    Cystoscop, cath rem, US prostate    UVULECTOMY  1998    due to snoring     Social History     Social History    Marital 0    nitroGLYCERIN (NITROSTAT) 0.4 MG SL tablet Place 1 tablet under the tongue every 5 minutes as needed for Chest pain Dissolve 1 tab under tongue at first sign of chest pain every 5 minutes as needed. If pain persists after taking 3 tabs in a 15-minute period, or the pain is different than is typically experienced, call 9-1-1 immediately. 25 tablet 1     Current Facility-Administered Medications   Medication Dose Route Frequency Provider Last Rate Last Dose    methylPREDNISolone acetate (DEPO-MEDROL) injection 80 mg  80 mg Intramuscular Once NAOMY Victoria - CNP           Objective    Vitals:    08/14/18 1026   BP: 118/76   Pulse: 66   SpO2: 98%   Weight: 203 lb (92.1 kg)   Height: 5' 9\" (1.753 m)       Physical Exam   Constitutional: He appears well-developed and well-nourished. HENT:   Head: Normocephalic and atraumatic. Skin: Skin is warm and dry. Erythematous scaly papules in the following locations:  Right lateral cheek x 4, right mid cheek x 1, left mid cheek x 1, left lateral cheek x 2, right temple x 2, right forehead x 4, left forehead x 2, left temple x 2, left elbow x 1. Assessment & Plan    Diagnosis Orders   1. Actinic keratosis  fluorouracil (EFUDEX) 5 % cream    34764 - [place],2-14 LESIONS   2. Pruritus of skin  30713 - [place] PREMALIG[place],2-14 LESIONS     LN2 times 3 seconds to affected areas times 19 lesion(s). Informed consent given was given. Risks including infection, bleeding, scarring discussed. No guarantee how scars will look. Post op instructions given. Best to leave blisters alone if they form. If blister(s) pop or patient pops with a sterilized needed, apply antibiotic ointment and a bandage to affected area(s). The use of sunscreen with an SPF of 30 or higher was discussed and recommended.      Orders Placed This Encounter   Procedures    [address] - [place] PREMALIGNANT,2-14 LESIONS     Orders Placed This Encounter   Medications    fluorouracil (EFUDEX) 5 % cream     Sig: Apply topically 2 times daily for 2 weeks. Dispense:  40 g     Refill:  0     There are no discontinued medications. Return in about 4 months (around 12/14/2018).     Chiuqis Castro MD

## 2018-08-29 RX ORDER — ETODOLAC 400 MG/1
TABLET, FILM COATED ORAL
Qty: 60 TABLET | Refills: 3 | Status: SHIPPED | OUTPATIENT
Start: 2018-08-29 | End: 2018-09-26

## 2018-09-26 ENCOUNTER — OFFICE VISIT (OUTPATIENT)
Dept: FAMILY MEDICINE CLINIC | Age: 73
End: 2018-09-26
Payer: MEDICARE

## 2018-09-26 VITALS
OXYGEN SATURATION: 95 % | SYSTOLIC BLOOD PRESSURE: 130 MMHG | HEART RATE: 66 BPM | DIASTOLIC BLOOD PRESSURE: 72 MMHG | BODY MASS INDEX: 29.83 KG/M2 | HEIGHT: 69 IN | WEIGHT: 201.4 LBS

## 2018-09-26 DIAGNOSIS — N40.1 BENIGN PROSTATIC HYPERPLASIA WITH LOWER URINARY TRACT SYMPTOMS, SYMPTOM DETAILS UNSPECIFIED: ICD-10-CM

## 2018-09-26 DIAGNOSIS — I21.4 NON-ST ELEVATION (NSTEMI) MYOCARDIAL INFARCTION (HCC): ICD-10-CM

## 2018-09-26 DIAGNOSIS — I10 ESSENTIAL HYPERTENSION: ICD-10-CM

## 2018-09-26 DIAGNOSIS — L30.9 DERMATITIS: ICD-10-CM

## 2018-09-26 DIAGNOSIS — E78.5 HYPERLIPIDEMIA, UNSPECIFIED HYPERLIPIDEMIA TYPE: Primary | ICD-10-CM

## 2018-09-26 DIAGNOSIS — I25.10 CORONARY ARTERY DISEASE INVOLVING NATIVE CORONARY ARTERY OF NATIVE HEART WITHOUT ANGINA PECTORIS: ICD-10-CM

## 2018-09-26 PROCEDURE — 99213 OFFICE O/P EST LOW 20 MIN: CPT | Performed by: FAMILY MEDICINE

## 2018-09-26 RX ORDER — ETODOLAC 400 MG/1
TABLET, FILM COATED ORAL
Qty: 60 TABLET | Refills: 3 | COMMUNITY
Start: 2018-09-26 | End: 2018-12-11

## 2018-09-26 RX ORDER — TRIAMCINOLONE ACETONIDE 0.25 MG/G
CREAM TOPICAL
Qty: 80 G | Refills: 1 | Status: SHIPPED | OUTPATIENT
Start: 2018-09-26 | End: 2018-11-23 | Stop reason: ALTCHOICE

## 2018-09-26 NOTE — PROGRESS NOTES
Chief Complaint   Patient presents with    Hyperglycemia     6 month        HPI:  Claudia Vargas is a 68 y.o. male    HTN, hyperlipidemia, BPH, arthritis pain  meds reviewed  Refills utd  Compliant with meds  Denies cardiac symptoms  Refer to ROS    Already had flu shot    He is taking lodine for OA pain  Wanted to know if anything else tier 1 to avoid copay    Taking vit D supplement and folic acid    Wt Readings from Last 3 Encounters:   09/26/18 201 lb 6.4 oz (91.4 kg)   08/14/18 203 lb (92.1 kg)   07/31/18 212 lb (96.2 kg)     Follows with urology for BPH  Follows with Dr. William Conley for cardiology    Knee continues to improve following replacement        Past Medical History:   Diagnosis Date    Actinic keratosis     Basal cell carcinoma, trunk 12/2017    left upper back    Basal cell carcinoma, trunk     BPH (benign prostatic hyperplasia)     CAD (coronary artery disease) 2012    has 4 cardiac stents    [facility] [address]     History of mycobacterial infection 6/22/2018    Hyperlipidemia     meds > 15 yrs    Hypertension     meds > 6 yrs / denies TIA or stroke    Hypertrophic scar     Myocardial infarct ([facility] [address] 11/2012    Osteoarthritis     all joints    Overweight 12/22/2017    Status post coronary angioplasty     Thyroid disease     meds > 1 month     Past Surgical History:   Procedure Laterality Date    CARPAL TUNNEL RELEASE Bilateral 2015    COLONOSCOPY  3/23/15        CORONARY ANGIOPLASTY WITH STENT PLACEMENT  2012    CYSTOSCOPY  05/17/2017    W/COMPLEX CYSTOMETROGRAM-COMPLEX UROFLOW-TRANSRECTAL US PROSTATE    EYE SURGERY      Lasik OU    JOINT REPLACEMENT Left 2010    LTKR    PA TOTAL KNEE ARTHROPLASTY Right 3/15/2018    RIGHT KNEE TOTAL KNEE  ARTHROPLASTY performed by Elda Phalen, MD at [address] [place] [address] Right     twice    SHOULDER SURGERY Right      RCR    TONSILLECTOMY      TURP N/A 7/17/2017    GREEN LIGHT LASER TRANSURETHRAL RESECTIOIN PROSTATE performed by Agustin Carroll MD at [address]  06/03/15    Cystoscop, cath rem, US prostate   [address]    due to snoring     Family History   Problem Relation Age of Onset    Kidney Disease Father     Other Mother         Perforated intestine    No Known Problems Sister     No Known Problems Brother     No Known Problems Sister     No Known Problems Brother     No Known Problems Son      Social History     Social History    Marital status:      Spouse name: N/A    Number of children: N/A    Years of education: N/A     Social History Main Topics    Smoking status: Former Smoker     Packs/day: 1.00     Years: 7.00     Start date: 6/22/1961     Quit date: 3/17/1967    Smokeless tobacco: Never Used      Comment: Quit for [address] [employer]    Alcohol use No      Comment: No alcohol  since February, 1987    Drug use: No    Sexual activity: Not Asked     Other Topics Concern    None     Social History Narrative    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     Current Outpatient Prescriptions   Medication Sig Dispense Refill    levothyroxine (SYNTHROID) 50 MCG tablet take 1 tablet by mouth once daily 30 tablet 3    atorvastatin (LIPITOR) 40 MG tablet Take 1 tablet by mouth daily 30 tablet 5    tamsulosin (FLOMAX) 0.4 MG capsule take 1 capsule by mouth twice a day 180 capsule 3    folic acid (FOLVITE) 1 MG tablet   0    RA VITAMIN D-3 1000 units TABS tablet   0    finasteride (PROSCAR) 5 MG tablet take 1 tablet by mouth once daily 90 tablet 3    carvedilol (COREG) 12.5 MG tablet take 1 tablet by mouth twice a day with food 60 tablet 6    hydrochlorothiazide (HYDRODIURIL) 25 MG tablet take 1 tablet by mouth once daily 30 tablet 5    aspirin 81 MG EC tablet Take 1 tablet by mouth 2 times daily 60 tablet 0    nitroGLYCERIN (NITROSTAT) 0.4 MG SL tablet Place 1 tablet under the tongue every 5 minutes as needed for Chest pain

## 2018-10-25 RX ORDER — HYDROCHLOROTHIAZIDE 25 MG/1
TABLET ORAL
Qty: 30 TABLET | Refills: 5 | Status: SHIPPED | OUTPATIENT
Start: 2018-10-25 | End: 2019-04-25 | Stop reason: SDUPTHER

## 2018-11-23 ENCOUNTER — APPOINTMENT (OUTPATIENT)
Dept: GENERAL RADIOLOGY | Age: 73
End: 2018-11-23
Payer: MEDICARE

## 2018-11-23 ENCOUNTER — OFFICE VISIT (OUTPATIENT)
Dept: FAMILY MEDICINE CLINIC | Age: 73
End: 2018-11-23
Payer: MEDICARE

## 2018-11-23 ENCOUNTER — HOSPITAL ENCOUNTER (EMERGENCY)
Age: 73
Discharge: HOME | End: 2018-11-23
Attending: EMERGENCY MEDICINE
Payer: MEDICARE

## 2018-11-23 VITALS
DIASTOLIC BLOOD PRESSURE: 60 MMHG | WEIGHT: 206 LBS | HEIGHT: 68 IN | OXYGEN SATURATION: 98 % | SYSTOLIC BLOOD PRESSURE: 122 MMHG | BODY MASS INDEX: 31.22 KG/M2 | HEART RATE: 65 BPM | TEMPERATURE: 98.9 F

## 2018-11-23 VITALS
SYSTOLIC BLOOD PRESSURE: 129 MMHG | HEART RATE: 75 BPM | WEIGHT: 200 LBS | TEMPERATURE: 98.3 F | OXYGEN SATURATION: 100 % | RESPIRATION RATE: 18 BRPM | HEIGHT: 69 IN | BODY MASS INDEX: 29.62 KG/M2 | DIASTOLIC BLOOD PRESSURE: 52 MMHG

## 2018-11-23 DIAGNOSIS — N12 PYELONEPHRITIS: ICD-10-CM

## 2018-11-23 DIAGNOSIS — I25.9 CARDIAC ISCHEMIA: Primary | ICD-10-CM

## 2018-11-23 DIAGNOSIS — N30.00 ACUTE CYSTITIS WITHOUT HEMATURIA: Primary | ICD-10-CM

## 2018-11-23 DIAGNOSIS — I25.119 CORONARY ARTERY DISEASE INVOLVING NATIVE CORONARY ARTERY OF NATIVE HEART WITH ANGINA PECTORIS (HCC): ICD-10-CM

## 2018-11-23 DIAGNOSIS — R53.83 FATIGUE, UNSPECIFIED TYPE: ICD-10-CM

## 2018-11-23 DIAGNOSIS — N13.8 BPH WITH OBSTRUCTION/LOWER URINARY TRACT SYMPTOMS: ICD-10-CM

## 2018-11-23 DIAGNOSIS — N40.1 BPH WITH OBSTRUCTION/LOWER URINARY TRACT SYMPTOMS: ICD-10-CM

## 2018-11-23 PROBLEM — R10.84 GENERALIZED ABDOMINAL PAIN: Status: ACTIVE | Noted: 2018-11-23

## 2018-11-23 LAB
ALBUMIN SERPL-MCNC: 3.5 G/DL (ref 3.9–4.9)
ALP BLD-CCNC: 73 U/L (ref 35–104)
ALT SERPL-CCNC: 39 U/L (ref 0–41)
ANION GAP SERPL CALCULATED.3IONS-SCNC: 13 MEQ/L (ref 7–13)
AST SERPL-CCNC: 40 U/L (ref 0–40)
BACTERIA: NORMAL /HPF
BASOPHILS ABSOLUTE: 0 K/UL (ref 0–0.2)
BASOPHILS RELATIVE PERCENT: 0.4 %
BILIRUB SERPL-MCNC: 0.6 MG/DL (ref 0–1.2)
BILIRUBIN URINE: ABNORMAL
BILIRUBIN, POC: ABNORMAL
BLOOD URINE, POC: ABNORMAL
BLOOD, URINE: ABNORMAL
BUN BLDV-MCNC: 22 MG/DL (ref 8–23)
CALCIUM SERPL-MCNC: 9.2 MG/DL (ref 8.6–10.2)
CHLORIDE BLD-SCNC: 93 MEQ/L (ref 98–107)
CLARITY, POC: ABNORMAL
CLARITY: CLEAR
CO2: 26 MEQ/L (ref 22–29)
COLOR, POC: ABNORMAL
COLOR: YELLOW
CREAT SERPL-MCNC: 1 MG/DL (ref 0.7–1.2)
EKG ATRIAL RATE: 61 BPM
EKG P AXIS: 59 DEGREES
EKG P-R INTERVAL: 168 MS
EKG Q-T INTERVAL: 386 MS
EKG QRS DURATION: 88 MS
EKG QTC CALCULATION (BAZETT): 388 MS
EKG R AXIS: 62 DEGREES
EKG T AXIS: 47 DEGREES
EKG VENTRICULAR RATE: 61 BPM
EOSINOPHILS ABSOLUTE: 0 K/UL (ref 0–0.7)
EOSINOPHILS RELATIVE PERCENT: 0 %
EPITHELIAL CELLS, UA: NORMAL /HPF
GFR AFRICAN AMERICAN: >60
GFR NON-AFRICAN AMERICAN: >60
GLOBULIN: 3.5 G/DL (ref 2.3–3.5)
GLUCOSE BLD-MCNC: 107 MG/DL (ref 74–109)
GLUCOSE URINE, POC: NEGATIVE
GLUCOSE URINE: NEGATIVE MG/DL
HCT VFR BLD CALC: 37.7 % (ref 42–52)
HEMOGLOBIN: 13.2 G/DL (ref 14–18)
KETONES, POC: NEGATIVE
KETONES, URINE: NEGATIVE MG/DL
LEUKOCYTE EST, POC: ABNORMAL
LEUKOCYTE ESTERASE, URINE: ABNORMAL
LYMPHOCYTES ABSOLUTE: 1.2 K/UL (ref 1–4.8)
LYMPHOCYTES RELATIVE PERCENT: 10.9 %
MCH RBC QN AUTO: 31.5 PG (ref 27–31.3)
MCHC RBC AUTO-ENTMCNC: 35 % (ref 33–37)
MCV RBC AUTO: 90.1 FL (ref 80–100)
MONOCYTES ABSOLUTE: 1.4 K/UL (ref 0.2–0.8)
MONOCYTES RELATIVE PERCENT: 12.9 %
NEUTROPHILS ABSOLUTE: 8.1 K/UL (ref 1.4–6.5)
NEUTROPHILS RELATIVE PERCENT: 75.8 %
NITRITE, POC: NEGATIVE
NITRITE, URINE: NEGATIVE
PDW BLD-RTO: 13.4 % (ref 11.5–14.5)
PH UA: 6.5 (ref 5–9)
PH, POC: 6
PLATELET # BLD: 168 K/UL (ref 130–400)
POTASSIUM SERPL-SCNC: 2.7 MEQ/L (ref 3.5–5.1)
PROTEIN UA: NEGATIVE MG/DL
PROTEIN, POC: ABNORMAL
RBC # BLD: 4.19 M/UL (ref 4.7–6.1)
RBC UA: NORMAL /HPF (ref 0–2)
SODIUM BLD-SCNC: 132 MEQ/L (ref 132–144)
SPECIFIC GRAVITY UA: 1.01 (ref 1–1.03)
SPECIFIC GRAVITY, POC: 1.01
TOTAL PROTEIN: 7 G/DL (ref 6.4–8.1)
TROPONIN: <0.01 NG/ML (ref 0–0.01)
URINE REFLEX TO CULTURE: YES
UROBILINOGEN, POC: NEGATIVE
UROBILINOGEN, URINE: 0.2 E.U./DL
WBC # BLD: 10.7 K/UL (ref 4.8–10.8)
WBC UA: NORMAL /HPF (ref 0–5)

## 2018-11-23 PROCEDURE — 99285 EMERGENCY DEPT VISIT HI MDM: CPT

## 2018-11-23 PROCEDURE — 81001 URINALYSIS AUTO W/SCOPE: CPT

## 2018-11-23 PROCEDURE — 99215 OFFICE O/P EST HI 40 MIN: CPT | Performed by: FAMILY MEDICINE

## 2018-11-23 PROCEDURE — 96365 THER/PROPH/DIAG IV INF INIT: CPT

## 2018-11-23 PROCEDURE — 93005 ELECTROCARDIOGRAM TRACING: CPT

## 2018-11-23 PROCEDURE — 84484 ASSAY OF TROPONIN QUANT: CPT

## 2018-11-23 PROCEDURE — 6370000000 HC RX 637 (ALT 250 FOR IP): Performed by: EMERGENCY MEDICINE

## 2018-11-23 PROCEDURE — 80053 COMPREHEN METABOLIC PANEL: CPT

## 2018-11-23 PROCEDURE — 74022 RADEX COMPL AQT ABD SERIES: CPT

## 2018-11-23 PROCEDURE — 6360000002 HC RX W HCPCS: Performed by: EMERGENCY MEDICINE

## 2018-11-23 PROCEDURE — 93000 ELECTROCARDIOGRAM COMPLETE: CPT | Performed by: FAMILY MEDICINE

## 2018-11-23 PROCEDURE — 2580000003 HC RX 258: Performed by: EMERGENCY MEDICINE

## 2018-11-23 PROCEDURE — 81002 URINALYSIS NONAUTO W/O SCOPE: CPT | Performed by: FAMILY MEDICINE

## 2018-11-23 PROCEDURE — 36415 COLL VENOUS BLD VENIPUNCTURE: CPT

## 2018-11-23 PROCEDURE — 87086 URINE CULTURE/COLONY COUNT: CPT

## 2018-11-23 PROCEDURE — 85025 COMPLETE CBC W/AUTO DIFF WBC: CPT

## 2018-11-23 RX ORDER — CEPHALEXIN 500 MG/1
500 CAPSULE ORAL 4 TIMES DAILY
Qty: 40 CAPSULE | Refills: 0 | Status: SHIPPED | OUTPATIENT
Start: 2018-11-23 | End: 2018-12-03

## 2018-11-23 RX ORDER — 0.9 % SODIUM CHLORIDE 0.9 %
1000 INTRAVENOUS SOLUTION INTRAVENOUS ONCE
Status: COMPLETED | OUTPATIENT
Start: 2018-11-23 | End: 2018-11-23

## 2018-11-23 RX ORDER — POTASSIUM CHLORIDE 20 MEQ/1
20 TABLET, EXTENDED RELEASE ORAL DAILY
Qty: 60 TABLET | Refills: 0 | Status: SHIPPED | OUTPATIENT
Start: 2018-11-23 | End: 2018-12-14 | Stop reason: ALTCHOICE

## 2018-11-23 RX ORDER — POTASSIUM BICARBONATE 25 MEQ/1
50 TABLET, EFFERVESCENT ORAL ONCE
Status: COMPLETED | OUTPATIENT
Start: 2018-11-23 | End: 2018-11-23

## 2018-11-23 RX ADMIN — SODIUM CHLORIDE 1000 ML: 9 INJECTION, SOLUTION INTRAVENOUS at 13:24

## 2018-11-23 RX ADMIN — CEFTRIAXONE SODIUM 1 G: 1 INJECTION, POWDER, FOR SOLUTION INTRAMUSCULAR; INTRAVENOUS at 14:49

## 2018-11-23 RX ADMIN — MAGESIUM CITRATE 296 ML: 1.75 LIQUID ORAL at 15:33

## 2018-11-23 RX ADMIN — POTASSIUM BICARBONATE 50 MEQ: 25 TABLET, EFFERVESCENT ORAL at 14:11

## 2018-11-23 ASSESSMENT — ENCOUNTER SYMPTOMS
FACIAL SWELLING: 0
PHOTOPHOBIA: 0
DIARRHEA: 0
STRIDOR: 0
SINUS PRESSURE: 0
VOICE CHANGE: 0
CHOKING: 0
COLOR CHANGE: 0
RHINORRHEA: 0
BLOOD IN STOOL: 0
NAUSEA: 0
COUGH: 0
EYE DISCHARGE: 0
ABDOMINAL PAIN: 0
SORE THROAT: 0
WHEEZING: 0
BACK PAIN: 0
EYE ITCHING: 0
CHEST TIGHTNESS: 0
ABDOMINAL DISTENTION: 0
VOMITING: 0
ANAL BLEEDING: 0
CONSTIPATION: 0
SHORTNESS OF BREATH: 0
EYE REDNESS: 0
TROUBLE SWALLOWING: 0
EYE PAIN: 0

## 2018-11-23 NOTE — ED NOTES
Pt given discharge instructions, states understanding, pt ambulated to exit independently with steady gait     Magui Peralta RN  11/23/18 6167

## 2018-11-23 NOTE — ED PROVIDER NOTES
Chest 1 VW    (Results Pending)         ED BEDSIDE ULTRASOUND:   Performed by ED Physician - none    LABS:  Labs Reviewed   CBC WITH AUTO DIFFERENTIAL - Abnormal; Notable for the following:        Result Value    RBC 4.19 (*)     Hemoglobin 13.2 (*)     Hematocrit 37.7 (*)     MCH 31.5 (*)     Neutrophils # 8.1 (*)     Monocytes # 1.4 (*)     All other components within normal limits   COMPREHENSIVE METABOLIC PANEL - Abnormal; Notable for the following:     Potassium 2.7 (*)     Chloride 93 (*)     Alb 3.5 (*)     All other components within normal limits    Narrative:     CALL  [facility]  [facility] tel. [phone],  K+ called to Victorina Thurston in E.R., 11/23/2018 13:41, by Dax Cabrera   URINE RT REFLEX TO CULTURE - Abnormal; Notable for the following:     Bilirubin Urine SMALL (*)     Blood, Urine MODERATE (*)     Leukocyte Esterase, Urine MODERATE (*)     All other components within normal limits   URINE CULTURE   TROPONIN   MICROSCOPIC URINALYSIS       All other labs were within normal range or not returned as of this dictation. EMERGENCY DEPARTMENT COURSE and DIFFERENTIAL DIAGNOSIS/MDM:   Vitals:    Vitals:    11/23/18 1241 11/23/18 1341   BP: 136/60 (!) 145/69   Pulse: 63 64   Resp: 20 18   Temp: 98.3 °F (36.8 °C)    SpO2: 98% 100%   Weight: 200 lb (90.7 kg)    Height: 5' 9\" (1.753 m)        I informed the patient of his urinary tract infection and the need for antibiotics I explained the home treatment I told him his potassium was low and explained the home treatment for this as well. MDM      CRITICAL CARE TIME     CONSULTS:  None    PROCEDURES:  Unless otherwise noted below, none     Procedures    FINAL IMPRESSION      1.  Acute cystitis without hematuria          DISPOSITION/PLAN   DISPOSITION Discharge - Pending Orders Complete 11/23/2018 02:27:48 PM      PATIENT REFERRED TO:  Caroline Christian MD  [address]  [phone]    Go in 3 days        DISCHARGE MEDICATIONS:  New Prescriptions

## 2018-11-23 NOTE — ED TRIAGE NOTES
PER PT. C/O WEAKNESS FOR APPROX. DAY. CHILLS,DIZZINESS ON AND OFF. BILATERAL FLANK PAIN YESTERDAY, ABDOMINBAL CRAMPS 6 DAYS AGO. NAUSEA. Radha Daily ON ARRIVAL TO [facility] PT ALERT AND ORIENTED,ANSWERS ALL QUESTIONS APPROPIATELY, JEWELL WELL. RESP EVEN AND UNLABORED.

## 2018-11-23 NOTE — PROGRESS NOTES
Cough       Current Outpatient Prescriptions   Medication Sig Dispense Refill    hydrochlorothiazide (HYDRODIURIL) 25 MG tablet take 1 tablet by mouth once daily 30 tablet 5    etodolac (LODINE) 400 MG tablet take 1 tablet by mouth twice a day 60 tablet 3    levothyroxine (SYNTHROID) 50 MCG tablet take 1 tablet by mouth once daily 30 tablet 3    atorvastatin (LIPITOR) 40 MG tablet Take 1 tablet by mouth daily 30 tablet 5    tamsulosin (FLOMAX) 0.4 MG capsule take 1 capsule by mouth twice a day 180 capsule 3    folic acid (FOLVITE) 1 MG tablet   0    RA VITAMIN D-3 1000 units TABS tablet   0    finasteride (PROSCAR) 5 MG tablet take 1 tablet by mouth once daily 90 tablet 3    carvedilol (COREG) 12.5 MG tablet take 1 tablet by mouth twice a day with food 60 tablet 6    aspirin 81 MG EC tablet Take 1 tablet by mouth 2 times daily 60 tablet 0    nitroGLYCERIN (NITROSTAT) 0.4 MG SL tablet Place 1 tablet under the tongue every 5 minutes as needed for Chest pain Dissolve 1 tab under tongue at first sign of chest pain every 5 minutes as needed. If pain persists after taking 3 tabs in a 15-minute period, or the pain is different than is typically experienced, call 9-1-1 immediately. 25 tablet 1     Current Facility-Administered Medications   Medication Dose Route Frequency Provider Last Rate Last Dose    methylPREDNISolone acetate (DEPO-MEDROL) injection 80 mg  80 mg Intramuscular Once NAOMY Victoria CNP         PMH, Surgical Hx, Family Hx, and Social Hxreviewed and updated. Health Maintenance reviewed. Objective    Vitals:    11/23/18 1054   BP: 122/60   Site: Right Upper Arm   Position: Sitting   Cuff Size: Medium Adult   Pulse: 65   Temp: 98.9 °F (37.2 °C)   TempSrc: Tympanic   SpO2: 98%   Weight: 206 lb (93.4 kg)   Height: 5' 8\" (1.727 m)       Physical Exam   Constitutional: He is oriented to person, place, and time. He appears well-developed and well-nourished.  He appears distressed (mild distress, weak, lying down for entirety of visit). HENT:   Head: Normocephalic and atraumatic. Eyes: Conjunctivae are normal. No scleral icterus. Neck: Normal range of motion. Neck supple. Carotid bruit is not present. No thyromegaly present. Cardiovascular: Normal rate, regular rhythm, S1 normal, S2 normal, normal heart sounds and intact distal pulses. Pulmonary/Chest: Effort normal and breath sounds normal. He has no wheezes. He has no rales. Abdominal: Soft. Bowel sounds are normal. He exhibits no distension and no mass. There is no tenderness. There is no rebound and no guarding. Genitourinary:   Genitourinary Comments: Mild B/L CVAT   Musculoskeletal: He exhibits no edema. Neurological: He is alert and oriented to person, place, and time. Skin: Skin is warm and dry. Psychiatric: He has a normal mood and affect.        EKG with lateral ST depression  Lab Results   Component Value Date    LABA1C 5.6 02/16/2018     Lab Results   Component Value Date    CREATININE 1.03 06/15/2018     Lab Results   Component Value Date    ALT 18 06/15/2018    AST 20 06/15/2018     Lab Results   Component Value Date    CHOL 168 06/15/2018    TRIG 132 06/15/2018    HDL 38 (L) 06/15/2018    [address] 104 06/15/2018        Assessment & Plan   Visit Diagnoses and Associated Orders     Cardiac ischemia    -  Primary         Pyelonephritis             Coronary artery disease involving native coronary artery of native heart with angina pectoris (HCC)             Fatigue, unspecified type             BPH with obstruction/lower urinary tract symptoms             ORDERS WITHOUT AN ASSOCIATED DIAGNOSIS    POCT Urinalysis no Micro [POC5 Custom]      Urine Culture [[id number] Custom]   - Future Order    EKG 12 Lead [EKG1 Custom]   - Future Order    EKG 12 Lead [EKG1 Custom]        The 71-year-old man with coronary artery disease status post stents now with fatigue, weakness, presyncopal episode and mild dyspnea on exertion with EKG

## 2018-11-24 LAB — URINE CULTURE, ROUTINE: NORMAL

## 2018-11-25 LAB — URINE CULTURE, ROUTINE: NORMAL

## 2018-11-26 PROCEDURE — 93010 ELECTROCARDIOGRAM REPORT: CPT | Performed by: INTERNAL MEDICINE

## 2018-12-04 ENCOUNTER — OFFICE VISIT (OUTPATIENT)
Dept: FAMILY MEDICINE CLINIC | Age: 73
End: 2018-12-04
Payer: MEDICARE

## 2018-12-04 VITALS
HEIGHT: 69 IN | HEART RATE: 72 BPM | WEIGHT: 201 LBS | DIASTOLIC BLOOD PRESSURE: 60 MMHG | SYSTOLIC BLOOD PRESSURE: 124 MMHG | BODY MASS INDEX: 29.77 KG/M2 | OXYGEN SATURATION: 95 %

## 2018-12-04 DIAGNOSIS — E87.6 HYPOKALEMIA: ICD-10-CM

## 2018-12-04 DIAGNOSIS — E03.9 HYPOTHYROIDISM, UNSPECIFIED TYPE: ICD-10-CM

## 2018-12-04 DIAGNOSIS — N39.0 URINARY TRACT INFECTION WITHOUT HEMATURIA, SITE UNSPECIFIED: Primary | ICD-10-CM

## 2018-12-04 DIAGNOSIS — R53.1 WEAKNESS: ICD-10-CM

## 2018-12-04 LAB
ANION GAP SERPL CALCULATED.3IONS-SCNC: 10 MEQ/L (ref 7–13)
BUN BLDV-MCNC: 29 MG/DL (ref 8–23)
CALCIUM SERPL-MCNC: 9.5 MG/DL (ref 8.6–10.2)
CHLORIDE BLD-SCNC: 98 MEQ/L (ref 98–107)
CO2: 30 MEQ/L (ref 22–29)
CREAT SERPL-MCNC: 1.12 MG/DL (ref 0.7–1.2)
GFR AFRICAN AMERICAN: >60
GFR NON-AFRICAN AMERICAN: >60
GLUCOSE BLD-MCNC: 83 MG/DL (ref 74–109)
POTASSIUM SERPL-SCNC: 5.2 MEQ/L (ref 3.5–5.1)
SODIUM BLD-SCNC: 138 MEQ/L (ref 132–144)
TSH SERPL DL<=0.05 MIU/L-ACNC: 2.63 UIU/ML (ref 0.27–4.2)

## 2018-12-04 PROCEDURE — 99213 OFFICE O/P EST LOW 20 MIN: CPT | Performed by: FAMILY MEDICINE

## 2018-12-04 NOTE — PROGRESS NOTES
aspirin 81 MG EC tablet Take 1 tablet by mouth 2 times daily 60 tablet 0    nitroGLYCERIN (NITROSTAT) 0.4 MG SL tablet Place 1 tablet under the tongue every 5 minutes as needed for Chest pain Dissolve 1 tab under tongue at first sign of chest pain every 5 minutes as needed. If pain persists after taking 3 tabs in a 15-minute period, or the pain is different than is typically experienced, call 9-1-1 immediately. 25 tablet 1    potassium chloride (KLOR-CON M) 20 MEQ extended release tablet Take 1 tablet by mouth daily 60 tablet 0     Current Facility-Administered Medications   Medication Dose Route Frequency Provider Last Rate Last Dose    methylPREDNISolone acetate (DEPO-MEDROL) injection 80 mg  80 mg Intramuscular Once NAOMY Victoria - CNP         Allergies   Allergen Reactions    Ace Inhibitors Other (See Comments)     Cough      Lisinopril Other (See Comments)     Cough         Review of Systems:   General ROS: some fatigue negative for - chills,fever, malaise, weight gain or weight loss  Respiratory ROS: coughing spells  Cardiovascular ROS: no chest pain or dyspnea on exertion  Gastrointestinal ROS: no abdominal pain, change in bowel habits, or black or bloody stools  Genito-Urinary ROS:per HPI , BPH and ED  Musculoskeletal ROS: negative for - gait disturbance, joint pain or joint stiffness  Neurological ROS: numbness in his hands      In general patient otherwise reports feeling well. Physical Exam:  /60 (Site: Left Upper Arm, Position: Sitting, Cuff Size: Medium Adult)   Pulse 72   Ht 5' 9\" (1.753 m)   Wt 201 lb (91.2 kg)   SpO2 95%   BMI 29.68 kg/m²     Gen: Well, NAD, Alert, Oriented x 3   HEENT: EOMI, eyes clear, MMM  Skin: without rash or jaundice  Neck: no significant lymphadenopathy or thyromegaly  Lungs: CTA B w/out Rales/Wheezes/Rhonchi, Good respiratory effort   Heart: RRR, S1S2, w/out M/R/G, non-displaced PMI   Ext: No C/C/E Bilaterally.    Neuro: Neurovascularly intact w/

## 2018-12-11 ENCOUNTER — TELEPHONE (OUTPATIENT)
Dept: FAMILY MEDICINE CLINIC | Age: 73
End: 2018-12-11

## 2018-12-11 NOTE — TELEPHONE ENCOUNTER
Orders Placed This Encounter   Medications    diclofenac (VOLTAREN) 50 MG EC tablet     Sig: Take 1 tablet by mouth 2 times daily as needed for Pain     Dispense:  60 tablet     Refill:  3       The above med(s) were e-scripted to the patient's pharmacy.    Please advise patient  Rosmery Allen MD

## 2018-12-14 ENCOUNTER — OFFICE VISIT (OUTPATIENT)
Dept: FAMILY MEDICINE CLINIC | Age: 73
End: 2018-12-14
Payer: MEDICARE

## 2018-12-14 VITALS
BODY MASS INDEX: 29.77 KG/M2 | DIASTOLIC BLOOD PRESSURE: 54 MMHG | WEIGHT: 201 LBS | OXYGEN SATURATION: 97 % | HEIGHT: 69 IN | TEMPERATURE: 97 F | SYSTOLIC BLOOD PRESSURE: 100 MMHG | HEART RATE: 67 BPM

## 2018-12-14 DIAGNOSIS — C44.519 BASAL CELL CARCINOMA, TRUNK: Primary | ICD-10-CM

## 2018-12-14 DIAGNOSIS — L57.0 ACTINIC KERATOSIS: ICD-10-CM

## 2018-12-14 PROCEDURE — 17004 DESTROY PREMAL LESIONS 15/>: CPT | Performed by: FAMILY MEDICINE

## 2018-12-14 ASSESSMENT — ENCOUNTER SYMPTOMS
COUGH: 0
ABDOMINAL PAIN: 0
EYE DISCHARGE: 0
SHORTNESS OF BREATH: 0
TROUBLE SWALLOWING: 0
CONSTIPATION: 0
VOICE CHANGE: 0
DIARRHEA: 0
NAUSEA: 0
COLOR CHANGE: 0
ABDOMINAL DISTENTION: 0

## 2018-12-14 NOTE — PROGRESS NOTES
Subjective:      Patient ID: Debora Morrissey is a 68 y.o. male who presents for:  Chief Complaint   Patient presents with    Follow-up     4 month f/u on skin lesions that dr Jesica Kurtz did imanro on. Dr Larence Lesches recently removed a skin cancer from his back and he has areas of rough skin that she often freezes. He would like any new ones removed. He has used the cream in the past.  Its been a long time since he has done that and does not remember how it went. At this time he would prefer to have any that need treatment to be frozen today. Current Outpatient Prescriptions on File Prior to Visit   Medication Sig Dispense Refill    diclofenac (VOLTAREN) 50 MG EC tablet Take 1 tablet by mouth 2 times daily as needed for Pain 60 tablet 3    carvedilol (COREG) 12.5 MG tablet take 1 tablet by mouth twice a day with food 60 tablet 5    levothyroxine (SYNTHROID) 50 MCG tablet take 1 tablet by mouth once daily 30 tablet 5    hydrochlorothiazide (HYDRODIURIL) 25 MG tablet take 1 tablet by mouth once daily 30 tablet 5    atorvastatin (LIPITOR) 40 MG tablet Take 1 tablet by mouth daily 30 tablet 5    tamsulosin (FLOMAX) 0.4 MG capsule take 1 capsule by mouth twice a day 180 capsule 3    folic acid (FOLVITE) 1 MG tablet   0    RA VITAMIN D-3 1000 units TABS tablet   0    finasteride (PROSCAR) 5 MG tablet take 1 tablet by mouth once daily 90 tablet 3    aspirin 81 MG EC tablet Take 1 tablet by mouth 2 times daily 60 tablet 0    nitroGLYCERIN (NITROSTAT) 0.4 MG SL tablet Place 1 tablet under the tongue every 5 minutes as needed for Chest pain Dissolve 1 tab under tongue at first sign of chest pain every 5 minutes as needed. If pain persists after taking 3 tabs in a 15-minute period, or the pain is different than is typically experienced, call 9-1-1 immediately.  25 tablet 1     Current Facility-Administered Medications on File Prior to Visit   Medication Dose Route Frequency Provider Last Rate Last Dose    normal.   Nose: Nose normal.   Eyes: Pupils are equal, round, and reactive to light. Conjunctivae and EOM are normal. Right eye exhibits no discharge. Left eye exhibits no discharge. Neck: Neck supple. No thyromegaly present. Cardiovascular: Normal rate and regular rhythm. Pulmonary/Chest: Effort normal. No respiratory distress. Abdominal: He exhibits no distension. Neurological: He is alert and oriented to person, place, and time. Coordination normal.   Skin: Skin is warm and dry. Multiple lesions on forehead, temple, scalp and neck that are 1-2 mm raised fleshy lesions with white flaking skin overlying. Rough texture. Psychiatric: He has a normal mood and affect. Judgment and thought content normal.   Vitals reviewed. No results found for this visit on 12/14/18. The procedure was discussed with the patient. All questions were answered and alternative options discussed. The patient is aware of the risks of bleeding, infection, unsatisfactory scar result. Informed consent paperwork was signed by the patient. Liquid nitrogen was applied for 10 seconds to affected areas with thaw allowed between dosing for a total of 2 applications. Applied to 16 lesion(s). The patient tolerated the procedure well. Post op instructions given. A printed copy provided. It is best to leave blisters alone if they form. They should be covered with a bandage to prevent blister breakage and dirt exposure. The wounds should remain dry while there is a blister, therefore if this is a sweaty location like the foot you may need to change socks multiple times per day. If blister(s) pop or patient pops with a sterilized needed, apply antibiotic (not triple antibiotic) ointment and a bandage to affected area(s). The ointment should be applied to the open area as long as it is not covered with skin. Exposed tissue is meant to be moist.  Once a scab formed she may stop applying ointment.   If this

## 2019-01-13 RX ORDER — ATORVASTATIN CALCIUM 40 MG/1
TABLET, FILM COATED ORAL
Qty: 30 TABLET | Refills: 6 | Status: SHIPPED | OUTPATIENT
Start: 2019-01-13 | End: 2019-03-27

## 2019-01-15 RX ORDER — ATORVASTATIN CALCIUM 40 MG/1
TABLET, FILM COATED ORAL
Qty: 30 TABLET | Refills: 5 | Status: SHIPPED | OUTPATIENT
Start: 2019-01-15 | End: 2020-01-02

## 2019-01-31 ENCOUNTER — OFFICE VISIT (OUTPATIENT)
Dept: FAMILY MEDICINE CLINIC | Age: 74
End: 2019-01-31
Payer: MEDICARE

## 2019-01-31 VITALS
HEART RATE: 63 BPM | WEIGHT: 208.4 LBS | BODY MASS INDEX: 30.87 KG/M2 | HEIGHT: 69 IN | SYSTOLIC BLOOD PRESSURE: 134 MMHG | OXYGEN SATURATION: 96 % | DIASTOLIC BLOOD PRESSURE: 80 MMHG

## 2019-01-31 DIAGNOSIS — L03.031 PARONYCHIA OF GREAT TOE OF RIGHT FOOT: Primary | ICD-10-CM

## 2019-01-31 DIAGNOSIS — M19.90 OSTEOARTHRITIS, UNSPECIFIED OSTEOARTHRITIS TYPE, UNSPECIFIED SITE: ICD-10-CM

## 2019-01-31 PROCEDURE — 99213 OFFICE O/P EST LOW 20 MIN: CPT | Performed by: FAMILY MEDICINE

## 2019-01-31 RX ORDER — DICLOFENAC SODIUM 75 MG/1
75 TABLET, DELAYED RELEASE ORAL 2 TIMES DAILY PRN
Qty: 60 TABLET | Refills: 5 | Status: SHIPPED | OUTPATIENT
Start: 2019-01-31 | End: 2019-08-08 | Stop reason: SDUPTHER

## 2019-01-31 RX ORDER — SULFAMETHOXAZOLE AND TRIMETHOPRIM 800; 160 MG/1; MG/1
1 TABLET ORAL 2 TIMES DAILY
Qty: 28 TABLET | Refills: 0 | Status: SHIPPED | OUTPATIENT
Start: 2019-01-31 | End: 2019-02-14

## 2019-03-01 ENCOUNTER — TELEPHONE (OUTPATIENT)
Dept: FAMILY MEDICINE CLINIC | Age: 74
End: 2019-03-01

## 2019-03-27 ENCOUNTER — OFFICE VISIT (OUTPATIENT)
Dept: FAMILY MEDICINE CLINIC | Age: 74
End: 2019-03-27
Payer: MEDICARE

## 2019-03-27 VITALS
HEIGHT: 69 IN | OXYGEN SATURATION: 98 % | SYSTOLIC BLOOD PRESSURE: 120 MMHG | WEIGHT: 202.4 LBS | DIASTOLIC BLOOD PRESSURE: 64 MMHG | BODY MASS INDEX: 29.98 KG/M2 | HEART RATE: 50 BPM

## 2019-03-27 DIAGNOSIS — I21.9 MYOCARDIAL INFARCTION, UNSPECIFIED MI TYPE, UNSPECIFIED ARTERY (HCC): ICD-10-CM

## 2019-03-27 DIAGNOSIS — E78.49 OTHER HYPERLIPIDEMIA: ICD-10-CM

## 2019-03-27 DIAGNOSIS — M19.041 OSTEOARTHRITIS OF BOTH HANDS, UNSPECIFIED OSTEOARTHRITIS TYPE: ICD-10-CM

## 2019-03-27 DIAGNOSIS — E03.9 HYPOTHYROIDISM, UNSPECIFIED TYPE: Primary | ICD-10-CM

## 2019-03-27 DIAGNOSIS — M19.042 OSTEOARTHRITIS OF BOTH HANDS, UNSPECIFIED OSTEOARTHRITIS TYPE: ICD-10-CM

## 2019-03-27 DIAGNOSIS — E03.9 HYPOTHYROIDISM, UNSPECIFIED TYPE: ICD-10-CM

## 2019-03-27 DIAGNOSIS — I25.10 CORONARY ARTERY DISEASE INVOLVING NATIVE CORONARY ARTERY OF NATIVE HEART WITHOUT ANGINA PECTORIS: ICD-10-CM

## 2019-03-27 DIAGNOSIS — I10 ESSENTIAL HYPERTENSION: ICD-10-CM

## 2019-03-27 LAB — TSH SERPL DL<=0.05 MIU/L-ACNC: 2.56 UIU/ML (ref 0.44–3.86)

## 2019-03-27 PROCEDURE — 99213 OFFICE O/P EST LOW 20 MIN: CPT | Performed by: FAMILY MEDICINE

## 2019-03-27 ASSESSMENT — PATIENT HEALTH QUESTIONNAIRE - PHQ9
2. FEELING DOWN, DEPRESSED OR HOPELESS: 0
SUM OF ALL RESPONSES TO PHQ QUESTIONS 1-9: 0
SUM OF ALL RESPONSES TO PHQ QUESTIONS 1-9: 0
1. LITTLE INTEREST OR PLEASURE IN DOING THINGS: 0
SUM OF ALL RESPONSES TO PHQ9 QUESTIONS 1 & 2: 0

## 2019-04-25 ENCOUNTER — OFFICE VISIT (OUTPATIENT)
Dept: FAMILY MEDICINE CLINIC | Age: 74
End: 2019-04-25
Payer: MEDICARE

## 2019-04-25 VITALS
SYSTOLIC BLOOD PRESSURE: 130 MMHG | HEART RATE: 62 BPM | BODY MASS INDEX: 29.89 KG/M2 | DIASTOLIC BLOOD PRESSURE: 78 MMHG | HEIGHT: 69 IN | WEIGHT: 201.8 LBS | OXYGEN SATURATION: 97 %

## 2019-04-25 DIAGNOSIS — R41.3 MEMORY LOSS: ICD-10-CM

## 2019-04-25 DIAGNOSIS — C44.219 BASAL CELL CARCINOMA (BCC) OF SKIN OF LEFT EAR: ICD-10-CM

## 2019-04-25 DIAGNOSIS — I21.9 MYOCARDIAL INFARCTION, UNSPECIFIED MI TYPE, UNSPECIFIED ARTERY (HCC): ICD-10-CM

## 2019-04-25 DIAGNOSIS — I25.10 CORONARY ARTERY DISEASE DUE TO LIPID RICH PLAQUE: ICD-10-CM

## 2019-04-25 DIAGNOSIS — I10 ESSENTIAL HYPERTENSION: ICD-10-CM

## 2019-04-25 DIAGNOSIS — I25.83 CORONARY ARTERY DISEASE DUE TO LIPID RICH PLAQUE: ICD-10-CM

## 2019-04-25 DIAGNOSIS — Z78.9 ARB INTOLERANCE: ICD-10-CM

## 2019-04-25 DIAGNOSIS — N40.1 BPH WITH OBSTRUCTION/LOWER URINARY TRACT SYMPTOMS: ICD-10-CM

## 2019-04-25 DIAGNOSIS — E78.5 HYPERLIPIDEMIA, UNSPECIFIED HYPERLIPIDEMIA TYPE: ICD-10-CM

## 2019-04-25 DIAGNOSIS — N13.8 BPH WITH OBSTRUCTION/LOWER URINARY TRACT SYMPTOMS: ICD-10-CM

## 2019-04-25 DIAGNOSIS — Z00.00 ROUTINE GENERAL MEDICAL EXAMINATION AT A HEALTH CARE FACILITY: Primary | ICD-10-CM

## 2019-04-25 DIAGNOSIS — R41.81 AGE-RELATED COGNITIVE DECLINE: ICD-10-CM

## 2019-04-25 PROCEDURE — G0438 PPPS, INITIAL VISIT: HCPCS | Performed by: FAMILY MEDICINE

## 2019-04-25 RX ORDER — FINASTERIDE 5 MG/1
TABLET, FILM COATED ORAL
Qty: 90 TABLET | Refills: 3 | Status: SHIPPED | OUTPATIENT
Start: 2019-04-25 | End: 2020-02-03

## 2019-04-25 RX ORDER — TAMSULOSIN HYDROCHLORIDE 0.4 MG/1
CAPSULE ORAL
Qty: 180 CAPSULE | Refills: 3 | Status: SHIPPED | OUTPATIENT
Start: 2019-04-25 | End: 2020-02-25

## 2019-04-25 RX ORDER — HYDROCHLOROTHIAZIDE 25 MG/1
TABLET ORAL
Qty: 90 TABLET | Refills: 3 | Status: SHIPPED | OUTPATIENT
Start: 2019-04-25 | End: 2020-02-03

## 2019-04-25 ASSESSMENT — ANXIETY QUESTIONNAIRES: GAD7 TOTAL SCORE: 1

## 2019-04-25 ASSESSMENT — LIFESTYLE VARIABLES: HOW OFTEN DO YOU HAVE A DRINK CONTAINING ALCOHOL: 0

## 2019-04-25 ASSESSMENT — PATIENT HEALTH QUESTIONNAIRE - PHQ9
SUM OF ALL RESPONSES TO PHQ QUESTIONS 1-9: 0
SUM OF ALL RESPONSES TO PHQ QUESTIONS 1-9: 0

## 2019-04-25 NOTE — PROGRESS NOTES
Medicare Annual Wellness Visit  Name: Cynthia Martin Date: 2019   MRN: [id number] Sex: Male   Age: 68 y.o. Ethnicity: Non-/Non    : 1945 Race: Valentin López is here for Medicare AWV (discuss mmr )    Screenings for behavioral, psychosocial and functional/safety risks, and cognitive dysfunction are all negative except as indicated below. These results, as well as other patient data from the [address], [place], operated by [facility] form, are documented in Flowsheets linked to this Encounter. Allergies   Allergen Reactions    Ace Inhibitors Other (See Comments)     Cough      Lisinopril Other (See Comments)     Cough         Prior to Visit Medications    Medication Sig Taking? Authorizing Provider   hydrochlorothiazide (HYDRODIURIL) 25 MG tablet take 1 tablet by mouth once daily Yes Jade Lu MD   finasteride (PROSCAR) 5 MG tablet take 1 tablet by mouth once daily Yes Jade Lu MD   tamsulosin (FLOMAX) 0.4 MG capsule take 1 capsule by mouth twice a day Yes Jade Lu MD   diclofenac (VOLTAREN) 75 MG EC tablet Take 1 tablet by mouth 2 times daily as needed for Pain Yes Jade Lu MD   atorvastatin (LIPITOR) 40 MG tablet take 1 tablet by mouth once daily Yes Dylan Naik DO   carvedilol (COREG) 12.5 MG tablet take 1 tablet by mouth twice a day with food Yes Jade Lu MD   levothyroxine (SYNTHROID) 50 MCG tablet take 1 tablet by mouth once daily Yes Jade Lu MD   folic acid (FOLVITE) 1 MG tablet  Yes Historical Provider, MD PARRISH VITAMIN D-3 1000 units TABS tablet  Yes Historical Provider, MD   aspirin 81 MG EC tablet Take 1 tablet by mouth 2 times daily Yes NAOMY Perez CNP   nitroGLYCERIN (NITROSTAT) 0.4 MG SL tablet Place 1 tablet under the tongue every 5 minutes as needed for Chest pain Dissolve 1 tab under tongue at first sign of chest pain every 5 minutes as needed.  If pain persists after taking 3 tabs in a 15-minute period, or the pain is different than is typically experienced, call 9-1-1 immediately.  Yes Reymundo Wylie MD       Past Medical History:   Diagnosis Date    Actinic keratosis     Basal cell carcinoma, trunk 12/2017    left upper back    Basal cell carcinoma, trunk     BPH (benign prostatic hyperplasia)     CAD (coronary artery disease) 2012    has 4 cardiac stents    [facility]     History of mycobacterial infection 6/22/2018    Hyperlipidemia     meds > 15 yrs    Hypertension     meds > 6 yrs / denies TIA or stroke    Hypertrophic scar     Myocardial infarct ([facility] [address] 11/2012    Osteoarthritis     all joints    Overweight 12/22/2017    Status post coronary angioplasty     Thyroid disease     meds > 1 month     Past Surgical History:   Procedure Laterality Date    CARPAL TUNNEL RELEASE Bilateral 2015    COLONOSCOPY  3/23/15        CORONARY ANGIOPLASTY WITH STENT PLACEMENT  2012    CYSTOSCOPY  05/17/2017    W/COMPLEX CYSTOMETROGRAM-COMPLEX UROFLOW-TRANSRECTAL US PROSTATE    EYE SURGERY      Lasik OU    JOINT REPLACEMENT Left 2010    LTKR    MS TOTAL KNEE ARTHROPLASTY Right 3/15/2018    RIGHT KNEE TOTAL KNEE  ARTHROPLASTY performed by Ralf Daniel MD at [place] [address] Right     twice    SHOULDER SURGERY Right      RCR    TONSILLECTOMY      TURP N/A 7/17/2017    GREEN LIGHT LASER TRANSURETHRAL RESECTIOIN PROSTATE performed by Lynn Snell MD at [address] [place] [address] PROSTATE/TRANSRECTAL  06/03/15    Cystoscop, cath rem, US prostate    UVULECTOMY  1998    due to snoring       Family History   Problem Relation Age of Onset    Kidney Disease Father     Other Mother         Perforated intestine    No Known Problems Sister     No Known Problems Brother     No Known Problems Sister     No Known Problems Brother     No Known Problems Son        CareTeam (Including outside providers/suppliers regularly involved in providing care):   Patient Care Team:  Balaji Quigley MD as PCP - General (Family Medicine)  Janki Roberts MD (Urology)  Thomas Riddle DO as Consulting Physician (Cardiology)    Wt Readings from Last 3 Encounters:   04/25/19 201 lb 12.8 oz (91.5 kg)   03/27/19 202 lb 6.4 oz (91.8 kg)   01/31/19 208 lb 6.4 oz (94.5 kg)     Vitals:    04/25/19 1432   BP: 130/78   Site: Left Upper Arm   Pulse: 62   SpO2: 97%   Weight: 201 lb 12.8 oz (91.5 kg)   Height: 5' 9\" (1.753 m)     Body mass index is 29.8 kg/m². Based upon direct observation of the patient, evaluation of cognition reveals remote memory intact, recent memory impaired. Physical Exam:  /78 (Site: Left Upper Arm)   Pulse 62   Ht 5' 9\" (1.753 m)   Wt 201 lb 12.8 oz (91.5 kg)   SpO2 97%   BMI 29.80 kg/m²     Gen: Well, NAD, Alert, Oriented x 3   HEENT: EOMI, eyes clear, MMM  Skin: without rash or jaundice  Neck: no significant lymphadenopathy or thyromegaly  Lungs: CTA B w/out Rales/Wheezes/Rhonchi, Good respiratory effort   Heart: RRR, S1S2, w/out M/R/G, non-displaced PMI   Abdomen: Soft NT/ND, w/out R/G, w/ +BSx4   Ext: No C/C/E Bilaterally. Neuro: Neurovascularly intact w/ Sensory/Motor intact UE/LE Bilaterally. Patient's complete Health Risk Assessment and screening values have been reviewed and are found in Flowsheets. The following problems were reviewed today and where indicated follow up appointments were made and/or referrals ordered. Positive Risk Factor Screenings with Interventions:     Health Habits/Nutrition:  Health Habits/Nutrition  Do you exercise for at least 20 minutes 2-3 times per week?: Yes  Have you lost any weight without trying in the past 3 months?: No  Do you eat fewer than 2 meals per day?: (!) Yes  Have you seen a dentist within the past year?: Yes  Body mass index is 29.8 kg/m².   Health Habits/Nutrition Interventions:  · none really at this time    Hearing/Vision:  Hearing/Vision  Do you or your family notice any trouble with your hearing?: No  Do you have difficulty driving, watching TV, or doing any of your daily activities because of your eyesight?: No  Have you had an eye exam within the past year?: (!) No  Hearing/Vision Interventions:  · suggest eye exam    Safety:  Safety  Do you have working smoke detectors?: Yes  Have all throw rugs been removed or fastened?: (!) No  Do you have non-slip mats in all bathtubs?: (!) No  Do all of your stairways have a railing or banister?: (!) No  Are your doorways, halls and stairs free of clutter?: (!) No  Do you always fasten your seatbelt when you are in a car?: Yes  Safety Interventions:  · Home safety tips provided    Personalized Preventive Plan   Current Health Maintenance Status  Immunization History   Administered Date(s) Administered    Hepatitis A 04/09/2019    Influenza, High Dose (Fluzone 65 yrs and older) 08/27/2015, 09/20/2016, 09/20/2017, 09/17/2018    Pneumococcal 13-valent Conjugate ([id number]) 02/27/2018    Pneumococcal Polysaccharide ([id number]) 02/18/2015, 05/12/2016    Tdap (Boostrix, Adacel) 04/09/2019    Zoster Live (Zostavax) 07/11/2015    Zoster Subunit (Shingrix) 01/31/2019        Health Maintenance   Topic Date Due    Shingles Vaccine (3 of 3) 03/28/2019    Potassium monitoring  12/04/2019    Creatinine monitoring  12/04/2019    Colon cancer screen colonoscopy  03/23/2020    Lipid screen  06/15/2023    DTaP/Tdap/Td vaccine (2 - Td) 04/09/2029    Flu vaccine  Completed    Pneumococcal 65+ years Vaccine  Completed    AAA screen  Completed    Hepatitis C screen  Completed     Recommendations for Preventive Services Due: see orders and patient instructions/AVS.  . Recommended screening schedule for the next 5-10 years is provided to the patient in written form: see Patient Instructions/AVS.           Diagnosis Orders   1. Routine general medical examination at a health care facility     2.  BPH with obstruction/lower urinary tract symptoms  finasteride (PROSCAR) 5 MG tablet    tamsulosin (FLOMAX) 0.4 MG capsule   3. Essential hypertension     4. Hyperlipidemia, unspecified hyperlipidemia type     5. Coronary artery disease due to lipid rich plaque     6. Myocardial infarction, unspecified MI type, unspecified artery ([address]     7. ARB intolerance     8. Memory loss     9. Age-related cognitive decline     10.  Basal cell carcinoma (BCC) of skin of left ear  TAI - Arie Golden MD, Dermatology, [facility]     Discussed aricept  Declines at this time    Labs are UTD    Had tetanus at [facility]    Had first of lore Cordova MD

## 2019-04-25 NOTE — PATIENT INSTRUCTIONS
Recommend looking into a diet higher in healthy fats, low/no carb/grains    Go on [employer] and search \"eat fat to lose fat\"    Or check out [facility] ([facility])    Google Search Ketogenic Diets    Consider Dr. Elissa Norris's book \"Eat Fat Get Thin\"        Personalized Preventive Plan for Mariela Campbell - 4/25/2019  Medicare offers a range of preventive health benefits. Some of the tests and screenings are paid in full while other may be subject to a deductible, co-insurance, and/or copay. Some of these benefits include a comprehensive review of your medical history including lifestyle, illnesses that may run in your family, and various assessments and screenings as appropriate. After reviewing your medical record and screening and assessments performed today your provider may have ordered immunizations, labs, imaging, and/or referrals for you. A list of these orders (if applicable) as well as your Preventive Care list are included within your After Visit Summary for your review. Other Preventive Recommendations:    · A preventive eye exam performed by an eye specialist is recommended every 1-2 years to screen for glaucoma; cataracts, macular degeneration, and other eye disorders. · A preventive dental visit is recommended every 6 months. · Try to get at least 150 minutes of exercise per week or 10,000 steps per day on a pedometer . · Order or download the FREE \"Exercise & Physical Activity: Your Everyday Guide\" from [employer]. Call 2-470.555.4845 or search [employer] online. · You need 0807-2947 mg of calcium and 0461-9087 IU of vitamin D per day. It is possible to meet your calcium requirement with diet alone, but a vitamin D supplement is usually necessary to meet this goal.  · When exposed to the sun, use a sunscreen that protects against both UVA and UVB radiation with an SPF of 30 or greater.  Reapply every 2 to 3 hours or after sweating, drying off with a towel, or swimming. · Always wear a seat belt when traveling in a car. Always wear a helmet when riding a bicycle or motorcycle.

## 2019-05-17 DIAGNOSIS — N40.1 BPH WITH OBSTRUCTION/LOWER URINARY TRACT SYMPTOMS: ICD-10-CM

## 2019-05-17 DIAGNOSIS — N13.8 BPH WITH OBSTRUCTION/LOWER URINARY TRACT SYMPTOMS: ICD-10-CM

## 2019-05-17 LAB — PROSTATE SPECIFIC ANTIGEN: 0.23 NG/ML (ref 0–6.22)

## 2019-05-24 ENCOUNTER — OFFICE VISIT (OUTPATIENT)
Dept: UROLOGY | Age: 74
End: 2019-05-24
Payer: MEDICARE

## 2019-05-24 VITALS
HEIGHT: 69 IN | BODY MASS INDEX: 27.85 KG/M2 | SYSTOLIC BLOOD PRESSURE: 120 MMHG | DIASTOLIC BLOOD PRESSURE: 60 MMHG | WEIGHT: 188 LBS | HEART RATE: 84 BPM

## 2019-05-24 DIAGNOSIS — N39.0 URINARY TRACT INFECTION WITH HEMATURIA, SITE UNSPECIFIED: ICD-10-CM

## 2019-05-24 DIAGNOSIS — R31.29 MICROSCOPIC HEMATURIA: Primary | ICD-10-CM

## 2019-05-24 DIAGNOSIS — N13.8 BPH WITH OBSTRUCTION/LOWER URINARY TRACT SYMPTOMS: ICD-10-CM

## 2019-05-24 DIAGNOSIS — N40.1 BPH WITH OBSTRUCTION/LOWER URINARY TRACT SYMPTOMS: ICD-10-CM

## 2019-05-24 DIAGNOSIS — R31.9 URINARY TRACT INFECTION WITH HEMATURIA, SITE UNSPECIFIED: ICD-10-CM

## 2019-05-24 LAB
BILIRUBIN, POC: ABNORMAL
BLOOD URINE, POC: ABNORMAL
CLARITY, POC: CLEAR
COLOR, POC: YELLOW
GLUCOSE URINE, POC: ABNORMAL
KETONES, POC: ABNORMAL
LEUKOCYTE EST, POC: ABNORMAL
NITRITE, POC: ABNORMAL
PH, POC: 6.5
PROTEIN, POC: ABNORMAL
SPECIFIC GRAVITY, POC: 1.01
UROBILINOGEN, POC: 0.2

## 2019-05-24 PROCEDURE — 81003 URINALYSIS AUTO W/O SCOPE: CPT | Performed by: UROLOGY

## 2019-05-24 PROCEDURE — 99214 OFFICE O/P EST MOD 30 MIN: CPT | Performed by: UROLOGY

## 2019-05-24 PROCEDURE — 51798 US URINE CAPACITY MEASURE: CPT | Performed by: UROLOGY

## 2019-05-24 PROCEDURE — 51741 ELECTRO-UROFLOWMETRY FIRST: CPT | Performed by: UROLOGY

## 2019-05-24 RX ORDER — SULFAMETHOXAZOLE AND TRIMETHOPRIM 400; 80 MG/1; MG/1
1 TABLET ORAL DAILY
Qty: 20 TABLET | Refills: 0 | Status: SHIPPED | OUTPATIENT
Start: 2019-05-24 | End: 2019-06-03

## 2019-05-24 ASSESSMENT — ENCOUNTER SYMPTOMS: ABDOMINAL PAIN: 0

## 2019-05-24 NOTE — PROGRESS NOTES
Subjective:      Patient ID: Loy Kocher is a 68 y.o. male. HPI This is a 71 yo male with h/o HTN, OA, CAD/MI/ASA (Dr Geneva Goodson), and BPH with LUTs on Flomax BID and Proscar and 3 prior episodes of urinary retention and is s/p  Laser TURP (7/17/17) back in follow-up. Since last seen on 6/5/18, he has a good flow and performs self-cath prn. Over the last few weeks, he has roland having some dysuria, hesitancy at night and had problems with CIC and developed some hematuria. He feels that he may be getting a UTI. He had cystoscopy on 10/3/17, that confirmed meatal stenosis and bulbous stricture both dilated at the time of cystoscopy. He has no incontinence. He has no interval medical or surgical problems. I reviewed the interval PSA today.        Past Medical History:   Diagnosis Date    Actinic keratosis     Basal cell carcinoma, trunk 12/2017    left upper back    Basal cell carcinoma, trunk     BPH (benign prostatic hyperplasia)     CAD (coronary artery disease) 2012    has 4 cardiac stents    Heart attack ([address]     History of mycobacterial infection 6/22/2018    Hyperlipidemia     meds > 15 yrs    Hypertension     meds > 6 yrs / denies TIA or stroke    Hypertrophic scar     Myocardial infarct ([address] 11/2012    Osteoarthritis     all joints    Overweight 12/22/2017    Status post coronary angioplasty     Thyroid disease     meds > 1 month     Past Surgical History:   Procedure Laterality Date    CARPAL TUNNEL RELEASE Bilateral 2015    COLONOSCOPY  3/23/15        CORONARY ANGIOPLASTY WITH STENT PLACEMENT  2012    CYSTOSCOPY  05/17/2017    W/COMPLEX CYSTOMETROGRAM-COMPLEX UROFLOW-TRANSRECTAL US PROSTATE    EYE SURGERY      Lasik OU    JOINT REPLACEMENT Left 2010    LTKR    RI TOTAL KNEE ARTHROPLASTY Right 3/15/2018    RIGHT KNEE TOTAL KNEE  ARTHROPLASTY performed by Gigi Roach MD at [facility] Right     twice    SHOULDER SURGERY Right      RCR    Problems Sister     No Known Problems Brother     No Known Problems Son      Current Outpatient Medications   Medication Sig Dispense Refill    hydrochlorothiazide (HYDRODIURIL) 25 MG tablet take 1 tablet by mouth once daily 90 tablet 3    finasteride (PROSCAR) 5 MG tablet take 1 tablet by mouth once daily 90 tablet 3    tamsulosin (FLOMAX) 0.4 MG capsule take 1 capsule by mouth twice a day 180 capsule 3    diclofenac (VOLTAREN) 75 MG EC tablet Take 1 tablet by mouth 2 times daily as needed for Pain 60 tablet 5    atorvastatin (LIPITOR) 40 MG tablet take 1 tablet by mouth once daily 30 tablet 5    carvedilol (COREG) 12.5 MG tablet take 1 tablet by mouth twice a day with food 60 tablet 5    levothyroxine (SYNTHROID) 50 MCG tablet take 1 tablet by mouth once daily 30 tablet 5    folic acid (FOLVITE) 1 MG tablet   0    RA VITAMIN D-3 1000 units TABS tablet   0    aspirin 81 MG EC tablet Take 1 tablet by mouth 2 times daily 60 tablet 0    nitroGLYCERIN (NITROSTAT) 0.4 MG SL tablet Place 1 tablet under the tongue every 5 minutes as needed for Chest pain Dissolve 1 tab under tongue at first sign of chest pain every 5 minutes as needed. If pain persists after taking 3 tabs in a 15-minute period, or the pain is different than is typically experienced, call 9-1-1 immediately. 25 tablet 1     Current Facility-Administered Medications   Medication Dose Route Frequency Provider Last Rate Last Dose    methylPREDNISolone acetate (DEPO-MEDROL) injection 80 mg  80 mg Intramuscular Once NAOMY Victoria CNP         Ace inhibitors and Lisinopril  reviewed      Review of Systems   Constitutional: Negative for fever. Gastrointestinal: Negative for abdominal pain. Genitourinary: Negative for flank pain and urgency. Objective:   Physical Exam   Constitutional: He appears well-developed and well-nourished. Abdominal: Soft. He exhibits no distension and no mass. There is no tenderness. There is no guarding. Neurological: He is alert. PSA   Date Value Ref Range Status   05/17/2019 0.23 0.00 - 6.22 ng/mL Final   06/04/2018 0.31 0.00 - 6.22 ng/mL Final   05/12/2017 0.21 0.00 - 6.22 ng/mL Final     Comment:     When the Total PSA is between 3.00 and 10.00 ng/mL, consider  requesting a Free PSA to aid in diagnosis. 07/15/2016 0.31 0.00 - 6.22 ng/mL Final     Comment:     When the Total PSA is between 3.00 and 10.00 ng/mL, consider  requesting a Free PSA to aid in diagnosis. 02/18/2015 0.81 0.00 - 5.40 ng/mL Final     Comment:     When the Total PSA is between 3.00 and 10.00 ng/mL, consider  requesting a Free PSA to aid in diagnosis.      5/24/2019  8:54 AM - Maco Clifford CMA ([facility])     Component Collected Lab   Color, UA 05/24/2019  8:53 AM Unknown   yellow    Clarity, UA 05/24/2019  8:53 AM Unknown   clear    Glucose, UA POC 05/24/2019  8:53 AM Unknown   neg    Bilirubin, UA 05/24/2019  8:53 AM Unknown   neg    Ketones, UA 05/24/2019  8:53 AM Unknown   neg    Spec Grav, UA 05/24/2019  8:53 AM Unknown   1.015    Blood, UA POC 05/24/2019  8:53 AM Unknown   moderate    pH, UA 05/24/2019  8:53 AM Unknown   6.5    Protein, UA POC 05/24/2019  8:53 AM Unknown   neg    Urobilinogen, UA 05/24/2019  8:53 AM Unknown   0.2    Leukocytes, UA 05/24/2019  8:53 AM Unknown   small    Nitrite, UA 05/24/2019  8:53 AM Unknown   neg    Lab and Collection     12/4/2018  7:33 PM - Pato, Vikrampo Incoming Lab Results From Soft     Component Value Ref Range & Units Status Collected Lab   Sodium 138  132 - 144 mEq/L Final 12/04/2018  4:11 PM [address] [facility]   Potassium 5.2High   3.5 - 5.1 mEq/L Final 12/04/2018  4:11 PM [address] [facility]   Chloride 98  98 - 107 mEq/L Final 12/04/2018  4:11 PM [facility]   CO2 30High   22 - 29 mEq/L Final 12/04/2018  4:11 PM [facility]   Anion Gap 10  7 - 13 mEq/L Final 12/04/2018  4:11 PM [address] [facility]   Glucose 83  74 - 109 mg/dL Final 12/04/2018  4:11 PM [address] [facility]   BUN 29High   8 - 23 mg/dL Final 12/04/2018  4:11 PM [facility]   CREATININE 1.12  0.70 - 1.20 mg/dL Final 12/04/2018  4:11 PM [address] [facility]   GFR Non- >60.0  >60 Final 12/04/2018  4:11 PM [facility]   >60 mL/min/1.73m2 EGFR, calc. for ages 25 and older using the   MDRD formula (not corrected for weight), is valid for     Uroflow: 6 ml/sec, vol 127 cc  post void residual by U/S: 29 cc      Assessment: This is a 71 yo male with h/o HTN, OA, CAD/MI/ASA (Dr Didi Pro), and BPH with LUTs on Flomax BID and Proscar s/p Laser TURP on 7/17/17. He has stable voiding and performs CIC intermittently and will continue with present management. he has a good PVR today and feels that he may be developing a UTI and will treat with Bactrim. He has a normal and stable PSA. He will call for follow-up if he is not improved after Bactrim use. The proper dosing and Se were reviewed. Plan:      1. Bactrim DS, 1 po BID, #20 with 1 refill  2.  F/U 1 yr for PSA and Flow/PVR        Modesta Wise MD

## 2019-06-01 DIAGNOSIS — E03.9 HYPOTHYROIDISM, UNSPECIFIED TYPE: ICD-10-CM

## 2019-06-03 RX ORDER — LEVOTHYROXINE SODIUM 0.05 MG/1
TABLET ORAL
Qty: 30 TABLET | Refills: 5 | Status: SHIPPED | OUTPATIENT
Start: 2019-06-03 | End: 2020-02-25

## 2019-06-03 RX ORDER — CARVEDILOL 12.5 MG/1
TABLET ORAL
Qty: 60 TABLET | Refills: 5 | Status: SHIPPED | OUTPATIENT
Start: 2019-06-03 | End: 2020-02-25

## 2019-06-04 LAB
ALBUMIN SERPL-MCNC: 4.1 G/DL
ALP BLD-CCNC: 58 U/L
ALT SERPL-CCNC: 25 U/L
ANION GAP SERPL CALCULATED.3IONS-SCNC: 12 MMOL/L
AST SERPL-CCNC: 23 U/L
AVERAGE GLUCOSE: NORMAL
BILIRUB SERPL-MCNC: 0.4 MG/DL (ref 0.1–1.4)
BUN BLDV-MCNC: 36 MG/DL
CALCIUM SERPL-MCNC: 9.5 MG/DL
CHLORIDE BLD-SCNC: 105 MMOL/L
CHOLESTEROL, TOTAL: 156 MG/DL
CHOLESTEROL/HDL RATIO: NORMAL
CO2: 24 MMOL/L
CREAT SERPL-MCNC: 1.33 MG/DL
GFR CALCULATED: 52.7
GLUCOSE BLD-MCNC: 92 MG/DL
HBA1C MFR BLD: 5.4 %
HDLC SERPL-MCNC: 38 MG/DL (ref 35–70)
LDL CHOLESTEROL CALCULATED: 88 MG/DL (ref 0–160)
POTASSIUM SERPL-SCNC: 4.6 MMOL/L
PROSTATE SPECIFIC ANTIGEN: 0.51 NG/ML
SODIUM BLD-SCNC: 141 MMOL/L
TOTAL PROTEIN: 6.9
TRIGL SERPL-MCNC: 151 MG/DL
TSH SERPL DL<=0.05 MIU/L-ACNC: 2.52 UIU/ML
VLDLC SERPL CALC-MCNC: NORMAL MG/DL

## 2019-06-07 DIAGNOSIS — N40.1 BPH WITH OBSTRUCTION/LOWER URINARY TRACT SYMPTOMS: ICD-10-CM

## 2019-06-07 DIAGNOSIS — I10 ESSENTIAL HYPERTENSION: Primary | ICD-10-CM

## 2019-06-07 DIAGNOSIS — E78.5 HYPERLIPIDEMIA, UNSPECIFIED HYPERLIPIDEMIA TYPE: ICD-10-CM

## 2019-06-07 DIAGNOSIS — N13.8 BPH WITH OBSTRUCTION/LOWER URINARY TRACT SYMPTOMS: ICD-10-CM

## 2019-06-22 ENCOUNTER — OFFICE VISIT (OUTPATIENT)
Dept: FAMILY MEDICINE CLINIC | Age: 74
End: 2019-06-22
Payer: MEDICARE

## 2019-06-22 VITALS
OXYGEN SATURATION: 97 % | SYSTOLIC BLOOD PRESSURE: 122 MMHG | HEIGHT: 69 IN | DIASTOLIC BLOOD PRESSURE: 64 MMHG | WEIGHT: 194.6 LBS | HEART RATE: 60 BPM | BODY MASS INDEX: 28.82 KG/M2

## 2019-06-22 DIAGNOSIS — I10 ESSENTIAL HYPERTENSION: Primary | ICD-10-CM

## 2019-06-22 DIAGNOSIS — N28.9 RENAL INSUFFICIENCY: ICD-10-CM

## 2019-06-22 PROCEDURE — 99212 OFFICE O/P EST SF 10 MIN: CPT | Performed by: FAMILY MEDICINE

## 2019-06-22 NOTE — PROGRESS NOTES
Chief Complaint   Patient presents with    Results     go over [facility] labs, kidney results       HPI:  Arie Sommers is a 69 y.o. male    Had [facility] nursing labs    Patient on nsaids/water pill/and has bph      Wt Readings from Last 3 Encounters:   06/22/19 194 lb 9.6 oz (88.3 kg)   05/24/19 188 lb (85.3 kg)   04/25/19 201 lb 12.8 oz (91.5 kg)           Past Medical History:   Diagnosis Date    Actinic keratosis     Basal cell carcinoma, trunk 12/2017    left upper back    Basal cell carcinoma, trunk     BPH (benign prostatic hyperplasia)     CAD (coronary artery disease) 2012    has 4 cardiac stents    [facility]     History of mycobacterial infection 6/22/2018    Hyperlipidemia     meds > 15 yrs    Hypertension     meds > 6 yrs / denies TIA or stroke    Hypertrophic scar     Myocardial infarct ([facility] 11/2012    Osteoarthritis     all joints    Overweight 12/22/2017    Status post coronary angioplasty     Thyroid disease     meds > 1 month     Past Surgical History:   Procedure Laterality Date    CARPAL TUNNEL RELEASE Bilateral 2015    COLONOSCOPY  3/23/15        CORONARY ANGIOPLASTY WITH STENT PLACEMENT  2012    CYSTOSCOPY  05/17/2017    W/COMPLEX CYSTOMETROGRAM-COMPLEX UROFLOW-TRANSRECTAL US PROSTATE    EYE SURGERY      Lasik OU    JOINT REPLACEMENT Left 2010    LTKR    [place] TOTAL KNEE ARTHROPLASTY Right 3/15/2018    RIGHT KNEE TOTAL KNEE  ARTHROPLASTY performed by Cl Gallardo MD at [place] [address] Right     twice    SHOULDER SURGERY Right      RCR    TONSILLECTOMY      TURP N/A 7/17/2017    GREEN LIGHT LASER TRANSURETHRAL RESECTIOIN PROSTATE performed by Talha Cai MD at [address] [place] [address] PROSTATE/TRANSRECTAL  06/03/15    Cystoscop, cath rem, US prostate   [address]    due to snoring     Family History   Problem Relation Age of Onset    Kidney Disease Father     Other Mother         Perforated intestine    No HEENT: EOMI, eyes clear, MMM  Skin: without rash or jaundice        A&P   Diagnosis Orders   1. Essential hypertension     2.  Renal insufficiency           reassurance    Explained labs to him    Healthy diet and exercise   Risk factor reduction    Follow up with urology    F/u 6 mos      Simona Thakur MD

## 2019-08-08 RX ORDER — DICLOFENAC SODIUM 75 MG/1
TABLET, DELAYED RELEASE ORAL
Qty: 60 TABLET | Refills: 5 | Status: SHIPPED | OUTPATIENT
Start: 2019-08-08 | End: 2020-02-03

## 2019-09-26 ENCOUNTER — OFFICE VISIT (OUTPATIENT)
Dept: FAMILY MEDICINE CLINIC | Age: 74
End: 2019-09-26
Payer: MEDICARE

## 2019-09-26 VITALS
BODY MASS INDEX: 27.78 KG/M2 | WEIGHT: 187.6 LBS | HEIGHT: 69 IN | SYSTOLIC BLOOD PRESSURE: 122 MMHG | OXYGEN SATURATION: 97 % | HEART RATE: 55 BPM | DIASTOLIC BLOOD PRESSURE: 60 MMHG

## 2019-09-26 DIAGNOSIS — E78.5 HYPERLIPIDEMIA, UNSPECIFIED HYPERLIPIDEMIA TYPE: ICD-10-CM

## 2019-09-26 DIAGNOSIS — M19.90 OSTEOARTHRITIS, UNSPECIFIED OSTEOARTHRITIS TYPE, UNSPECIFIED SITE: ICD-10-CM

## 2019-09-26 DIAGNOSIS — I10 ESSENTIAL HYPERTENSION: ICD-10-CM

## 2019-09-26 DIAGNOSIS — I25.119 CORONARY ARTERY DISEASE INVOLVING NATIVE CORONARY ARTERY OF NATIVE HEART WITH ANGINA PECTORIS (HCC): Primary | ICD-10-CM

## 2019-09-26 DIAGNOSIS — I21.4 NON-ST ELEVATION MYOCARDIAL INFARCTION (NSTEMI) (HCC): ICD-10-CM

## 2019-09-26 PROCEDURE — 99213 OFFICE O/P EST LOW 20 MIN: CPT | Performed by: FAMILY MEDICINE

## 2019-09-26 NOTE — PROGRESS NOTES
carvedilol (COREG) 12.5 MG tablet take 1 tablet by mouth twice a day with food 60 tablet 5    levothyroxine (SYNTHROID) 50 MCG tablet take 1 tablet by mouth once daily 30 tablet 5    hydrochlorothiazide (HYDRODIURIL) 25 MG tablet take 1 tablet by mouth once daily 90 tablet 3    finasteride (PROSCAR) 5 MG tablet take 1 tablet by mouth once daily 90 tablet 3    tamsulosin (FLOMAX) 0.4 MG capsule take 1 capsule by mouth twice a day 180 capsule 3    atorvastatin (LIPITOR) 40 MG tablet take 1 tablet by mouth once daily 30 tablet 5    folic acid (FOLVITE) 1 MG tablet   0    RA VITAMIN D-3 1000 units TABS tablet   0    aspirin 81 MG EC tablet Take 1 tablet by mouth 2 times daily 60 tablet 0    nitroGLYCERIN (NITROSTAT) 0.4 MG SL tablet Place 1 tablet under the tongue every 5 minutes as needed for Chest pain Dissolve 1 tab under tongue at first sign of chest pain every 5 minutes as needed. If pain persists after taking 3 tabs in a 15-minute period, or the pain is different than is typically experienced, call 9-1-1 immediately. 25 tablet 1     Current Facility-Administered Medications   Medication Dose Route Frequency Provider Last Rate Last Dose    methylPREDNISolone acetate (DEPO-MEDROL) injection 80 mg  80 mg Intramuscular Once NAOMY Victoria - CNP         Allergies   Allergen Reactions    Ace Inhibitors Other (See Comments)     Cough      Lisinopril Other (See Comments)     Cough         Review of Systems:   General ROS: some fatigue negative for - chills,fever, malaise, weight gain or weight loss  Respiratory ROS: coughing spells, mainly at nighttime  Cardiovascular ROS: no chest pain or dyspnea on exertion  Gastrointestinal ROS: no abdominal pain, change in bowel habits, or black or bloody stools  Genito-Urinary ROS: , BPH and ED  Musculoskeletal ROS: some hand joint pain  Neurological ROS: numbness in his hands      In general patient otherwise reports feeling well.      Physical Exam:  /60

## 2019-12-10 ENCOUNTER — OFFICE VISIT (OUTPATIENT)
Dept: ORTHOPEDIC SURGERY | Age: 74
End: 2019-12-10
Payer: MEDICARE

## 2019-12-10 ENCOUNTER — HOSPITAL ENCOUNTER (OUTPATIENT)
Dept: ORTHOPEDIC SURGERY | Age: 74
Discharge: HOME | End: 2019-12-12
Payer: MEDICARE

## 2019-12-10 VITALS
HEIGHT: 69 IN | DIASTOLIC BLOOD PRESSURE: 60 MMHG | WEIGHT: 194.4 LBS | BODY MASS INDEX: 28.79 KG/M2 | SYSTOLIC BLOOD PRESSURE: 140 MMHG

## 2019-12-10 DIAGNOSIS — S46.212A BICEPS STRAIN, LEFT, INITIAL ENCOUNTER: Primary | ICD-10-CM

## 2019-12-10 DIAGNOSIS — M19.012 OSTEOARTHRITIS OF LEFT SHOULDER, UNSPECIFIED OSTEOARTHRITIS TYPE: ICD-10-CM

## 2019-12-10 DIAGNOSIS — M75.82 ROTATOR CUFF TENDINITIS, LEFT: ICD-10-CM

## 2019-12-10 DIAGNOSIS — M25.512 ACUTE PAIN OF LEFT SHOULDER: ICD-10-CM

## 2019-12-10 PROCEDURE — 73030 X-RAY EXAM OF SHOULDER: CPT | Performed by: ORTHOPAEDIC SURGERY

## 2019-12-10 PROCEDURE — 99203 OFFICE O/P NEW LOW 30 MIN: CPT | Performed by: ORTHOPAEDIC SURGERY

## 2019-12-10 PROCEDURE — 73030 X-RAY EXAM OF SHOULDER: CPT

## 2019-12-10 RX ORDER — METHYLPREDNISOLONE 4 MG/1
TABLET ORAL
Qty: 42 TABLET | Refills: 0 | Status: SHIPPED | OUTPATIENT
Start: 2019-12-10 | End: 2020-04-30

## 2019-12-10 ASSESSMENT — ENCOUNTER SYMPTOMS
ABDOMINAL PAIN: 0
SHORTNESS OF BREATH: 0
EYE PAIN: 0
WHEEZING: 0
VOMITING: 0
NAUSEA: 0

## 2020-01-03 RX ORDER — ATORVASTATIN CALCIUM 40 MG/1
TABLET, FILM COATED ORAL
Qty: 90 TABLET | Refills: 3 | Status: SHIPPED | OUTPATIENT
Start: 2020-01-03 | End: 2020-12-28

## 2020-02-03 RX ORDER — HYDROCHLOROTHIAZIDE 25 MG/1
TABLET ORAL
Qty: 90 TABLET | Refills: 3 | Status: SHIPPED | OUTPATIENT
Start: 2020-02-03 | End: 2020-04-27

## 2020-02-03 RX ORDER — DICLOFENAC SODIUM 75 MG/1
TABLET, DELAYED RELEASE ORAL
Qty: 180 TABLET | Refills: 2 | Status: SHIPPED | OUTPATIENT
Start: 2020-02-03 | End: 2020-11-15

## 2020-02-03 RX ORDER — FINASTERIDE 5 MG/1
TABLET, FILM COATED ORAL
Qty: 90 TABLET | Refills: 3 | Status: SHIPPED | OUTPATIENT
Start: 2020-02-03 | End: 2020-04-27

## 2020-02-25 RX ORDER — CARVEDILOL 12.5 MG/1
TABLET ORAL
Qty: 180 TABLET | Refills: 0 | Status: SHIPPED | OUTPATIENT
Start: 2020-02-25 | End: 2020-05-20

## 2020-02-25 RX ORDER — TAMSULOSIN HYDROCHLORIDE 0.4 MG/1
CAPSULE ORAL
Qty: 180 CAPSULE | Refills: 0 | Status: SHIPPED | OUTPATIENT
Start: 2020-02-25 | End: 2020-05-20

## 2020-02-25 RX ORDER — LEVOTHYROXINE SODIUM 0.05 MG/1
TABLET ORAL
Qty: 90 TABLET | Refills: 0 | Status: SHIPPED | OUTPATIENT
Start: 2020-02-25 | End: 2020-05-20

## 2020-04-06 ENCOUNTER — TELEPHONE (OUTPATIENT)
Dept: FAMILY MEDICINE CLINIC | Age: 75
End: 2020-04-06

## 2020-04-27 RX ORDER — FINASTERIDE 5 MG/1
TABLET, FILM COATED ORAL
Qty: 90 TABLET | Refills: 3 | Status: SHIPPED | OUTPATIENT
Start: 2020-04-27 | End: 2021-04-16

## 2020-04-27 RX ORDER — HYDROCHLOROTHIAZIDE 25 MG/1
TABLET ORAL
Qty: 90 TABLET | Refills: 3 | Status: SHIPPED | OUTPATIENT
Start: 2020-04-27 | End: 2021-04-16

## 2020-04-30 ENCOUNTER — OFFICE VISIT (OUTPATIENT)
Dept: FAMILY MEDICINE CLINIC | Age: 75
End: 2020-04-30
Payer: MEDICARE

## 2020-04-30 VITALS
WEIGHT: 190.2 LBS | BODY MASS INDEX: 28.09 KG/M2 | TEMPERATURE: 97.8 F | HEART RATE: 61 BPM | DIASTOLIC BLOOD PRESSURE: 80 MMHG | SYSTOLIC BLOOD PRESSURE: 120 MMHG | OXYGEN SATURATION: 97 %

## 2020-04-30 PROCEDURE — 99214 OFFICE O/P EST MOD 30 MIN: CPT | Performed by: NURSE PRACTITIONER

## 2020-04-30 RX ORDER — METHYLPREDNISOLONE 4 MG/1
TABLET ORAL
Qty: 21 TABLET | Refills: 0 | Status: SHIPPED | OUTPATIENT
Start: 2020-04-30 | End: 2020-05-06

## 2020-04-30 ASSESSMENT — ENCOUNTER SYMPTOMS
COUGH: 0
SHORTNESS OF BREATH: 0

## 2020-04-30 ASSESSMENT — PATIENT HEALTH QUESTIONNAIRE - PHQ9
2. FEELING DOWN, DEPRESSED OR HOPELESS: 1
SUM OF ALL RESPONSES TO PHQ9 QUESTIONS 1 & 2: 1
SUM OF ALL RESPONSES TO PHQ QUESTIONS 1-9: 1
SUM OF ALL RESPONSES TO PHQ QUESTIONS 1-9: 1
1. LITTLE INTEREST OR PLEASURE IN DOING THINGS: 0

## 2020-04-30 NOTE — PROGRESS NOTES
Subjective  Chief Complaint   Patient presents with    Shoulder Pain     left shoulder, when lifting arm up get sharp shooting pain, when laying on side hurts    Foot Pain     left achilles tendon bothering him, maybe bone spur, x 2 months        HPI     Left foot/heel pain for 2 mos. Told had a heel spur previously. Trouble going up stairs. Using topical otc crm with minimal relief. Warm water with epsom salts. No relief. Sharp pains    Left shoulder- previous surgeries on right  Similar symptoms with lifting arm  Hurting to sleep on. Sharp pains at time. Takes diclofenac daily. Has not bee helping with this pain.      Patient Active Problem List    Diagnosis Date Noted    Acute pain of left shoulder 12/10/2019    Rotator cuff tendinitis, left 12/10/2019    Osteoarthritis of left shoulder 12/10/2019    Biceps strain, left, initial encounter 12/10/2019    Generalized abdominal pain 11/23/2018    History of mycobacterial infection 06/22/2018    ARB intolerance 06/14/2018    Status post total right knee replacement 03/15/2018    Hypertrophic scar     Osteoarthritis of right knee 03/06/2018    Infection of toe 03/06/2018    Overweight 12/22/2017    Basal cell carcinoma, trunk 12/01/2017    BPH with obstruction/lower urinary tract symptoms     Urinary retention     Actinic keratosis     Status post LASIK surgery 06/23/2015    Senile nuclear sclerosis 06/23/2015    Regular astigmatism 06/23/2015    Presbyopia 06/23/2015    Hyperlipidemia     Hypertension     Osteoarthritis     CAD (coronary artery disease)     STEMI (ST elevation myocardial infarction) ([facility] [address] 11/20/2012     Past Medical History:   Diagnosis Date    Actinic keratosis     Basal cell carcinoma, trunk 12/2017    left upper back    Basal cell carcinoma, trunk     BPH (benign prostatic hyperplasia)     CAD (coronary artery disease) 2012    has 4 cardiac stents    Heart attack ([address]     History of mycobacterial infection 6/22/2018    Hyperlipidemia     meds > 15 yrs    Hypertension     meds > 6 yrs / denies TIA or stroke    Hypertrophic scar     Myocardial infarct ([facility] [address] 11/2012    Osteoarthritis     all joints    Overweight 12/22/2017    Status post coronary angioplasty     Thyroid disease     meds > 1 month     Past Surgical History:   Procedure Laterality Date    CARPAL TUNNEL RELEASE Bilateral 2015    COLONOSCOPY  3/23/15        CORONARY ANGIOPLASTY WITH STENT PLACEMENT  2012    CYSTOSCOPY  05/17/2017    W/COMPLEX CYSTOMETROGRAM-COMPLEX UROFLOW-TRANSRECTAL US PROSTATE    EYE SURGERY      Lasik OU    JOINT REPLACEMENT Left 2010    LTKR    ID TOTAL KNEE ARTHROPLASTY Right 3/15/2018    RIGHT KNEE TOTAL KNEE  ARTHROPLASTY performed by Dirk Nobles MD at [facility] Right     twice    SHOULDER SURGERY Right      RCR    TONSILLECTOMY      TURP N/A 7/17/2017    GREEN LIGHT LASER TRANSURETHRAL RESECTIOIN PROSTATE performed by Nba Hutchins MD at [address] [place] [address] PROSTATE/TRANSRECTAL  06/03/15    Cystoscop, cath rem, US prostate   [address]    due to snoring     Family History   Problem Relation Age of Onset    Kidney Disease Father     Other Mother         Perforated intestine    No Known Problems Sister     No Known Problems Brother     No Known Problems Sister     No Known Problems Brother     No Known Problems Son      Social History     Socioeconomic History    Marital status:      Spouse name: None    Number of children: None    Years of education: None    Highest education level: None   Occupational History    None   Social Needs    Financial resource strain: None    Food insecurity     Worry: None     Inability: None    Transportation needs     Medical: None     Non-medical: None   Tobacco Use    Smoking status: Former Smoker     Packs/day: 1.00     Years: 7.00     Pack years: 7.00     Start date: 6/22/1961     Last attempt to quit: 3/17/1967     Years since quittin.1    Smokeless tobacco: Never Used    Tobacco comment: Quit for [employer]   Substance and Sexual Activity    Alcohol use: No     Types: 30 Cans of beer per week     Comment: No alcohol  since     Drug use: No    Sexual activity: None   Lifestyle    Physical activity     Days per week: None     Minutes per session: None    Stress: None   Relationships    Social connections     Talks on phone: None     Gets together: None     Attends Anabaptist service: None     Active member of club or organization: None     Attends meetings of clubs or organizations: None     Relationship status: None    Intimate partner violence     Fear of current or ex partner: None     Emotionally abused: None     Physically abused: None     Forced sexual activity: None   Other Topics Concern    None   Social History Narrative    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     Current Outpatient Medications on File Prior to Visit   Medication Sig Dispense Refill    Multiple Vitamins-Minerals (MULTI COMPLETE PO) Take by mouth      finasteride (PROSCAR) 5 MG tablet take 1 tablet by mouth once daily 90 tablet 3    hydroCHLOROthiazide (HYDRODIURIL) 25 MG tablet take 1 tablet by mouth once daily 90 tablet 3    tamsulosin (FLOMAX) 0.4 MG capsule take 1 capsule by mouth twice a day 180 capsule 0    carvedilol (COREG) 12.5 MG tablet take 1 tablet by mouth twice a day with food 180 tablet 0    levothyroxine (SYNTHROID) 50 MCG tablet take 1 tablet by mouth once daily 90 tablet 0    diclofenac (VOLTAREN) 75 MG EC tablet take 1 tablet by mouth twice a day if needed for pain 180 tablet 2    atorvastatin (LIPITOR) 40 MG tablet take 1 tablet by mouth once daily 90 tablet 3    aspirin 81 MG EC tablet Take 1 tablet by mouth 2 times daily 60 tablet 0    nitroGLYCERIN (NITROSTAT) 0.4 MG SL tablet Place 1 tablet under the tongue every 5 minutes as needed for (MIN 2 VIEWS)    methylPREDNISolone (MEDROL DOSEPACK) 4 MG tablet   2. Chronic left shoulder pain  XR SHOULDER LEFT (MIN 2 VIEWS)    External Referral - Eileen Schwab MD - Joint Replacement, Arthroscopic Surgery, Sports Med    methylPREDNISolone (MEDROL DOSEPACK) 4 MG tablet   3. Colon cancer screening  Willis Mendez MD, Gastroenterology, [address] [place] [address]          Orders Placed This Encounter   Procedures    XR CALCANEUS RIGHT (MIN 2 VIEWS)     Standing Status:   Future     Standing Expiration Date:   4/30/2021     Order Specific Question:   Reason for exam:     Answer:   pain    XR SHOULDER LEFT (MIN 2 VIEWS)     Standing Status:   Future     Standing Expiration Date:   4/30/2021     Order Specific Question:   Reason for exam:     Answer:   pain, limited Mildred Morris MD, Gastroenterology, [address] [place] [address]     Referral Priority:   Routine     Referral Type:   Eval and Treat     Referral Reason:   Specialty Services Required     Referred to Provider:   Roseline Núñez MD     Requested Specialty:   Gastroenterology     Number of Visits Requested:   1    External Referral - Eileen Schwab MD - Joint Replacement, Arthroscopic Surgery, Sports Med     Referral Priority:   Routine     Referral Type:   Eval and Treat     Referral Reason:   Specialty Services Required     Referred to Provider:   Addison Shi MD     Requested Specialty:   Orthopedic Surgery     Number of Visits Requested:   1       Orders Placed This Encounter   Medications    methylPREDNISolone (MEDROL DOSEPACK) 4 MG tablet     Sig: Take by mouth. Dispense:  21 tablet     Refill:  0       Side effects, adverse effects of the medication prescribed today, as well as treatment plan/ rationale and result expectations have been discussed with the patient who expresses understanding and desires to proceed. Close follow up to evaluate treatment results and for coordination of care.   I have reviewed the patient's medical history in detail and updated the computerized patient record. As always, patient is advised that if symptoms worsen in any way they will proceed to the nearest emergency room. FU after imaging.     Angelique Pritchard, NAOMY - CNP

## 2020-05-01 ENCOUNTER — HOSPITAL ENCOUNTER (OUTPATIENT)
Dept: GENERAL RADIOLOGY | Age: 75
Discharge: HOME | End: 2020-05-03
Payer: MEDICARE

## 2020-05-01 PROCEDURE — 73030 X-RAY EXAM OF SHOULDER: CPT

## 2020-05-01 PROCEDURE — 73650 X-RAY EXAM OF HEEL: CPT

## 2020-05-04 ENCOUNTER — TELEPHONE (OUTPATIENT)
Dept: ENDOSCOPY | Age: 75
End: 2020-05-04

## 2020-05-05 ENCOUNTER — NURSE ONLY (OUTPATIENT)
Dept: PRIMARY CARE CLINIC | Age: 75
End: 2020-05-05

## 2020-05-05 DIAGNOSIS — Z01.818 ENCOUNTER FOR PREADMISSION TESTING: ICD-10-CM

## 2020-05-05 PROCEDURE — U0003 INFECTIOUS AGENT DETECTION BY NUCLEIC ACID (DNA OR RNA); SEVERE ACUTE RESPIRATORY SYNDROME CORONAVIRUS 2 (SARS-COV-2) (CORONAVIRUS DISEASE [COVID-19]), AMPLIFIED PROBE TECHNIQUE, MAKING USE OF HIGH THROUGHPUT TECHNOLOGIES AS DESCRIBED BY CMS-2020-01-R: HCPCS

## 2020-05-07 LAB
SARS-COV-2: NOT DETECTED
SOURCE: NORMAL

## 2020-05-11 ENCOUNTER — ANESTHESIA (OUTPATIENT)
Dept: ENDOSCOPY | Age: 75
End: 2020-05-11
Payer: MEDICARE

## 2020-05-11 ENCOUNTER — ANCILLARY PROCEDURE (OUTPATIENT)
Dept: ENDOSCOPY | Age: 75
End: 2020-05-11
Attending: INTERNAL MEDICINE
Payer: MEDICARE

## 2020-05-11 ENCOUNTER — ANESTHESIA EVENT (OUTPATIENT)
Dept: ENDOSCOPY | Age: 75
End: 2020-05-11
Payer: MEDICARE

## 2020-05-11 ENCOUNTER — HOSPITAL ENCOUNTER (OUTPATIENT)
Age: 75
Setting detail: OUTPATIENT SURGERY
Discharge: HOME | End: 2020-05-11
Attending: INTERNAL MEDICINE | Admitting: INTERNAL MEDICINE
Payer: MEDICARE

## 2020-05-11 VITALS
RESPIRATION RATE: 16 BRPM | BODY MASS INDEX: 26.98 KG/M2 | TEMPERATURE: 98 F | WEIGHT: 178 LBS | HEART RATE: 64 BPM | DIASTOLIC BLOOD PRESSURE: 72 MMHG | SYSTOLIC BLOOD PRESSURE: 129 MMHG | OXYGEN SATURATION: 97 % | HEIGHT: 68 IN

## 2020-05-11 VITALS
RESPIRATION RATE: 14 BRPM | OXYGEN SATURATION: 99 % | SYSTOLIC BLOOD PRESSURE: 118 MMHG | DIASTOLIC BLOOD PRESSURE: 59 MMHG

## 2020-05-11 PROCEDURE — 2500000003 HC RX 250 WO HCPCS: Performed by: NURSE ANESTHETIST, CERTIFIED REGISTERED

## 2020-05-11 PROCEDURE — 3700000001 HC ADD 15 MINUTES (ANESTHESIA): Performed by: INTERNAL MEDICINE

## 2020-05-11 PROCEDURE — 7100000011 HC PHASE II RECOVERY - ADDTL 15 MIN: Performed by: INTERNAL MEDICINE

## 2020-05-11 PROCEDURE — 3700000000 HC ANESTHESIA ATTENDED CARE: Performed by: INTERNAL MEDICINE

## 2020-05-11 PROCEDURE — 3609027000 HC COLONOSCOPY: Performed by: INTERNAL MEDICINE

## 2020-05-11 PROCEDURE — G0105 COLORECTAL SCRN; HI RISK IND: HCPCS | Performed by: INTERNAL MEDICINE

## 2020-05-11 PROCEDURE — 2580000003 HC RX 258: Performed by: INTERNAL MEDICINE

## 2020-05-11 PROCEDURE — 6360000002 HC RX W HCPCS

## 2020-05-11 PROCEDURE — 7100000010 HC PHASE II RECOVERY - FIRST 15 MIN: Performed by: INTERNAL MEDICINE

## 2020-05-11 PROCEDURE — 2709999900 HC NON-CHARGEABLE SUPPLY: Performed by: INTERNAL MEDICINE

## 2020-05-11 RX ORDER — SODIUM CHLORIDE 0.9 % (FLUSH) 0.9 %
10 SYRINGE (ML) INJECTION EVERY 12 HOURS SCHEDULED
Status: DISCONTINUED | OUTPATIENT
Start: 2020-05-11 | End: 2020-05-11 | Stop reason: HOSPADM

## 2020-05-11 RX ORDER — PROPOFOL 10 MG/ML
INJECTION, EMULSION INTRAVENOUS PRN
Status: DISCONTINUED | OUTPATIENT
Start: 2020-05-11 | End: 2020-05-11 | Stop reason: SDUPTHER

## 2020-05-11 RX ORDER — LIDOCAINE HYDROCHLORIDE 20 MG/ML
INJECTION, SOLUTION INFILTRATION; PERINEURAL PRN
Status: DISCONTINUED | OUTPATIENT
Start: 2020-05-11 | End: 2020-05-11 | Stop reason: SDUPTHER

## 2020-05-11 RX ORDER — LIDOCAINE HYDROCHLORIDE 10 MG/ML
1 INJECTION, SOLUTION EPIDURAL; INFILTRATION; INTRACAUDAL; PERINEURAL
Status: DISCONTINUED | OUTPATIENT
Start: 2020-05-11 | End: 2020-05-11 | Stop reason: HOSPADM

## 2020-05-11 RX ORDER — ONDANSETRON 2 MG/ML
4 INJECTION INTRAMUSCULAR; INTRAVENOUS
Status: DISCONTINUED | OUTPATIENT
Start: 2020-05-11 | End: 2020-05-11 | Stop reason: HOSPADM

## 2020-05-11 RX ORDER — SODIUM CHLORIDE 0.9 % (FLUSH) 0.9 %
10 SYRINGE (ML) INJECTION PRN
Status: DISCONTINUED | OUTPATIENT
Start: 2020-05-11 | End: 2020-05-11 | Stop reason: HOSPADM

## 2020-05-11 RX ORDER — MAGNESIUM HYDROXIDE 1200 MG/15ML
LIQUID ORAL PRN
Status: DISCONTINUED | OUTPATIENT
Start: 2020-05-11 | End: 2020-05-11 | Stop reason: ALTCHOICE

## 2020-05-11 RX ORDER — SODIUM CHLORIDE 9 MG/ML
INJECTION, SOLUTION INTRAVENOUS CONTINUOUS
Status: DISCONTINUED | OUTPATIENT
Start: 2020-05-11 | End: 2020-05-11 | Stop reason: HOSPADM

## 2020-05-11 RX ADMIN — PROPOFOL 240 MG: 10 INJECTION, EMULSION INTRAVENOUS at 11:46

## 2020-05-11 RX ADMIN — LIDOCAINE HYDROCHLORIDE 40 MG: 20 INJECTION, SOLUTION INFILTRATION; PERINEURAL at 11:46

## 2020-05-11 RX ADMIN — SODIUM CHLORIDE: 9 INJECTION, SOLUTION INTRAVENOUS at 11:07

## 2020-05-11 ASSESSMENT — PAIN DESCRIPTION - PAIN TYPE: TYPE: ACUTE PAIN

## 2020-05-11 ASSESSMENT — PULMONARY FUNCTION TESTS
PIF_VALUE: 0
PIF_VALUE: 1
PIF_VALUE: 0

## 2020-05-11 ASSESSMENT — PAIN - FUNCTIONAL ASSESSMENT: PAIN_FUNCTIONAL_ASSESSMENT: 0-10

## 2020-05-11 ASSESSMENT — PAIN SCALES - GENERAL: PAINLEVEL_OUTOF10: 1

## 2020-05-11 ASSESSMENT — PAIN DESCRIPTION - LOCATION: LOCATION: ABDOMEN

## 2020-05-11 ASSESSMENT — PAIN DESCRIPTION - DESCRIPTORS: DESCRIPTORS: CRAMPING

## 2020-05-11 NOTE — ANESTHESIA PRE PROCEDURE
Department of Anesthesiology  Preprocedure Note       Name:  Yusra Duran   Age:  69 y.o.  :  1945                                          MRN:  [id number]         Date:  2020      Surgeon: Shakir Alexis):  Elroy Lackey MD    Procedure: COLORECTAL CANCER SCREENING, HIGH RISK (N/A )    Medications prior to admission:   Prior to Admission medications    Medication Sig Start Date End Date Taking? Authorizing Provider   Multiple Vitamins-Minerals (MULTI COMPLETE PO) Take by mouth    Historical Provider, MD   finasteride (PROSCAR) 5 MG tablet take 1 tablet by mouth once daily 20   Jessica Regalado MD   hydroCHLOROthiazide (HYDRODIURIL) 25 MG tablet take 1 tablet by mouth once daily 20   Jessica Regalado MD   tamsulosin [facility]) 0.4 MG capsule take 1 capsule by mouth twice a day 20   Jessica Regalado MD   carvedilol (COREG) 12.5 MG tablet take 1 tablet by mouth twice a day with food 20   Jessica Regalado MD   levothyroxine (SYNTHROID) 50 MCG tablet take 1 tablet by mouth once daily 20   Jessica Regalado MD   diclofenac (VOLTAREN) 75 MG EC tablet take 1 tablet by mouth twice a day if needed for pain 2/3/20   Jessica Regalado MD   atorvastatin (LIPITOR) 40 MG tablet take 1 tablet by mouth once daily 1/3/20   Dylan NORTH [place], DO   aspirin 81 MG EC tablet Take 1 tablet by mouth 2 times daily 3/16/18   NAOMY Ramírez CNP   nitroGLYCERIN (NITROSTAT) 0.4 MG SL tablet Place 1 tablet under the tongue every 5 minutes as needed for Chest pain Dissolve 1 tab under tongue at first sign of chest pain every 5 minutes as needed. If pain persists after taking 3 tabs in a 15-minute period, or the pain is different than is typically experienced, call 9-1-1 immediately.  9/15/16   Rozina Davila MD       Current medications:    Current Facility-Administered Medications   Medication Dose Route Frequency Provider Last Rate Last Dose    ondansetron (ZOFRAN) injection 4 mg  4 mg Intravenous Once PRN Elroy Lackey MD Allergies:     Allergies   Allergen Reactions    Ace Inhibitors Other (See Comments)     Cough      Lisinopril Other (See Comments)     Cough         Problem List:    Patient Active Problem List   Diagnosis Code    Hyperlipidemia E78.5    Hypertension I10    Osteoarthritis M19.90    STEMI (ST elevation myocardial infarction) ([facility] [address] I21.3    CAD (coronary artery disease) I25.10    Actinic keratosis L57.0    BPH with obstruction/lower urinary tract symptoms N40.1, N13.8    Urinary retention R33.9    Basal cell carcinoma, trunk C44.519    Overweight E66.3    Status post LASIK surgery Z98.890    Osteoarthritis of right knee M17.11    Infection of toe L08.9    Hypertrophic scar L91.0    Status post total right knee replacement Z96.651    ARB intolerance Z78.9    History of mycobacterial infection Z86.19    Generalized abdominal pain R10.84    Senile nuclear sclerosis H25.10    Regular astigmatism H52.229    Presbyopia H52.4    Acute pain of left shoulder M25.512    Rotator cuff tendinitis, left M75.82    Osteoarthritis of left shoulder M19.012    Biceps strain, left, initial encounter Q89.434I       Past Medical History:        Diagnosis Date    Actinic keratosis     Basal cell carcinoma, trunk 12/2017    left upper back    Basal cell carcinoma, trunk     BPH (benign prostatic hyperplasia)     CAD (coronary artery disease) 2012    has 4 cardiac stents    Heart attack ([facility] [address]     History of mycobacterial infection 6/22/2018    Hyperlipidemia     meds > 15 yrs    Hypertension     meds > 6 yrs / denies TIA or stroke    Hypertrophic scar     Myocardial infarct ([facility] [address] 11/2012    Osteoarthritis     all joints    Overweight 12/22/2017    Status post coronary angioplasty     Thyroid disease     meds > 1 month       Past Surgical History:        Procedure Laterality Date    CARPAL TUNNEL RELEASE Bilateral 2015    COLONOSCOPY  3/23/15        CORONARY ANGIOPLASTY WITH STENT Component Value Date     06/04/2019    K 4.6 06/04/2019    K 3.9 03/16/2018     06/04/2019    CO2 24 06/04/2019    BUN 36 06/04/2019    CREATININE 1.33 06/04/2019    GFRAA >60.0 06/02/2019    LABGLOM 52.7 06/04/2019    LABGLOM 52.7 06/02/2019    GLUCOSE 92 06/04/2019    PROT 6.9 06/02/2019    CALCIUM 9.5 06/04/2019    BILITOT 0.4 06/04/2019    ALKPHOS 58 06/04/2019    AST 23 06/04/2019    ALT 25 06/04/2019       POC Tests: No results for input(s): POCGLU, POCNA, POCK, POCCL, POCBUN, POCHEMO, POCHCT in the last 72 hours. Coags:   Lab Results   Component Value Date    PROTIME 10.8 07/10/2017    INR 1.0 07/10/2017       HCG (If Applicable): No results found for: PREGTESTUR, PREGSERUM, HCG, HCGQUANT     ABGs: No results found for: PHART, PO2ART, FSC5UTH, AOR9QMP, BEART, H8JEHULK     Type & Screen (If Applicable):  No results found for: LABABO, [address]    Anesthesia Evaluation  Patient summary reviewed and Nursing notes reviewed no history of anesthetic complications:   Airway: Mallampati: II  TM distance: >3 FB   Neck ROM: full  Mouth opening: > = 3 FB Dental: normal exam         Pulmonary:       Smoker: former. Cardiovascular:    (+) hypertension:, past MI (2012; 4 stents):, CAD:, hyperlipidemia      ECG reviewed      Echocardiogram reviewed                  Neuro/Psych:               GI/Hepatic/Renal:             Endo/Other:    (+) hypothyroidism: arthritis: OA., malignancy/cancer (trunk basal cell carcinoma). Abdominal:           Vascular:                                      Anesthesia Plan      MAC     ASA 3       Induction: intravenous. Anesthetic plan and risks discussed with patient. Plan discussed with attending.                   Paulette Pierre, APRN - CRNA   5/11/2020

## 2020-05-11 NOTE — H&P
2/3/20  Yes Ritika Castro MD   atorvastatin (LIPITOR) 40 MG tablet take 1 tablet by mouth once daily 1/3/20  Yes Dylan Naik DO   aspirin 81 MG EC tablet Take 1 tablet by mouth 2 times daily  Patient taking differently: Take 81 mg by mouth daily  3/16/18  Yes NAOMY Terrell CNP   nitroGLYCERIN (NITROSTAT) 0.4 MG SL tablet Place 1 tablet under the tongue every 5 minutes as needed for Chest pain Dissolve 1 tab under tongue at first sign of chest pain every 5 minutes as needed. If pain persists after taking 3 tabs in a 15-minute period, or the pain is different than is typically experienced, call 9-1-1 immediately. 9/15/16   Adin Lacey MD       Allergies:    Allergies   Allergen Reactions    Ace Inhibitors Other (See Comments)     Cough      Lisinopril Other (See Comments)     Cough          History of allergic reaction to anesthesia:  No    Past Medical History:  Past Medical History:   Diagnosis Date    Actinic keratosis     Basal cell carcinoma, trunk 12/2017    left upper back    Basal cell carcinoma, trunk     BPH (benign prostatic hyperplasia)     CAD (coronary artery disease) 2012    has 4 cardiac stents    Heart attack ([address]     History of mycobacterial infection 6/22/2018    Hyperlipidemia     meds > 15 yrs    Hypertension     meds > 6 yrs / denies TIA or stroke    Hypertrophic scar     Myocardial infarct ([address] 11/2012    Osteoarthritis     all joints    Overweight 12/22/2017    Status post coronary angioplasty     Thyroid disease     meds > 1 month       Past Surgical History:  Past Surgical History:   Procedure Laterality Date    CARPAL TUNNEL RELEASE Bilateral 2015    COLONOSCOPY  3/23/15        CORONARY ANGIOPLASTY WITH STENT PLACEMENT  2012    CYSTOSCOPY  05/17/2017    W/COMPLEX CYSTOMETROGRAM-COMPLEX UROFLOW-TRANSRECTAL US PROSTATE    EYE SURGERY      Lasik OU    JOINT REPLACEMENT Left 2010    LTKR    IA TOTAL KNEE ARTHROPLASTY Right 3/15/2018    RIGHT KNEE TOTAL KNEE  ARTHROPLASTY performed by Aria Lu MD at [address] Right     twice    SHOULDER SURGERY Right      RCR    TONSILLECTOMY      TURP N/A 2017    GREEN LIGHT LASER TRANSURETHRAL RESECTIOIN PROSTATE performed by Leyda Prasad MD at [address] [place] [address] PROSTATE/TRANSRECTAL  06/03/15    Cystoscop, cath rem,  prostate    UVULECTOMY      due to snoring       Social History:  Social History     Tobacco Use    Smoking status: Former Smoker     Packs/day: 1.00     Years: 7.00     Pack years: 7.00     Start date: 1961     Last attempt to quit: 3/17/1967     Years since quittin.1    Smokeless tobacco: Never Used    Tobacco comment: Quit for [employer]   Substance Use Topics    Alcohol use: No     Types: 30 Cans of beer per week     Comment: No alcohol  since     Drug use: No       Vital Signs:   Vitals:    20 1055   BP: (!) 147/70   Pulse: 64   Resp: 18   Temp: 98 °F (36.7 °C)   SpO2: 97%        Physical Exam:  Cardiac:  [x]WNL []Comments:  Pulmonary:  [x]WNL   []Comments:   Neuro/Mental Status:  [x]WNL  []Comments:  Abdominal:  [x]WNL    []Comments:  Other:   []WNL  []Comments:    Informed Consent:  The risks and benefits of the procedure have been discussed with either the patient or if they cannot consent, their representative. Assessment:  Patient examined and appropriate for planned sedation and procedure. Plan:  Proceed with planned sedation and procedure as above. The patient was counseled at length about risks of reggie COVID-19 in the perioperative and any recovery window from the procedure. The patient was made aware that reggie COVID-19 may worsen their prognosis for recovery from their procedure and lend to a higher morbidity and-all mortality risk. The patient was given the option of postponing the procedure all material risks, benefits, and alternatives were discussed.   The patient does wish to proceed with the procedure at this time.     Silvia Ahn MD  11:39 AM

## 2020-05-18 ENCOUNTER — HOSPITAL ENCOUNTER (OUTPATIENT)
Dept: MRI IMAGING | Age: 75
Discharge: HOME | End: 2020-05-20
Payer: MEDICARE

## 2020-05-18 PROCEDURE — 73221 MRI JOINT UPR EXTREM W/O DYE: CPT

## 2020-05-20 RX ORDER — LEVOTHYROXINE SODIUM 0.05 MG/1
TABLET ORAL
Qty: 90 TABLET | Refills: 0 | Status: SHIPPED | OUTPATIENT
Start: 2020-05-20 | End: 2020-11-11

## 2020-05-20 RX ORDER — TAMSULOSIN HYDROCHLORIDE 0.4 MG/1
CAPSULE ORAL
Qty: 180 CAPSULE | Refills: 0 | Status: SHIPPED | OUTPATIENT
Start: 2020-05-20 | End: 2020-11-11

## 2020-05-20 RX ORDER — CARVEDILOL 12.5 MG/1
TABLET ORAL
Qty: 180 TABLET | Refills: 0 | Status: SHIPPED | OUTPATIENT
Start: 2020-05-20 | End: 2020-11-11

## 2020-05-27 ENCOUNTER — OFFICE VISIT (OUTPATIENT)
Dept: UROLOGY | Age: 75
End: 2020-05-27
Payer: MEDICARE

## 2020-05-27 VITALS
HEART RATE: 84 BPM | DIASTOLIC BLOOD PRESSURE: 64 MMHG | HEIGHT: 68 IN | WEIGHT: 184 LBS | BODY MASS INDEX: 27.89 KG/M2 | SYSTOLIC BLOOD PRESSURE: 122 MMHG

## 2020-05-27 PROCEDURE — 99213 OFFICE O/P EST LOW 20 MIN: CPT | Performed by: UROLOGY

## 2020-05-27 PROCEDURE — 51798 US URINE CAPACITY MEASURE: CPT | Performed by: UROLOGY

## 2020-05-27 ASSESSMENT — ENCOUNTER SYMPTOMS
ABDOMINAL PAIN: 0
ABDOMINAL DISTENTION: 0

## 2020-05-27 NOTE — PROGRESS NOTES
No Known Problems Sister     No Known Problems Brother     No Known Problems Sister     No Known Problems Brother     No Known Problems Son      Current Outpatient Medications   Medication Sig Dispense Refill    tamsulosin (FLOMAX) 0.4 MG capsule take 1 capsule by mouth twice a day 180 capsule 0    carvedilol (COREG) 12.5 MG tablet take 1 tablet by mouth twice a day with food 180 tablet 0    levothyroxine (SYNTHROID) 50 MCG tablet take 1 tablet by mouth once daily 90 tablet 0    Multiple Vitamins-Minerals (MULTI COMPLETE PO) Take by mouth      finasteride (PROSCAR) 5 MG tablet take 1 tablet by mouth once daily 90 tablet 3    hydroCHLOROthiazide (HYDRODIURIL) 25 MG tablet take 1 tablet by mouth once daily 90 tablet 3    diclofenac (VOLTAREN) 75 MG EC tablet take 1 tablet by mouth twice a day if needed for pain 180 tablet 2    atorvastatin (LIPITOR) 40 MG tablet take 1 tablet by mouth once daily 90 tablet 3    aspirin 81 MG EC tablet Take 1 tablet by mouth 2 times daily (Patient taking differently: Take 81 mg by mouth daily ) 60 tablet 0    nitroGLYCERIN (NITROSTAT) 0.4 MG SL tablet Place 1 tablet under the tongue every 5 minutes as needed for Chest pain Dissolve 1 tab under tongue at first sign of chest pain every 5 minutes as needed. If pain persists after taking 3 tabs in a 15-minute period, or the pain is different than is typically experienced, call 9-1-1 immediately. 25 tablet 1     Current Facility-Administered Medications   Medication Dose Route Frequency Provider Last Rate Last Dose    methylPREDNISolone acetate (DEPO-MEDROL) injection 80 mg  80 mg Intramuscular Once NAOMY Victoria CNP         Ace inhibitors and Lisinopril  reviewed    Review of Systems   Constitutional: Negative for fever. Gastrointestinal: Negative for abdominal distention and abdominal pain. Genitourinary: Negative for dysuria, flank pain and hematuria.        Objective:   Physical Exam  Abdominal:      Palpations: Abdomen is soft. Tenderness: There is no abdominal tenderness. Neurological:      Mental Status: He is alert. Psychiatric:         Mood and Affect: Mood normal.         Behavior: Behavior normal.           PSA   Date Value Ref Range Status   06/04/2019 0.51 ng/mL Final   06/02/2019 0.51 0.00 - 6.22 ng/mL Final     Comment:     When the Total PSA is between 3.00 and 10.00 ng/mL, consider  requesting a Free PSA to aid in diagnosis. 05/17/2019 0.23 0.00 - 6.22 ng/mL Final   06/04/2018 0.31 0.00 - 6.22 ng/mL Final   05/12/2017 0.21 0.00 - 6.22 ng/mL Final     Comment:     When the Total PSA is between 3.00 and 10.00 ng/mL, consider  requesting a Free PSA to aid in diagnosis. post void residual by U/S: 41 cc (voided prior to the visit)    Assessment: This is a 71 yo male with h/o HTN, OA, CAD/MI/ASA (Dr Kishan Colbert), and BPH with LUTs on Flomax BID and Proscar s/p Laser TURP on 7/17/17. He has stable voiding and last performed CIC about 1 yr ago and had some difficulty. He is emptying the bladder by PVR and will continue with present management. I discussed no further PSA testing given his age and normal PSA's and he agrees. He understands the purpose if for cancer screening. Plan:      1.  F/U 1 yr for Joce Lino MD

## 2020-06-11 ENCOUNTER — OFFICE VISIT (OUTPATIENT)
Dept: FAMILY MEDICINE CLINIC | Age: 75
End: 2020-06-11
Payer: MEDICARE

## 2020-06-11 VITALS
WEIGHT: 193 LBS | HEART RATE: 56 BPM | DIASTOLIC BLOOD PRESSURE: 72 MMHG | TEMPERATURE: 97.7 F | BODY MASS INDEX: 29.25 KG/M2 | HEIGHT: 68 IN | SYSTOLIC BLOOD PRESSURE: 128 MMHG | OXYGEN SATURATION: 95 %

## 2020-06-11 PROCEDURE — G0439 PPPS, SUBSEQ VISIT: HCPCS | Performed by: FAMILY MEDICINE

## 2020-06-11 ASSESSMENT — LIFESTYLE VARIABLES: HOW OFTEN DO YOU HAVE A DRINK CONTAINING ALCOHOL: 0

## 2020-06-11 ASSESSMENT — PATIENT HEALTH QUESTIONNAIRE - PHQ9
SUM OF ALL RESPONSES TO PHQ QUESTIONS 1-9: 0
SUM OF ALL RESPONSES TO PHQ QUESTIONS 1-9: 0

## 2020-06-11 NOTE — PROGRESS NOTES
COLONOSCOPY N/A 5/11/2020    COLORECTAL CANCER SCREENING, HIGH RISK performed by Craole Hooper MD at [facility]  2012    CYSTOSCOPY  05/17/2017    W/COMPLEX CYSTOMETROGRAM-COMPLEX UROFLOW-TRANSRECTAL US PROSTATE    EYE SURGERY      Lasik OU    JOINT REPLACEMENT Left 2010    LTKR    AK TOTAL KNEE ARTHROPLASTY Right 3/15/2018    RIGHT KNEE TOTAL KNEE  ARTHROPLASTY performed by Camillia Goldmann, MD at [facility] [address] Right     twice    SHOULDER SURGERY Right      RCR    TONSILLECTOMY      TURP N/A 7/17/2017    GREEN LIGHT LASER TRANSURETHRAL RESECTIOIN PROSTATE performed by Ermelinda Espinosa MD at [address] [place] [address] PROSTATE/TRANSRECTAL  06/03/15    Cystoscop, cath rem, US prostate   [address]    due to snoring         Family History   Problem Relation Age of Onset    Kidney Disease Father     Other Mother         Perforated intestine    No Known Problems Sister     No Known Problems Brother     No Known Problems Sister     No Known Problems Brother     No Known Problems Son        CareTeam (Including outside providers/suppliers regularly involved in providing care):   Patient Care Team:  Bishop Powers MD as PCP - General (Family Medicine)  Bishop Powers MD as PCP - [facility] Empaneled Provider  Ermelinda Espinosa MD (Urology)  Joseph Moreau DO as Consulting Physician (Cardiology)    Wt Readings from Last 3 Encounters:   06/11/20 193 lb (87.5 kg)   05/27/20 184 lb (83.5 kg)   05/11/20 178 lb (80.7 kg)     Vitals:    06/11/20 1039   BP: 128/72   Site: Right Upper Arm   Pulse: 56   Temp: 97.7 °F (36.5 °C)   SpO2: 95%   Weight: 193 lb (87.5 kg)   Height: 5' 8\" (1.727 m)     Body mass index is 29.35 kg/m². Based upon direct observation of the patient, evaluation of cognition reveals recent and remote memory intact.     Physical Exam:  /72 (Site: Right Upper Arm)   Pulse 56   Temp 97.7 °F (36.5 °C)   Ht 5' 8\" (1.727 m) Wt 193 lb (87.5 kg)   SpO2 95%   BMI 29.35 kg/m²     Gen: Well, NAD, Alert, Oriented x 3   HEENT: EOMI, eyes clear, MMM  Skin: without rash or jaundice  Neck: no significant lymphadenopathy or thyromegaly  Lungs: CTA B w/out Rales/Wheezes/Rhonchi, Good respiratory effort   Heart: RRR, S1S2, w/out M/R/G, non-displaced PMI   Abdomen: Soft NT/ND, w/out R/G, w/ +BSx4   Ext: No C/C/E Bilaterally. Neuro: Neurovascularly intact w/ Sensory/Motor intact UE/LE Bilaterally. Walks with limp on left heel      Patient's complete Health Risk Assessment and screening values have been reviewed and are found in Flowsheets. The following problems were reviewed today and where indicated follow up appointments were made and/or referrals ordered. Positive Risk Factor Screenings with Interventions:     General Health:  General  In general, how would you say your health is?: Fair  In the past 7 days, have you experienced any of the following?  New or Increased Pain, New or Increased Fatigue, Loneliness, Social Isolation, Stress or Anger?: (!) New or Increased Fatigue, New or Increased Pain  Do you get the social and emotional support that you need?: Yes  Do you have a Living Will?: Yes  General Health Risk Interventions:  · Leg pain, shoulder pain  · Leg cramps at night  · Needs to f/u with ortho/podiatry    Hearing/Vision:  No exam data present  Hearing/Vision  Do you or your family notice any trouble with your hearing?: (!) Yes  Do you have difficulty driving, watching TV, or doing any of your daily activities because of your eyesight?: No  Have you had an eye exam within the past year?: Yes  Hearing/Vision Interventions:  · hearing aids    Safety:  Safety  Do you have working smoke detectors?: Yes  Have all throw rugs been removed or fastened?: (!) No  Do you have non-slip mats or surfaces in all bathtubs/showers?: (!) No  Do all of your stairways have a railing or banister?: Yes  Are your doorways, halls and stairs free of

## 2020-06-11 NOTE — PATIENT INSTRUCTIONS
Personalized Preventive Plan for Nelson Hallman - 6/11/2020  Medicare offers a range of preventive health benefits. Some of the tests and screenings are paid in full while other may be subject to a deductible, co-insurance, and/or copay. Some of these benefits include a comprehensive review of your medical history including lifestyle, illnesses that may run in your family, and various assessments and screenings as appropriate. After reviewing your medical record and screening and assessments performed today your provider may have ordered immunizations, labs, imaging, and/or referrals for you. A list of these orders (if applicable) as well as your Preventive Care list are included within your After Visit Summary for your review. Other Preventive Recommendations:    · A preventive eye exam performed by an eye specialist is recommended every 1-2 years to screen for glaucoma; cataracts, macular degeneration, and other eye disorders. · A preventive dental visit is recommended every 6 months. · Try to get at least 150 minutes of exercise per week or 10,000 steps per day on a pedometer . · Order or download the FREE \"Exercise & Physical Activity: Your Everyday Guide\" from [employer]. Call 6-900.883.4994 or search [employer] online. · You need 5373-7987 mg of calcium and 7903-6410 IU of vitamin D per day. It is possible to meet your calcium requirement with diet alone, but a vitamin D supplement is usually necessary to meet this goal.  · When exposed to the sun, use a sunscreen that protects against both UVA and UVB radiation with an SPF of 30 or greater. Reapply every 2 to 3 hours or after sweating, drying off with a towel, or swimming. · Always wear a seat belt when traveling in a car. Always wear a helmet when riding a bicycle or motorcycle.

## 2020-06-12 DIAGNOSIS — R53.83 FATIGUE, UNSPECIFIED TYPE: ICD-10-CM

## 2020-06-12 DIAGNOSIS — E03.9 HYPOTHYROIDISM, UNSPECIFIED TYPE: ICD-10-CM

## 2020-06-12 DIAGNOSIS — Z12.5 SCREENING PSA (PROSTATE SPECIFIC ANTIGEN): ICD-10-CM

## 2020-06-12 DIAGNOSIS — I10 ESSENTIAL HYPERTENSION: ICD-10-CM

## 2020-06-12 LAB
ALBUMIN SERPL-MCNC: 4 G/DL (ref 3.5–4.6)
ALP BLD-CCNC: 53 U/L (ref 35–104)
ALT SERPL-CCNC: 26 U/L (ref 0–41)
ANION GAP SERPL CALCULATED.3IONS-SCNC: 11 MEQ/L (ref 9–15)
AST SERPL-CCNC: 22 U/L (ref 0–40)
BILIRUB SERPL-MCNC: 0.4 MG/DL (ref 0.2–0.7)
BUN BLDV-MCNC: 26 MG/DL (ref 8–23)
CALCIUM SERPL-MCNC: 9.4 MG/DL (ref 8.5–9.9)
CHLORIDE BLD-SCNC: 100 MEQ/L (ref 95–107)
CHOLESTEROL, TOTAL: 167 MG/DL (ref 0–199)
CO2: 27 MEQ/L (ref 20–31)
CREAT SERPL-MCNC: 1.16 MG/DL (ref 0.7–1.2)
GFR AFRICAN AMERICAN: >60
GFR NON-AFRICAN AMERICAN: >60
GLOBULIN: 2.7 G/DL (ref 2.3–3.5)
GLUCOSE BLD-MCNC: 79 MG/DL (ref 70–99)
HCT VFR BLD CALC: 43.6 % (ref 42–52)
HDLC SERPL-MCNC: 49 MG/DL (ref 40–59)
HEMOGLOBIN: 14.5 G/DL (ref 14–18)
LDL CHOLESTEROL CALCULATED: 98 MG/DL (ref 0–129)
MCH RBC QN AUTO: 30.1 PG (ref 27–31.3)
MCHC RBC AUTO-ENTMCNC: 33.3 % (ref 33–37)
MCV RBC AUTO: 90.4 FL (ref 80–100)
PDW BLD-RTO: 14 % (ref 11.5–14.5)
PLATELET # BLD: 181 K/UL (ref 130–400)
POTASSIUM SERPL-SCNC: 4.3 MEQ/L (ref 3.4–4.9)
RBC # BLD: 4.82 M/UL (ref 4.7–6.1)
SODIUM BLD-SCNC: 138 MEQ/L (ref 135–144)
TOTAL PROTEIN: 6.7 G/DL (ref 6.3–8)
TRIGL SERPL-MCNC: 99 MG/DL (ref 0–150)
TSH SERPL DL<=0.05 MIU/L-ACNC: 3.68 UIU/ML (ref 0.44–3.86)
WBC # BLD: 5.7 K/UL (ref 4.8–10.8)

## 2020-06-13 LAB
PROSTATE SPECIFIC ANTIGEN: 0.18 NG/ML (ref 0–6.22)
TESTOSTERONE TOTAL-MALE: 623 NG/DL (ref 300–720)

## 2020-06-18 NOTE — PATIENT INSTRUCTIONS
For new scars, use mederma or vitamin E capsules broken open to massage scar daily for one month. Do not get a sun burn on a new scar. Clean surgical area with antibacterial soap and water once daily. Keep surgical site moist with vaseline or polysporin and apply a fresh bandage daily for 14 days. Follow up immediately if any redness surrounds the surgical site or if drainage occurs at surgical site. After 14 day no more bandage needed. The use of sunscreen with an SPF of 30 or higher was discussed and recommended. If you have a history of precancers or skin cancer, you should be seen AT LEAST once yearly for a skin check or sooner if recommended by your doctor. You should follow up immediately for any new or changing lesions. Instructions after treatment with liquid nitrogen. Clean the area(s) once daily with antibacterial soap and water. Keep the area(s) moist with vaseline or polysporin. If the area(s) blister, it is best to leave them alone. However, you may take a sterilzed needle and pop the blister or if the blister pops on its own, apply polysporin and cover with a bandage. Change the bandage daily. Attending Only

## 2020-06-26 ENCOUNTER — OFFICE VISIT (OUTPATIENT)
Dept: FAMILY MEDICINE CLINIC | Age: 75
End: 2020-06-26
Payer: MEDICARE

## 2020-06-26 VITALS — BODY MASS INDEX: 29.16 KG/M2 | WEIGHT: 192.4 LBS | HEIGHT: 68 IN

## 2020-06-26 PROCEDURE — 93000 ELECTROCARDIOGRAM COMPLETE: CPT | Performed by: FAMILY MEDICINE

## 2020-06-26 PROCEDURE — 99213 OFFICE O/P EST LOW 20 MIN: CPT | Performed by: FAMILY MEDICINE

## 2020-07-10 ENCOUNTER — HOSPITAL ENCOUNTER (OUTPATIENT)
Age: 75
Setting detail: OUTPATIENT SURGERY
Discharge: HOME | End: 2020-07-10
Attending: PODIATRIST | Admitting: PODIATRIST
Payer: MEDICARE

## 2020-07-10 ENCOUNTER — ANESTHESIA EVENT (OUTPATIENT)
Dept: OPERATING ROOM | Age: 75
End: 2020-07-10
Payer: MEDICARE

## 2020-07-10 ENCOUNTER — ANESTHESIA (OUTPATIENT)
Dept: OPERATING ROOM | Age: 75
End: 2020-07-10
Payer: MEDICARE

## 2020-07-10 VITALS
HEART RATE: 61 BPM | TEMPERATURE: 97.4 F | RESPIRATION RATE: 14 BRPM | SYSTOLIC BLOOD PRESSURE: 157 MMHG | OXYGEN SATURATION: 94 % | WEIGHT: 185 LBS | DIASTOLIC BLOOD PRESSURE: 70 MMHG | BODY MASS INDEX: 28.04 KG/M2 | HEIGHT: 68 IN

## 2020-07-10 VITALS — DIASTOLIC BLOOD PRESSURE: 76 MMHG | OXYGEN SATURATION: 100 % | TEMPERATURE: 96.8 F | SYSTOLIC BLOOD PRESSURE: 153 MMHG

## 2020-07-10 PROCEDURE — 2580000003 HC RX 258: Performed by: ANESTHESIOLOGY

## 2020-07-10 PROCEDURE — 2709999900 HC NON-CHARGEABLE SUPPLY: Performed by: PODIATRIST

## 2020-07-10 PROCEDURE — 76942 ECHO GUIDE FOR BIOPSY: CPT | Performed by: NURSE ANESTHETIST, CERTIFIED REGISTERED

## 2020-07-10 PROCEDURE — 6360000002 HC RX W HCPCS: Performed by: NURSE ANESTHETIST, CERTIFIED REGISTERED

## 2020-07-10 PROCEDURE — 64445 NJX AA&/STRD SCIATIC NRV IMG: CPT | Performed by: NURSE ANESTHETIST, CERTIFIED REGISTERED

## 2020-07-10 PROCEDURE — 3700000000 HC ANESTHESIA ATTENDED CARE: Performed by: PODIATRIST

## 2020-07-10 PROCEDURE — 7100000001 HC PACU RECOVERY - ADDTL 15 MIN: Performed by: PODIATRIST

## 2020-07-10 PROCEDURE — 2580000003 HC RX 258

## 2020-07-10 PROCEDURE — 3600000013 HC SURGERY LEVEL 3 ADDTL 15MIN: Performed by: PODIATRIST

## 2020-07-10 PROCEDURE — 2500000003 HC RX 250 WO HCPCS: Performed by: ANESTHESIOLOGY

## 2020-07-10 PROCEDURE — C1713 ANCHOR/SCREW BN/BN,TIS/BN: HCPCS | Performed by: PODIATRIST

## 2020-07-10 PROCEDURE — 3600000003 HC SURGERY LEVEL 3 BASE: Performed by: PODIATRIST

## 2020-07-10 PROCEDURE — 3700000001 HC ADD 15 MINUTES (ANESTHESIA): Performed by: PODIATRIST

## 2020-07-10 PROCEDURE — 6360000002 HC RX W HCPCS: Performed by: STUDENT IN AN ORGANIZED HEALTH CARE EDUCATION/TRAINING PROGRAM

## 2020-07-10 PROCEDURE — 6360000002 HC RX W HCPCS: Performed by: ANESTHESIOLOGY

## 2020-07-10 PROCEDURE — 7100000010 HC PHASE II RECOVERY - FIRST 15 MIN: Performed by: PODIATRIST

## 2020-07-10 PROCEDURE — 7100000000 HC PACU RECOVERY - FIRST 15 MIN: Performed by: PODIATRIST

## 2020-07-10 PROCEDURE — 2500000003 HC RX 250 WO HCPCS: Performed by: NURSE ANESTHETIST, CERTIFIED REGISTERED

## 2020-07-10 PROCEDURE — 7100000011 HC PHASE II RECOVERY - ADDTL 15 MIN: Performed by: PODIATRIST

## 2020-07-10 PROCEDURE — 2580000003 HC RX 258: Performed by: PODIATRIST

## 2020-07-10 DEVICE — SYSTEM IMPL TEND 4.75MM SWIVELOCK BAN SUT LASSO W/ NIT WIRE: Type: IMPLANTABLE DEVICE | Site: ACHILLES TENDON | Status: FUNCTIONAL

## 2020-07-10 DEVICE — SYSTEM IMPL INCL NDL 1.3MM WHT WHT/BLUE WHT/BLACK: Type: IMPLANTABLE DEVICE | Site: ACHILLES TENDON | Status: FUNCTIONAL

## 2020-07-10 RX ORDER — SODIUM CHLORIDE 0.9 % (FLUSH) 0.9 %
10 SYRINGE (ML) INJECTION EVERY 12 HOURS SCHEDULED
Status: DISCONTINUED | OUTPATIENT
Start: 2020-07-10 | End: 2020-07-10 | Stop reason: HOSPADM

## 2020-07-10 RX ORDER — HYDROCODONE BITARTRATE AND ACETAMINOPHEN 5; 325 MG/1; MG/1
2 TABLET ORAL PRN
Status: DISCONTINUED | OUTPATIENT
Start: 2020-07-10 | End: 2020-07-10 | Stop reason: HOSPADM

## 2020-07-10 RX ORDER — PROPOFOL 10 MG/ML
INJECTION, EMULSION INTRAVENOUS PRN
Status: DISCONTINUED | OUTPATIENT
Start: 2020-07-10 | End: 2020-07-10 | Stop reason: SDUPTHER

## 2020-07-10 RX ORDER — DEXAMETHASONE SODIUM PHOSPHATE 4 MG/ML
INJECTION, SOLUTION INTRA-ARTICULAR; INTRALESIONAL; INTRAMUSCULAR; INTRAVENOUS; SOFT TISSUE PRN
Status: DISCONTINUED | OUTPATIENT
Start: 2020-07-10 | End: 2020-07-10 | Stop reason: SDUPTHER

## 2020-07-10 RX ORDER — MEPERIDINE HYDROCHLORIDE 25 MG/ML
12.5 INJECTION INTRAMUSCULAR; INTRAVENOUS; SUBCUTANEOUS EVERY 5 MIN PRN
Status: DISCONTINUED | OUTPATIENT
Start: 2020-07-10 | End: 2020-07-10 | Stop reason: HOSPADM

## 2020-07-10 RX ORDER — LIDOCAINE HYDROCHLORIDE AND EPINEPHRINE BITARTRATE 20; .01 MG/ML; MG/ML
INJECTION, SOLUTION SUBCUTANEOUS PRN
Status: DISCONTINUED | OUTPATIENT
Start: 2020-07-10 | End: 2020-07-10 | Stop reason: SDUPTHER

## 2020-07-10 RX ORDER — MAGNESIUM OXIDE 400 MG/1
400 TABLET ORAL DAILY
COMMUNITY

## 2020-07-10 RX ORDER — DIPHENHYDRAMINE HYDROCHLORIDE 50 MG/ML
12.5 INJECTION INTRAMUSCULAR; INTRAVENOUS
Status: DISCONTINUED | OUTPATIENT
Start: 2020-07-10 | End: 2020-07-10 | Stop reason: HOSPADM

## 2020-07-10 RX ORDER — ONDANSETRON 2 MG/ML
4 INJECTION INTRAMUSCULAR; INTRAVENOUS
Status: DISCONTINUED | OUTPATIENT
Start: 2020-07-10 | End: 2020-07-10 | Stop reason: HOSPADM

## 2020-07-10 RX ORDER — SODIUM CHLORIDE, SODIUM LACTATE, POTASSIUM CHLORIDE, CALCIUM CHLORIDE 600; 310; 30; 20 MG/100ML; MG/100ML; MG/100ML; MG/100ML
INJECTION, SOLUTION INTRAVENOUS
Status: COMPLETED
Start: 2020-07-10 | End: 2020-07-10

## 2020-07-10 RX ORDER — LIDOCAINE HYDROCHLORIDE 20 MG/ML
INJECTION, SOLUTION INTRAVENOUS PRN
Status: DISCONTINUED | OUTPATIENT
Start: 2020-07-10 | End: 2020-07-10 | Stop reason: SDUPTHER

## 2020-07-10 RX ORDER — ONDANSETRON 2 MG/ML
INJECTION INTRAMUSCULAR; INTRAVENOUS PRN
Status: DISCONTINUED | OUTPATIENT
Start: 2020-07-10 | End: 2020-07-10 | Stop reason: SDUPTHER

## 2020-07-10 RX ORDER — METOCLOPRAMIDE HYDROCHLORIDE 5 MG/ML
10 INJECTION INTRAMUSCULAR; INTRAVENOUS
Status: DISCONTINUED | OUTPATIENT
Start: 2020-07-10 | End: 2020-07-10 | Stop reason: HOSPADM

## 2020-07-10 RX ORDER — CEFAZOLIN SODIUM 2 G/50ML
2 SOLUTION INTRAVENOUS
Status: COMPLETED | OUTPATIENT
Start: 2020-07-10 | End: 2020-07-10

## 2020-07-10 RX ORDER — MAGNESIUM HYDROXIDE 1200 MG/15ML
LIQUID ORAL CONTINUOUS PRN
Status: COMPLETED | OUTPATIENT
Start: 2020-07-10 | End: 2020-07-10

## 2020-07-10 RX ORDER — ROPIVACAINE HYDROCHLORIDE 5 MG/ML
INJECTION, SOLUTION EPIDURAL; INFILTRATION; PERINEURAL PRN
Status: DISCONTINUED | OUTPATIENT
Start: 2020-07-10 | End: 2020-07-10 | Stop reason: SDUPTHER

## 2020-07-10 RX ORDER — OXYCODONE HYDROCHLORIDE AND ACETAMINOPHEN 5; 325 MG/1; MG/1
1 TABLET ORAL EVERY 6 HOURS PRN
Qty: 20 TABLET | Refills: 0 | Status: SHIPPED | OUTPATIENT
Start: 2020-07-10 | End: 2020-07-15

## 2020-07-10 RX ORDER — FENTANYL CITRATE 50 UG/ML
25 INJECTION, SOLUTION INTRAMUSCULAR; INTRAVENOUS EVERY 10 MIN PRN
Status: DISCONTINUED | OUTPATIENT
Start: 2020-07-10 | End: 2020-07-10 | Stop reason: HOSPADM

## 2020-07-10 RX ORDER — SODIUM CHLORIDE, SODIUM LACTATE, POTASSIUM CHLORIDE, CALCIUM CHLORIDE 600; 310; 30; 20 MG/100ML; MG/100ML; MG/100ML; MG/100ML
INJECTION, SOLUTION INTRAVENOUS CONTINUOUS
Status: DISCONTINUED | OUTPATIENT
Start: 2020-07-10 | End: 2020-07-10 | Stop reason: HOSPADM

## 2020-07-10 RX ORDER — HYDROCODONE BITARTRATE AND ACETAMINOPHEN 5; 325 MG/1; MG/1
1 TABLET ORAL PRN
Status: DISCONTINUED | OUTPATIENT
Start: 2020-07-10 | End: 2020-07-10 | Stop reason: HOSPADM

## 2020-07-10 RX ORDER — ROCURONIUM BROMIDE 10 MG/ML
INJECTION, SOLUTION INTRAVENOUS PRN
Status: DISCONTINUED | OUTPATIENT
Start: 2020-07-10 | End: 2020-07-10 | Stop reason: SDUPTHER

## 2020-07-10 RX ADMIN — PROPOFOL 130 MG: 10 INJECTION, EMULSION INTRAVENOUS at 14:55

## 2020-07-10 RX ADMIN — SODIUM CHLORIDE, POTASSIUM CHLORIDE, SODIUM LACTATE AND CALCIUM CHLORIDE: 600; 310; 30; 20 INJECTION, SOLUTION INTRAVENOUS at 16:15

## 2020-07-10 RX ADMIN — CEFAZOLIN SODIUM 2 G: 2 SOLUTION INTRAVENOUS at 15:06

## 2020-07-10 RX ADMIN — ROPIVACAINE HYDROCHLORIDE 30 ML: 5 INJECTION, SOLUTION EPIDURAL; INFILTRATION; PERINEURAL at 14:05

## 2020-07-10 RX ADMIN — LIDOCAINE HYDROCHLORIDE,EPINEPHRINE BITARTRATE 15 ML: 20; .01 INJECTION, SOLUTION INFILTRATION; PERINEURAL at 14:05

## 2020-07-10 RX ADMIN — SODIUM CHLORIDE, POTASSIUM CHLORIDE, SODIUM LACTATE AND CALCIUM CHLORIDE 1000 ML: 600; 310; 30; 20 INJECTION, SOLUTION INTRAVENOUS at 13:13

## 2020-07-10 RX ADMIN — LIDOCAINE HYDROCHLORIDE 40 MG: 20 INJECTION, SOLUTION INTRAVENOUS at 14:55

## 2020-07-10 RX ADMIN — ONDANSETRON 4 MG: 2 INJECTION INTRAMUSCULAR; INTRAVENOUS at 16:17

## 2020-07-10 RX ADMIN — SODIUM CHLORIDE, POTASSIUM CHLORIDE, SODIUM LACTATE AND CALCIUM CHLORIDE: 600; 310; 30; 20 INJECTION, SOLUTION INTRAVENOUS at 14:51

## 2020-07-10 RX ADMIN — SUGAMMADEX 200 MG: 100 INJECTION, SOLUTION INTRAVENOUS at 16:42

## 2020-07-10 RX ADMIN — DEXAMETHASONE SODIUM PHOSPHATE 4 MG: 4 INJECTION, SOLUTION INTRA-ARTICULAR; INTRALESIONAL; INTRAMUSCULAR; INTRAVENOUS; SOFT TISSUE at 14:05

## 2020-07-10 RX ADMIN — ROCURONIUM BROMIDE 40 MG: 10 INJECTION INTRAVENOUS at 14:55

## 2020-07-10 ASSESSMENT — PULMONARY FUNCTION TESTS
PIF_VALUE: 16
PIF_VALUE: 15
PIF_VALUE: 15
PIF_VALUE: 10
PIF_VALUE: 15
PIF_VALUE: 16
PIF_VALUE: 3
PIF_VALUE: 15
PIF_VALUE: 16
PIF_VALUE: 14
PIF_VALUE: 15
PIF_VALUE: 16
PIF_VALUE: 16
PIF_VALUE: 15
PIF_VALUE: 15
PIF_VALUE: 17
PIF_VALUE: 16
PIF_VALUE: 15
PIF_VALUE: 10
PIF_VALUE: 15
PIF_VALUE: 12
PIF_VALUE: 15
PIF_VALUE: 4
PIF_VALUE: 16
PIF_VALUE: 15
PIF_VALUE: 16
PIF_VALUE: 15
PIF_VALUE: 15
PIF_VALUE: 1
PIF_VALUE: 15
PIF_VALUE: 16
PIF_VALUE: 15
PIF_VALUE: 27
PIF_VALUE: 10
PIF_VALUE: 16
PIF_VALUE: 1
PIF_VALUE: 3
PIF_VALUE: 15
PIF_VALUE: 16
PIF_VALUE: 10
PIF_VALUE: 15
PIF_VALUE: 15
PIF_VALUE: 16
PIF_VALUE: 15
PIF_VALUE: 17
PIF_VALUE: 13
PIF_VALUE: 16
PIF_VALUE: 16
PIF_VALUE: 2
PIF_VALUE: 1
PIF_VALUE: 15
PIF_VALUE: 17
PIF_VALUE: 15
PIF_VALUE: 12
PIF_VALUE: 2
PIF_VALUE: 16
PIF_VALUE: 15
PIF_VALUE: 15
PIF_VALUE: 3
PIF_VALUE: 15
PIF_VALUE: 16
PIF_VALUE: 10
PIF_VALUE: 16
PIF_VALUE: 15
PIF_VALUE: 10
PIF_VALUE: 10
PIF_VALUE: 15
PIF_VALUE: 6
PIF_VALUE: 15
PIF_VALUE: 10
PIF_VALUE: 16
PIF_VALUE: 16
PIF_VALUE: 15
PIF_VALUE: 15
PIF_VALUE: 10
PIF_VALUE: 15
PIF_VALUE: 1
PIF_VALUE: 15
PIF_VALUE: 15
PIF_VALUE: 2
PIF_VALUE: 15
PIF_VALUE: 13
PIF_VALUE: 15
PIF_VALUE: 15
PIF_VALUE: 2
PIF_VALUE: 15
PIF_VALUE: 16
PIF_VALUE: 18
PIF_VALUE: 15

## 2020-07-10 ASSESSMENT — PAIN - FUNCTIONAL ASSESSMENT: PAIN_FUNCTIONAL_ASSESSMENT: 0-10

## 2020-07-10 NOTE — BRIEF OP NOTE
Brief Postoperative Note      Patient: Leonila Cabrera  YOB: 1945  MRN: [id number]    Date of Procedure: 7/10/2020    Pre-Op Diagnosis: RUPTURE OF LEFT ACHILLES TENDON    Post-Op Diagnosis: Same       Procedure(s):  LEFT REPAIR OF RUPTURED ACHILLES TENDON,    Surgeon(s):  Santiago Perry DPM    Assistant:  * No surgical staff found *    Anesthesia: General    Estimated Blood Loss (mL): Minimal    Complications: None    Specimens:   * No specimens in log *    Implants:  Implant Name Type Inv.  Item Serial No.  Lot No. LRB No. Used Action   IMPL SYSTEM SUTURE PARS W/TAPE Fastener IMPL SYSTEM SUTURE PARS W/TAPE  [employer] [id number] Left 1 Implanted   IMPL ACHILLES MID SPEEDBRIDGE Fastener IMPL ACHILLES MID SPEEDBRIDGE  [employer]ikes [id number] Left 1 Implanted         Drains: * No LDAs found *    Findings: See op note    Electronically signed by Leola Hammans, DPM on 7/10/2020 at 4:48 PM     Santiago Perry DPM

## 2020-07-10 NOTE — ANESTHESIA PROCEDURE NOTES
Peripheral Block    Patient location during procedure: pre-op  Start time: 7/10/2020 2:00 PM  End time: 7/10/2020 2:04 PM  Staffing  Anesthesiologist: Brett Ocampo MD  Performed: anesthesiologist   Preanesthetic Checklist  Completed: patient identified, site marked, surgical consent, pre-op evaluation, timeout performed, IV checked, risks and benefits discussed, monitors and equipment checked, anesthesia consent given, oxygen available and patient being monitored  Peripheral Block  Patient position: supine  Prep: ChloraPrep  Patient monitoring: cardiac monitor, continuous pulse ox, frequent blood pressure checks and IV access  Block type: Saphenous  Laterality: left  Injection technique: single-shot  Procedures: ultrasound guided  Local infiltration: ropivacaine, lidocaine and decadron  Infiltration strength: 0.5 %  Dose: 10 mL  Provider prep: mask and sterile gloves (Sterile probe cover)  Local infiltration: ropivacaine, lidocaine and decadron  Needle  Needle type: combined needle/nerve stimulator   Needle gauge: 22 G  Needle length: 5 cm  Needle localization: anatomical landmarks and ultrasound guidance  Assessment  Injection assessment: negative aspiration for heme, no paresthesia on injection and local visualized surrounding nerve on ultrasound  Paresthesia pain: immediately resolved  Slow fractionated injection: yes  Hemodynamics: stable  Additional Notes  Ultrasound image printed and saved in patient chart.     Sterile probe cover used    Reason for block: post-op pain management and at surgeon's request

## 2020-07-10 NOTE — PROGRESS NOTES
Disch instr were reviewed with patient and his wife. Both verbalized understanding. Taking coffee & snack very well. Pain free, pleasant, conversant.

## 2020-07-10 NOTE — ANESTHESIA PROCEDURE NOTES
Peripheral Block    Patient location during procedure: pre-op  Staffing  Anesthesiologist: Matias Sanchez MD  Performed: anesthesiologist   Preanesthetic Checklist  Completed: patient identified, site marked, surgical consent, pre-op evaluation, timeout performed, IV checked, risks and benefits discussed, monitors and equipment checked, anesthesia consent given, oxygen available and patient being monitored  Peripheral Block  Patient position: supine  Prep: ChloraPrep  Patient monitoring: cardiac monitor, continuous pulse ox, frequent blood pressure checks and IV access  Block type: Sciatic  Laterality: left  Injection technique: single-shot  Procedures: ultrasound guided  Local infiltration: ropivacaine, lidocaine and decadron  Infiltration strength: 0.5 %  Dose: 20 mL  Popliteal  Provider prep: mask and sterile gloves (Sterile probe cover)  Local infiltration: ropivacaine, lidocaine and decadron  Needle  Needle type: combined needle/nerve stimulator   Needle gauge: 21 G  Needle length: 10 cm  Needle localization: anatomical landmarks and ultrasound guidance  Assessment  Injection assessment: negative aspiration for heme, no paresthesia on injection and local visualized surrounding nerve on ultrasound  Paresthesia pain: immediately resolved  Slow fractionated injection: yes  Hemodynamics: stable  Additional Notes  Ultrasound image printed and saved in patient chart.     Sterile probe cover used    Reason for block: post-op pain management and at surgeon's request

## 2020-07-10 NOTE — OP NOTE
Operative Note      Patient: Sara Wadsworth  YOB: 1945  MRN: [id number]    Date of Procedure: 7/10/2020    Pre-Op Diagnosis: RUPTURE OF LEFT ACHILLES TENDON    Post-Op Diagnosis: Same       Procedure(s):  1. REPAIR OF RUPTURED ACHILLES TENDON, left using the [employer] PARS system  2. Application of anterior splint    Surgeon(s):  Kennedy Luke DPM    Assistant:   Dudley Valerio PGY 3    Anesthesia: General + Pop block    OPERATIVE INDICATIONS: This is a very pleasant 77year old male with history of a chronic Achilles tendon rupture sustained while using the treadmill. It was significant gapping along the left Achilles tendon distally. Patient had significant gait deficit with pain. Preoperatively, all risks, benefits, and alternative meds relative to the surgical procedure were discussed at length with the patient. All questions were answered. No guarantees were given. Patient requested to proceed with proposed procedure. OPERATIVE FINDINGS: Consistent with pre-op diagnosis. OPERATIVE PROCEDURE: Patient was transferred from the preoperative holding area to the operative suite and placed on the table in a prone position. All monitors were   applied. A popliteal block was administered to the left leg by anesthesia in the preoperative area. After induction of general anesthesia, the patient was moved to the   table and placed in a prone position. Prophylaxis was obtained with Ancef 2 g IV   piggyback pre-operatively. Tourniquet was applied to the left thigh per standard technique. The left leg, foot, and ankle were then prepped and draped in   the normal sterile fashion. After appropriate time out, the tourniquet was inflated to 325 mmHg. Attention was then directed to the posterior left leg, where a 4 cm   transverse incision was performed about 1 cm proximal to the distal end of the proximal stump of the Achilles rupture.  Dissection was carried deep to the level of the subcutaneous tissue. Care was taken to preserve any neurovascular structures. Superficial venous bleeders were meticulously retracted and electrocauterized as necessary. Dissection was carried deep to the level of paratenon. After incision was made through the paratenon, the rupture site of the Achilles tendon   was noted. It was evaluated and able to be re-approximated with plantarflexion of the   foot. At this time, the PARS jig for the Achilles repair system from [employer] was placed within the paratenon of the proximal stump of the Achilles tendon. Fiberwire sutures were then passed through the jig system per standard technique. The jig was then removed and sutures brought through the incision site under the paratenon. Next, standard technique was followed to pass the locking stitch on each side of the tendon. Sutures were then organized and clamped while attention was directed to the distal stump. Next, attention was directed to the posterior aspect of the calcaneus where two small   incisions were made central to the heel near the level of the achilles tendon insertion   site. The incisions were about 0.5 cm in length linear and parallel to one another and   approximately 1.5 cm apart. Dissection was bluntly carried down to bone. Utilizing micro suture lassos, the fiber wire sutures from the proximal stump of the achilles tendon were passed through the distal stump of the tendon and out through the posterior incision sites per standard technique. Next, the [employer] suture bridge tendon repair technique was utilized to secure the fiber wire sutures to the posterior calcaneus. Two 4.75-mm [employer] push lock screws were placed in the posterior calcaneus per standard technique after appropriate drilling and tapping with the foot in a plantar-flexed position repairing the ruptured tendon. Excellent repair was achieved with physiological tension noted to the Achilles tendon with normal Del Valle test noted.  Tourniquet was released. Excellent hemostasis was achieved. The surgical site was then flushed with copious amounts of normal saline. Additional 4-0 Vicryl sutures were used to close deep tissue. The paratenon overlying the Achilles tendon was carefully closed using 4-O Vicryl suture. Subcutaneous tissue was closed with 4-0 Vicryl suture in simple interrupted technique. The skin was closed with 4-0 Nylon in a horizontal mattress fashion. Dr. Ajay Baum was present and scrubbed for the entire case. Elements of the case which included but were not limited to incision, dissection, preparation of site, implant, and closure were performed by resident Dr. Dudley Valerio under direct supervision. All critical elements of this case were performed by Dr. Kings Belchere were dressed with adaptic, fluff dressings, Kerlix roll, modified Noe dressing and anterior splint which was secured with an Ace wrap in a plantarflexed position. Patient tolerated the anesthesia and procedure very well, and was transferred to the PACU with all vital signs stable and brisk capillary refill time noted in   all toes. Patient's intraoperative and postoperative disposition was discussed with the   family in the postoperative consultation area. POSITION: Prone. ESTIMATED BLOOD LOSS: Minimal.    MATERIALS: Fiberwire delivered through the Achilles PARS Jig, 2-0, 4-0 Vicryl, 4-0   Nylon. Implants:  Implant Name Type Inv. Item Serial No.  Lot No. LRB No. Used Action   IMPL SYSTEM SUTURE PARS W/TAPE Fastener IMPL SYSTEM SUTURE PARS W/TAPE  [employer] [id number] Left 1 Implanted   IMPL ACHILLES MID SPEEDBRIDGE Fastener IMPL ACHILLES MID SPEEDBRIDGE  [place] [id number] Left 1 Implanted        PATHOLOGY: None. COMPLICATIONS: None. CONDITION: Satisfactory.      Electronically signed by Dudley Valerio DPM on 7/10/2020 at 6:18 PM     Kennedy Luke DPM

## 2020-07-10 NOTE — ANESTHESIA PRE PROCEDURE
Department of Anesthesiology  Preprocedure Note       Name:  Martha Jewell   Age:  77 y.o.  :  1945                                          MRN:  [id number]         Date:  7/10/2020      Surgeon: Tari Siegel):  Roland GEORGIA Plunkett    Procedure: Procedure(s):   LEFT REPAIR OF RUPTURED ACHILLES TENDON, ARTHREX, POPLITEAL AND SAPHENOUS BLOCK (Left )    Medications prior to admission:   Prior to Admission medications    Medication Sig Start Date End Date Taking? Authorizing Provider   magnesium oxide (MAG-OX) 400 MG tablet Take 400 mg by mouth daily    Historical Provider, MD   Handicap Placard MISC by Does not apply route Exp 5 years 20   Render MD Osvaldo   tamsulosin [facility]) 0.4 MG capsule take 1 capsule by mouth twice a day 20   Render MD Osvaldo   carvedilol (COREG) 12.5 MG tablet take 1 tablet by mouth twice a day with food 20   Render MD Osvaldo   levothyroxine (SYNTHROID) 50 MCG tablet take 1 tablet by mouth once daily 20   Render MD Osvaldo   Multiple Vitamins-Minerals (MULTI COMPLETE PO) Take by mouth    Historical Provider, MD   finasteride (PROSCAR) 5 MG tablet take 1 tablet by mouth once daily 20   Render MD Osvaldo   hydroCHLOROthiazide (HYDRODIURIL) 25 MG tablet take 1 tablet by mouth once daily 20   Render MD Osvaldo   diclofenac (VOLTAREN) 75 MG EC tablet take 1 tablet by mouth twice a day if needed for pain 2/3/20   Render MD Osvaldo   atorvastatin (LIPITOR) 40 MG tablet take 1 tablet by mouth once daily 1/3/20   Dylan NORTH Holiday, DO   aspirin 81 MG EC tablet Take 1 tablet by mouth 2 times daily  Patient taking differently: Take 81 mg by mouth daily  3/16/18   Katina Ordonezo, APRN - CNP   nitroGLYCERIN (NITROSTAT) 0.4 MG SL tablet Place 1 tablet under the tongue every 5 minutes as needed for Chest pain Dissolve 1 tab under tongue at first sign of chest pain every 5 minutes as needed.  If pain persists after taking 3 tabs in a 15-minute period, or the pain is different than is typically experienced, call 9-1-1 immediately. 9/15/16   Nolan Marsh MD       Current medications:    No current facility-administered medications for this visit. No current outpatient medications on file. Facility-Administered Medications Ordered in Other Visits   Medication Dose Route Frequency Provider Last Rate Last Dose    sodium chloride flush 0.9 % injection 10 mL  10 mL Intravenous 2 times per day Michaelle Sandhu, DPM        ceFAZolin (ANCEF) 2 g in dextrose 3 % 50 mL IVPB (duplex)  2 g Intravenous On Call to OR Michaelle Fu DPM        lactated ringers infusion   Intravenous Continuous Jt Lazcano  mL/hr at 07/10/20 1313 1,000 mL at 07/10/20 1313       Allergies:     Allergies   Allergen Reactions    Ace Inhibitors Other (See Comments)     Cough      Lisinopril Other (See Comments)     Cough         Problem List:    Patient Active Problem List   Diagnosis Code    Hyperlipidemia E78.5    Hypertension I10    Osteoarthritis M19.90    STEMI (ST elevation myocardial infarction) ([facility] [address] I21.3    CAD (coronary artery disease) I25.10    Actinic keratosis L57.0    BPH with obstruction/lower urinary tract symptoms N40.1, N13.8    Urinary retention R33.9    Basal cell carcinoma, trunk C44.519    Overweight E66.3    Status post LASIK surgery Z98.890    Osteoarthritis of right knee M17.11    Infection of toe L08.9    Hypertrophic scar L91.0    Status post total right knee replacement Z96.651    ARB intolerance Z78.9    History of mycobacterial infection Z86.19    Generalized abdominal pain R10.84    Senile nuclear sclerosis H25.10    Regular astigmatism H52.229    Presbyopia H52.4    Acute pain of left shoulder M25.512    Rotator cuff tendinitis, left M75.82    Osteoarthritis of left shoulder M19.012    Biceps strain, left, initial encounter Q64.196F    History of colon polyps Z86.010       Past Medical History:        Diagnosis Date    Actinic keratosis     Basal cell carcinoma, trunk 2017    left upper back    Basal cell carcinoma, trunk     BPH (benign prostatic hyperplasia)     CAD (coronary artery disease) 2012    has 4 cardiac stents    Heart attack ([facility] [address]     History of mycobacterial infection 2018    Hyperlipidemia     meds > 15 yrs    Hypertension     meds > 6 yrs / denies TIA or stroke    Hypertrophic scar     Myocardial infarct ([facility] [address] 2012    Osteoarthritis     all joints    Overweight 2017    Status post coronary angioplasty     Thyroid disease     meds > 1 month       Past Surgical History:        Procedure Laterality Date    CARPAL TUNNEL RELEASE Bilateral     COLONOSCOPY  3/23/15        COLONOSCOPY N/A 2020    COLORECTAL CANCER SCREENING, HIGH RISK performed by Fay Lan MD at [facility]      CYSTOSCOPY  2017    W/COMPLEX CYSTOMETROGRAM-COMPLEX UROFLOW-TRANSRECTAL US PROSTATE    EYE SURGERY      Lasik OU    JOINT REPLACEMENT Left     LTKR    [place] TOTAL KNEE ARTHROPLASTY Right 3/15/2018    RIGHT KNEE TOTAL KNEE  ARTHROPLASTY performed by Rivas Workman MD at [place] [address] Right     twice    SHOULDER SURGERY Right      RCR    TONSILLECTOMY      TURP N/A 2017    GREEN LIGHT LASER TRANSURETHRAL RESECTIOIN PROSTATE performed by Laura Dunham MD at [address]  06/03/15    Cystoscop, cath rem, US prostate    UVULECTOMY      due to snoring       Social History:    Social History     Tobacco Use    Smoking status: Former Smoker     Packs/day: 1.00     Years: 7.00     Pack years: 7.00     Start date: 1961     Last attempt to quit: 3/17/1967     Years since quittin.3    Smokeless tobacco: Never Used    Tobacco comment: Quit for [employer]   Substance Use Topics    Alcohol use: No     Types: 30 Cans of beer per week     Comment: No alcohol  since February, 1987                                Counseling given: Not Answered  Comment: Quit for [id number]iving      Vital Signs (Current): There were no vitals filed for this visit. BP Readings from Last 3 Encounters:   07/10/20 120/70   06/11/20 128/72   05/27/20 122/64       NPO Status:                                                                                 BMI:   Wt Readings from Last 3 Encounters:   07/10/20 185 lb (83.9 kg)   06/26/20 192 lb 6.4 oz (87.3 kg)   06/11/20 193 lb (87.5 kg)     There is no height or weight on file to calculate BMI.    CBC:   Lab Results   Component Value Date    WBC 5.7 06/12/2020    RBC 4.82 06/12/2020    HGB 14.5 06/12/2020    HCT 43.6 06/12/2020    MCV 90.4 06/12/2020    RDW 14.0 06/12/2020     06/12/2020       CMP:   Lab Results   Component Value Date     06/12/2020    K 4.3 06/12/2020    K 3.9 03/16/2018     06/12/2020    CO2 27 06/12/2020    BUN 26 06/12/2020    CREATININE 1.16 06/12/2020    GFRAA >60.0 06/12/2020    LABGLOM >60.0 06/12/2020    GLUCOSE 79 06/12/2020    PROT 6.7 06/12/2020    CALCIUM 9.4 06/12/2020    BILITOT 0.4 06/12/2020    ALKPHOS 53 06/12/2020    AST 22 06/12/2020    ALT 26 06/12/2020       POC Tests: No results for input(s): POCGLU, POCNA, POCK, POCCL, POCBUN, POCHEMO, POCHCT in the last 72 hours.     Coags:   Lab Results   Component Value Date    PROTIME 10.8 07/10/2017    INR 1.0 07/10/2017       HCG (If Applicable): No results found for: PREGTESTUR, PREGSERUM, HCG, HCGQUANT     ABGs: No results found for: PHART, PO2ART, QEB0PEC, FOA2OMR, BEART, X6EISHIW     Type & Screen (If Applicable):  No results found for: LABABO, [address]    Anesthesia Evaluation  Patient summary reviewed and Nursing notes reviewed no history of anesthetic complications:   Airway: Mallampati: II  TM distance: >3 FB   Neck ROM: full  Mouth opening: > = 3 FB Dental: normal exam

## 2020-07-10 NOTE — H&P
[place]  1945  [id number]  77 y.o. male    H&P Update:     No interval changes from PAT H&P noted from 6/26/20  by Katy Gee MD. Plan to proceed with surgery as discussed and consented.      Cynthia Sandhu  07/10/20  12:44 PM    Kaylen Hogan DPM

## 2020-07-10 NOTE — ANESTHESIA POSTPROCEDURE EVALUATION
Department of Anesthesiology  Postprocedure Note    Patient: Denia Jordan  MRN: [id number]  YOB: 1945  Date of evaluation: 7/10/2020  Time:  4:51 PM     Procedure Summary     Date:  07/10/20 Room / Location:  [address]    Anesthesia Start:  4992 Anesthesia Stop:  0946    Procedure:  LEFT REPAIR OF RUPTURED ACHILLES TENDON, (Left ) Diagnosis:  (RUPTURE OF LEFT ACHILLES TENDON)    Surgeon:  Nicolle Sullivan DPM Responsible Provider:  NAOMY Dubose CRNA    Anesthesia Type:  general ASA Status:  3          Anesthesia Type: general    Geovanni Phase I: Geovanni Score: 10    Geovanni Phase II:      Last vitals: Reviewed and per EMR flowsheets.        Anesthesia Post Evaluation    Patient location during evaluation: PACU  Patient participation: complete - patient participated  Level of consciousness: awake  Pain score: 0  Airway patency: patent  Nausea & Vomiting: no nausea and no vomiting  Complications: no  Cardiovascular status: hemodynamically stable  Respiratory status: acceptable  Hydration status: euvolemic

## 2020-08-11 RX ORDER — NITROGLYCERIN 0.4 MG/1
0.4 TABLET SUBLINGUAL EVERY 5 MIN PRN
Qty: 25 TABLET | Refills: 1 | Status: SHIPPED | OUTPATIENT
Start: 2020-08-11 | End: 2020-08-14 | Stop reason: SDUPTHER

## 2020-08-14 RX ORDER — NITROGLYCERIN 0.4 MG/1
0.4 TABLET SUBLINGUAL EVERY 5 MIN PRN
Qty: 25 TABLET | Refills: 1 | Status: SHIPPED | OUTPATIENT
Start: 2020-08-14 | End: 2022-04-19 | Stop reason: SDUPTHER

## 2020-10-22 ENCOUNTER — OFFICE VISIT (OUTPATIENT)
Dept: FAMILY MEDICINE CLINIC | Age: 75
End: 2020-10-22
Payer: MEDICARE

## 2020-10-22 VITALS
TEMPERATURE: 98.4 F | WEIGHT: 191.8 LBS | HEIGHT: 68 IN | HEART RATE: 69 BPM | SYSTOLIC BLOOD PRESSURE: 112 MMHG | DIASTOLIC BLOOD PRESSURE: 60 MMHG | OXYGEN SATURATION: 96 % | BODY MASS INDEX: 29.07 KG/M2

## 2020-10-22 PROCEDURE — 99213 OFFICE O/P EST LOW 20 MIN: CPT | Performed by: NURSE PRACTITIONER

## 2020-10-22 RX ORDER — BACITRACIN 500 [USP'U]/G
OINTMENT OPHTHALMIC 3 TIMES DAILY
Qty: 1 TUBE | Refills: 0 | Status: SHIPPED | OUTPATIENT
Start: 2020-10-22 | End: 2020-10-29

## 2020-10-22 ASSESSMENT — ENCOUNTER SYMPTOMS
RHINORRHEA: 0
SHORTNESS OF BREATH: 0
EYE REDNESS: 0
COUGH: 0
WHEEZING: 0
EYE ITCHING: 1
EYE PAIN: 1
SORE THROAT: 0

## 2020-10-22 NOTE — PROGRESS NOTES
Subjective:      Patient ID: Marleni Valenzuela is a 77 y.o. male who presents today for:  Chief Complaint   Patient presents with    Eye Problem     Right eye redness and itchy occasionally. Noticed a film over his eye one morning. Possiblle stye or pimple in the inner bottom lid. For one week.         HPI      Right eye  For a weak now   Sometimes it itches, sometimes it hurts, sometimes feel a bump  Its red on the skin and not getting better   Couple times a little bit of drainage but not consistently   Was using antibacterial eye drops twice a day   Helped but hasn't gone away       Past Medical History:   Diagnosis Date    Actinic keratosis     Basal cell carcinoma, trunk 12/2017    left upper back    Basal cell carcinoma, trunk     BPH (benign prostatic hyperplasia)     CAD (coronary artery disease) 2012    has 4 cardiac stents    Heart attack ([facility] [address]     History of mycobacterial infection 6/22/2018    Hyperlipidemia     meds > 15 yrs    Hypertension     meds > 6 yrs / denies TIA or stroke    Hypertrophic scar     Myocardial infarct ([facility] [address] 11/2012    Osteoarthritis     all joints    Overweight 12/22/2017    Status post coronary angioplasty     Thyroid disease     meds > 1 month     Past Surgical History:   Procedure Laterality Date    ACHILLES TENDON SURGERY Left 7/10/2020    LEFT REPAIR OF RUPTURED ACHILLES TENDON, performed by Joey Rivera DPM at [address] [place] [address] Bilateral 2015    COLONOSCOPY  3/23/15        COLONOSCOPY N/A 5/11/2020    COLORECTAL CANCER SCREENING, HIGH RISK performed by Krissy Stinson MD at [address] [place] [address]  2012    CYSTOSCOPY  05/17/2017    W/COMPLEX CYSTOMETROGRAM-COMPLEX UROFLOW-TRANSRECTAL US PROSTATE    EYE SURGERY      Lasik OU    JOINT REPLACEMENT Left 2010    LTKR    [place] TOTAL KNEE ARTHROPLASTY Right 3/15/2018    RIGHT KNEE TOTAL KNEE  ARTHROPLASTY performed by Paulino Ocampo MD at [facility]    ROTATOR CUFF REPAIR Right     twice    SHOULDER SURGERY Right      RCR    TONSILLECTOMY      TURP N/A 2017    GREEN LIGHT LASER TRANSURETHRAL RESECTIOIN PROSTATE performed by Mt Castelan MD at [address]  06/03/15    Cystoscop, cath rem,  prostate    UVULECTOMY  1998    due to snoring     Social History     Socioeconomic History    Marital status:      Spouse name: Not on file    Number of children: Not on file    Years of education: Not on file    Highest education level: Not on file   Occupational History    Not on file   Social Needs    Financial resource strain: Not on file    Food insecurity     Worry: Not on file     Inability: Not on file    Transportation needs     Medical: Not on file     Non-medical: Not on file   Tobacco Use    Smoking status: Former Smoker     Packs/day: 1.00     Years: 7.00     Pack years: 7.00     Start date: 1961     Last attempt to quit: 3/17/1967     Years since quittin.6    Smokeless tobacco: Never Used    Tobacco comment: Quit for [employer]   Substance and Sexual Activity    Alcohol use: No     Types: 30 Cans of beer per week     Comment: No alcohol  since     Drug use: No    Sexual activity: Not on file   Lifestyle    Physical activity     Days per week: Not on file     Minutes per session: Not on file    Stress: Not on file   Relationships    Social connections     Talks on phone: Not on file     Gets together: Not on file     Attends [employer] service: Not on file     Active member of club or organization: Not on file     Attends meetings of clubs or organizations: Not on file     Relationship status: Not on file    Intimate partner violence     Fear of current or ex partner: Not on file     Emotionally abused: Not on file     Physically abused: Not on file     Forced sexual activity: Not on file   Other Topics Concern    Not on file   Social History Narrative    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     Family History   Problem Relation Age of Onset    Kidney Disease Father     Other Mother         Perforated intestine    No Known Problems Sister     No Known Problems Brother     No Known Problems Sister     No Known Problems Brother     No Known Problems Son      Allergies   Allergen Reactions    Ace Inhibitors Other (See Comments)     Cough      Lisinopril Other (See Comments)     Cough       Current Outpatient Medications   Medication Sig Dispense Refill    bacitracin 500 UNIT/GM ophthalmic ointment Place into the left eye 3 times daily for 7 days 1 Tube 0    nitroGLYCERIN (NITROSTAT) 0.4 MG SL tablet Place 1 tablet under the tongue every 5 minutes as needed for Chest pain Dissolve 1 tab under tongue at first sign of chest pain every 5 minutes as needed. If pain persists after taking 3 tabs in a 15-minute period, or the pain is different than is typically experienced, call 9-1-1 immediately.  25 tablet 1    magnesium oxide (MAG-OX) 400 MG tablet Take 400 mg by mouth daily      Handicap Placard MISC by Does not apply route Exp 5 years 1 each 0    tamsulosin (FLOMAX) 0.4 MG capsule take 1 capsule by mouth twice a day 180 capsule 0    carvedilol (COREG) 12.5 MG tablet take 1 tablet by mouth twice a day with food 180 tablet 0    levothyroxine (SYNTHROID) 50 MCG tablet take 1 tablet by mouth once daily 90 tablet 0    Multiple Vitamins-Minerals (MULTI COMPLETE PO) Take by mouth      finasteride (PROSCAR) 5 MG tablet take 1 tablet by mouth once daily 90 tablet 3    hydroCHLOROthiazide (HYDRODIURIL) 25 MG tablet take 1 tablet by mouth once daily 90 tablet 3    diclofenac (VOLTAREN) 75 MG EC tablet take 1 tablet by mouth twice a day if needed for pain 180 tablet 2    atorvastatin (LIPITOR) 40 MG tablet take 1 tablet by mouth once daily 90 tablet 3    aspirin 81 MG EC tablet Take 1 tablet by mouth 2 times daily (Patient taking differently: Take 81 mg by mouth daily ) 60 tablet 0     Current Facility-Administered Medications   Medication Dose Route Frequency Provider Last Rate Last Dose    methylPREDNISolone acetate (DEPO-MEDROL) injection 80 mg  80 mg Intramuscular Once NAOMY Victoria - MYKE              Review of Systems   Constitutional: Negative for chills, fatigue and fever. HENT: Negative for congestion, ear pain, rhinorrhea and sore throat. Eyes: Positive for pain and itching. Negative for redness and visual disturbance. Respiratory: Negative for cough, shortness of breath and wheezing. Skin: Negative for rash. Neurological: Negative for dizziness, light-headedness and headaches. Psychiatric/Behavioral: Negative for agitation, confusion and decreased concentration. Objective:   /60 (Site: Right Upper Arm, Position: Sitting, Cuff Size: Large Adult)   Pulse 69   Temp 98.4 °F (36.9 °C) (Oral)   Ht 5' 8\" (1.727 m)   Wt 191 lb 12.8 oz (87 kg)   SpO2 96%   BMI 29.16 kg/m²     Physical Exam  Vitals signs reviewed. Constitutional:       Appearance: Normal appearance. HENT:      Head: Normocephalic and atraumatic. Nose: Nose normal.      Mouth/Throat:      Lips: Pink. Mouth: Mucous membranes are moist.   Eyes:      General: Lids are normal.         Right eye: Hordeolum present. No discharge. Conjunctiva/sclera: Conjunctivae normal.      Right eye: Right conjunctiva is not injected. No exudate or hemorrhage. Left eye: Left conjunctiva is not injected. No exudate or hemorrhage. Neck:      Musculoskeletal: Normal range of motion. Cardiovascular:      Rate and Rhythm: Normal rate. Pulmonary:      Effort: Pulmonary effort is normal.   Abdominal:      Tenderness: There is no guarding. Skin:     General: Skin is warm and dry. Neurological:      General: No focal deficit present. Mental Status: He is alert and oriented to person, place, and time.    Psychiatric:         Mood and Affect: Mood normal.         Behavior: Behavior normal. Behavior is cooperative. Assessment:       Diagnosis Orders   1. Hordeolum externum of right lower eyelid  bacitracin 500 UNIT/GM ophthalmic ointment     No results found for this visit on 10/22/20. Plan:   Encouraged warm compresses to the eye often throughout the day   If not better in a few days he is to see his eye doctor  Leatha Concha was seen today for eye problem. Diagnoses and all orders for this visit:    Hordeolum externum of right lower eyelid  -     bacitracin 500 UNIT/GM ophthalmic ointment; Place into the left eye 3 times daily for 7 days      No orders of the defined types were placed in this encounter. Orders Placed This Encounter   Medications    bacitracin 500 UNIT/GM ophthalmic ointment     Sig: Place into the left eye 3 times daily for 7 days     Dispense:  1 Tube     Refill:  0     There are no discontinued medications. Return if symptoms worsen or fail to improve. Reviewed with the patient/family: current clinical status & medications. Side effects of the medication prescribed today, as well as treatment plan/rationale and result expectations have been discussed with the patient/family who expresses understanding. Patient will be discharged home in stable condition. Follow up with PCP to evaluate treatment results or return if symptoms worsen or fail to improve. Discussed signs and symptoms which require immediate follow-up in ED/call to 911. Understanding verbalized. I have reviewed the patient's medical history in detail and updated the computerized patient record.     NAOMY De La Paz - CNP

## 2020-10-22 NOTE — PATIENT INSTRUCTIONS
Patient Education        Styes and Chalazia: Care Instructions  Your Care Instructions     Styes and chalazia (say \"yon-TUM-afg-uh\") are both conditions that can cause swelling of the eyelid. A stye is an infection in the root of an eyelash. The infection causes a tender red lump on the edge of the eyelid. The infection can spread until the whole eyelid becomes red and inflamed. Styes usually break open, and a tiny amount of pus drains. They usually clear up on their own in about a week, but they sometimes need treatment with antibiotics. A chalazion is a lump or cyst in the eyelid (chalazion is singular; chalazia is plural). It is caused by swelling and inflammation of deep oil glands inside the eyelid. Chalazia are usually not infected. They can take a few months to heal.  If a chalazion becomes more swollen and painful or does not go away, you may need to have it drained by your doctor. Follow-up care is a key part of your treatment and safety. Be sure to make and go to all appointments, and call your doctor if you are having problems. It's also a good idea to know your test results and keep a list of the medicines you take. How can you care for yourself at home? · Do not rub your eyes. Do not squeeze or try to open a stye or chalazion. · To help a stye or chalazion heal faster:  ? Put a warm, moist compress on your eye for 5 to 10 minutes, 3 to 6 times a day. Heat often brings a stye to a point where it drains on its own. Keep in mind that warm compresses will often increase swelling a little at first.  ? Do not use hot water or heat a wet cloth in a microwave oven. The compress may get too hot and can burn the eyelid. · Always wash your hands before and after you use a compress or touch your eyes. · If the doctor gave you antibiotic drops or ointment, use the medicine exactly as directed. Use the medicine for as long as instructed, even if your eye starts to feel better.   · To put in eyedrops or ointment:  ? Tilt your head back, and pull your lower eyelid down with one finger. ? Drop or squirt the medicine inside the lower lid. ? Close your eye for 30 to 60 seconds to let the drops or ointment move around. ? Do not touch the ointment or dropper tip to your eyelashes or any other surface. · Do not wear eye makeup or contact lenses until the stye or chalazion heals. · Do not share towels, pillows, or washcloths while you have a stye. When should you call for help? Call your doctor now or seek immediate medical care if:    · You have pain in your eye.     · You have a change in vision or loss of vision.     · Redness and swelling get much worse. Watch closely for changes in your health, and be sure to contact your doctor if:    · Your stye does not get better in 1 week.     · Your chalazion does not start to get better after several weeks. Where can you learn more? Go to [email or web address]://[employer]. org and sign in to your [email or web address] account. Enter B088 in the [facility] box to learn more about \"Styes and Chalazia: Care Instructions. \"     If you do not have an account, please click on the \"Sign Up Now\" link. Current as of: December 18, 2019               Content Version: 12.6  © 9434-6102 [employer]. Care instructions adapted under license by [facility] ([facility]). If you have questions about a medical condition or this instruction, always ask your healthcare professional. [place] [address] any warranty or liability for your use of this information.

## 2020-11-11 RX ORDER — LEVOTHYROXINE SODIUM 0.05 MG/1
TABLET ORAL
Qty: 90 TABLET | Refills: 1 | Status: SHIPPED | OUTPATIENT
Start: 2020-11-11 | End: 2021-05-03

## 2020-11-11 RX ORDER — TAMSULOSIN HYDROCHLORIDE 0.4 MG/1
CAPSULE ORAL
Qty: 180 CAPSULE | Refills: 1 | Status: SHIPPED | OUTPATIENT
Start: 2020-11-11 | End: 2021-05-03

## 2020-11-11 RX ORDER — CARVEDILOL 12.5 MG/1
TABLET ORAL
Qty: 180 TABLET | Refills: 1 | Status: SHIPPED | OUTPATIENT
Start: 2020-11-11 | End: 2020-11-13

## 2020-11-12 ENCOUNTER — NURSE TRIAGE (OUTPATIENT)
Dept: OTHER | Facility: CLINIC | Age: 75
End: 2020-11-12

## 2020-11-12 ENCOUNTER — TELEPHONE (OUTPATIENT)
Dept: FAMILY MEDICINE CLINIC | Age: 75
End: 2020-11-12

## 2020-11-12 NOTE — TELEPHONE ENCOUNTER
[facility] with red flag complaint. Brief description of triage: Fatigue that has progressively gotten worse of the last 6 months. Triage indicates for patient to be seen in the next 2 weeks    Care advice provided, patient verbalizes understanding; denies any other questions or concerns; instructed to call back for any new or worsening symptoms. Writer provided warm transfer to [facility] for appointment scheduling. Attention Provider: Thank you for allowing me to participate in the care of your patient. The patient was connected to triage in response to information provided to the [facility]. Please do not respond through this encounter as the response is not directed to a shared pool. Reason for Disposition   Weakness is a chronic symptom (recurrent or ongoing AND lasting > 4 weeks)    Answer Assessment - Initial Assessment Questions  1. DESCRIPTION: \"Describe how you are feeling. \"      Pt states feeling of tingling in upper body and at times get lightheaded    2. SEVERITY: \"How bad is it? \"  \"Can you stand and walk? \"    - MILD - Feels weak or tired, but does not interfere with work, school or normal activities    - [facility] to stand and walk; weakness interferes with work, school, or normal activities    - SEVERE - Unable to stand or walk      Pt states able to walk but get very tired easily even with small task    3. ONSET:  \"When did the weakness begin? \"      Pt states symptoms has been going on for 6 months and getting and worse    4. CAUSE: \"What do you think is causing the weakness? \"      Unsure    5. MEDICINES: Chhaya Combs you recently started a new medicine or had a change in the amount of a medicine? \"      Pt states he is not on any new medication    6. OTHER SYMPTOMS: \"Do you have any other symptoms? \" (e.g., chest pain, fever, cough, SOB, vomiting, diarrhea, bleeding, other areas of pain)      Lightheaded and SOB    7.  PREGNANCY: \"Is there any chance you are pregnant? \" \"When was your last menstrual period? \"      No    Protocols used: WEAKNESS (GENERALIZED) AND FATIGUE-ADULT-[place]

## 2020-11-12 NOTE — TELEPHONE ENCOUNTER
Pt c/o feeling extremely weak x several mos, gets winded when he walks or climbs stairs, hx of heart attack. 4 stents in 2012. Also c/o tingling in chest and upper skin today. Transferred to [facility].

## 2020-11-13 ENCOUNTER — OFFICE VISIT (OUTPATIENT)
Dept: FAMILY MEDICINE CLINIC | Age: 75
End: 2020-11-13
Payer: MEDICARE

## 2020-11-13 VITALS
HEART RATE: 62 BPM | DIASTOLIC BLOOD PRESSURE: 52 MMHG | TEMPERATURE: 97 F | WEIGHT: 198 LBS | HEIGHT: 68 IN | OXYGEN SATURATION: 94 % | BODY MASS INDEX: 30.01 KG/M2 | SYSTOLIC BLOOD PRESSURE: 110 MMHG

## 2020-11-13 DIAGNOSIS — R53.83 FATIGUE, UNSPECIFIED TYPE: ICD-10-CM

## 2020-11-13 LAB
HCT VFR BLD CALC: 42.8 % (ref 42–52)
HEMOGLOBIN: 14.2 G/DL (ref 14–18)
MCH RBC QN AUTO: 30 PG (ref 27–31.3)
MCHC RBC AUTO-ENTMCNC: 33.1 % (ref 33–37)
MCV RBC AUTO: 90.6 FL (ref 80–100)
PDW BLD-RTO: 14 % (ref 11.5–14.5)
PLATELET # BLD: 209 K/UL (ref 130–400)
RBC # BLD: 4.72 M/UL (ref 4.7–6.1)
TSH SERPL DL<=0.05 MIU/L-ACNC: 2.25 UIU/ML (ref 0.44–3.86)
WBC # BLD: 6.7 K/UL (ref 4.8–10.8)

## 2020-11-13 PROCEDURE — 99213 OFFICE O/P EST LOW 20 MIN: CPT | Performed by: FAMILY MEDICINE

## 2020-11-13 RX ORDER — CARVEDILOL 12.5 MG/1
6.25 TABLET ORAL 2 TIMES DAILY
Qty: 180 TABLET | Refills: 1 | COMMUNITY
Start: 2020-11-13 | End: 2020-12-11 | Stop reason: DRUGHIGH

## 2020-11-13 NOTE — PROGRESS NOTES
Chief Complaint   Patient presents with    Fatigue     pt stated he has been feeling more fatigued the older he gets and walks short distances and feel winded.         HPI:  Zakia Cabrera is a 77 y.o. male    Increasing fatigue and SERNA  Remote history MI  Hx CAD   statin, b-blocker, asa        Wt Readings from Last 3 Encounters:   11/13/20 198 lb (89.8 kg)   10/22/20 191 lb 12.8 oz (87 kg)   07/10/20 185 lb (83.9 kg)           Past Medical History:   Diagnosis Date    Actinic keratosis     Basal cell carcinoma, trunk 12/2017    left upper back    Basal cell carcinoma, trunk     BPH (benign prostatic hyperplasia)     CAD (coronary artery disease) 2012    has 4 cardiac stents    [facility] [address]     History of mycobacterial infection 6/22/2018    Hyperlipidemia     meds > 15 yrs    Hypertension     meds > 6 yrs / denies TIA or stroke    Hypertrophic scar     Myocardial infarct ([facility] [address] 11/2012    Osteoarthritis     all joints    Overweight 12/22/2017    Status post coronary angioplasty     Thyroid disease     meds > 1 month     Past Surgical History:   Procedure Laterality Date    ACHILLES TENDON SURGERY Left 7/10/2020    LEFT REPAIR OF RUPTURED ACHILLES TENDON, performed by Paige Sebastian DPM at [address] Bilateral 2015    COLONOSCOPY  3/23/15        COLONOSCOPY N/A 5/11/2020    COLORECTAL CANCER SCREENING, HIGH RISK performed by Romulo Carson MD at [address] [place] [address]  2012    CYSTOSCOPY  05/17/2017    W/COMPLEX CYSTOMETROGRAM-COMPLEX UROFLOW-TRANSRECTAL US PROSTATE    EYE SURGERY      Lasik OU    JOINT REPLACEMENT Left 2010    LTKR    [place] TOTAL KNEE ARTHROPLASTY Right 3/15/2018    RIGHT KNEE TOTAL KNEE  ARTHROPLASTY performed by Mary Beth Mojica MD at [place] [address] Right     twice    SHOULDER SURGERY Right      RCR    TONSILLECTOMY      TURP N/A 7/17/2017    GREEN LIGHT LASER TRANSURETHRAL RESECTIOIN PROSTATE performed by Ac Jarrett MD at [address]  06/03/15    Cystoscop, cath rem,  prostate    UVULECTOMY      due to snoring     Family History   Problem Relation Age of Onset    Kidney Disease Father     Other Mother         Perforated intestine    No Known Problems Sister     No Known Problems Brother     No Known Problems Sister     No Known Problems Brother     No Known Problems Son      Social History     Socioeconomic History    Marital status:      Spouse name: None    Number of children: None    Years of education: None    Highest education level: None   Occupational History    None   Social Needs    Financial resource strain: None    Food insecurity     Worry: None     Inability: None    Transportation needs     Medical: None     Non-medical: None   Tobacco Use    Smoking status: Former Smoker     Packs/day: 1.00     Years: 7.00     Pack years: 7.00     Start date: 1961     Last attempt to quit: 3/17/1967     Years since quittin.6    Smokeless tobacco: Never Used    Tobacco comment: Quit for [id number] [employer]   Substance and Sexual Activity    Alcohol use: No     Types: 30 Cans of beer per week     Comment: No alcohol  since     Drug use: No    Sexual activity: None   Lifestyle    Physical activity     Days per week: None     Minutes per session: None    Stress: None   Relationships    Social connections     Talks on phone: None     Gets together: None     Attends Jewish service: None     Active member of club or organization: None     Attends meetings of clubs or organizations: None     Relationship status: None    Intimate partner violence     Fear of current or ex partner: None     Emotionally abused: None     Physically abused: None     Forced sexual activity: None   Other Topics Concern    None   Social History Narrative    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 If pain persists after taking 3 tabs in a 15-minute period, or the pain is different than is typically experienced, call 9-1-1 immediately.  25 tablet 1    magnesium oxide (MAG-OX) 400 MG tablet Take 400 mg by mouth daily      Handicap Placard MISC by Does not apply route Exp 5 years 1 each 0    Multiple Vitamins-Minerals (MULTI COMPLETE PO) Take by mouth      finasteride (PROSCAR) 5 MG tablet take 1 tablet by mouth once daily 90 tablet 3    hydroCHLOROthiazide (HYDRODIURIL) 25 MG tablet take 1 tablet by mouth once daily 90 tablet 3    diclofenac (VOLTAREN) 75 MG EC tablet take 1 tablet by mouth twice a day if needed for pain 180 tablet 2    atorvastatin (LIPITOR) 40 MG tablet take 1 tablet by mouth once daily 90 tablet 3    aspirin 81 MG EC tablet Take 1 tablet by mouth 2 times daily (Patient taking differently: Take 81 mg by mouth daily ) 60 tablet 0     Current Facility-Administered Medications   Medication Dose Route Frequency Provider Last Rate Last Dose    methylPREDNISolone acetate (DEPO-MEDROL) injection 80 mg  80 mg Intramuscular Once NAOMY Victoria - CNP         Allergies   Allergen Reactions    Ace Inhibitors Other (See Comments)     Cough      Lisinopril Other (See Comments)     Cough         Review of Systems:   General ROS: some fatigue   Respiratory ROS: coughing spells, mainly at nighttime  Cardiovascular ROS: +SERNA  Gastrointestinal ROS: no abdominal pain, change in bowel habits, or black or bloody stools  Genito-Urinary ROS: , BPH and ED  Musculoskeletal ROS: some hand joint pain  Neurological ROS: numbness in his hands    In general patient otherwise reports feeling well. Physical Exam:  BP (!) 110/52 (Site: Left Upper Arm, Position: Sitting, Cuff Size: Medium Adult)   Pulse 62   Temp 97 °F (36.1 °C) (Infrared)   Ht 5' 8\" (1.727 m)   Wt 198 lb (89.8 kg)   SpO2 94%   BMI 30.11 kg/m²     Gen: Well, NAD, Alert, Oriented x 3   HEENT: EOMI, eyes clear, MMM  Skin: without rash or jaundice  Neck: no significant lymphadenopathy or thyromegaly  Lungs: CTA B w/out Rales/Wheezes/Rhonchi, Good respiratory effort   Heart: RRR, S1S2, w/out M/R/G, non-displaced PMI   Ext: No C/C/E Bilaterally. A&P   Diagnosis Orders   1. Fatigue, unspecified type  TSH without Reflex    CBC   2. Coronary artery disease involving native coronary artery of native heart without angina pectoris  carvedilol (COREG) 12.5 MG tablet   3. SERNA (dyspnea on exertion)  [address], , Invasive Cardiology, [place]   4.  Essential hypertension  carvedilol (COREG) 12.5 MG tablet       Halve coreg  Cardiology referral     Chronic conditions are stable  Continue current regimen  Follow up with appropriate specialists and here routinely for ongoing monitoring of chronic conditions    Healthy diet and exercise   Risk factor reduction    Follow up with urology    F/u 6 mos      Lew Vigil MD

## 2020-11-15 RX ORDER — DICLOFENAC SODIUM 75 MG/1
TABLET, DELAYED RELEASE ORAL
Qty: 180 TABLET | Refills: 1 | Status: ON HOLD | OUTPATIENT
Start: 2020-11-15 | End: 2020-12-29 | Stop reason: HOSPADM

## 2020-11-24 ENCOUNTER — OFFICE VISIT (OUTPATIENT)
Dept: FAMILY MEDICINE CLINIC | Age: 75
End: 2020-11-24
Payer: MEDICARE

## 2020-11-24 VITALS
HEIGHT: 68 IN | OXYGEN SATURATION: 96 % | TEMPERATURE: 97 F | WEIGHT: 199 LBS | DIASTOLIC BLOOD PRESSURE: 58 MMHG | SYSTOLIC BLOOD PRESSURE: 108 MMHG | HEART RATE: 66 BPM | BODY MASS INDEX: 30.16 KG/M2

## 2020-11-24 PROCEDURE — 99213 OFFICE O/P EST LOW 20 MIN: CPT | Performed by: FAMILY MEDICINE

## 2020-11-24 NOTE — PROGRESS NOTES
Subjective:       Seferino Tomlinson is a 77 y.o. male who presents for new evaluation and treatment of actinic keratosis. New lesions have developed with the following symptoms: increasing thickness. Previous treatment for prior lesions has been cryosurgery, 5-fluorouracil. Past history of skin cancer: basal cell carcinoma. Other skin problems: yes - x6 months. Enlarging the last month. Present for one year. On the face (right cheek).  Pt states he has had cancerous lesions removed in the past.    Past Medical History:   Diagnosis Date    Actinic keratosis     Basal cell carcinoma, trunk 12/2017    left upper back    Basal cell carcinoma, trunk     BPH (benign prostatic hyperplasia)     CAD (coronary artery disease) 2012    has 4 cardiac stents    [facility] ([facility] [address]     History of mycobacterial infection 6/22/2018    Hyperlipidemia     meds > 15 yrs    Hypertension     meds > 6 yrs / denies TIA or stroke    Hypertrophic scar     Myocardial infarct ([facility] [address] 11/2012    Osteoarthritis     all joints    Overweight 12/22/2017    Status post coronary angioplasty     Thyroid disease     meds > 1 month     Patient Active Problem List    Diagnosis Date Noted    History of colon polyps     Acute pain of left shoulder 12/10/2019    Rotator cuff tendinitis, left 12/10/2019    Osteoarthritis of left shoulder 12/10/2019    Biceps strain, left, initial encounter 12/10/2019    Generalized abdominal pain 11/23/2018    History of mycobacterial infection 06/22/2018    ARB intolerance 06/14/2018    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 11/20/2012     Past Surgical History:   Procedure Laterality Date    ACHILLES TENDON SURGERY Left 7/10/2020    LEFT REPAIR OF RUPTURED ACHILLES TENDON, performed by Brenda Vences DPM at [address] Bilateral 2015    COLONOSCOPY  3/23/15        COLONOSCOPY N/A 5/11/2020    COLORECTAL CANCER SCREENING, HIGH RISK performed by Amy Phillip MD at [facility]  2012    CYSTOSCOPY  05/17/2017    W/COMPLEX CYSTOMETROGRAM-COMPLEX UROFLOW-TRANSRECTAL US PROSTATE    EYE SURGERY      Lasik OU    JOINT REPLACEMENT Left 2010    LTKR    [place] TOTAL KNEE ARTHROPLASTY Right 3/15/2018    RIGHT KNEE TOTAL KNEE  ARTHROPLASTY performed by Jen Fernández MD at [place] [address] Right     twice    SHOULDER SURGERY Right      RCR    TONSILLECTOMY      TURP N/A 7/17/2017    GREEN LIGHT LASER TRANSURETHRAL RESECTIOIN PROSTATE performed by Ayana Curiel MD at [address] [place] [address] PROSTATE/TRANSRECTAL  06/03/15    Cystoscop, cath rem, US prostate    UVULECTOMY  1998    due to snoring     Family History   Problem Relation Age of Onset    Kidney Disease Father     Other Mother         Perforated intestine    No Known Problems Sister     No Known Problems Brother     No Known Problems Sister     No Known Problems Brother     No Known Problems Son      Social History     Socioeconomic History    Marital status:      Spouse name: None    Number of children: None    Years of education: None    Highest education level: None   Occupational History    None   Social Needs    Financial resource strain: None    Food insecurity     Worry: None     Inability: None    Transportation needs     Medical: None     Non-medical: None   Tobacco Use    Smoking status: Former Smoker     Packs/day: 1.00     Years: 7.00     Pack years: 7.00     Start date: 1961     Last attempt to quit: 3/17/1967     Years since quittin.7    Smokeless tobacco: Never Used    Tobacco comment: Quit for [employer]   Substance and Sexual Activity    Alcohol use: No     Types: 30 Cans of beer per week     Comment: No alcohol  since     Drug use: No    Sexual activity: None   Lifestyle    Physical activity     Days per week: None     Minutes per session: None    Stress: None   Relationships    Social connections     Talks on phone: None     Gets together: None     Attends Church service: None     Active member of club or organization: None     Attends meetings of clubs or organizations: None     Relationship status: None    Intimate partner violence     Fear of current or ex partner: None     Emotionally abused: None     Physically abused: None     Forced sexual activity: None   Other Topics Concern    None   Social History Narrative    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     Current Outpatient Medications   Medication Sig Dispense Refill    diclofenac (VOLTAREN) 75 MG EC tablet take 1 tablet by mouth twice a day if needed for pain 180 tablet 1    carvedilol (COREG) 12.5 MG tablet Take 0.5 tablets by mouth 2 times daily 180 tablet 1    tamsulosin (FLOMAX) 0.4 MG capsule take 1 capsule by mouth twice a day 180 capsule 1    levothyroxine (SYNTHROID) 50 MCG tablet take 1 tablet by mouth once daily 90 tablet 1    nitroGLYCERIN (NITROSTAT) 0.4 MG SL tablet Place 1 tablet under the tongue every 5 minutes as needed for Chest pain Dissolve 1 tab under tongue at first sign of chest pain every 5 minutes as needed. If pain persists after taking 3 tabs in a 15-minute period, or the pain is different than is typically experienced, call 9-1-1 immediately.  25 tablet 1    magnesium oxide (MAG-OX) 400 MG tablet Take 400 mg by mouth daily      Handicap Placard MISC by Does not apply route Exp 5 years 1 each 0    Multiple Vitamins-Minerals (MULTI COMPLETE PO) Take by mouth      finasteride (PROSCAR) 5 MG tablet take 1 tablet by mouth once daily 90 tablet 3    hydroCHLOROthiazide (HYDRODIURIL) 25 MG tablet take 1 tablet by mouth once daily 90 tablet 3    atorvastatin (LIPITOR) 40 MG tablet take 1 tablet by mouth once daily 90 tablet 3    aspirin 81 MG EC tablet Take 1 tablet by mouth 2 times daily (Patient taking differently: Take 81 mg by mouth daily ) 60 tablet 0     No current facility-administered medications for this visit. Allergies   Allergen Reactions    Ace Inhibitors Other (See Comments)     Cough      Lisinopril Other (See Comments)     Cough         Review of Systems  Pertinent items are noted in HPI. Objective:      Physical Exam   Skin: Raised erythematous scaly circumscribed area with gray/white keratotic scale present on the scalp, face    6mm x 6mm raised rough lesion with background solar damage to the right lateral cheek         Assessment:      Actinic Keratosis of scalp, face    SCC vs irritated SK right lateral cheek     Plan:      1. Cryosurgery explained to the patient and then performed with Liquid Nitrogen via CRY-AC Spray unit to 14 lesions. Post op course explained. 2. Fluoruricil treatment not indicated at this time. 3. Continue sun protective measures and avoidance. 4. Observe closely for skin damage/changes and contact us if worrisome changes occur. 5. Written patient instruction given. 6. Follow up in 6 months.   for FBSE and 2 weeks for shave biospy

## 2020-11-24 NOTE — PATIENT INSTRUCTIONS
Cryosurgery (Liquid Nitrogen Therapy) Aftercare Instructions    Cryosurgery involves using liquid nitrogen (approximately -320 degrees F) to freeze and destroy abnormal skin tissue including, but not limited to, warts, skin tags, seborrheic keratosis, actinic keratosis, and other benign or pre-cancerous growths. Your provider, Dr. Pedro Baptiste, uses liquid nitrogen by spraying the liquid directly on the skin growth or applying it on using a cotton swab. This causes some stinging and burning while the  growth is being frozen and may continue while it thaws. Throbbing may persist on any areas, especially the face or forehead, but this will not last long. (We recommend Tylenol or aspirin  for any persistent pain.)    It is best to leave the procedure site alone. A blister may form in 1-3 days which is desired to allow the damaged tissue (precancerous lesion, wart, or whatever lesion is being removed) to separate from healthy tissue. Please cover the blister with a bandage to prevent blister breakage and dirt exposure. The wound(s) should remain dry while there is a blister. If this is a sweaty location like the foot you may need to change socks multiple times per day. When the blister(s) pop, apply Vaseline (NOT triple antibiotic, like Neosporin) and a bandage to the wound. If blistering does not cause the lesion to separate from healthy skin, gently help separate the lesion from normal skin on day 3-5. You may stop using a Band-Aid once the wound has formed a scab. Continue using Vaseline at least once a day to keep the scab moist.  This will improve wound healing. The scab may appear yellow while moist, this is not a sign of infection. If the wound is infected pus will drain from the site. Please call the office immediately if these symptoms occur. If this treatment was for a large wart you may notice a plug of skin may fall out of the area that was treated.   This is the center of the wart and it is appropriate for it to come out. Healing takes 1-3 weeks, after which the skin may look perfectly normal or slightly lighter in color. Actinic keratosis  Actinic keratosis (AK) is a precancerous lesion and has the possibility of transformation into squamous cell carcinoma. AKs result from long term exposure to UV light. These lesions commonly appear on the face, lips, ears, back of hands, forearms, scalp and neck. They are rough in texture and may come and go. Continue to monitor you skin for any new spots. Avoid being outside during high sun exposure. Wear SPF (30+) if you are going to be outdoors and reapply frequently.           -------------------------------------------------------------------------------------         Actinic Keratosis: Care Instructions  Your Care Instructions  Actinic keratosis is a skin growth caused by sun damage. It can turn into skin cancer, but this isn't common. Actinic keratoses, also called solar keratoses, are small red, brown, or skin-colored scaly patches. They are most common on the face, neck, hands, and forearms. Your doctor can remove these growths by freezing or scraping them off or by putting medicines on them. Follow-up care is a key part of your treatment and safety. Be sure to make and go to all appointments, and call your doctor if you are having problems. It's also a good idea to know your test results and keep a list of the medicines you take. How can you care for yourself at home? · If your doctor told you how to care for the treated area, follow your doctor's instructions. If you did not get instructions, follow this general advice:  ? Wash around the area with clean water 2 times a day. Don't use hydrogen peroxide or alcohol, which can slow healing. ? You may cover the area with a thin layer of petroleum jelly, such as Vaseline, and a nonstick bandage. ? Apply more petroleum jelly and replace the bandage as needed.   To prevent actinic

## 2020-11-24 NOTE — PROGRESS NOTES
Chief Complaint   Patient presents with    Mass     pt c/o a spot on his right side cheek, possibly precancerous. x6 months - 1 years.  Just recently seemed to be getting bigger        HPI:  Dianne Hassan is a 77 y.o. male    History of precancerous lesions  Has lesion on face that is getting bigger    Would like to see derm to have addressed        Wt Readings from Last 3 Encounters:   11/24/20 199 lb (90.3 kg)   11/13/20 198 lb (89.8 kg)   10/22/20 191 lb 12.8 oz (87 kg)           Past Medical History:   Diagnosis Date    Actinic keratosis     Basal cell carcinoma, trunk 12/2017    left upper back    Basal cell carcinoma, trunk     BPH (benign prostatic hyperplasia)     CAD (coronary artery disease) 2012    has 4 cardiac stents    [facility] ([facility]     History of mycobacterial infection 6/22/2018    Hyperlipidemia     meds > 15 yrs    Hypertension     meds > 6 yrs / denies TIA or stroke    Hypertrophic scar     Myocardial infarct ([facility] 11/2012    Osteoarthritis     all joints    Overweight 12/22/2017    Status post coronary angioplasty     Thyroid disease     meds > 1 month     Past Surgical History:   Procedure Laterality Date    ACHILLES TENDON SURGERY Left 7/10/2020    LEFT REPAIR OF RUPTURED ACHILLES TENDON, performed by Can Quinonez DPM at [address] Bilateral 2015    COLONOSCOPY  3/23/15        COLONOSCOPY N/A 5/11/2020    COLORECTAL CANCER SCREENING, HIGH RISK performed by Pascale Cárdenas MD at [address] [place] [address]  2012    CYSTOSCOPY  05/17/2017    W/COMPLEX CYSTOMETROGRAM-COMPLEX UROFLOW-TRANSRECTAL US PROSTATE    EYE SURGERY      Lasik OU    JOINT REPLACEMENT Left 2010    LTKR    [place] TOTAL KNEE ARTHROPLASTY Right 3/15/2018    RIGHT KNEE TOTAL KNEE  ARTHROPLASTY performed by Basil Costello MD at [place] [address] Right     twice    SHOULDER SURGERY Right      RCR    TONSILLECTOMY  TURP N/A 2017    GREEN LIGHT LASER TRANSURETHRAL RESECTIOIN PROSTATE performed by John Sullivan MD at [address]  06/03/15    Cystoscop, cath rem,  prostate    UVULECTOMY      due to snoring     Family History   Problem Relation Age of Onset    Kidney Disease Father     Other Mother         Perforated intestine    No Known Problems Sister     No Known Problems Brother     No Known Problems Sister     No Known Problems Brother     No Known Problems Son      Social History     Socioeconomic History    Marital status:      Spouse name: None    Number of children: None    Years of education: None    Highest education level: None   Occupational History    None   Social Needs    Financial resource strain: None    Food insecurity     Worry: None     Inability: None    Transportation needs     Medical: None     Non-medical: None   Tobacco Use    Smoking status: Former Smoker     Packs/day: 1.00     Years: 7.00     Pack years: 7.00     Start date: 1961     Last attempt to quit: 3/17/1967     Years since quittin.7    Smokeless tobacco: Never Used    Tobacco comment: Q[facility] [id number] [facility]   Substance and Sexual Activity    Alcohol use: No     Types: 30 Cans of beer per week     Comment: No alcohol  since     Drug use: No    Sexual activity: None   Lifestyle    Physical activity     Days per week: None     Minutes per session: None    Stress: None   Relationships    Social connections     Talks on phone: None     Gets together: None     Attends Church service: None     Active member of club or organization: None     Attends meetings of clubs or organizations: None     Relationship status: None    Intimate partner violence     Fear of current or ex partner: None     Emotionally abused: None     Physically abused: None     Forced sexual activity: None   Other Topics Concern    None   Social History Narrative 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     Current Outpatient Medications   Medication Sig Dispense Refill    diclofenac (VOLTAREN) 75 MG EC tablet take 1 tablet by mouth twice a day if needed for pain 180 tablet 1    carvedilol (COREG) 12.5 MG tablet Take 0.5 tablets by mouth 2 times daily 180 tablet 1    tamsulosin (FLOMAX) 0.4 MG capsule take 1 capsule by mouth twice a day 180 capsule 1    levothyroxine (SYNTHROID) 50 MCG tablet take 1 tablet by mouth once daily 90 tablet 1    nitroGLYCERIN (NITROSTAT) 0.4 MG SL tablet Place 1 tablet under the tongue every 5 minutes as needed for Chest pain Dissolve 1 tab under tongue at first sign of chest pain every 5 minutes as needed. If pain persists after taking 3 tabs in a 15-minute period, or the pain is different than is typically experienced, call 9-1-1 immediately. 25 tablet 1    magnesium oxide (MAG-OX) 400 MG tablet Take 400 mg by mouth daily      Handicap Placard MISC by Does not apply route Exp 5 years 1 each 0    Multiple Vitamins-Minerals (MULTI COMPLETE PO) Take by mouth      finasteride (PROSCAR) 5 MG tablet take 1 tablet by mouth once daily 90 tablet 3    hydroCHLOROthiazide (HYDRODIURIL) 25 MG tablet take 1 tablet by mouth once daily 90 tablet 3    atorvastatin (LIPITOR) 40 MG tablet take 1 tablet by mouth once daily 90 tablet 3    aspirin 81 MG EC tablet Take 1 tablet by mouth 2 times daily (Patient taking differently: Take 81 mg by mouth daily ) 60 tablet 0     No current facility-administered medications for this visit.       Allergies   Allergen Reactions    Ace Inhibitors Other (See Comments)     Cough      Lisinopril Other (See Comments)     Cough         Review of Systems:   General ROS: some fatigue   Respiratory ROS: coughing spells, mainly at nighttime  Cardiovascular ROS: +SERNA  Gastrointestinal ROS: no abdominal pain, change in bowel habits, or black or bloody stools  Genito-Urinary ROS: , BPH and ED  Musculoskeletal

## 2020-11-25 ENCOUNTER — OFFICE VISIT (OUTPATIENT)
Dept: FAMILY MEDICINE CLINIC | Age: 75
End: 2020-11-25
Payer: MEDICARE

## 2020-11-25 VITALS
WEIGHT: 200 LBS | HEIGHT: 68 IN | OXYGEN SATURATION: 98 % | DIASTOLIC BLOOD PRESSURE: 60 MMHG | BODY MASS INDEX: 30.31 KG/M2 | TEMPERATURE: 97.9 F | HEART RATE: 65 BPM | SYSTOLIC BLOOD PRESSURE: 132 MMHG

## 2020-11-25 PROCEDURE — 99213 OFFICE O/P EST LOW 20 MIN: CPT | Performed by: STUDENT IN AN ORGANIZED HEALTH CARE EDUCATION/TRAINING PROGRAM

## 2020-11-25 PROCEDURE — 17000 DESTRUCT PREMALG LESION: CPT | Performed by: STUDENT IN AN ORGANIZED HEALTH CARE EDUCATION/TRAINING PROGRAM

## 2020-11-25 PROCEDURE — 17003 DESTRUCT PREMALG LES 2-14: CPT | Performed by: STUDENT IN AN ORGANIZED HEALTH CARE EDUCATION/TRAINING PROGRAM

## 2020-11-30 ASSESSMENT — ENCOUNTER SYMPTOMS
SHORTNESS OF BREATH: 0
ABDOMINAL PAIN: 0
VOMITING: 0
SINUS PRESSURE: 0
SORE THROAT: 0
COUGH: 0

## 2020-11-30 NOTE — PATIENT INSTRUCTIONS
Incision/ Shave biopsy/ Laceration repair    - Wash your hands with soap and water before cleaning the surgical area, this is the most common cause of infection  - Clean the surgical area with antibacterial soap and water once daily  - Keep surgical site moist with Vaseline and apply a fresh bandage daily until a solid scab forms or if the wound is at risk for trauma or dirt. - Follow up immediately if any growing redness (minimal redness or pale pink is normal along wound edges) surrounds the surgical site or if drainage occurs at the surgical site. - Once a solid scab forms the bandage is no longer needed. A wet scab can look yellow. This is not infection, just moisture. - Once the lesion is healed be sure to apply sunscreen to the area to prevent burn of the new and more delicate skin during the first 6 months of healing.    - If the scar begins to be raised you may massage the area firmly twice a day to help break down scar tissue and help the area become a flat scar. There is some evidence that Mederma applied to a scar daily for the first few months can help shrink and fade it more quickly then without intervention.

## 2020-11-30 NOTE — PROGRESS NOTES
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 HPI  Facial lesion right cheek  Enlarging the last month. Present for one year. Pt states he has had cancerous lesions removed in the past ([facility] on left ear and back)    Review of Systems   Constitutional: Negative for chills and fever. HENT: Negative for congestion, sinus pressure and sore throat. Respiratory: Negative for cough and shortness of breath. Cardiovascular: Negative for chest pain and palpitations. Gastrointestinal: Negative for abdominal pain and vomiting. Musculoskeletal: Negative for arthralgias and myalgias. Skin: Negative for rash and wound. Enlarging lesion on the right cheek   Neurological: Negative for speech difficulty and light-headedness. Psychiatric/Behavioral: Negative for suicidal ideas. The patient is not nervous/anxious. Prior to Visit Medications    Medication Sig Taking? Authorizing Provider   dextromethorphan-guaiFENesin  MG TABS Take 1 tablet by mouth every 4 hours as needed Yes Historical Provider, MD   Niacin (VITAMIN B-3 PO) Take by mouth Yes Historical Provider, MD   diclofenac (VOLTAREN) 75 MG EC tablet take 1 tablet by mouth twice a day if needed for pain Yes Stephan Gary MD   carvedilol (COREG) 12.5 MG tablet Take 0.5 tablets by mouth 2 times daily Yes Stephan Gary MD   tamsulosin (FLOMAX) 0.4 MG capsule take 1 capsule by mouth twice a day Yes Stephan Gary MD   levothyroxine (SYNTHROID) 50 MCG tablet take 1 tablet by mouth once daily Yes Stephan Gary MD   nitroGLYCERIN (NITROSTAT) 0.4 MG SL tablet Place 1 tablet under the tongue every 5 minutes as needed for Chest pain Dissolve 1 tab under tongue at first sign of chest pain every 5 minutes as needed.  If pain persists after taking 3 tabs in a 15-minute period, or the pain is different than is typically experienced, call 9-1-1 immediately.  Yes Floyd Corbett MD   magnesium oxide (MAG-OX) 400 MG tablet Take 400 mg by mouth daily Yes Historical Provider, MD   Handicap Placard MISC by Does not apply route Exp 5 years Yes Floyd Corbett MD   finasteride (PROSCAR) 5 MG tablet take 1 tablet by mouth once daily Yes Floyd Corbett MD   hydroCHLOROthiazide (HYDRODIURIL) 25 MG tablet take 1 tablet by mouth once daily Yes Floyd Corbett MD   atorvastatin (LIPITOR) 40 MG tablet take 1 tablet by mouth once daily Yes Dylan J Holiday, DO   aspirin 81 MG EC tablet Take 1 tablet by mouth 2 times daily  Patient taking differently: Take 81 mg by mouth daily  Yes Cordella Fair, APRN - CNP   Multiple Vitamins-Minerals (MULTI COMPLETE PO) Take by mouth  Historical Provider, MD        Allergies   Allergen Reactions    Ace Inhibitors Other (See Comments)     Cough      Lisinopril Other (See Comments)     Cough         Past Medical History:   Diagnosis Date    Actinic keratosis     Basal cell carcinoma, trunk 12/2017    left upper back    Basal cell carcinoma, trunk     BPH (benign prostatic hyperplasia)     CAD (coronary artery disease) 2012    has 4 cardiac stents    Heart attack ([address]     History of mycobacterial infection 6/22/2018    Hyperlipidemia     meds > 15 yrs    Hypertension     meds > 6 yrs / denies TIA or stroke    Hypertrophic scar     Myocardial infarct ([address] 11/2012    Osteoarthritis     all joints    Overweight 12/22/2017    Status post coronary angioplasty     Thyroid disease     meds > 1 month       Past Surgical History:   Procedure Laterality Date    ACHILLES TENDON SURGERY Left 7/10/2020    LEFT REPAIR OF RUPTURED ACHILLES TENDON, performed by Yue Moody DPM at [address] Bilateral 2015    COLONOSCOPY  3/23/15        COLONOSCOPY N/A 5/11/2020    COLORECTAL CANCER SCREENING, HIGH RISK performed by Melisa Nicholson MD at [facility]  2012    CYSTOSCOPY  2017    W/COMPLEX CYSTOMETROGRAM-COMPLEX UROFLOW-TRANSRECTAL US PROSTATE    EYE SURGERY      Lasik OU    JOINT REPLACEMENT Left     LTKR    [place] TOTAL KNEE ARTHROPLASTY Right 3/15/2018    RIGHT KNEE TOTAL KNEE  ARTHROPLASTY performed by Kami Shepard MD [address] Right     twice    SHOULDER SURGERY Right      RCR    TONSILLECTOMY      TURP N/A 2017    GREEN LIGHT LASER TRANSURETHRAL RESECTIOIN PROSTATE performed by Isma Fraser MD at [address] [place] [address] PROSTATE/TRANSRECTAL  06/03/15    Cystoscop, cath rem, US prostate    UVULECTOMY      due to snoring       Social History     Socioeconomic History    Marital status:      Spouse name: Not on file    Number of children: Not on file    Years of education: Not on file    Highest education level: Not on file   Occupational History    Not on file   Social Needs    Financial resource strain: Not on file    Food insecurity     Worry: Not on file     Inability: Not on file    Transportation needs     Medical: Not on file     Non-medical: Not on file   Tobacco Use    Smoking status: Former Smoker     Packs/day: 1.00     Years: 7.00     Pack years: 7.00     Start date: 1961     Last attempt to quit: 3/17/1967     Years since quittin.7    Smokeless tobacco: Never Used    Tobacco comment: Quit for [id number] [employer]   Substance and Sexual Activity    Alcohol use: No     Types: 30 Cans of beer per week     Comment: No alcohol  since     Drug use: No    Sexual activity: Not on file   Lifestyle    Physical activity     Days per week: Not on file     Minutes per session: Not on file    Stress: Not on file   Relationships    Social connections     Talks on phone: Not on file     Gets together: Not on file     Attends Latter day service: Not on file     Active member of club or organization: Not on file     Attends meetings of clubs or organizations: Not on file     Relationship status: Not on file    Intimate partner violence     Fear of current or ex partner: Not on file     Emotionally abused: Not on file     Physically abused: Not on file     Forced sexual activity: Not on file   Other Topics Concern    Not on file   Social History Narrative    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 Weight as of this encounter: 201 lb 3.2 oz (91.3 kg). No results for input(s): WBC, RBC, HGB, HCT, MCV, MCH, MCHC, RDW, PLT, MPV in the last 72 hours. No results for input(s): NA, K, CL, CO2, BUN, CREATININE, GLUCOSE, CALCIUM, PROT, LABALBU, BILITOT, ALKPHOS, AST, ALT in the last 72 hours. Lab Results   Component Value Date    LABA1C 5.4 06/02/2019       No results found. Physical Exam  Constitutional:       Appearance: Normal appearance. He is normal weight. HENT:      Head: Normocephalic and atraumatic. Nose: No rhinorrhea. Mouth/Throat:      Mouth: Mucous membranes are moist.      Pharynx: Oropharynx is clear. Eyes:      Extraocular Movements: Extraocular movements intact. Conjunctiva/sclera: Conjunctivae normal.   Skin:     Findings: No lesion or rash. Comments: 6mm x 6mm raised rough lesion with background solar damage to the right lateral cheek   Neurological:      General: No focal deficit present. Mental Status: He is alert and oriented to person, place, and time. Mental status is at baseline.    Psychiatric: Mood and Affect: Mood normal.         Thought Content: Thought content normal.         Judgment: Judgment normal.               ASSESSMENT/PLAN:  1. Changing skin lesion  - Specimen to Pathology Outpatient; Future    Procedure was discussed with patient along with risks including bleeding, infection and scarring. Informed consent was signed by the patient. 0.5cc of 1% lidocaine with epinephrine was used for anesthesia. A Dermablade was used to remove the lesion. Hemostasis was obtained by aluminium chloride solution. Vaseline and a bandage was applied to the wound. Post op wound care instructions were verbally discussed with patient by nursing staff and instructions given to the patient. Will call pt with pathology results. ---------------------------------------------------------------------  Side effects, adverse effects of the medication prescribed today, as well as treatment plan/ rationale and result expectations have been discussed with the patient who expresses understanding and desires to proceed. Close follow up to evaluate treatment results and for coordination of care. I have reviewed the patient's medical history in detail and updated the computerized patient record. As always, patient is advised that if symptoms worsen in any way they will proceed to the nearest emergency room. --------------------------------------------------------------------    Return in about 6 months (around 6/2/2021) for Skin check w/Dr. Gordon Dobson. An  electronic signature was used to authenticate this note.     --Juan Marino DO on 12/2/2020 at 10:01 AM

## 2020-12-02 ENCOUNTER — PROCEDURE VISIT (OUTPATIENT)
Dept: FAMILY MEDICINE CLINIC | Age: 75
End: 2020-12-02
Payer: MEDICARE

## 2020-12-02 VITALS
HEART RATE: 76 BPM | TEMPERATURE: 96.8 F | HEIGHT: 68 IN | OXYGEN SATURATION: 98 % | WEIGHT: 201.2 LBS | BODY MASS INDEX: 30.49 KG/M2 | SYSTOLIC BLOOD PRESSURE: 124 MMHG | DIASTOLIC BLOOD PRESSURE: 68 MMHG

## 2020-12-02 DIAGNOSIS — L98.9 CHANGING SKIN LESION: ICD-10-CM

## 2020-12-02 PROCEDURE — 11311 SHAVE SKIN LESION 0.6-1.0 CM: CPT | Performed by: STUDENT IN AN ORGANIZED HEALTH CARE EDUCATION/TRAINING PROGRAM

## 2020-12-02 RX ORDER — GUAIFENESIN AND DEXTROMETHORPHAN HYDROBROMIDE 400; 20 MG/1; MG/1
1 TABLET ORAL EVERY 4 HOURS PRN
Status: ON HOLD | COMMUNITY
End: 2020-12-29

## 2020-12-11 ENCOUNTER — OFFICE VISIT (OUTPATIENT)
Dept: CARDIOLOGY CLINIC | Age: 75
End: 2020-12-11
Payer: MEDICARE

## 2020-12-11 VITALS
WEIGHT: 202 LBS | HEART RATE: 54 BPM | BODY MASS INDEX: 30.71 KG/M2 | DIASTOLIC BLOOD PRESSURE: 80 MMHG | OXYGEN SATURATION: 99 % | SYSTOLIC BLOOD PRESSURE: 144 MMHG

## 2020-12-11 PROBLEM — I25.2 HISTORY OF MI (MYOCARDIAL INFARCTION): Status: ACTIVE | Noted: 2020-12-11

## 2020-12-11 PROBLEM — Z86.19 HISTORY OF MYCOBACTERIAL INFECTION: Status: RESOLVED | Noted: 2018-06-22 | Resolved: 2020-12-11

## 2020-12-11 PROCEDURE — 99215 OFFICE O/P EST HI 40 MIN: CPT | Performed by: INTERNAL MEDICINE

## 2020-12-11 RX ORDER — CARVEDILOL 6.25 MG/1
6.25 TABLET ORAL 2 TIMES DAILY
COMMUNITY
End: 2021-05-26

## 2020-12-11 NOTE — PROGRESS NOTES
Chief Complaint   Patient presents with    Shortness of Breath       3-20-15: Patient presents for initial medical evaluation. Patient is followed on a regular basis by Dr. Georgina Carlisle MD. Hx of MI/CAD in 2012 in [facility] [place] s/p PCIx 4 stents. Does feel cold at times and numbness in hands at times. Pt denies chest pain, dyspnea, dyspnea on exertion, change in exercise capacity,  nausea, vomiting, diarrhea, constipation, motor weakness, insomnia, weight loss, syncope, dizziness, lightheadedness, palpitations, PND, orthopnea. No LE ulcers or discoloration. 4-24-15: as above, s/p echo with EF of 60%, mild MR, no PTHN. States he feels tired at times. Pt denies chest pain, dyspnea, dyspnea on exertion, change in exercise capacity, nausea, vomiting, diarrhea, constipation, motor weakness, insomnia, weight loss, syncope, dizziness, lightheadedness, palpitations, PND, orthopnea, or claudication. 10-30-15: as above, doing well overall. Pt denies chest pain, dyspnea, dyspnea on exertion, change in exercise capacity, fatigue,  nausea, vomiting, diarrhea, constipation, motor weakness, insomnia, weight loss, syncope, dizziness, lightheadedness, palpitations, PND, orthopnea. BP is under control. Did have a cough with Lisinopril. Doing fine with losartan. No bleeding issues. Having some low back pain. Lipid profile in normal range. 6-20-17: Pt denies chest pain, dyspnea, dyspnea on exertion, change in exercise capacity, fatigue,  nausea, vomiting, diarrhea, constipation, motor weakness, insomnia, weight loss, syncope, dizziness, lightheadedness, palpitations, PND, orthopnea, or claudication. No nitro use. BP and hr are good. CAD is stable. No LE discoloration or ulcers. No LE edema. No CHF type symptoms. Lipid profile is normal.  Needs pre op clearance for urological surgery with dr. Monse Rodriguez. Compliant with meds. Not having any symptoms with doing heavy yard work.        12-22-17: c/o right knee and low back pain/OA type issues. Pt denies chest pain, dyspnea, dyspnea on exertion, change in exercise capacity, fatigue,  nausea, vomiting, diarrhea, constipation, motor weakness, insomnia, weight loss, syncope, dizziness, lightheadedness, palpitations, PND, orthopnea, or claudication. He states he heard/felt a weird sound of his heart beat. No recent hospitalization. Compliant with meds. No nitro use. BP and hr are good. CAD is stable. No LE discoloration or ulcers. No LE edema. No CHF type symptoms. Lipid profile is normal.     2-27-18: needs to have right knee replacement. Pt denies chest pain, dyspnea, dyspnea on exertion, change in exercise capacity, fatigue,  nausea, vomiting, diarrhea, constipation, motor weakness, insomnia, weight loss, syncope, dizziness, lightheadedness, palpitations, PND, orthopnea, or claudication. No nitro use. BP and hr are good. CAD is stable. No LE discoloration or ulcers. No LE edema. No CHF type symptoms. Lipid profile is normal. EKG with NSR, no ischemia. He is not as active as he would like due to knee pain. He is independent of ADL;s.       6-22-18: did well with knee replacement. He does have some fatigue that has worsened now and has had a drop in his functional capacity lately some what per patient. [place] [employer] He is retired. Pt denies chest pain, dyspnea, dyspnea on exertion, change in exercise capacity, fatigue,  nausea, vomiting, diarrhea, constipation, motor weakness, insomnia, weight loss, syncope, dizziness, lightheadedness, palpitations, PND, orthopnea, or claudication. No nitro use. BP and hr are good. CAD is stable. No LE discoloration or ulcers. No LE edema. No CHF type symptoms. Lipid profile is normal. No recent hospitalization. No change in meds. LDL is 104. No anemia. Labs reviewed. 12/11/2020: Patient has been complaining of worsening fatigue and shortness of breath with mild exertion. He has definitely had a drop in his functional capacity.   Stress test was ordered 2010 but was SURGERY Right      RCR    TONSILLECTOMY      TURP N/A 2017    GREEN LIGHT LASER TRANSURETHRAL RESECTIOIN PROSTATE performed by Adryan Hernandez MD at [address] [place] [address] PROSTATE/TRANSRECTAL  06/03/15    Cystoscop, cath rem,  prostate    UVULECTOMY      due to snoring       Social History     Socioeconomic History    Marital status:      Spouse name: Not on file    Number of children: Not on file    Years of education: Not on file    Highest education level: Not on file   Occupational History    Not on file   Social Needs    Financial resource strain: Not on file    Food insecurity     Worry: Not on file     Inability: Not on file    Transportation needs     Medical: Not on file     Non-medical: Not on file   Tobacco Use    Smoking status: Former Smoker     Packs/day: 1.00     Years: 7.00     Pack years: 7.00     Start date: 1961     Last attempt to quit: 3/17/1967     Years since quittin.7    Smokeless tobacco: Never Used    Tobacco comment: Quit for [employer]   Substance and Sexual Activity    Alcohol use: No     Types: 30 Cans of beer per week     Comment: No alcohol  since     Drug use: No    Sexual activity: Not on file   Lifestyle    Physical activity     Days per week: Not on file     Minutes per session: Not on file    Stress: Not on file   Relationships    Social connections     Talks on phone: Not on file     Gets together: Not on file     Attends Evangelical service: Not on file     Active member of club or organization: Not on file     Attends meetings of clubs or organizations: Not on file     Relationship status: Not on file    Intimate partner violence     Fear of current or ex partner: Not on file     Emotionally abused: Not on file     Physically abused: Not on file     Forced sexual activity: Not on file   Other Topics Concern    Not on file   Social History Narrative    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 Family History   Problem Relation Age of Onset    Kidney Disease Father     Other Mother         Perforated intestine    No Known Problems Sister     No Known Problems Brother     No Known Problems Sister     No Known Problems Brother     No Known Problems Son        Current Outpatient Medications   Medication Sig Dispense Refill    carvedilol (COREG) 6.25 MG tablet Take 6.25 mg by mouth 2 times daily      dextromethorphan-guaiFENesin  MG TABS Take 1 tablet by mouth every 4 hours as needed      Niacin (VITAMIN B-3 PO) Take by mouth      diclofenac (VOLTAREN) 75 MG EC tablet take 1 tablet by mouth twice a day if needed for pain 180 tablet 1    tamsulosin (FLOMAX) 0.4 MG capsule take 1 capsule by mouth twice a day 180 capsule 1    levothyroxine (SYNTHROID) 50 MCG tablet take 1 tablet by mouth once daily 90 tablet 1    nitroGLYCERIN (NITROSTAT) 0.4 MG SL tablet Place 1 tablet under the tongue every 5 minutes as needed for Chest pain Dissolve 1 tab under tongue at first sign of chest pain every 5 minutes as needed. If pain persists after taking 3 tabs in a 15-minute period, or the pain is different than is typically experienced, call 9-1-1 immediately. 25 tablet 1    magnesium oxide (MAG-OX) 400 MG tablet Take 400 mg by mouth daily      Handicap Placard MISC by Does not apply route Exp 5 years 1 each 0    Multiple Vitamins-Minerals (MULTI COMPLETE PO) Take by mouth      finasteride (PROSCAR) 5 MG tablet take 1 tablet by mouth once daily 90 tablet 3    hydroCHLOROthiazide (HYDRODIURIL) 25 MG tablet take 1 tablet by mouth once daily 90 tablet 3    atorvastatin (LIPITOR) 40 MG tablet take 1 tablet by mouth once daily 90 tablet 3    aspirin 81 MG EC tablet Take 1 tablet by mouth 2 times daily (Patient taking differently: Take 81 mg by mouth daily ) 60 tablet 0     No current facility-administered medications for this visit.         Ace inhibitors and Lisinopril    Review of Systems:  General ROS: negative  Psychological ROS: negative  Hematological and Lymphatic ROS: No history of blood clots or bleeding disorder. Respiratory ROS: no cough, shortness of breath, or wheezing  negative  Cardiovascular ROS: no chest pain or dyspnea on exertion  Gastrointestinal ROS: no abdominal pain, change in bowel habits, or black or bloody stools  Genito-Urinary ROS: no dysuria, trouble voiding, or hematuria  Musculoskeletal ROS: negative  Neurological ROS: no TIA or stroke symptoms  Dermatological ROS: negative    VITALS:  Blood pressure (!) 144/80, pulse 54, weight 202 lb (91.6 kg), SpO2 99 %. Body mass index is 30.71 kg/m². Physical Examination:  General appearance - alert, well appearing, and in no distress  Mental status - alert, oriented to person, place, and time  Neck - Neck is supple, no JVD or carotid bruits. No thyromegaly or adenopathy. Chest - clear to auscultation, no wheezes, rales or rhonchi, symmetric air entry  Heart - normal rate, regular rhythm, normal S1, S2, no murmurs, rubs, clicks or gallops  Abdomen - soft, nontender, nondistended, no masses or organomegaly  Neurological - alert, oriented, normal speech, no focal findings or movement disorder noted  Extremities - peripheral pulses normal, no pedal edema, no clubbing or cyanosis  Skin - normal coloration and turgor, no rashes, no suspicious skin lesions noted      EKG: NSR< non specific changes. Orders Placed This Encounter   Procedures    ECHO Complete 2D W Doppler W Color       Pulses:   Carotid pulses are 3+ on the right side, and 3+ on the left side. Radial pulses are 3+ on the right side, and 3+ on the left side. Femoral pulses are 3+ on the right side, and 3+ on the left side. Popliteal pulses are 3+ on the right side, and 3+ on the left side. Dorsalis pedis pulses are 3+ on the right side, and 3+ on the left side. Posterior tibial pulses are 3+ on the right side, and 3+ on the left side.        ASSESSMENT: allowing me to participate in the care of your patient, please don't hesitate to contact me if you have any further questions.

## 2020-12-18 ENCOUNTER — HOSPITAL ENCOUNTER (OUTPATIENT)
Dept: NON INVASIVE DIAGNOSTICS | Age: 75
Discharge: HOME | End: 2020-12-18
Payer: MEDICARE

## 2020-12-18 LAB
LV EF: 60 %
LVEF MODALITY: NORMAL

## 2020-12-18 PROCEDURE — 93306 TTE W/DOPPLER COMPLETE: CPT

## 2020-12-28 RX ORDER — ATORVASTATIN CALCIUM 40 MG/1
TABLET, FILM COATED ORAL
Qty: 90 TABLET | Refills: 3 | Status: SHIPPED | OUTPATIENT
Start: 2020-12-28 | End: 2021-09-13

## 2020-12-29 ENCOUNTER — HOSPITAL ENCOUNTER (OUTPATIENT)
Dept: CARDIAC CATH/INVASIVE PROCEDURES | Age: 75
Discharge: HOME | End: 2020-12-29
Attending: INTERNAL MEDICINE | Admitting: INTERNAL MEDICINE
Payer: MEDICARE

## 2020-12-29 VITALS
BODY MASS INDEX: 29.55 KG/M2 | HEART RATE: 63 BPM | WEIGHT: 195 LBS | RESPIRATION RATE: 12 BRPM | TEMPERATURE: 98 F | HEIGHT: 68 IN | SYSTOLIC BLOOD PRESSURE: 150 MMHG | DIASTOLIC BLOOD PRESSURE: 78 MMHG | OXYGEN SATURATION: 96 %

## 2020-12-29 LAB
ANION GAP SERPL CALCULATED.3IONS-SCNC: 11 MEQ/L (ref 9–15)
BUN BLDV-MCNC: 29 MG/DL (ref 8–23)
CALCIUM SERPL-MCNC: 9.5 MG/DL (ref 8.5–9.9)
CHLORIDE BLD-SCNC: 99 MEQ/L (ref 95–107)
CO2: 27 MEQ/L (ref 20–31)
CREAT SERPL-MCNC: 1.04 MG/DL (ref 0.7–1.2)
EKG ATRIAL RATE: 65 BPM
EKG P AXIS: 68 DEGREES
EKG P-R INTERVAL: 166 MS
EKG Q-T INTERVAL: 384 MS
EKG QRS DURATION: 96 MS
EKG QTC CALCULATION (BAZETT): 399 MS
EKG R AXIS: 76 DEGREES
EKG T AXIS: 74 DEGREES
EKG VENTRICULAR RATE: 65 BPM
GFR AFRICAN AMERICAN: >60
GFR NON-AFRICAN AMERICAN: >60
GLUCOSE BLD-MCNC: 90 MG/DL (ref 70–99)
HCT VFR BLD CALC: 43.1 % (ref 42–52)
HEMOGLOBIN: 14.4 G/DL (ref 14–18)
MCH RBC QN AUTO: 29.7 PG (ref 27–31.3)
MCHC RBC AUTO-ENTMCNC: 33.4 % (ref 33–37)
MCV RBC AUTO: 89 FL (ref 80–100)
PDW BLD-RTO: 14.5 % (ref 11.5–14.5)
PLATELET # BLD: 200 K/UL (ref 130–400)
POTASSIUM SERPL-SCNC: 4.4 MEQ/L (ref 3.4–4.9)
RBC # BLD: 4.84 M/UL (ref 4.7–6.1)
SODIUM BLD-SCNC: 137 MEQ/L (ref 135–144)
WBC # BLD: 9.2 K/UL (ref 4.8–10.8)

## 2020-12-29 PROCEDURE — 92928 PRQ TCAT PLMT NTRAC ST 1 LES: CPT | Performed by: INTERNAL MEDICINE

## 2020-12-29 PROCEDURE — 6360000002 HC RX W HCPCS

## 2020-12-29 PROCEDURE — 92920 PRQ TRLUML C ANGIOP 1ART&/BR: CPT | Performed by: INTERNAL MEDICINE

## 2020-12-29 PROCEDURE — 92921 PR PRQ TRLUML CORONARY ANGIOPLASTY ADDL BRANCH: CPT | Performed by: INTERNAL MEDICINE

## 2020-12-29 PROCEDURE — 93458 L HRT ARTERY/VENTRICLE ANGIO: CPT | Performed by: INTERNAL MEDICINE

## 2020-12-29 PROCEDURE — C1887 CATHETER, GUIDING: HCPCS

## 2020-12-29 PROCEDURE — 93005 ELECTROCARDIOGRAM TRACING: CPT | Performed by: INTERNAL MEDICINE

## 2020-12-29 PROCEDURE — 85027 COMPLETE CBC AUTOMATED: CPT

## 2020-12-29 PROCEDURE — C1769 GUIDE WIRE: HCPCS

## 2020-12-29 PROCEDURE — C1874 STENT, COATED/COV W/DEL SYS: HCPCS

## 2020-12-29 PROCEDURE — 6370000000 HC RX 637 (ALT 250 FOR IP)

## 2020-12-29 PROCEDURE — 80048 BASIC METABOLIC PNL TOTAL CA: CPT

## 2020-12-29 PROCEDURE — C1894 INTRO/SHEATH, NON-LASER: HCPCS

## 2020-12-29 PROCEDURE — 2500000003 HC RX 250 WO HCPCS

## 2020-12-29 PROCEDURE — 2709999900 HC NON-CHARGEABLE SUPPLY

## 2020-12-29 PROCEDURE — C9600 PERC DRUG-EL COR STENT SING: HCPCS | Performed by: INTERNAL MEDICINE

## 2020-12-29 PROCEDURE — C1725 CATH, TRANSLUMIN NON-LASER: HCPCS

## 2020-12-29 PROCEDURE — 6360000004 HC RX CONTRAST MEDICATION: Performed by: INTERNAL MEDICINE

## 2020-12-29 PROCEDURE — 2580000003 HC RX 258

## 2020-12-29 RX ORDER — ACETAMINOPHEN 325 MG/1
650 TABLET ORAL EVERY 4 HOURS PRN
Status: DISCONTINUED | OUTPATIENT
Start: 2020-12-29 | End: 2020-12-29 | Stop reason: HOSPADM

## 2020-12-29 RX ORDER — ONDANSETRON 2 MG/ML
4 INJECTION INTRAMUSCULAR; INTRAVENOUS EVERY 6 HOURS PRN
Status: DISCONTINUED | OUTPATIENT
Start: 2020-12-29 | End: 2020-12-29 | Stop reason: HOSPADM

## 2020-12-29 RX ORDER — LABETALOL HYDROCHLORIDE 5 MG/ML
10 INJECTION, SOLUTION INTRAVENOUS EVERY 30 MIN PRN
Status: DISCONTINUED | OUTPATIENT
Start: 2020-12-29 | End: 2020-12-29 | Stop reason: HOSPADM

## 2020-12-29 RX ORDER — SODIUM CHLORIDE 9 MG/ML
INJECTION, SOLUTION INTRAVENOUS CONTINUOUS
Status: DISCONTINUED | OUTPATIENT
Start: 2020-12-29 | End: 2020-12-29 | Stop reason: HOSPADM

## 2020-12-29 RX ORDER — HYDRALAZINE HYDROCHLORIDE 20 MG/ML
10 INJECTION INTRAMUSCULAR; INTRAVENOUS EVERY 10 MIN PRN
Status: DISCONTINUED | OUTPATIENT
Start: 2020-12-29 | End: 2020-12-29 | Stop reason: HOSPADM

## 2020-12-29 RX ORDER — SODIUM CHLORIDE 0.9 % (FLUSH) 0.9 %
10 SYRINGE (ML) INJECTION EVERY 12 HOURS SCHEDULED
Status: DISCONTINUED | OUTPATIENT
Start: 2020-12-29 | End: 2020-12-29 | Stop reason: HOSPADM

## 2020-12-29 RX ORDER — SODIUM CHLORIDE 0.9 % (FLUSH) 0.9 %
10 SYRINGE (ML) INJECTION PRN
Status: DISCONTINUED | OUTPATIENT
Start: 2020-12-29 | End: 2020-12-29 | Stop reason: HOSPADM

## 2020-12-29 RX ADMIN — IOVERSOL 80 ML: 678 INJECTION INTRA-ARTERIAL; INTRAVENOUS at 15:05

## 2020-12-29 NOTE — PROGRESS NOTES
Pt resting comfortably, no bleeding or hematoma at puncture site, vitals are stable, removed 2 cc from Vasc Band, will continue to monitor.

## 2020-12-29 NOTE — PROGRESS NOTES
Vasc-band removed from R wrist, quik clot and tegaderm dressing applied. No bleeding or hematoma. Will continue to monitor.

## 2020-12-29 NOTE — PROGRESS NOTES
Pt arrived from home to pre/post cath, alert and oriented. Vital signs obtained. Consent for procedure obtained. Prepping pt for procedure.

## 2020-12-29 NOTE — PROGRESS NOTES
Pt returned from cath lab to pre/post cath, alert and oriented. VSS. Vasc-band to R wrist intact, no bleeding or hematoma. Will continue to monitor.

## 2020-12-29 NOTE — BRIEF OP NOTE
Section of Cardiology  Adult Brief Cardiac Cath Procedure Note        Procedure(s):  LHC, b/l coronary angio, PTCA of prox LAD ISR, PCI of Om1    Pre-operative Diagnosis:  angina    H&P Status: Completed and reviewed. Post-operative Diagnosis:    LV ef of 60%    LAD 80% prox ISR  CX, mild disease. Om1 is small with 80% mid. RCA mild disease.      Findings:  See full report    Complications:  none    Primary Proceduralist:   Dr.Wes Naik DO      Full procedure note to follow

## 2020-12-29 NOTE — PROGRESS NOTES
Discharge instructions reviewed with pt, verbalizes understanding. Pt discharged to home. Wife is driving him home.

## 2020-12-29 NOTE — PROGRESS NOTES
Pt up to bathroom, IV removed, pt is getting dressed for discharge to home. R wrist remains stable, no bleeding or hematoma.

## 2020-12-30 PROCEDURE — 93010 ELECTROCARDIOGRAM REPORT: CPT | Performed by: INTERNAL MEDICINE

## 2020-12-31 ENCOUNTER — TELEPHONE (OUTPATIENT)
Dept: CARDIOLOGY CLINIC | Age: 75
End: 2020-12-31

## 2020-12-31 DIAGNOSIS — I25.10 CORONARY ARTERY DISEASE INVOLVING NATIVE CORONARY ARTERY OF NATIVE HEART WITHOUT ANGINA PECTORIS: Primary | ICD-10-CM

## 2020-12-31 NOTE — TELEPHONE ENCOUNTER
PATIENT CALLED OFFICE STATES THAT BRILINTA COSTS TOO MUCH AND HE CAN'T AFFORD IT. PATIENT IS REQUESTING THIS TO BE SWITCHED TO SOMETHING GENERIC . PATIENT STATES THAT DICLOFENAC (VOLTAREN) WAS STOPPED AND HE STATES THAT HE REALLY NEEDS THIS MEDICATION FOR HIS ARTHRITIS.      PLEASE ADVISE

## 2021-01-05 RX ORDER — CLOPIDOGREL BISULFATE 75 MG/1
75 TABLET ORAL DAILY
Qty: 90 TABLET | Refills: 1 | Status: SHIPPED | OUTPATIENT
Start: 2021-01-05 | End: 2021-06-29

## 2021-01-05 NOTE — TELEPHONE ENCOUNTER
Place on plavix 75mg daily. Ok to resume Voltaren. Just be aware it may cause peptic ulcer disease. Needs to take PPI as well. Only protonix or pepcid.      Electronically signed by Paige Hernandez DO on 1/5/2021 at 2:30 PM

## 2021-01-07 ENCOUNTER — TELEPHONE (OUTPATIENT)
Dept: FAMILY MEDICINE CLINIC | Age: 76
End: 2021-01-07

## 2021-01-07 NOTE — TELEPHONE ENCOUNTER
Patient complaining of hematuria.  Advised to contact us if continues once he starts plavix and recommended to hold off on taking diclofenac until bleeding resolves

## 2021-01-07 NOTE — TELEPHONE ENCOUNTER
Unable to take diclofenac dur to plavix, been off for a week, neck and back pain, needs something for arthritis. Urination issues since colonoscopy, blood in urine, judy clot today    Had stent put in.     Cell [phone]

## 2021-02-05 ENCOUNTER — OFFICE VISIT (OUTPATIENT)
Dept: CARDIOLOGY CLINIC | Age: 76
End: 2021-02-05
Payer: MEDICARE

## 2021-02-05 VITALS
HEART RATE: 75 BPM | OXYGEN SATURATION: 97 % | SYSTOLIC BLOOD PRESSURE: 130 MMHG | WEIGHT: 202 LBS | BODY MASS INDEX: 30.71 KG/M2 | DIASTOLIC BLOOD PRESSURE: 80 MMHG

## 2021-02-05 DIAGNOSIS — I25.2 HISTORY OF MI (MYOCARDIAL INFARCTION): ICD-10-CM

## 2021-02-05 DIAGNOSIS — I25.10 CORONARY ARTERY DISEASE INVOLVING NATIVE CORONARY ARTERY OF NATIVE HEART WITHOUT ANGINA PECTORIS: Primary | ICD-10-CM

## 2021-02-05 DIAGNOSIS — E78.2 MIXED HYPERLIPIDEMIA: ICD-10-CM

## 2021-02-05 DIAGNOSIS — I10 ESSENTIAL HYPERTENSION: ICD-10-CM

## 2021-02-05 PROCEDURE — 99214 OFFICE O/P EST MOD 30 MIN: CPT | Performed by: INTERNAL MEDICINE

## 2021-02-05 RX ORDER — DICLOFENAC SODIUM 75 MG/1
TABLET, DELAYED RELEASE ORAL
Qty: 180 TABLET | Refills: 3 | Status: SHIPPED | OUTPATIENT
Start: 2021-02-05 | End: 2021-08-11

## 2021-02-05 NOTE — PROGRESS NOTES
Chief Complaint   Patient presents with    Coronary Artery Disease    Hypertension    Hyperlipidemia       3-20-15: Patient presents for initial medical evaluation. Patient is followed on a regular basis by Dr. Oneil Christensen MD. Hx of MI/CAD in 2012 in [facility] s/p PCIx 4 stents. Does feel cold at times and numbness in hands at times. Pt denies chest pain, dyspnea, dyspnea on exertion, change in exercise capacity,  nausea, vomiting, diarrhea, constipation, motor weakness, insomnia, weight loss, syncope, dizziness, lightheadedness, palpitations, PND, orthopnea. No LE ulcers or discoloration. 4-24-15: as above, s/p echo with EF of 60%, mild MR, no PTHN. States he feels tired at times. Pt denies chest pain, dyspnea, dyspnea on exertion, change in exercise capacity, nausea, vomiting, diarrhea, constipation, motor weakness, insomnia, weight loss, syncope, dizziness, lightheadedness, palpitations, PND, orthopnea, or claudication. 10-30-15: as above, doing well overall. Pt denies chest pain, dyspnea, dyspnea on exertion, change in exercise capacity, fatigue,  nausea, vomiting, diarrhea, constipation, motor weakness, insomnia, weight loss, syncope, dizziness, lightheadedness, palpitations, PND, orthopnea. BP is under control. Did have a cough with Lisinopril. Doing fine with losartan. No bleeding issues. Having some low back pain. Lipid profile in normal range. 6-20-17: Pt denies chest pain, dyspnea, dyspnea on exertion, change in exercise capacity, fatigue,  nausea, vomiting, diarrhea, constipation, motor weakness, insomnia, weight loss, syncope, dizziness, lightheadedness, palpitations, PND, orthopnea, or claudication. No nitro use. BP and hr are good. CAD is stable. No LE discoloration or ulcers. No LE edema. No CHF type symptoms. Lipid profile is normal.  Needs pre op clearance for urological surgery with dr. Rishi Heath. Compliant with meds. Not having any symptoms with doing heavy yard work. 12-22-17: c/o right knee and low back pain/OA type issues. Pt denies chest pain, dyspnea, dyspnea on exertion, change in exercise capacity, fatigue,  nausea, vomiting, diarrhea, constipation, motor weakness, insomnia, weight loss, syncope, dizziness, lightheadedness, palpitations, PND, orthopnea, or claudication. He states he heard/felt a weird sound of his heart beat. No recent hospitalization. Compliant with meds. No nitro use. BP and hr are good. CAD is stable. No LE discoloration or ulcers. No LE edema. No CHF type symptoms. Lipid profile is normal.     2-27-18: needs to have right knee replacement. Pt denies chest pain, dyspnea, dyspnea on exertion, change in exercise capacity, fatigue,  nausea, vomiting, diarrhea, constipation, motor weakness, insomnia, weight loss, syncope, dizziness, lightheadedness, palpitations, PND, orthopnea, or claudication. No nitro use. BP and hr are good. CAD is stable. No LE discoloration or ulcers. No LE edema. No CHF type symptoms. Lipid profile is normal. EKG with NSR, no ischemia. He is not as active as he would like due to knee pain. He is independent of ADL;s.       6-22-18: did well with knee replacement. He does have some fatigue that has worsened now and has had a drop in his functional capacity lately some what per patient. [place] [employer] He is retired. Pt denies chest pain, dyspnea, dyspnea on exertion, change in exercise capacity, fatigue,  nausea, vomiting, diarrhea, constipation, motor weakness, insomnia, weight loss, syncope, dizziness, lightheadedness, palpitations, PND, orthopnea, or claudication. No nitro use. BP and hr are good. CAD is stable. No LE discoloration or ulcers. No LE edema. No CHF type symptoms. Lipid profile is normal. No recent hospitalization. No change in meds. LDL is 104. No anemia. Labs reviewed.  History of colon polyps    History of MI (myocardial infarction)    Angina at [facility])       Past Surgical History:   Procedure Laterality Date    ACHILLES TENDON SURGERY Left 7/10/2020    LEFT REPAIR OF RUPTURED ACHILLES TENDON, performed by Brisa Smith DPM at [address] Bilateral 2015    COLONOSCOPY  3/23/15        COLONOSCOPY N/A 2020    COLORECTAL CANCER SCREENING, HIGH RISK performed by Pedro Pablo Lea MD at [facility]      CYSTOSCOPY  2017    W/COMPLEX CYSTOMETROGRAM-COMPLEX UROFLOW-TRANSRECTAL US PROSTATE    EYE SURGERY      Lasik OU    JOINT REPLACEMENT Left     LTKR    [place] TOTAL KNEE ARTHROPLASTY Right 3/15/2018    RIGHT KNEE TOTAL KNEE  ARTHROPLASTY performed by Meredith Whitley [facility] at [facility] Right     twice    SHOULDER SURGERY Right      RCR    TONSILLECTOMY      TURP N/A 2017    GREEN LIGHT LASER TRANSURETHRAL RESECTIOIN PROSTATE performed by Carmen Carroll MD at [address] [place] [address] PROSTATE/TRANSRECTAL  06/03/15    Cystoscop, cath rem, US prostate    UVULECTOMY      due to snoring       Social History     Socioeconomic History    Marital status:      Spouse name: Not on file    Number of children: Not on file    Years of education: Not on file    Highest education level: Not on file   Occupational History    Not on file   Social Needs    Financial resource strain: Not on file    Food insecurity     Worry: Not on file     Inability: Not on file    Transportation needs     Medical: Not on file     Non-medical: Not on file   Tobacco Use    Smoking status: Former Smoker     Packs/day: 1.00     Years: 7.00     Pack years: 7.00     Start date: 1961     Quit date: 3/17/1967     Years since quittin.9    Smokeless tobacco: Never Used    Tobacco comment: Quit for [employer]   Substance and Sexual Activity  Alcohol use: No     Types: 30 Cans of beer per week     Comment: No alcohol  since February, 1987    Drug use: No    Sexual activity: Not on file   Lifestyle    Physical activity     Days per week: Not on file     Minutes per session: Not on file    Stress: Not on file   Relationships    Social connections     Talks on phone: Not on file     Gets together: Not on file     Attends Nondenominational service: Not on file     Active member of club or organization: Not on file     Attends meetings of clubs or organizations: Not on file     Relationship status: Not on file    Intimate partner violence     Fear of current or ex partner: Not on file     Emotionally abused: Not on file     Physically abused: Not on file     Forced sexual activity: Not on file   Other Topics Concern    Not on file   Social History Narrative    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       Family History   Problem Relation Age of Onset    Kidney Disease Father     Other Mother         Perforated intestine    No Known Problems Sister     No Known Problems Brother     No Known Problems Sister     No Known Problems Brother     No Known Problems Son        Current Outpatient Medications   Medication Sig Dispense Refill    diclofenac (VOLTAREN) 75 MG EC tablet take 1 tablet by mouth twice a day if needed for pain 180 tablet 3    clopidogrel (PLAVIX) 75 MG tablet Take 1 tablet by mouth daily 90 tablet 1    atorvastatin (LIPITOR) 40 MG tablet take 1 tablet by mouth once daily 90 tablet 3    carvedilol (COREG) 6.25 MG tablet Take 6.25 mg by mouth 2 times daily      Niacin (VITAMIN B-3 PO) Take by mouth      tamsulosin (FLOMAX) 0.4 MG capsule take 1 capsule by mouth twice a day (Patient taking differently: Take 0.4 mg by mouth daily take 1 capsule by mouth twice a day) 180 capsule 1  levothyroxine (SYNTHROID) 50 MCG tablet take 1 tablet by mouth once daily (Patient taking differently: Take 50 mcg by mouth Daily ) 90 tablet 1    nitroGLYCERIN (NITROSTAT) 0.4 MG SL tablet Place 1 tablet under the tongue every 5 minutes as needed for Chest pain Dissolve 1 tab under tongue at first sign of chest pain every 5 minutes as needed. If pain persists after taking 3 tabs in a 15-minute period, or the pain is different than is typically experienced, call 9-1-1 immediately. 25 tablet 1    magnesium oxide (MAG-OX) 400 MG tablet Take 400 mg by mouth daily      Handicap Placard MISC by Does not apply route Exp 5 years 1 each 0    Multiple Vitamins-Minerals (MULTI COMPLETE PO) Take by mouth      finasteride (PROSCAR) 5 MG tablet take 1 tablet by mouth once daily (Patient taking differently: Take 5 mg by mouth daily take 1 tablet by mouth once daily) 90 tablet 3    hydroCHLOROthiazide (HYDRODIURIL) 25 MG tablet take 1 tablet by mouth once daily 90 tablet 3     No current facility-administered medications for this visit. Ace inhibitors and Lisinopril    Review of Systems:  General ROS: negative  Psychological ROS: negative  Hematological and Lymphatic ROS: No history of blood clots or bleeding disorder. Respiratory ROS: no cough, shortness of breath, or wheezing  negative  Cardiovascular ROS: no chest pain or dyspnea on exertion  Gastrointestinal ROS: no abdominal pain, change in bowel habits, or black or bloody stools  Genito-Urinary ROS: no dysuria, trouble voiding, or hematuria  Musculoskeletal ROS: negative  Neurological ROS: no TIA or stroke symptoms  Dermatological ROS: negative    VITALS:  Blood pressure 130/80, pulse 75, weight 202 lb (91.6 kg), SpO2 97 %. Body mass index is 30.71 kg/m².     Physical Examination:  General appearance - alert, well appearing, and in no distress  Mental status - alert, oriented to person, place, and time Neck - Neck is supple, no JVD or carotid bruits. No thyromegaly or adenopathy. Chest - clear to auscultation, no wheezes, rales or rhonchi, symmetric air entry  Heart - normal rate, regular rhythm, normal S1, S2, no murmurs, rubs, clicks or gallops  Abdomen - soft, nontender, nondistended, no masses or organomegaly  Neurological - alert, oriented, normal speech, no focal findings or movement disorder noted  Extremities - peripheral pulses normal, no pedal edema, no clubbing or cyanosis  Skin - normal coloration and turgor, no rashes, no suspicious skin lesions noted      EKG: NSR< non specific changes. No orders of the defined types were placed in this encounter. Pulses:   Carotid pulses are 3+ on the right side, and 3+ on the left side. Radial pulses are 3+ on the right side, and 3+ on the left side. Femoral pulses are 3+ on the right side, and 3+ on the left side. Popliteal pulses are 3+ on the right side, and 3+ on the left side. Dorsalis pedis pulses are 3+ on the right side, and 3+ on the left side. Posterior tibial pulses are 3+ on the right side, and 3+ on the left side. ASSESSMENT:     Diagnosis Orders   1. Coronary artery disease involving native coronary artery of native heart without angina pectoris     2. Mixed hyperlipidemia     3. Essential hypertension     4. History of MI (myocardial infarction)           PLAN:     Patient will need to continue to follow up with you for their general medical care     As always, aggressive risk factor modification is strongly recommended. We should adhere to the [address] guidelines for HTN management and the NCEP ATP III guidelines for LDL-C management. Cardiac diet is always recommended with low fat, cholesterol, calories and sodium. Continue medications at current doses. Cont with plavix.      DC ASA    Resume Diclofenac due to severe osteoarthritis discomfort/pain Patient was advised and encouraged to check blood pressure at home or at a pharmacy, maintain a logbook, and also call us back if blood pressure are above the target ranges or if it is low. Patient clearly understands and agrees to the instructions. We will need to continue to monitor muscle and liver enzymes, BUN, CR, and electrolytes. Details of medical condition explained and patient was warned about adverse consequences of uncontrolled medical conditions and possible side effects of prescribed medications. Patient was advised to go to the ER if he starts experiencing adverse effects of the medications. patient was instructed to call us back or go to nearby emergency room immediately if symptoms get worse or do not improve. Thank you for allowing me to participate in the care of your patient, please don't hesitate to contact me if you have any further questions.

## 2021-03-02 ENCOUNTER — OFFICE VISIT (OUTPATIENT)
Dept: FAMILY MEDICINE CLINIC | Age: 76
End: 2021-03-02
Payer: MEDICARE

## 2021-03-02 VITALS
HEART RATE: 71 BPM | HEIGHT: 68 IN | WEIGHT: 199 LBS | TEMPERATURE: 96.8 F | SYSTOLIC BLOOD PRESSURE: 128 MMHG | DIASTOLIC BLOOD PRESSURE: 68 MMHG | BODY MASS INDEX: 30.16 KG/M2 | OXYGEN SATURATION: 98 %

## 2021-03-02 DIAGNOSIS — L82.1 VERRUCOUS KERATOSIS: ICD-10-CM

## 2021-03-02 DIAGNOSIS — L57.0 ACTINIC KERATOSES: Primary | ICD-10-CM

## 2021-03-02 PROCEDURE — 17000 DESTRUCT PREMALG LESION: CPT | Performed by: STUDENT IN AN ORGANIZED HEALTH CARE EDUCATION/TRAINING PROGRAM

## 2021-03-02 PROCEDURE — 17003 DESTRUCT PREMALG LES 2-14: CPT | Performed by: STUDENT IN AN ORGANIZED HEALTH CARE EDUCATION/TRAINING PROGRAM

## 2021-03-02 PROCEDURE — 99213 OFFICE O/P EST LOW 20 MIN: CPT | Performed by: STUDENT IN AN ORGANIZED HEALTH CARE EDUCATION/TRAINING PROGRAM

## 2021-03-02 PROCEDURE — 17110 DESTRUCTION B9 LES UP TO 14: CPT | Performed by: STUDENT IN AN ORGANIZED HEALTH CARE EDUCATION/TRAINING PROGRAM

## 2021-03-02 ASSESSMENT — PATIENT HEALTH QUESTIONNAIRE - PHQ9
SUM OF ALL RESPONSES TO PHQ QUESTIONS 1-9: 0
1. LITTLE INTEREST OR PLEASURE IN DOING THINGS: 0
2. FEELING DOWN, DEPRESSED OR HOPELESS: 0
SUM OF ALL RESPONSES TO PHQ QUESTIONS 1-9: 0

## 2021-03-02 ASSESSMENT — ENCOUNTER SYMPTOMS
SHORTNESS OF BREATH: 0
ABDOMINAL PAIN: 0
COUGH: 0
VOMITING: 0
ROS SKIN COMMENTS: SKIN LESION
SORE THROAT: 0
SINUS PRESSURE: 0

## 2021-03-02 NOTE — PROGRESS NOTES
3/2/2021    Brian Hernandez (:  1945) is a 77 y.o. male, here for evaluation of the following medical concerns:  Chief Complaint   Patient presents with    Lesion(s)     Pt lesion that was removed in december has grown back. HPI  Right cheek lesion  Shave biopsy done   Results showed verrucous keratosis     Pt states lesions went away but then shortly after grew back  Also has some small AKs on his head he wants frozen    Review of Systems   Constitutional: Negative for chills and fever. HENT: Negative for congestion, sinus pressure and sore throat. Respiratory: Negative for cough and shortness of breath. Cardiovascular: Negative for chest pain and palpitations. Gastrointestinal: Negative for abdominal pain and vomiting. Musculoskeletal: Negative for arthralgias and myalgias. Skin: Negative for rash and wound. Skin lesion     Neurological: Negative for speech difficulty and light-headedness. Psychiatric/Behavioral: Negative for suicidal ideas. The patient is not nervous/anxious. Prior to Visit Medications    Medication Sig Taking?  Authorizing Provider   diclofenac (VOLTAREN) 75 MG EC tablet take 1 tablet by mouth twice a day if needed for pain Yes Dylan NORTH Holiday, DO   clopidogrel (PLAVIX) 75 MG tablet Take 1 tablet by mouth daily Yes Dylan NORTH Holiday, DO   atorvastatin (LIPITOR) 40 MG tablet take 1 tablet by mouth once daily Yes Dylan Moncadaiday, DO   carvedilol (COREG) 6.25 MG tablet Take 6.25 mg by mouth 2 times daily Yes Historical Provider, MD   Niacin (VITAMIN B-3 PO) Take by mouth Yes Historical Provider, MD   tamsulosin (FLOMAX) 0.4 MG capsule take 1 capsule by mouth twice a day  Patient taking differently: Take 0.4 mg by mouth daily take 1 capsule by mouth twice a day Yes Iraj Cho MD   levothyroxine (SYNTHROID) 50 MCG tablet take 1 tablet by mouth once daily  Patient taking differently: Take 50 mcg by mouth Daily  Yes Iraj Cho MD   nitroGLYCERIN (NITROSTAT) 0.4 MG SL tablet Place 1 tablet under the tongue every 5 minutes as needed for Chest pain Dissolve 1 tab under tongue at first sign of chest pain every 5 minutes as needed. If pain persists after taking 3 tabs in a 15-minute period, or the pain is different than is typically experienced, call 9-1-1 immediately.  Yes Gianni Oliver MD   magnesium oxide (MAG-OX) 400 MG tablet Take 400 mg by mouth daily Yes Historical Provider, MD   Handicap Placard MISC by Does not apply route Exp 5 years Yes Gianni Oliver MD   Multiple Vitamins-Minerals (MULTI COMPLETE PO) Take by mouth Yes Historical Provider, MD   finasteride (PROSCAR) 5 MG tablet take 1 tablet by mouth once daily  Patient taking differently: Take 5 mg by mouth daily take 1 tablet by mouth once daily Yes Gianni Oliver MD   hydroCHLOROthiazide (HYDRODIURIL) 25 MG tablet take 1 tablet by mouth once daily Yes Gianni Oliver MD        Allergies   Allergen Reactions    Ace Inhibitors Other (See Comments)     Cough      Lisinopril Other (See Comments)     Cough         Past Medical History:   Diagnosis Date    Actinic keratosis     Basal cell carcinoma, trunk 12/2017    left upper back    Basal cell carcinoma, trunk     BPH (benign prostatic hyperplasia)     CAD (coronary artery disease) 2012    has 4 cardiac stents    Heart attack ([address]     History of MI (myocardial infarction) 12/11/2020    History of mycobacterial infection 6/22/2018    Hyperlipidemia     meds > 15 yrs    Hypertension     meds > 6 yrs / denies TIA or stroke    Hypertrophic scar     Myocardial infarct ([address] 11/2012    Osteoarthritis     all joints    Overweight 12/22/2017    Status post coronary angioplasty     Thyroid disease     meds > 1 month       Past Surgical History:   Procedure Laterality Date    ACHILLES TENDON SURGERY Left 7/10/2020    LEFT REPAIR OF RUPTURED ACHILLES TENDON, [facility] by Miguel Angel Bob DPM [facility] Bilateral 2015    verrucous appearance right cheek, several AKs scattered around the scalp   Neurological:      General: No focal deficit present. Mental Status: He is alert and oriented to person, place, and time. Mental status is at baseline. Psychiatric:         Mood and Affect: Mood normal.         Thought Content: Thought content normal.         Judgment: Judgment normal.             ASSESSMENT/PLAN:  1. Actinic keratoses  Cryotherapy Actinic Keratosis:   Reason for treatment: It was explained that actinic keratosis are precancerous. Consent: The patient understood all the risks and benefits prior to treatment. The risks explained included scarring, hyper and/or hypopigmentation. Although this treatment is highly effective, recurrences do occur and this was explained to the patient. The patient further understood that these lesions are precancerous and may develop into a malignancy. Number of lesions treated/ location: scattered across forehead and temples (x8)    Method: Liquid nitrogen was used to treat the lesion(s) with two freeze-thaw cycles. Post-op: The patient was instructed to clean the site normally twice a day. Signs of infection were reviewed and patient was instructed to call if he/ she develops increasing pain, purulent drainage, or beefy redness. The patient was informed that a blister may occur at the cryo site and that this is an expected event. Gautam Figueroa protection was reviewed and patient advised to use sunscreen with SPF 30 or greater on exposed skin when outdoors. Post-op instructions were given orally and in writing. 2. Verrucous keratosis  - Cryo today, if doesn't respond to treatment may want to consider excision    Procedure: The procedure was discussed with the patient. All questions were answered and alternative options discussed. The patient is aware of the risks of bleeding, infection and scarring. Informed consent paperwork was signed by the patient.     Liquid nitrogen was applied to the affected areas until freezing was noted then a thaw was allowed between dosing for a total of 2  cycles. Applied to 1 lesion(s). The patient tolerated the procedure well. Post op instructions verbally given. A printed copy provided. ---------------------------------------------------------------------  Side effects, adverse effects of the medication prescribed today, as well as treatment plan/ rationale and result expectations have been discussed with the patient who expresses understanding and desires to proceed. Close follow up to evaluate treatment results and for coordination of care. I have reviewed the patient's medical history in detail and updated the computerized patient record. As always, patient is advised that if symptoms worsen in any way they will proceed to the nearest emergency room. --------------------------------------------------------------------    Return in about 2 weeks (around 3/16/2021) for cryo. An  electronic signature was used to authenticate this note.     --Gallito Roman DO on 3/2/2021 at 3:52 PM

## 2021-03-02 NOTE — PATIENT INSTRUCTIONS
Cryosurgery (Liquid Nitrogen Therapy) Aftercare Instructions    Cryosurgery involves using liquid nitrogen (approximately -320 degrees F) to freeze and destroy abnormal skin tissue including, but not limited to, warts, skin tags, seborrheic keratosis, actinic keratosis, and other benign or pre-cancerous growths. Your provider, Dr. Jada Nielsen, uses liquid nitrogen by spraying the liquid directly on the skin growth or applying it on using a cotton swab. This causes some stinging and burning while the  growth is being frozen and may continue while it thaws. Throbbing may persist on any areas, especially the face or forehead, but this will not last long.  (We recommend Tylenol or aspirin  for any persistent pain.) It is best to leave the procedure site alone. A blister may form in 1-3 days which is desired to allow the damaged tissue (precancerous lesion, wart, or whatever lesion is being removed) to separate from healthy tissue. Please cover the blister with a bandage to prevent blister breakage and dirt exposure. The wound(s) should remain dry while there is a blister. If this is a sweaty location like the foot you may need to change socks multiple times per day. When the blister(s) pop, apply Vaseline (NOT triple antibiotic, like Neosporin) and a bandage to the wound. If blistering does not cause the lesion to separate from healthy skin, gently help separate the lesion from normal skin on day 3-5. You may stop using a Band-Aid once the wound has formed a scab. Continue using Vaseline at least once a day to keep the scab moist.  This will improve wound healing. The scab may appear yellow while moist, this is not a sign of infection. If the wound is infected pus will drain from the site. Please call the office immediately if these symptoms occur. If this treatment was for a large wart you may notice a plug of skin may fall out of the area that was treated. This is the center of the wart and it is appropriate for it to come out. Healing takes 1-3 weeks, after which the skin may look perfectly normal or slightly lighter in color. Actinic keratosis  Actinic keratosis (AK) is a precancerous lesion and has the possibility of transformation into squamous cell carcinoma. AKs result from long term exposure to UV light. These lesions commonly appear on the face, lips, ears, back of hands, forearms, scalp and neck. They are rough in texture and may come and go. Continue to monitor you skin for any new spots. Avoid being outside during high sun exposure. Wear SPF (30+) if you are going to be outdoors and reapply frequently. -------------------------------------------------------------------------------------         Actinic Keratosis: Care Instructions  Your Care Instructions  Actinic keratosis is a skin growth caused by sun damage. It can turn into skin cancer, but this isn't common. Actinic keratoses, also called solar keratoses, are small red, brown, or skin-colored scaly patches. They are most common on the face, neck, hands, and forearms. Your doctor can remove these growths by freezing or scraping them off or by putting medicines on them. Follow-up care is a key part of your treatment and safety. Be sure to make and go to all appointments, and call your doctor if you are having problems. It's also a good idea to know your test results and keep a list of the medicines you take. How can you care for yourself at home? · If your doctor told you how to care for the treated area, follow your doctor's instructions. If you did not get instructions, follow this general advice:  ? Wash around the area with clean water 2 times a day. Don't use hydrogen peroxide or alcohol, which can slow healing. ? You may cover the area with a thin layer of petroleum jelly, such as Vaseline, and a nonstick bandage. ? Apply more petroleum jelly and replace the bandage as needed. To prevent actinic keratosis  · Always wear sunscreen on exposed skin. Make sure to use a broad-spectrum sunscreen that has a sun protection factor (SPF) of 30 or higher. Use it every day, even when it is cloudy. · Wear long sleeves, a hat, and pants if you are going to be outdoors for a long time. · Avoid the sun between 10 a.m. and 4 p.m., the peak time for UV rays. · Do not use tanning booths or sunlamps. When should you call for help? Watch closely for changes in your health, and be sure to contact your doctor if:  · You have symptoms of infection, such as:  ? Increased pain, swelling, warmth, or redness. ? Red streaks leading from the area. ? Pus draining from the area. ? A fever. Where can you learn more? Go to [email or web address] and sign in to your [employer] account. Enter L364 in the [facility] box to learn more about \"Actinic Keratosis: Care Instructions. \"     If you do not have an account, please click on the \"Sign Up Now\" link. Current as of: October 31, 2019               Content Version: 12.5  © 5500-9920 [employer]. Care instructions adapted under license by [facility] ([facility]). If you have questions about a medical condition or this instruction, always ask your healthcare professional. [address] any warranty or liability for your use of this information.

## 2021-03-18 ENCOUNTER — TELEPHONE (OUTPATIENT)
Dept: ORTHOPEDIC SURGERY | Age: 76
End: 2021-03-18

## 2021-03-18 ENCOUNTER — SURGICAL CONSULT (OUTPATIENT)
Dept: ORTHOPEDIC SURGERY | Age: 76
End: 2021-03-18

## 2021-03-22 NOTE — PROGRESS NOTES
MD Denis   nitroGLYCERIN (NITROSTAT) 0.4 MG SL tablet Place 1 tablet under the tongue every 5 minutes as needed for Chest pain Dissolve 1 tab under tongue at first sign of chest pain every 5 minutes as needed. If pain persists after taking 3 tabs in a 15-minute period, or the pain is different than is typically experienced, call 9-1-1 immediately.  Yes Carol Haque MD   magnesium oxide (MAG-OX) 400 MG tablet Take 400 mg by mouth daily Yes Historical Provider, MD   Handicap Placard MISC by Does not apply route Exp 5 years Yes Carol Haque MD   Multiple Vitamins-Minerals (MULTI COMPLETE PO) Take by mouth Yes Historical Provider, MD   finasteride (PROSCAR) 5 MG tablet take 1 tablet by mouth once daily  Patient taking differently: Take 5 mg by mouth daily take 1 tablet by mouth once daily Yes Carol Haque MD   hydroCHLOROthiazide (HYDRODIURIL) 25 MG tablet take 1 tablet by mouth once daily Yes Carol Haque MD        Allergies   Allergen Reactions    Ace Inhibitors Other (See Comments)     Cough      Lisinopril Other (See Comments)     Cough         Past Medical History:   Diagnosis Date    Actinic keratosis     Basal cell carcinoma, trunk 12/2017    left upper back    Basal cell carcinoma, trunk     BPH (benign prostatic hyperplasia)     CAD (coronary artery disease) 2012    has 4 cardiac stents    Heart attack ([address]     History of MI (myocardial infarction) 12/11/2020    History of mycobacterial infection 6/22/2018    Hyperlipidemia     meds > 15 yrs    Hypertension     meds > 6 yrs / denies TIA or stroke    Hypertrophic scar     Myocardial infarct ([address] 11/2012    Osteoarthritis     all joints    Overweight 12/22/2017    Status post coronary angioplasty     Thyroid disease     meds > 1 month       Past Surgical History:   Procedure Laterality Date    ACHILLES TENDON SURGERY Left 7/10/2020    LEFT REPAIR OF RUPTURED ACHILLES TENDON, performed by Harlan Romo DPM [address] Bilateral 2015    COLONOSCOPY  3/23/15        COLONOSCOPY N/A 2020    COLORECTAL CANCER SCREENING, HIGH RISK performed by Alina Silva MD at [facility]      CYSTOSCOPY  2017    W/COMPLEX CYSTOMETROGRAM-COMPLEX UROFLOW-TRANSRECTAL US PROSTATE    EYE SURGERY      Lasik OU    JOINT REPLACEMENT Left     LTKR    VT TOTAL KNEE ARTHROPLASTY Right 3/15/2018    RIGHT KNEE TOTAL KNEE  ARTHROPLASTY performed by Giorgi Saha MD at [address] Right     twice    SHOULDER SURGERY Right      RCR    TONSILLECTOMY      TURP N/A 2017    GREEN LIGHT LASER TRANSURETHRAL RESECTIOIN PROSTATE performed by Luann Lilly MD at [address] [place] [address] PROSTATE/TRANSRECTAL  06/03/15    Cystoscop, cath rem, US prostate    UVULECTOMY      due to snoring       Social History     Socioeconomic History    Marital status:      Spouse name: Not on file    Number of children: Not on file    Years of education: Not on file    Highest education level: Not on file   Occupational History    Not on file   Social Needs    Financial resource strain: Not on file    Food insecurity     Worry: Not on file     Inability: Not on file    Transportation needs     Medical: Not on file     Non-medical: Not on file   Tobacco Use    Smoking status: Former Smoker     Packs/day: 1.00     Years: 7.00     Pack years: 7.00     Start date: 1961     Quit date: 3/17/1967     Years since quittin.0    Smokeless tobacco: Never Used    Tobacco comment: Quit for [employer]   Substance and Sexual Activity    Alcohol use: No     Types: 30 Cans of beer per week     Comment: No alcohol  since     Drug use: No    Sexual activity: Not on file   Lifestyle    Physical activity     Days per week: Not on file     Minutes per session: Not on file    Stress: Not on file   Relationships    Social connections     Talks on phone: Not on file     Gets together: Not on file     Attends Confucianist service: Not on file     Active member of club or organization: Not on file     Attends meetings of clubs or organizations: Not on file     Relationship status: Not on file    Intimate partner violence     Fear of current or ex partner: Not on file     Emotionally abused: Not on file     Physically abused: Not on file     Forced sexual activity: Not on file   Other Topics Concern    Not on file   Social History Narrative    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 Weight as of this encounter: 199 lb (90.3 kg). No results for input(s): WBC, RBC, HGB, HCT, MCV, MCH, MCHC, RDW, PLT, MPV in the last 72 hours. No results for input(s): NA, K, CL, CO2, BUN, CREATININE, GLUCOSE, CALCIUM, PROT, LABALBU, BILITOT, ALKPHOS, AST, ALT in the last 72 hours. Lab Results   Component Value Date    LABA1C 5.4 06/02/2019       No results found. Physical Exam  Constitutional:       Appearance: Normal appearance. He is normal weight. HENT:      Head: Normocephalic and atraumatic. Nose: No rhinorrhea. Mouth/Throat:      Mouth: Mucous membranes are moist.      Pharynx: Oropharynx is clear. Eyes:      Extraocular Movements: Extraocular movements intact. Conjunctiva/sclera: Conjunctivae normal.   Skin:     Findings: No lesion or rash.       Comments: Stuck-on skin colored papule with verrucous appearance right cheek - appears flatter since last appointment, several AKs scattered around the scalp   Neurological:      General: No focal deficit present. Mental Status: He is alert and oriented to person, place, and time. Mental status is at baseline. Psychiatric:         Mood and Affect: Mood normal.         Thought Content: Thought content normal.         Judgment: Judgment normal.         ASSESSMENT/PLAN:  1. Actinic keratoses  Cryotherapy Actinic Keratosis:   Reason for treatment: It was explained that actinic keratosis are precancerous. Consent: The patient understood all the risks and benefits prior to treatment. The risks explained included scarring, hyper and/or hypopigmentation. Although this treatment is highly effective, recurrences do occur and this was explained to the patient. The patient further understood that these lesions are precancerous and may develop into a malignancy. Number of lesions treated/ location: temples and crown of scalp, 5 lesions    Method: Liquid nitrogen was used to treat the lesion(s) with two freeze-thaw cycles. Post-op: The patient was instructed to clean the site normally twice a day. Signs of infection were reviewed and patient was instructed to call if he/ she develops increasing pain, purulent drainage, or beefy redness. The patient was informed that a blister may occur at the cryo site and that this is an expected event. [facility] protection was reviewed and patient advised to use sunscreen with SPF 30 or greater on exposed skin when outdoors. Post-op instructions were given orally and in writing. 2. Verrucous keratosis  Procedure: The procedure was discussed with the patient. All questions were answered and alternative options discussed. The patient is aware of the risks of bleeding, infection and scarring. Informed consent paperwork was signed by the patient.     Liquid nitrogen was applied to the affected areas until freezing was noted then a thaw was allowed between dosing for a total of 3 cycles. Applied to 1 lesion(s). The patient tolerated the procedure well. Post op instructions verbally given. A printed copy provided. ---------------------------------------------------------------------  Side effects, adverse effects of the medication prescribed today, as well as treatment plan/ rationale and result expectations have been discussed with the patient who expresses understanding and desires to proceed. Close follow up to evaluate treatment results and for coordination of care. I have reviewed the patient's medical history in detail and updated the computerized patient record. As always, patient is advised that if symptoms worsen in any way they will proceed to the nearest emergency room. --------------------------------------------------------------------    Return in about 6 months (around 9/23/2021) for Skin check w/Dr. Royce Andino. An  electronic signature was used to authenticate this note.     --Chhaya Jones,  on 3/23/2021 at 11:26 AM

## 2021-03-23 ENCOUNTER — OFFICE VISIT (OUTPATIENT)
Dept: FAMILY MEDICINE CLINIC | Age: 76
End: 2021-03-23
Payer: MEDICARE

## 2021-03-23 VITALS
HEIGHT: 68 IN | DIASTOLIC BLOOD PRESSURE: 68 MMHG | BODY MASS INDEX: 30.16 KG/M2 | WEIGHT: 199 LBS | TEMPERATURE: 98.4 F | SYSTOLIC BLOOD PRESSURE: 128 MMHG

## 2021-03-23 DIAGNOSIS — L82.1 VERRUCOUS KERATOSIS: ICD-10-CM

## 2021-03-23 DIAGNOSIS — L57.0 ACTINIC KERATOSES: Primary | ICD-10-CM

## 2021-03-23 PROCEDURE — 17000 DESTRUCT PREMALG LESION: CPT | Performed by: STUDENT IN AN ORGANIZED HEALTH CARE EDUCATION/TRAINING PROGRAM

## 2021-03-23 PROCEDURE — 17110 DESTRUCTION B9 LES UP TO 14: CPT | Performed by: STUDENT IN AN ORGANIZED HEALTH CARE EDUCATION/TRAINING PROGRAM

## 2021-03-23 PROCEDURE — 17003 DESTRUCT PREMALG LES 2-14: CPT | Performed by: STUDENT IN AN ORGANIZED HEALTH CARE EDUCATION/TRAINING PROGRAM

## 2021-03-23 ASSESSMENT — ENCOUNTER SYMPTOMS
SHORTNESS OF BREATH: 0
SORE THROAT: 0
ABDOMINAL PAIN: 0
ROS SKIN COMMENTS: SKIN LESION
VOMITING: 0
SINUS PRESSURE: 0
COUGH: 0

## 2021-03-23 NOTE — PATIENT INSTRUCTIONS
Cryosurgery (Liquid Nitrogen Therapy) Aftercare Instructions    Cryosurgery involves using liquid nitrogen (approximately -320 degrees F) to freeze and destroy abnormal skin tissue including, but not limited to, warts, skin tags, seborrheic keratosis, actinic keratosis, and other benign or pre-cancerous growths. Your provider, Dr. Ariel Lazaro, uses liquid nitrogen by spraying the liquid directly on the skin growth or applying it on using a cotton swab. This causes some stinging and burning while the  growth is being frozen and may continue while it thaws. Throbbing may persist on any areas, especially the face or forehead, but this will not last long. (We recommend Tylenol or aspirin  for any persistent pain.)    It is best to leave the procedure site alone. A blister may form in 1-3 days which is desired to allow the damaged tissue (precancerous lesion, wart, or whatever lesion is being removed) to separate from healthy tissue. Please cover the blister with a bandage to prevent blister breakage and dirt exposure. The wound(s) should remain dry while there is a blister. If this is a sweaty location like the foot you may need to change socks multiple times per day. When the blister(s) pop, apply Vaseline (NOT triple antibiotic, like Neosporin) and a bandage to the wound. If blistering does not cause the lesion to separate from healthy skin, gently help separate the lesion from normal skin on day 3-5. You may stop using a Band-Aid once the wound has formed a scab. Continue using Vaseline at least once a day to keep the scab moist.  This will improve wound healing. The scab may appear yellow while moist, this is not a sign of infection. If the wound is infected pus will drain from the site. Please call the office immediately if these symptoms occur. If this treatment was for a large wart you may notice a plug of skin may fall out of the area that was treated.   This is the center of the wart and it is appropriate for it to come out. Healing takes 1-3 weeks, after which the skin may look perfectly normal or slightly lighter in color.

## 2021-04-16 DIAGNOSIS — N40.1 BPH WITH OBSTRUCTION/LOWER URINARY TRACT SYMPTOMS: ICD-10-CM

## 2021-04-16 DIAGNOSIS — N13.8 BPH WITH OBSTRUCTION/LOWER URINARY TRACT SYMPTOMS: ICD-10-CM

## 2021-04-16 RX ORDER — HYDROCHLOROTHIAZIDE 25 MG/1
TABLET ORAL
Qty: 90 TABLET | Refills: 3 | Status: SHIPPED | OUTPATIENT
Start: 2021-04-16 | End: 2021-08-11

## 2021-04-16 RX ORDER — FINASTERIDE 5 MG/1
5 TABLET, FILM COATED ORAL DAILY
Qty: 90 TABLET | Refills: 3 | Status: SHIPPED | OUTPATIENT
Start: 2021-04-16 | End: 2022-03-21

## 2021-05-03 DIAGNOSIS — N40.1 BPH WITH OBSTRUCTION/LOWER URINARY TRACT SYMPTOMS: ICD-10-CM

## 2021-05-03 DIAGNOSIS — N13.8 BPH WITH OBSTRUCTION/LOWER URINARY TRACT SYMPTOMS: ICD-10-CM

## 2021-05-03 DIAGNOSIS — E03.9 HYPOTHYROIDISM, UNSPECIFIED TYPE: ICD-10-CM

## 2021-05-03 RX ORDER — TAMSULOSIN HYDROCHLORIDE 0.4 MG/1
CAPSULE ORAL
Qty: 180 CAPSULE | Refills: 1 | Status: SHIPPED | OUTPATIENT
Start: 2021-05-03 | End: 2021-10-28

## 2021-05-03 RX ORDER — CARVEDILOL 12.5 MG/1
TABLET ORAL
Qty: 180 TABLET | Refills: 1 | Status: SHIPPED | OUTPATIENT
Start: 2021-05-03 | End: 2022-03-21

## 2021-05-03 RX ORDER — LEVOTHYROXINE SODIUM 0.05 MG/1
TABLET ORAL
Qty: 90 TABLET | Refills: 1 | Status: SHIPPED | OUTPATIENT
Start: 2021-05-03 | End: 2021-10-28

## 2021-05-26 ENCOUNTER — OFFICE VISIT (OUTPATIENT)
Dept: UROLOGY | Age: 76
End: 2021-05-26
Payer: MEDICARE

## 2021-05-26 VITALS
SYSTOLIC BLOOD PRESSURE: 126 MMHG | WEIGHT: 190.2 LBS | HEIGHT: 68 IN | HEART RATE: 73 BPM | DIASTOLIC BLOOD PRESSURE: 60 MMHG | BODY MASS INDEX: 28.82 KG/M2

## 2021-05-26 DIAGNOSIS — N40.1 HYPERTROPHY OF PROSTATE WITH URINARY OBSTRUCTION: ICD-10-CM

## 2021-05-26 DIAGNOSIS — N13.8 HYPERTROPHY OF PROSTATE WITH URINARY OBSTRUCTION: ICD-10-CM

## 2021-05-26 DIAGNOSIS — R31.0 GROSS HEMATURIA: Primary | ICD-10-CM

## 2021-05-26 PROCEDURE — 51798 US URINE CAPACITY MEASURE: CPT | Performed by: UROLOGY

## 2021-05-26 PROCEDURE — 99214 OFFICE O/P EST MOD 30 MIN: CPT | Performed by: UROLOGY

## 2021-05-26 ASSESSMENT — ENCOUNTER SYMPTOMS
ABDOMINAL DISTENTION: 0
ABDOMINAL PAIN: 0
SHORTNESS OF BREATH: 0

## 2021-05-26 NOTE — PROGRESS NOTES
Subjective:      Patient ID: Skip Gandhi is a 77 y.o. male. HPI  This is a 66 yo male with h/o HTN, OA, CAD/MI/ASA/stents (Dr Alexander Navas), and BPH with LUTs on Flomax BID and Proscar and 3 prior episodes of urinary retention and is s/p  Laser TURP (7/17/17) back in follow-up. Since last seen on 5/27/20, he has a good flow and last performed self-cath about 2 yrs ago and had difficulty and some blood and has not tried since. He has no interval dysuria or pain. He has a good flow. He had cystoscopy on 10/3/17, that confirmed meatal stenosis and bulbous stricture both dilated at the time of cystoscopy. He has no incontinence. He has no interval surgical problems, however, he has been recently started on oral anticoagulants by Dr Alexander Navas and has noticed some hematuria after BM's she switched to Plavix and this has improved. He has elected no further PSA testing at prior visits.        Past Medical History:   Diagnosis Date    Actinic keratosis     Basal cell carcinoma, trunk 12/2017    left upper back    Basal cell carcinoma, trunk     BPH (benign prostatic hyperplasia)     CAD (coronary artery disease) 2012    has 4 cardiac stents    Heart attack ([address]     History of MI (myocardial infarction) 12/11/2020    History of mycobacterial infection 6/22/2018    Hyperlipidemia     meds > 15 yrs    Hypertension     meds > 6 yrs / denies TIA or stroke    Hypertrophic scar     Myocardial infarct ([address] 11/2012    Osteoarthritis     all joints    Overweight 12/22/2017    Status post coronary angioplasty     Thyroid disease     meds > 1 month     Past Surgical History:   Procedure Laterality Date    ACHILLES TENDON SURGERY Left 7/10/2020    LEFT REPAIR OF RUPTURED ACHILLES TENDON, performed by Ezekiel Vyas DPM at [address] Bilateral 2015    COLONOSCOPY  3/23/15        COLONOSCOPY N/A 5/11/2020    COLORECTAL CANCER SCREENING, HIGH RISK performed by Tato Wiseman MD [facility]    CORONARY ANGIOPLASTY WITH STENT PLACEMENT  2012    CYSTOSCOPY  2017    W/COMPLEX CYSTOMETROGRAM-COMPLEX UROFLOW-TRANSRECTAL US PROSTATE    EYE SURGERY      Lasik OU    JOINT REPLACEMENT Left     LTKR    TX TOTAL KNEE ARTHROPLASTY Right 3/15/2018    RIGHT KNEE TOTAL KNEE  ARTHROPLASTY performed by Roslyn Unger MD at [facility] Right     twice    SHOULDER SURGERY Right      RCR    TONSILLECTOMY      TURP N/A 2017    GREEN LIGHT LASER TRANSURETHRAL RESECTIOIN PROSTATE performed by Felicity Jauregui MD at [address]  06/03/15    Cystoscop, cath rem, US prostate   [address]    due to snoring     Social History     Socioeconomic History    Marital status:      Spouse name: None    Number of children: None    Years of education: None    Highest education level: None   Occupational History    None   Tobacco Use    Smoking status: Former Smoker     Packs/day: 1.00     Years: 7.00     Pack years: 7.00     Start date: 1961     Quit date: 3/17/1967     Years since quittin.2    Smokeless tobacco: Never Used    Tobacco comment: Quit for [employer]   Vaping Use    Vaping Use: Never used   Substance and Sexual Activity    Alcohol use: No     Types: 30 Cans of beer per week     Comment: No alcohol  since     Drug use: No    Sexual activity: None   Other Topics Concern    None   Social History Narrative    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     Social Determinants of Health     Financial Resource Strain:     Difficulty of Paying Living Expenses:    Food Insecurity:     Worried About [address] in the Last Year:    [address] in the Last Year:    Transportation Needs:     Lack of Transportation (Medical):      Lack of Transportation (Non-Medical):    Physical Activity:     Days of Exercise per Week:     Minutes of Exercise per Session: Stress:     Feeling of Stress :    Social Connections:     Frequency of Communication with Friends and Family:     Frequency of Social Gatherings with Friends and Family:     Attends [employer] Services:     Active Member of Clubs or Organizations:     Attends Club or Organization Meetings:     Marital Status:    Intimate Partner Violence:     Fear of Current or Ex-Partner:     Emotionally Abused:     Physically Abused:     Sexually Abused:      Family History   Problem Relation Age of Onset    Kidney Disease Father     Other Mother         Perforated intestine    No Known Problems Sister     No Known Problems Brother     No Known Problems Sister     No Known Problems Brother     No Known Problems Son      Current Outpatient Medications   Medication Sig Dispense Refill    tamsulosin (FLOMAX) 0.4 MG capsule take 1 capsule by mouth twice a day 180 capsule 1    carvedilol (COREG) 12.5 MG tablet take 1 tablet by mouth twice a day with food 180 tablet 1    levothyroxine (SYNTHROID) 50 MCG tablet take 1 tablet by mouth once daily 90 tablet 1    hydroCHLOROthiazide (HYDRODIURIL) 25 MG tablet take 1 tablet by mouth once daily 90 tablet 3    finasteride (PROSCAR) 5 MG tablet Take 1 tablet by mouth daily take 1 tablet by mouth once daily 90 tablet 3    diclofenac (VOLTAREN) 75 MG EC tablet take 1 tablet by mouth twice a day if needed for pain 180 tablet 3    clopidogrel (PLAVIX) 75 MG tablet Take 1 tablet by mouth daily 90 tablet 1    atorvastatin (LIPITOR) 40 MG tablet take 1 tablet by mouth once daily 90 tablet 3    Niacin (VITAMIN B-3 PO) Take by mouth      nitroGLYCERIN (NITROSTAT) 0.4 MG SL tablet Place 1 tablet under the tongue every 5 minutes as needed for Chest pain Dissolve 1 tab under tongue at first sign of chest pain every 5 minutes as needed. If pain persists after taking 3 tabs in a 15-minute period, or the pain is different than is typically experienced, call 9-1-1 immediately.  25 a full hematuria evaluation with CT Urogram and then cystoscopy. The risks and benefits of cystoscopy including but not limited to infection, pain, bleeding, stricture, retention, perforation, need for multiple procedures and he wants to proceed as planned. Will discuss holding Plavix with Dr Jann Argueta. Plan:      1. CT Urogram  2. F/U 2-3 weeks for office cystoscopy, local and possible dilation of urethra  3.  Hold Plavix 7-10 days prior        Indu Barbour MD

## 2021-05-28 ENCOUNTER — TELEPHONE (OUTPATIENT)
Dept: CARDIOLOGY CLINIC | Age: 76
End: 2021-05-28

## 2021-05-28 NOTE — TELEPHONE ENCOUNTER
Alyssa Pritchard DO sent to Jaycob Mariscal RN  [id number] [address] for patient to hold plavix for 7-10 days for urological procedure. I forwarded the information to urology and to [address]

## 2021-05-28 NOTE — TELEPHONE ENCOUNTER
DR. Sylvia Brown OFFICE CALLED TO LETTER FOR CARDIAC CLEARANCE TO HOLD PLAVIX 7-10 DAYS PRIOR TO PROCEDURE    PATIENT TO SEE DR. SANCHEZ ON 6/23 FOR THE PROCEDURE    PLEASE ADVISE AND THANK YOU            PLEASE SEND LETTER INFO TO    [address] [place] [address]

## 2021-06-01 NOTE — PROGRESS NOTES
2021    Gilberto Castillo (:  1945) is a 77 y.o. male, here for evaluation of the following medical concerns:  Chief Complaint   Patient presents with    Skin Problem     Spot on right cheek that has been frozen off previously but has came back.  6 Month Follow-Up     HPI  Right cheek lesion  Shave biopsy done   Results showed verrucous keratosis      Pt states lesions went away but then shortly after grew back  Cryo 3/2 to cheek lesion along with 8 AKs on the forehead and temples        Review of Systems   Constitutional: Negative for chills and fever. HENT: Negative for congestion, sinus pressure and sore throat. Respiratory: Negative for cough and shortness of breath. Cardiovascular: Negative for chest pain and palpitations. Gastrointestinal: Negative for abdominal pain and vomiting. Musculoskeletal: Negative for arthralgias and myalgias. Skin: Negative for rash and wound. Skin lesion   Neurological: Negative for speech difficulty and light-headedness. Psychiatric/Behavioral: Negative for suicidal ideas. The patient is not nervous/anxious. Prior to Visit Medications    Medication Sig Taking?  Authorizing Provider   tamsulosin (FLOMAX) 0.4 MG capsule take 1 capsule by mouth twice a day Yes Flori Scruggs MD   carvedilol (COREG) 12.5 MG tablet take 1 tablet by mouth twice a day with food Yes Flori Scruggs MD   levothyroxine (SYNTHROID) 50 MCG tablet take 1 tablet by mouth once daily Yes Flori Scruggs MD   hydroCHLOROthiazide (HYDRODIURIL) 25 MG tablet take 1 tablet by mouth once daily Yes Flori Scruggs MD   finasteride (PROSCAR) 5 MG tablet Take 1 tablet by mouth daily take 1 tablet by mouth once daily Yes Flori Scruggs MD   diclofenac (VOLTAREN) 75 MG EC tablet take 1 tablet by mouth twice a day if needed for pain Yes Dylna J Holiday, DO   clopidogrel (PLAVIX) 75 MG tablet Take 1 tablet by mouth daily Yes Dylan NORTH Holiday, DO   atorvastatin (LIPITOR) 40 MG tablet take 1 tablet by mouth once daily Yes Dylan Naik, DO   Niacin (VITAMIN B-3 PO) Take by mouth Yes Historical Provider, MD   nitroGLYCERIN (NITROSTAT) 0.4 MG SL tablet Place 1 tablet under the tongue every 5 minutes as needed for Chest pain Dissolve 1 tab under tongue at first sign of chest pain every 5 minutes as needed. If pain persists after taking 3 tabs in a 15-minute period, or the pain is different than is typically experienced, call 9-1-1 immediately.  Yes Boris Coleman MD   magnesium oxide (MAG-OX) 400 MG tablet Take 400 mg by mouth daily Yes Historical Provider, MD   Handicap Placard MISC by Does not apply route Exp 5 years Yes Boris Coleman MD   Multiple Vitamins-Minerals (MULTI COMPLETE PO) Take by mouth Yes Historical Provider, MD        Allergies   Allergen Reactions    Ace Inhibitors Other (See Comments)     Cough      Lisinopril Other (See Comments)     Cough         Past Medical History:   Diagnosis Date    Actinic keratosis     Basal cell carcinoma, trunk 12/2017    left upper back    Basal cell carcinoma, trunk     BPH (benign prostatic hyperplasia)     CAD (coronary artery disease) 2012    has 4 cardiac stents    Heart [facility] [address] [address]     History of MI (myocardial infarction) 12/11/2020    History of mycobacterial infection 6/22/2018    Hyperlipidemia     meds > 15 yrs    Hypertension     meds > 6 yrs / denies TIA or stroke    Hypertrophic scar     Myocardial infarct ([facility] [address] 11/2012    Osteoarthritis     all joints    Overweight 12/22/2017    Status post coronary angioplasty     Thyroid disease     meds > 1 month       Past Surgical History:   Procedure Laterality Date    ACHILLES TENDON SURGERY Left 7/10/2020    LEFT REPAIR OF RUPTURED ACHILLES TENDON, performed by Coleman Dennis DPM at [address] [facility], [address] Bilateral 2015    COLONOSCOPY  3/23/15        COLONOSCOPY N/A 5/11/2020    COLORECTAL CANCER SCREENING, HIGH RISK performed by Eduardo Jack MD at [facility] verrucous appearance right cheek - appears flatter since last appointment, several AKs scattered around the scalp   Neurological:      General: No focal deficit present. Mental Status: He is alert and oriented to person, place, and time. Mental status is at baseline. Psychiatric:         Mood and Affect: Mood normal.         Thought Content: Thought content normal.         Judgment: Judgment normal.                 ASSESSMENT/PLAN:  1. Verrucous keratosis  -Shave biopsy done 12/2 right cheek, pathology results verrucous keratosis  -Over the last several months the lesion has recurred, it is flatter than previously  -Due to recurrence patient would like it removed at this time  -Patient scheduled for excisional biopsy with Dr. Jennifer Juares    2. Actinic keratoses  Cryotherapy Actinic Keratosis:     Reason for treatment: It was explained that actinic keratosis are precancerous. Consent: The patient understood all the risks and benefits prior to treatment. The risks explained included scarring, hyper and/or hypopigmentation. Although this treatment is highly effective, recurrences do occur and this was explained to the patient. The patient further understood that these lesions are precancerous and may develop into a malignancy. Number of lesions treated/ location: x9, Diffuse around the scalp and temples    Method: Liquid nitrogen was used to treat the lesion(s) with two freeze-thaw cycles. Post-op: The patient was instructed to clean the site normally twice a day. Signs of infection were reviewed and patient was instructed to call if he/ she develops increasing pain, purulent drainage, or beefy redness. The patient was informed that a blister may occur at the cryo site and that this is an expected event. Eulas Stager protection was reviewed and patient advised to use sunscreen with SPF 30 or greater on exposed skin when outdoors. Post-op instructions were given orally and in writing. ---------------------------------------------------------------------  Side effects, adverse effects of the medication prescribed today, as well as treatment plan/ rationale and result expectations have been discussed with the patient who expresses understanding and desires to proceed. Close follow up to evaluate treatment results and for coordination of care. I have reviewed the patient's medical history in detail and updated the computerized patient record. As always, patient is advised that if symptoms worsen in any way they will proceed to the nearest emergency room. --------------------------------------------------------------------    Return in about 2 weeks (around 6/16/2021) for 30 min procedure Dr. Angi umana; 6 months Dr. Elias Peraza. An  electronic signature was used to authenticate this note.     --Conrad Marshall DO on 6/2/2021 at 10:28 AM

## 2021-06-02 ENCOUNTER — OFFICE VISIT (OUTPATIENT)
Dept: FAMILY MEDICINE CLINIC | Age: 76
End: 2021-06-02
Payer: MEDICARE

## 2021-06-02 ENCOUNTER — HOSPITAL ENCOUNTER (OUTPATIENT)
Dept: CT IMAGING | Age: 76
Discharge: HOME | End: 2021-06-04
Payer: MEDICARE

## 2021-06-02 VITALS
OXYGEN SATURATION: 98 % | WEIGHT: 190 LBS | HEART RATE: 72 BPM | BODY MASS INDEX: 28.79 KG/M2 | TEMPERATURE: 97.2 F | HEIGHT: 68 IN

## 2021-06-02 DIAGNOSIS — L57.0 ACTINIC KERATOSES: ICD-10-CM

## 2021-06-02 DIAGNOSIS — L82.1 VERRUCOUS KERATOSIS: Primary | ICD-10-CM

## 2021-06-02 DIAGNOSIS — R31.0 GROSS HEMATURIA: ICD-10-CM

## 2021-06-02 LAB
GFR AFRICAN AMERICAN: >60
GFR NON-AFRICAN AMERICAN: 59
PERFORMED ON: ABNORMAL
POC CREATININE: 1.2 MG/DL (ref 0.8–1.3)
POC SAMPLE TYPE: ABNORMAL

## 2021-06-02 PROCEDURE — 74178 CT ABD&PLV WO CNTR FLWD CNTR: CPT

## 2021-06-02 PROCEDURE — 17110 DESTRUCTION B9 LES UP TO 14: CPT | Performed by: STUDENT IN AN ORGANIZED HEALTH CARE EDUCATION/TRAINING PROGRAM

## 2021-06-02 PROCEDURE — 6360000004 HC RX CONTRAST MEDICATION: Performed by: UROLOGY

## 2021-06-02 PROCEDURE — 99213 OFFICE O/P EST LOW 20 MIN: CPT | Performed by: STUDENT IN AN ORGANIZED HEALTH CARE EDUCATION/TRAINING PROGRAM

## 2021-06-02 RX ADMIN — IOPAMIDOL 100 ML: 755 INJECTION, SOLUTION INTRAVENOUS at 15:52

## 2021-06-02 SDOH — ECONOMIC STABILITY: FOOD INSECURITY: WITHIN THE PAST 12 MONTHS, YOU WORRIED THAT YOUR FOOD WOULD RUN OUT BEFORE YOU GOT MONEY TO BUY MORE.: NEVER TRUE

## 2021-06-02 SDOH — ECONOMIC STABILITY: FOOD INSECURITY: WITHIN THE PAST 12 MONTHS, THE FOOD YOU BOUGHT JUST DIDN'T LAST AND YOU DIDN'T HAVE MONEY TO GET MORE.: NEVER TRUE

## 2021-06-02 ASSESSMENT — ENCOUNTER SYMPTOMS
SHORTNESS OF BREATH: 0
VOMITING: 0
SINUS PRESSURE: 0
ROS SKIN COMMENTS: SKIN LESION
ABDOMINAL PAIN: 0
COUGH: 0
SORE THROAT: 0

## 2021-06-02 ASSESSMENT — SOCIAL DETERMINANTS OF HEALTH (SDOH): HOW HARD IS IT FOR YOU TO PAY FOR THE VERY BASICS LIKE FOOD, HOUSING, MEDICAL CARE, AND HEATING?: NOT HARD AT ALL

## 2021-06-02 NOTE — PATIENT INSTRUCTIONS
Cryosurgery (Liquid Nitrogen Therapy) Aftercare Instructions    Cryosurgery involves using liquid nitrogen (approximately -320 degrees F) to freeze and destroy abnormal skin tissue including, but not limited to, warts, skin tags, seborrheic keratosis, actinic keratosis, and other benign or pre-cancerous growths. Your provider, Dr. Jaclyn Love, uses liquid nitrogen by spraying the liquid directly on the skin growth or applying it on using a cotton swab. This causes some stinging and burning while the  growth is being frozen and may continue while it thaws. Throbbing may persist on any areas, especially the face or forehead, but this will not last long. (We recommend Tylenol or aspirin  for any persistent pain.)    It is best to leave the procedure site alone. A blister may form in 1-3 days which is desired to allow the damaged tissue (precancerous lesion, wart, or whatever lesion is being removed) to separate from healthy tissue. Please cover the blister with a bandage to prevent blister breakage and dirt exposure. The wound(s) should remain dry while there is a blister. If this is a sweaty location like the foot you may need to change socks multiple times per day. When the blister(s) pop, apply Vaseline (NOT triple antibiotic, like Neosporin) and a bandage to the wound. If blistering does not cause the lesion to separate from healthy skin, gently help separate the lesion from normal skin on day 3-5. You may stop using a Band-Aid once the wound has formed a scab. Continue using Vaseline at least once a day to keep the scab moist.  This will improve wound healing. The scab may appear yellow while moist, this is not a sign of infection. If the wound is infected pus will drain from the site. Please call the office immediately if these symptoms occur. If this treatment was for a large wart you may notice a plug of skin may fall out of the area that was treated.   This is the center of the wart and it is appropriate for it to come out. Healing takes 1-3 weeks, after which the skin may look perfectly normal or slightly lighter in color. Actinic keratosis  Actinic keratosis (AK) is a precancerous lesion and has the possibility of transformation into squamous cell carcinoma. AKs result from long term exposure to UV light. These lesions commonly appear on the face, lips, ears, back of hands, forearms, scalp and neck. They are rough in texture and may come and go. Continue to monitor you skin for any new spots. Avoid being outside during high sun exposure. Wear SPF (30+) if you are going to be outdoors and reapply frequently.           -------------------------------------------------------------------------------------         Actinic Keratosis: Care Instructions  Your Care Instructions  Actinic keratosis is a skin growth caused by sun damage. It can turn into skin cancer, but this isn't common. Actinic keratoses, also called solar keratoses, are small red, brown, or skin-colored scaly patches. They are most common on the face, neck, hands, and forearms. Your doctor can remove these growths by freezing or scraping them off or by putting medicines on them. Follow-up care is a key part of your treatment and safety. Be sure to make and go to all appointments, and call your doctor if you are having problems. It's also a good idea to know your test results and keep a list of the medicines you take. How can you care for yourself at home? · If your doctor told you how to care for the treated area, follow your doctor's instructions. If you did not get instructions, follow this general advice:  ? Wash around the area with clean water 2 times a day. Don't use hydrogen peroxide or alcohol, which can slow healing. ? You may cover the area with a thin layer of petroleum jelly, such as Vaseline, and a nonstick bandage. ? Apply more petroleum jelly and replace the bandage as needed.   To prevent actinic keratosis  · Always wear sunscreen on exposed skin. Make sure to use a broad-spectrum sunscreen that has a sun protection factor (SPF) of 30 or higher. Use it every day, even when it is cloudy. · Wear long sleeves, a hat, and pants if you are going to be outdoors for a long time. · Avoid the sun between 10 a.m. and 4 p.m., the peak time for UV rays. · Do not use tanning booths or sunlamps. When should you call for help? Watch closely for changes in your health, and be sure to contact your doctor if:  · You have symptoms of infection, such as:  ? Increased pain, swelling, warmth, or redness. ? Red streaks leading from the area. ? Pus draining from the area. ? A fever. Where can you learn more? Go to [email or web address].[email or web address] org and sign in to your [employer] account. Enter L364 in the Emailage box to learn more about \"Actinic Keratosis: Care Instructions. \"     If you do not have an account, please click on the \"Sign Up Now\" link. Current as of: October 31, 2019               Content Version: 12.5  © 2022-6509 [employer], Incorporated. Care instructions adapted under license by [facility] ([facility]). If you have questions about a medical condition or this instruction, always ask your healthcare professional. [address]  any warranty or liability for your use of this information.

## 2021-06-14 ENCOUNTER — OFFICE VISIT (OUTPATIENT)
Dept: FAMILY MEDICINE CLINIC | Age: 76
End: 2021-06-14
Payer: MEDICARE

## 2021-06-14 VITALS
BODY MASS INDEX: 29.67 KG/M2 | HEIGHT: 68 IN | OXYGEN SATURATION: 96 % | HEART RATE: 66 BPM | TEMPERATURE: 97.5 F | DIASTOLIC BLOOD PRESSURE: 80 MMHG | WEIGHT: 195.8 LBS | SYSTOLIC BLOOD PRESSURE: 110 MMHG

## 2021-06-14 DIAGNOSIS — I25.119 CORONARY ARTERY DISEASE INVOLVING NATIVE CORONARY ARTERY OF NATIVE HEART WITH ANGINA PECTORIS (HCC): ICD-10-CM

## 2021-06-14 DIAGNOSIS — Z00.00 ROUTINE GENERAL MEDICAL EXAMINATION AT A HEALTH CARE FACILITY: Primary | ICD-10-CM

## 2021-06-14 DIAGNOSIS — Z12.5 SCREENING PSA (PROSTATE SPECIFIC ANTIGEN): ICD-10-CM

## 2021-06-14 DIAGNOSIS — M19.90 OSTEOARTHRITIS, UNSPECIFIED OSTEOARTHRITIS TYPE, UNSPECIFIED SITE: ICD-10-CM

## 2021-06-14 DIAGNOSIS — N28.9 RENAL INSUFFICIENCY: ICD-10-CM

## 2021-06-14 DIAGNOSIS — E03.9 HYPOTHYROIDISM, UNSPECIFIED TYPE: ICD-10-CM

## 2021-06-14 DIAGNOSIS — I10 ESSENTIAL HYPERTENSION: ICD-10-CM

## 2021-06-14 DIAGNOSIS — E78.5 HYPERLIPIDEMIA, UNSPECIFIED HYPERLIPIDEMIA TYPE: ICD-10-CM

## 2021-06-14 PROCEDURE — G0439 PPPS, SUBSEQ VISIT: HCPCS | Performed by: FAMILY MEDICINE

## 2021-06-14 ASSESSMENT — PATIENT HEALTH QUESTIONNAIRE - PHQ9
SUM OF ALL RESPONSES TO PHQ QUESTIONS 1-9: 0
1. LITTLE INTEREST OR PLEASURE IN DOING THINGS: 0
2. FEELING DOWN, DEPRESSED OR HOPELESS: 0
SUM OF ALL RESPONSES TO PHQ9 QUESTIONS 1 & 2: 0
SUM OF ALL RESPONSES TO PHQ QUESTIONS 1-9: 0
SUM OF ALL RESPONSES TO PHQ QUESTIONS 1-9: 0

## 2021-06-14 ASSESSMENT — LIFESTYLE VARIABLES: HOW OFTEN DO YOU HAVE A DRINK CONTAINING ALCOHOL: 0

## 2021-06-14 NOTE — PROGRESS NOTES
finasteride (PROSCAR) 5 MG tablet Take 1 tablet by mouth daily take 1 tablet by mouth once daily Yes Nidia Amato MD   diclofenac (VOLTAREN) 75 MG EC tablet take 1 tablet by mouth twice a day if needed for pain Yes Dylan J Holiday, DO   clopidogrel (PLAVIX) 75 MG tablet Take 1 tablet by mouth daily Yes Dylan J Holiday, DO   atorvastatin (LIPITOR) 40 MG tablet take 1 tablet by mouth once daily Yes [facility] Holiday, DO   Niacin (VITAMIN B-3 PO) Take by mouth Yes Historical Provider, MD   nitroGLYCERIN (NITROSTAT) 0.4 MG SL tablet Place 1 tablet under the tongue every 5 minutes as needed for Chest pain Dissolve 1 tab under tongue at first sign of chest pain every 5 minutes as needed. If pain persists after taking 3 tabs in a 15-minute period, or the pain is different than is typically experienced, call 9-1-1 immediately.  Yes Nidia Amato MD   magnesium oxide (MAG-OX) 400 MG tablet Take 400 mg by mouth daily Yes Historical Provider, MD   Handicap Placard MISC by Does not apply route Exp 5 years Yes Nidia Amato MD   Multiple Vitamins-Minerals (MULTI COMPLETE PO) Take by mouth Yes Historical Provider, MD         Past Medical History:   Diagnosis Date    Actinic keratosis     Basal cell carcinoma, trunk 12/2017    left upper back    Basal cell carcinoma, trunk     BPH (benign prostatic hyperplasia)     CAD (coronary artery disease) 2012    has 4 cardiac stents    Heart attack ([facility] [address]     History of MI (myocardial infarction) 12/11/2020    History of mycobacterial infection 6/22/2018    Hyperlipidemia     meds > 15 yrs    Hypertension     meds > 6 yrs / denies TIA or stroke    Hypertrophic scar     Myocardial infarct ([address] 11/2012    Osteoarthritis     all joints    Overweight 12/22/2017    Status post coronary angioplasty     Thyroid disease     meds > 1 month       Past Surgical History:   Procedure Laterality Date    ACHILLES TENDON SURGERY Left 7/10/2020    LEFT REPAIR OF RUPTURED ACHILLES TENDON, performed recent and remote memory intact. Physical Exam:  /80 (Site: Left Upper Arm)   Pulse 66   Temp 97.5 °F (36.4 °C)   Ht 5' 8\" (1.727 m)   Wt 195 lb 12.8 oz (88.8 kg)   SpO2 96%   BMI 29.77 kg/m²     Gen: Well, NAD, Alert, Oriented x 3   HEENT: EOMI, eyes clear, MMM  Skin: without rash or jaundice  Neck: no significant lymphadenopathy or thyromegaly  Lungs: CTA B w/out Rales/Wheezes/Rhonchi, Good respiratory effort   Heart: RRR, S1S2, w/out M/R/G, non-displaced PMI   Ext: No C/C/E Bilaterally. Neuro: Neurovascularly intact w/ Sensory/Motor intact UE/LE Bilaterally. Patient's complete Health Risk Assessment and screening values have been reviewed and are found in Flowsheets. The following problems were reviewed today and where indicated follow up appointments were made and/or referrals ordered. Positive Risk Factor Screenings with Interventions:            General Health and ACP:  General  In general, how would you say your health is?: Fair  In the past 7 days, have you experienced any of the following?  New or Increased Pain, New or Increased Fatigue, Loneliness, Social Isolation, Stress or Anger?: (!) New or Increased Fatigue  Do you get the social and emotional support that you need?: Yes  Do you have a Living Will?: Yes  Advance Directives     Power of  Living Will ACP-Advance Directive ACP-Power of     Not on File Filed on 09/10/20 Filed Not on File      General Health Risk Interventions:  · ongoing fatigue      Safety:  Safety  Do you have working smoke detectors?: Yes  Have all throw rugs been removed or fastened?: (!) No  Do you have non-slip mats or surfaces in all bathtubs/showers?: Yes  Do all of your stairways have a railing or banister?: Yes  Are your doorways, halls and stairs free of clutter?: Yes  Do you always fasten your seatbelt when you are in a car?: Yes  Safety Interventions:  · Home safety tips provided     Personalized Preventive Plan   Current Health Maintenance Status  Immunization History   Administered Date(s) Administered    Hepatitis A 04/09/2019    Influenza, High Dose (Fluzone 65 yrs and older) 08/27/2015, 09/20/2016, 09/20/2017, 09/17/2018, 09/05/2019    Influenza, Quadv, adjuvanted, 65 yrs +, IM, PF (Fluad) 09/11/2020    Pneumococcal Conjugate 13-valent (Thhxpre64) 02/27/2018    Pneumococcal Polysaccharide ([id number]) 02/18/2015, 05/12/2016    Tdap (Boostrix, Adacel) 04/09/2019    Zoster Live (Zostavax) 07/11/2015    Zoster Recombinant (Shingrix) 01/31/2019        Health Maintenance   Topic Date Due    COVID-19 Vaccine (1) Never done    Shingles Vaccine (3 of 3) 03/28/2019    Annual Wellness Visit (AWV)  Never done    Lipid screen  06/12/2021    Potassium monitoring  12/29/2021    Creatinine monitoring  12/29/2021    Colon cancer screen colonoscopy  05/11/2025    DTaP/Tdap/Td vaccine (2 - Td or Tdap) 04/09/2029    Flu vaccine  Completed    Pneumococcal 65+ years Vaccine  Completed    AAA screen  Completed    Hepatitis C screen  Completed    Hepatitis A vaccine  Aged Out    Hepatitis B vaccine  Aged Out    Hib vaccine  Aged Out    Meningococcal (ACWY) vaccine  Aged Out     Recommendations for Invenshure Due: see orders and patient instructions/AVS.  . Recommended screening schedule for the next 5-10 years is provided to the patient in written form: see Patient Daniel Larsen was seen today for medicare awv. Diagnoses and all orders for this visit:    Routine general medical examination at a health care facility    Screening PSA (prostate specific antigen)  -     PSA Screening; Future    Hypothyroidism, unspecified type  -     TSH without Reflex; Future    Essential hypertension  -     Lipid Panel; Future  -     Comprehensive Metabolic Panel; Future  -     CBC; Future    Hyperlipidemia, unspecified hyperlipidemia type  -     Lipid Panel;  Future    Coronary artery disease involving native coronary artery of native heart with angina pectoris (HCC)    Osteoarthritis, unspecified osteoarthritis type, unspecified site    Renal insufficiency             Chronic conditions are stable  Continue current regimen  Follow up with appropriate specialists and here routinely for ongoing monitoring of chronic conditions    Return for fasting labs     Apple Freitas MD

## 2021-06-14 NOTE — PATIENT INSTRUCTIONS
Personalized Preventive Plan for Gilberto Castillo - 6/14/2021  Medicare offers a range of preventive health benefits. Some of the tests and screenings are paid in full while other may be subject to a deductible, co-insurance, and/or copay. Some of these benefits include a comprehensive review of your medical history including lifestyle, illnesses that may run in your family, and various assessments and screenings as appropriate. After reviewing your medical record and screening and assessments performed today your provider may have ordered immunizations, labs, imaging, and/or referrals for you. A list of these orders (if applicable) as well as your Preventive Care list are included within your After Visit Summary for your review. Other Preventive Recommendations:    · A preventive eye exam performed by an eye specialist is recommended every 1-2 years to screen for glaucoma; cataracts, macular degeneration, and other eye disorders. · A preventive dental visit is recommended every 6 months. · Try to get at least 150 minutes of exercise per week or 10,000 steps per day on a pedometer . · Order or download the FREE \"Exercise & Physical Activity: Your Everyday Guide\" from [employer]. Call 5-191.557.2424 or search [employer] online. · You need 6063-9982 mg of calcium and 0916-5530 IU of vitamin D per day. It is possible to meet your calcium requirement with diet alone, but a vitamin D supplement is usually necessary to meet this goal.  · When exposed to the sun, use a sunscreen that protects against both UVA and UVB radiation with an SPF of 30 or greater. Reapply every 2 to 3 hours or after sweating, drying off with a towel, or swimming. · Always wear a seat belt when traveling in a car. Always wear a helmet when riding a bicycle or motorcycle.

## 2021-06-15 DIAGNOSIS — I10 ESSENTIAL HYPERTENSION: ICD-10-CM

## 2021-06-15 DIAGNOSIS — Z12.5 SCREENING PSA (PROSTATE SPECIFIC ANTIGEN): ICD-10-CM

## 2021-06-15 DIAGNOSIS — E78.5 HYPERLIPIDEMIA, UNSPECIFIED HYPERLIPIDEMIA TYPE: ICD-10-CM

## 2021-06-15 DIAGNOSIS — E03.9 HYPOTHYROIDISM, UNSPECIFIED TYPE: ICD-10-CM

## 2021-06-15 LAB
ALBUMIN SERPL-MCNC: 3.9 G/DL (ref 3.5–4.6)
ALP BLD-CCNC: 68 U/L (ref 35–104)
ALT SERPL-CCNC: 24 U/L (ref 0–41)
ANION GAP SERPL CALCULATED.3IONS-SCNC: 11 MEQ/L (ref 9–15)
AST SERPL-CCNC: 26 U/L (ref 0–40)
BILIRUB SERPL-MCNC: 0.6 MG/DL (ref 0.2–0.7)
BUN BLDV-MCNC: 33 MG/DL (ref 8–23)
CALCIUM SERPL-MCNC: 9.6 MG/DL (ref 8.5–9.9)
CHLORIDE BLD-SCNC: 102 MEQ/L (ref 95–107)
CHOLESTEROL, TOTAL: 156 MG/DL (ref 0–199)
CO2: 27 MEQ/L (ref 20–31)
CREAT SERPL-MCNC: 1.14 MG/DL (ref 0.7–1.2)
GFR AFRICAN AMERICAN: >60
GFR NON-AFRICAN AMERICAN: >60
GLOBULIN: 3.1 G/DL (ref 2.3–3.5)
GLUCOSE BLD-MCNC: 102 MG/DL (ref 70–99)
HCT VFR BLD CALC: 41.3 % (ref 42–52)
HDLC SERPL-MCNC: 34 MG/DL (ref 40–59)
HEMOGLOBIN: 13.9 G/DL (ref 14–18)
LDL CHOLESTEROL CALCULATED: 95 MG/DL (ref 0–129)
MCH RBC QN AUTO: 30.4 PG (ref 27–31.3)
MCHC RBC AUTO-ENTMCNC: 33.7 % (ref 33–37)
MCV RBC AUTO: 90.2 FL (ref 80–100)
PDW BLD-RTO: 14.3 % (ref 11.5–14.5)
PLATELET # BLD: 186 K/UL (ref 130–400)
POTASSIUM SERPL-SCNC: 4.8 MEQ/L (ref 3.4–4.9)
PROSTATE SPECIFIC ANTIGEN: 0.29 NG/ML (ref 0–6.22)
RBC # BLD: 4.58 M/UL (ref 4.7–6.1)
SODIUM BLD-SCNC: 140 MEQ/L (ref 135–144)
TOTAL PROTEIN: 7 G/DL (ref 6.3–8)
TRIGL SERPL-MCNC: 136 MG/DL (ref 0–150)
TSH SERPL DL<=0.05 MIU/L-ACNC: 2.3 UIU/ML (ref 0.44–3.86)
WBC # BLD: 5.6 K/UL (ref 4.8–10.8)

## 2021-06-28 ENCOUNTER — PROCEDURE VISIT (OUTPATIENT)
Dept: FAMILY MEDICINE CLINIC | Age: 76
End: 2021-06-28
Payer: MEDICARE

## 2021-06-28 VITALS — BODY MASS INDEX: 29.55 KG/M2 | HEIGHT: 68 IN | WEIGHT: 195 LBS | TEMPERATURE: 97.2 F

## 2021-06-28 DIAGNOSIS — C44.519 BASAL CELL CARCINOMA, TRUNK: ICD-10-CM

## 2021-06-28 DIAGNOSIS — L82.1 VERRUCOUS KERATOSIS: ICD-10-CM

## 2021-06-28 DIAGNOSIS — D49.2 ABNORMAL SKIN GROWTH: ICD-10-CM

## 2021-06-28 DIAGNOSIS — D49.2 ABNORMAL SKIN GROWTH: Primary | ICD-10-CM

## 2021-06-28 PROCEDURE — 11441 EXC FACE-MM B9+MARG 0.6-1 CM: CPT | Performed by: FAMILY MEDICINE

## 2021-06-28 ASSESSMENT — ENCOUNTER SYMPTOMS: COLOR CHANGE: 1

## 2021-06-28 NOTE — PATIENT INSTRUCTIONS
Incision/Laceration repair    -Clean surgical area with antibacterial soap and water once daily.      -Every 3 days rinse the wound with hydrogen peroxide, let it sit for 30 seconds to a minute, and then rinse off with water. Use this to help take off any excess scab.    -Keep surgical site moist with vaseline or antibiotic ointment (single, not tripleantibiotic or Neosporin) and apply a fresh bandage daily until a solid scab forms or if the wound is at risk for trauma or dirt.     -Follow up immediately if any growing redness (minimal redness or pale pink is normal along wound edges) surrounds the surgical site or if dripping drainage occurs at surgical site. Once a solid scab forms no more bandage needed. A wet scab can look yellow. This is not infection, just moisture.  -Once the lesion is healed be sure to apply sunscreen to the area to prevent burn of the newer and more delicate skin during the first 6 months of healing.    -If the scar begins to be raised you may massage the area firmly twice a day to help break down scar tissue and help the area become a flat scar. There is some evidence that Mederma applied to a scar daily for the first few months can help shrink and fade it more quickly then without intervention.

## 2021-06-28 NOTE — PROGRESS NOTES
Diagnosis Orders   1. Abnormal skin growth  Specimen to Pathology Outpatient    OK EXC SKIN BENIG 0.6-1CM FACE,FACIAL   2. Verrucous keratosis     3. Basal cell carcinoma, trunk       Return in about 8 days (around 7/6/2021) for Dr Mykel Coyne suture removal.    Pt has history of BCC of the trunk. In the last 12 months  He has had a verrucous keratosis removed from hiis face by 2 superificial method with reoccurence of the lesion then and now. Would like removed      Current Outpatient Medications on File Prior to Visit   Medication Sig Dispense Refill    tamsulosin (FLOMAX) 0.4 MG capsule take 1 capsule by mouth twice a day 180 capsule 1    carvedilol (COREG) 12.5 MG tablet take 1 tablet by mouth twice a day with food 180 tablet 1    levothyroxine (SYNTHROID) 50 MCG tablet take 1 tablet by mouth once daily 90 tablet 1    hydroCHLOROthiazide (HYDRODIURIL) 25 MG tablet take 1 tablet by mouth once daily 90 tablet 3    finasteride (PROSCAR) 5 MG tablet Take 1 tablet by mouth daily take 1 tablet by mouth once daily 90 tablet 3    diclofenac (VOLTAREN) 75 MG EC tablet take 1 tablet by mouth twice a day if needed for pain 180 tablet 3    clopidogrel (PLAVIX) 75 MG tablet Take 1 tablet by mouth daily 90 tablet 1    atorvastatin (LIPITOR) 40 MG tablet take 1 tablet by mouth once daily 90 tablet 3    Niacin (VITAMIN B-3 PO) Take by mouth      nitroGLYCERIN (NITROSTAT) 0.4 MG SL tablet Place 1 tablet under the tongue every 5 minutes as needed for Chest pain Dissolve 1 tab under tongue at first sign of chest pain every 5 minutes as needed. If pain persists after taking 3 tabs in a 15-minute period, or the pain is different than is typically experienced, call 9-1-1 immediately.  25 tablet 1    magnesium oxide (MAG-OX) 400 MG tablet Take 400 mg by mouth daily      Handicap Placard MISC by Does not apply route Exp 5 years 1 each 0    Multiple Vitamins-Minerals (MULTI COMPLETE PO) Take by mouth       No current facility-administered medications on file prior to visit. Ace inhibitors and Lisinopril    Review of Systems   Constitutional: Negative for chills and fever. Skin: Positive for color change. Negative for rash and wound. Allergic/Immunologic: Negative for environmental allergies, food allergies and immunocompromised state. Hematological: Negative for adenopathy. Does not bruise/bleed easily. Psychiatric/Behavioral: Negative for behavioral problems and sleep disturbance. Temp 97.2 °F (36.2 °C)   Ht 5' 8\" (1.727 m)   Wt 195 lb (88.5 kg)   BMI 29.65 kg/m²   Physical Exam  Constitutional:       General: He is not in acute distress. Appearance: Normal appearance. He is well-developed. He is not toxic-appearing. HENT:      Head: Normocephalic and atraumatic. Right Ear: Hearing and tympanic membrane normal.      Left Ear: Hearing and tympanic membrane normal.      Nose: Nose normal. No nasal deformity. Eyes:      General: Lids are normal.         Right eye: No discharge. Left eye: No discharge. Conjunctiva/sclera: Conjunctivae normal.      Pupils: Pupils are equal, round, and reactive to light. Neck:      Thyroid: No thyroid mass or thyromegaly. Vascular: No JVD. Trachea: Trachea and phonation normal.   Cardiovascular:      Rate and Rhythm: Normal rate and regular rhythm. Pulmonary:      Effort: No accessory muscle usage or respiratory distress. Musculoskeletal:      Cervical back: Full passive range of motion without pain. Comments: FROM all large muscle groups and joints as witnessed when walking to exam table, getting on, and getting off the exam table. Skin:     General: Skin is warm and dry. Findings: No rash. Comments: R preauricular cheek 6 x 8 mm irregular rough white flake lesion   Neurological:      Mental Status: He is alert. Motor: No tremor or atrophy.       Gait: Gait normal.   Psychiatric:         Speech: Speech normal. Behavior: Behavior normal.         Thought Content: Thought content normal.         CONSENT:  The procedure was discussed with the patient. All questions were answered and alternative options discussed. The patient is aware of the risks of bleeding, infection,unsatisfactory scar result. Informed consent paperwork was signed by the patient. PROCEDURE:  The lesion area was prepped with alcohol wipes and 1.5 cc of 1% Lidocaine with epi used for anesthesia. A 15 blade scalpel was used to completely remove the visible lesion. The lesion size is noted in physical exam and the excision made to allow 2 mm non-lesion skin border. Hemostasis was achieved with 8 x 5.o ethilon interupted sutures, Drysol and Hyfrecator  EBL < 1cc. Bacitracin and a bandage were placed over the wound. ASSESSMENT AND PLAN:   Diagnosis Orders   1. Abnormal skin growth  Specimen to Pathology Outpatient    MN EXC SKIN BENIG 0.6-1CM FACE,FACIAL   2. Verrucous keratosis     3. Basal cell carcinoma, trunk         Return in about 8 days (around 7/6/2021) for Dr Kelly Esposito suture removal.      DO NOT USE TRIPLE ANTIBIOTIC OINTMENT    Orders Placed This Encounter   Procedures    Specimen to Pathology Outpatient     Margins requested on all specimens  R/O BCC  Location R prearicular cheek     Standing Status:   Future     Standing Expiration Date:   6/28/2022     Order Specific Question:   PREVIOUS BIOPSY     Answer:   No     Order Specific Question:   PREOP DIAGNOSIS     Answer:   abnormal skin growth     Order Specific Question:   FROZEN SECTION - NO OR YES/SPECIMEN     Answer:   No    MN EXC SKIN BENIG 0.6-1CM FACE,FACIAL     R preauricular cheek     No orders of the defined types were placed in this encounter.           Patient Instructions   Incision/Laceration repair    -Clean surgical area with antibacterial soap and water once daily.      -Every 3 days rinse the wound with hydrogen peroxide, let it sit for 30 seconds to a minute, and then rinse off with water. Use this to help take off any excess scab.    -Keep surgical site moist with vaseline or antibiotic ointment (single, not tripleantibiotic or Neosporin) and apply a fresh bandage daily until a solid scab forms or if the wound is at risk for trauma or dirt.     -Follow up immediately if any growing redness (minimal redness or pale pink is normal along wound edges) surrounds the surgical site or if dripping drainage occurs at surgical site. Once a solid scab forms no more bandage needed. A wet scab can look yellow. This is not infection, just moisture.  -Once the lesion is healed be sure to apply sunscreen to the area to prevent burn of the newer and more delicate skin during the first 6 months of healing.    -If the scar begins to be raised you may massage the area firmly twice a day to help break down scar tissue and help the area become a flat scar. There is some evidence that Mederma applied to a scar daily for the first few months can help shrink and fade it more quickly then without intervention.

## 2021-06-29 DIAGNOSIS — I25.10 CORONARY ARTERY DISEASE INVOLVING NATIVE CORONARY ARTERY OF NATIVE HEART WITHOUT ANGINA PECTORIS: ICD-10-CM

## 2021-06-29 RX ORDER — CLOPIDOGREL BISULFATE 75 MG/1
TABLET ORAL
Qty: 90 TABLET | Refills: 1 | Status: SHIPPED | OUTPATIENT
Start: 2021-06-29 | End: 2022-03-06 | Stop reason: SDUPTHER

## 2021-07-07 NOTE — PROGRESS NOTES
2021    Raegan Cook (:  1945) is a 77 y.o. male, here for evaluation of the following medical concerns:  Chief Complaint   Patient presents with    Suture / Staple Removal     right side of the face     Advance Care Planning     HPI  Procedure follow-up  Here today for suture removal  Lesion was previously superficially removed via shave biopsy and pathology results were verrucous keratosis  Lesion regrew and patient wanted area permanently removed  Excisional biopsy completed  with 8 x 5.0 Ethilon interrupted sutures placed    Review of Systems   Constitutional: Negative for chills and fever. HENT: Negative for congestion, sinus pressure and sore throat. Respiratory: Negative for cough and shortness of breath. Cardiovascular: Negative for chest pain and palpitations. Gastrointestinal: Negative for abdominal pain and vomiting. Musculoskeletal: Negative for arthralgias and myalgias. Skin: Negative for rash and wound. Neurological: Negative for speech difficulty and light-headedness. Psychiatric/Behavioral: Negative for suicidal ideas. The patient is not nervous/anxious. Prior to Visit Medications    Medication Sig Taking?  Authorizing Provider   clopidogrel (PLAVIX) 75 MG tablet take 1 tablet by mouth once daily Yes Dylan Naik, DO   tamsulosin (FLOMAX) 0.4 MG capsule take 1 capsule by mouth twice a day Yes Gigi Hawk MD   carvedilol (COREG) 12.5 MG tablet take 1 tablet by mouth twice a day with food Yes Gigi Hawk MD   levothyroxine (SYNTHROID) 50 MCG tablet take 1 tablet by mouth once daily Yes Gigi Hawk MD   hydroCHLOROthiazide (HYDRODIURIL) 25 MG tablet take 1 tablet by mouth once daily Yes Gigi Hawk MD   finasteride (PROSCAR) 5 MG tablet Take 1 tablet by mouth daily take 1 tablet by mouth once daily Yes Gigi Hawk MD   diclofenac (VOLTAREN) 75 MG EC tablet take 1 tablet by mouth twice a day if needed for pain Yes Dylan Naik, DO   atorvastatin (LIPITOR) 40 MG tablet take 1 tablet by mouth once daily Yes Dylan [place], DO   Niacin (VITAMIN B-3 PO) Take by mouth Yes Historical Provider, MD   nitroGLYCERIN (NITROSTAT) 0.4 MG SL tablet Place 1 tablet under the tongue every 5 minutes as needed for Chest pain Dissolve 1 tab under tongue at first sign of chest pain every 5 minutes as needed. If pain persists after taking 3 tabs in a 15-minute period, or the pain is different than is typically experienced, call 9-1-1 immediately.  Yes Bhavesh Chase MD   magnesium oxide (MAG-OX) 400 MG tablet Take 400 mg by mouth daily Yes Historical Provider, MD   Handicap Placard MISC by Does not apply route Exp 5 years Yes Bhavesh Chase MD   Multiple Vitamins-Minerals (MULTI COMPLETE PO) Take by mouth Yes Historical Provider, MD        Allergies   Allergen Reactions    Ace Inhibitors Other (See Comments)     Cough      Lisinopril Other (See Comments)     Cough         Past Medical History:   Diagnosis Date    Actinic keratosis     Basal cell carcinoma, trunk 12/2017    left upper back    Basal cell carcinoma, trunk     BPH (benign prostatic hyperplasia)     CAD (coronary artery disease) 2012    has 4 cardiac stents    [address] [facility] [address] [address]     History of MI (myocardial infarction) 12/11/2020    History of mycobacterial infection 6/22/2018    Hyperlipidemia     meds > 15 yrs    Hypertension     meds > 6 yrs / denies TIA or stroke    Hypertrophic scar     Myocardial infarct ([address] 11/2012    Osteoarthritis     all joints    Overweight 12/22/2017    Status post coronary angioplasty     Thyroid disease     meds > 1 month       Past Surgical History:   Procedure Laterality Date    ACHILLES TENDON SURGERY Left 7/10/2020    LEFT REPAIR OF RUPTURED ACHILLES TENDON, performed by Joya Land DPM at [address] Bilateral 2015    COLONOSCOPY  3/23/15        COLONOSCOPY N/A 5/11/2020    COLORECTAL CANCER SCREENING, HIGH RISK performed by Svetlana Ma MD at [facility]      CYSTOSCOPY  2017    W/COMPLEX CYSTOMETROGRAM-COMPLEX UROFLOW-TRANSRECTAL US PROSTATE    EYE SURGERY      Lasik OU    JOINT REPLACEMENT Left     LTKR    OK TOTAL KNEE ARTHROPLASTY Right 3/15/2018    RIGHT KNEE TOTAL KNEE  ARTHROPLASTY performed by Marco Arcos MD at [facility] Right     twice    SHOULDER SURGERY Right      RCR    TONSILLECTOMY      TURP N/A 2017    GREEN LIGHT LASER TRANSURETHRAL RESECTIOIN PROSTATE performed by Mark Gunderson MD at [address] [place] [address] PROSTATE/TRANSRECTAL  06/03/15    Cystoscop, cath rem, US prostate    UVULECTOMY      due to snoring       Social History     Socioeconomic History    Marital status:      Spouse name: Not on file    Number of children: Not on file    Years of education: Not on file    Highest education level: Not on file   Occupational History    Not on file   Tobacco Use    Smoking status: Former Smoker     Packs/day: 1.00     Years: 7.00     Pack years: 7.00     Start date: 1961     Quit date: 3/17/1967     Years since quittin.3    Smokeless tobacco: Never Used    Tobacco comment: Quit for  [employer]   Vaping Use    Vaping Use: Never used   Substance and Sexual Activity    Alcohol use: No     Types: 30 Cans of beer per week     Comment: No alcohol  since     Drug use: No    Sexual activity: Not on file   Other Topics Concern    Not on file   Social History Narrative    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     Social Determinants of Health     Financial Resource Strain: Low Risk     Difficulty of Paying Living Expenses: Not hard at all   Food Insecurity: No Food Insecurity    Worried About [facility] in the Last Year: Never true    [facility] in the Last Year: Never true   Transportation Needs:     Lack of Transportation (Medical):  Lack of Transportation (Non-Medical):    Physical Activity:     Days of Exercise per Week:     Minutes of Exercise per Session:    Stress:     Feeling of Stress :    Social Connections:     Frequency of Communication with Friends and Family:     Frequency of Social Gatherings with Friends and Family:     Attends Yazidism Services:     Active Member of Clubs or Organizations:     Attends Club or Organization Meetings:     Marital Status:    Intimate Partner Violence:     Fear of Current or Ex-Partner:     Emotionally Abused:     Physically Abused:     Sexually Abused:         Family History   Problem Relation Age of Onset    Kidney Disease Father     Other Mother         Perforated intestine    No Known Problems Sister     No Known Problems Brother     No Known Problems Sister     No Known Problems Brother     No Known Problems Son        Vitals:    07/08/21 1052   Pulse: 55   Temp: 97.1 °F (36.2 °C)   SpO2: 97%   Weight: 195 lb (88.5 kg)   Height: 5' 8\" (1.727 m)       Estimated body mass index is 29.65 kg/m² as calculated from the following:    Height as of this encounter: 5' 8\" (1.727 m). Weight as of this encounter: 195 lb (88.5 kg). No results for input(s): WBC, RBC, HGB, HCT, MCV, MCH, MCHC, RDW, PLT, MPV in the last 72 hours. No results for input(s): NA, K, CL, CO2, BUN, CREATININE, GLUCOSE, CALCIUM, PROT, LABALBU, BILITOT, ALKPHOS, AST, ALT in the last 72 hours. Lab Results   Component Value Date    LABA1C 5.4 06/02/2019       No results found. Physical Exam  Constitutional:       Appearance: Normal appearance. He is normal weight. HENT:      Head: Normocephalic and atraumatic. Nose: No rhinorrhea. Mouth/Throat:      Mouth: Mucous membranes are moist.      Pharynx: Oropharynx is clear. Eyes:      Extraocular Movements: Extraocular movements intact. Conjunctiva/sclera: Conjunctivae normal.   Skin:     Findings: No lesion or rash. Comments: Well-healing postsurgical site right preauricular area   Neurological:      General: No focal deficit present. Mental Status: He is alert and oriented to person, place, and time. Mental status is at baseline. Psychiatric:         Mood and Affect: Mood normal.         Thought Content: Thought content normal.         Judgment: Judgment normal.         ASSESSMENT/PLAN:  There are no diagnoses linked to this encounter.  - Excisional biopsy completed 6/28 with 8 x 5.0 Ethilon interrupted sutures placed  - Pathology results squamoproliferative lesion, favoring benign  - Sutures removed (6 sutures still present, patient has been shaving his face over the last week and states he did accidentally cut a few sutures out on his own), wound well healing      ---------------------------------------------------------------------  Side effects, adverse effects of the medication prescribed today, as well as treatment plan/ rationale and result expectations have been discussed with the patient who expresses understanding and desires to proceed. Close follow up to evaluate treatment results and for coordination of care. I have reviewed the patient's medical history in detail and updated the computerized patient record. As always, patient is advised that if symptoms worsen in any way they will proceed to the nearest emergency room. --------------------------------------------------------------------    Return in about 7 weeks (around 8/23/2021) for as scheduled, Skin check w/Dr. Meek Rueda. An  electronic signature was used to authenticate this note.     --Gabbie Holliday DO on 7/8/2021 at 11:17 AM

## 2021-07-08 ENCOUNTER — OFFICE VISIT (OUTPATIENT)
Dept: FAMILY MEDICINE CLINIC | Age: 76
End: 2021-07-08

## 2021-07-08 VITALS
WEIGHT: 195 LBS | HEART RATE: 55 BPM | BODY MASS INDEX: 29.55 KG/M2 | OXYGEN SATURATION: 97 % | TEMPERATURE: 97.1 F | HEIGHT: 68 IN

## 2021-07-08 DIAGNOSIS — D49.2 ATYPICAL SQUAMOPROLIFERATIVE SKIN LESION: Primary | ICD-10-CM

## 2021-07-08 PROCEDURE — 99024 POSTOP FOLLOW-UP VISIT: CPT | Performed by: STUDENT IN AN ORGANIZED HEALTH CARE EDUCATION/TRAINING PROGRAM

## 2021-07-08 ASSESSMENT — ENCOUNTER SYMPTOMS
COUGH: 0
VOMITING: 0
SINUS PRESSURE: 0
SORE THROAT: 0
SHORTNESS OF BREATH: 0
ABDOMINAL PAIN: 0

## 2021-07-23 ENCOUNTER — APPOINTMENT (OUTPATIENT)
Dept: GENERAL RADIOLOGY | Age: 76
DRG: 313 | End: 2021-07-23
Payer: MEDICARE

## 2021-07-23 ENCOUNTER — HOSPITAL ENCOUNTER (INPATIENT)
Age: 76
LOS: 1 days | Discharge: ANOTHER HOSPITAL | DRG: 313 | End: 2021-07-24
Attending: EMERGENCY MEDICINE | Admitting: INTERNAL MEDICINE
Payer: MEDICARE

## 2021-07-23 DIAGNOSIS — R07.9 CHEST PAIN, UNSPECIFIED TYPE: Primary | ICD-10-CM

## 2021-07-23 LAB
ALBUMIN SERPL-MCNC: 3.8 G/DL (ref 3.5–4.6)
ALP BLD-CCNC: 64 U/L (ref 35–104)
ALT SERPL-CCNC: 33 U/L (ref 0–41)
ANION GAP SERPL CALCULATED.3IONS-SCNC: 9 MEQ/L (ref 9–15)
APTT: 29.4 SEC (ref 24.4–36.8)
AST SERPL-CCNC: 25 U/L (ref 0–40)
BASOPHILS ABSOLUTE: 0 K/UL (ref 0–0.1)
BASOPHILS RELATIVE PERCENT: 0.2 % (ref 0.2–1.2)
BILIRUB SERPL-MCNC: 0.5 MG/DL (ref 0.2–0.7)
BUN BLDV-MCNC: 41 MG/DL (ref 8–23)
CALCIUM SERPL-MCNC: 8.7 MG/DL (ref 8.5–9.9)
CHLORIDE BLD-SCNC: 100 MEQ/L (ref 95–107)
CO2: 26 MEQ/L (ref 20–31)
CREAT SERPL-MCNC: 1.17 MG/DL (ref 0.7–1.2)
EOSINOPHILS ABSOLUTE: 0.1 K/UL (ref 0–0.5)
EOSINOPHILS RELATIVE PERCENT: 0.7 % (ref 0.8–7)
GFR AFRICAN AMERICAN: >60
GFR NON-AFRICAN AMERICAN: >60
GLOBULIN: 2.9 G/DL (ref 2.3–3.5)
GLUCOSE BLD-MCNC: 139 MG/DL (ref 70–99)
HCT VFR BLD CALC: 41.1 % (ref 42–52)
HEMOGLOBIN: 13.6 G/DL (ref 13.7–17.5)
IMMATURE GRANULOCYTES #: 0.1 K/UL
IMMATURE GRANULOCYTES %: 1.2 %
INR BLD: 1
LYMPHOCYTES ABSOLUTE: 2 K/UL (ref 1.3–3.6)
LYMPHOCYTES RELATIVE PERCENT: 18.3 %
MCH RBC QN AUTO: 29.2 PG (ref 25.7–32.2)
MCHC RBC AUTO-ENTMCNC: 33.1 % (ref 32.3–36.5)
MCV RBC AUTO: 88.4 FL (ref 79–92.2)
MONOCYTES ABSOLUTE: 1.1 K/UL (ref 0.3–0.8)
MONOCYTES RELATIVE PERCENT: 10.2 % (ref 5.3–12.2)
NEUTROPHILS ABSOLUTE: 7.4 K/UL (ref 1.8–5.4)
NEUTROPHILS RELATIVE PERCENT: 69.4 % (ref 34–67.9)
PDW BLD-RTO: 13.2 % (ref 11.6–14.4)
PLATELET # BLD: 218 K/UL (ref 163–337)
POTASSIUM SERPL-SCNC: 4.2 MEQ/L (ref 3.4–4.9)
PROTHROMBIN TIME: 12.9 SEC (ref 12.3–14.9)
RBC # BLD: 4.65 M/UL (ref 4.63–6.08)
SARS-COV-2, NAAT: NOT DETECTED
SODIUM BLD-SCNC: 135 MEQ/L (ref 135–144)
TOTAL CK: 155 U/L (ref 0–190)
TOTAL PROTEIN: 6.7 G/DL (ref 6.3–8)
TROPONIN: <0.01 NG/ML (ref 0–0.01)
WBC # BLD: 10.7 K/UL (ref 4.2–9)

## 2021-07-23 PROCEDURE — 85730 THROMBOPLASTIN TIME PARTIAL: CPT

## 2021-07-23 PROCEDURE — 93005 ELECTROCARDIOGRAM TRACING: CPT

## 2021-07-23 PROCEDURE — 85610 PROTHROMBIN TIME: CPT

## 2021-07-23 PROCEDURE — 80053 COMPREHEN METABOLIC PANEL: CPT

## 2021-07-23 PROCEDURE — 6370000000 HC RX 637 (ALT 250 FOR IP): Performed by: EMERGENCY MEDICINE

## 2021-07-23 PROCEDURE — 82550 ASSAY OF CK (CPK): CPT

## 2021-07-23 PROCEDURE — 87635 SARS-COV-2 COVID-19 AMP PRB: CPT

## 2021-07-23 PROCEDURE — 99283 EMERGENCY DEPT VISIT LOW MDM: CPT

## 2021-07-23 PROCEDURE — 84484 ASSAY OF TROPONIN QUANT: CPT

## 2021-07-23 PROCEDURE — 85025 COMPLETE CBC W/AUTO DIFF WBC: CPT

## 2021-07-23 PROCEDURE — G0378 HOSPITAL OBSERVATION PER HR: HCPCS

## 2021-07-23 PROCEDURE — 93005 ELECTROCARDIOGRAM TRACING: CPT | Performed by: EMERGENCY MEDICINE

## 2021-07-23 PROCEDURE — 96360 HYDRATION IV INFUSION INIT: CPT

## 2021-07-23 PROCEDURE — 2580000003 HC RX 258: Performed by: EMERGENCY MEDICINE

## 2021-07-23 PROCEDURE — 71045 X-RAY EXAM CHEST 1 VIEW: CPT

## 2021-07-23 RX ORDER — SODIUM CHLORIDE 0.9 % (FLUSH) 0.9 %
3 SYRINGE (ML) INJECTION EVERY 8 HOURS
Status: DISCONTINUED | OUTPATIENT
Start: 2021-07-23 | End: 2021-07-24 | Stop reason: HOSPADM

## 2021-07-23 RX ORDER — 0.9 % SODIUM CHLORIDE 0.9 %
500 INTRAVENOUS SOLUTION INTRAVENOUS ONCE
Status: COMPLETED | OUTPATIENT
Start: 2021-07-23 | End: 2021-07-24

## 2021-07-23 RX ORDER — ASPIRIN 81 MG/1
324 TABLET, CHEWABLE ORAL ONCE
Status: COMPLETED | OUTPATIENT
Start: 2021-07-23 | End: 2021-07-23

## 2021-07-23 RX ADMIN — NITROGLYCERIN 0.5 INCH: 20 OINTMENT TOPICAL at 22:29

## 2021-07-23 RX ADMIN — ASPIRIN 81 MG CHEWABLE TABLET 324 MG: 81 TABLET CHEWABLE at 22:29

## 2021-07-23 RX ADMIN — SODIUM CHLORIDE 500 ML: 9 INJECTION, SOLUTION INTRAVENOUS at 23:21

## 2021-07-23 RX ADMIN — Medication 3 ML: at 22:30

## 2021-07-23 ASSESSMENT — ENCOUNTER SYMPTOMS
VOMITING: 0
EYE REDNESS: 0
EYE PAIN: 0
DIARRHEA: 0
SORE THROAT: 0
ABDOMINAL PAIN: 0
SHORTNESS OF BREATH: 1
BACK PAIN: 0
COUGH: 0
COLOR CHANGE: 0
NAUSEA: 1
SINUS PAIN: 0

## 2021-07-24 ENCOUNTER — HOSPITAL ENCOUNTER (INPATIENT)
Age: 76
LOS: 4 days | Discharge: ANOTHER HOSPITAL | DRG: 282 | End: 2021-07-28
Attending: INTERNAL MEDICINE | Admitting: INTERNAL MEDICINE
Payer: MEDICARE

## 2021-07-24 VITALS
SYSTOLIC BLOOD PRESSURE: 133 MMHG | HEART RATE: 67 BPM | WEIGHT: 201.1 LBS | HEIGHT: 68 IN | RESPIRATION RATE: 18 BRPM | DIASTOLIC BLOOD PRESSURE: 60 MMHG | OXYGEN SATURATION: 97 % | TEMPERATURE: 98.2 F | BODY MASS INDEX: 30.48 KG/M2

## 2021-07-24 PROBLEM — R77.8 ELEVATED TROPONIN: Status: ACTIVE | Noted: 2021-07-24

## 2021-07-24 PROBLEM — R79.89 ELEVATED TROPONIN: Status: ACTIVE | Noted: 2021-07-24

## 2021-07-24 PROBLEM — R07.9 CHEST PAIN: Status: ACTIVE | Noted: 2021-07-24

## 2021-07-24 LAB
ALBUMIN SERPL-MCNC: 3.4 G/DL (ref 3.5–4.6)
ALP BLD-CCNC: 59 U/L (ref 35–104)
ALT SERPL-CCNC: 29 U/L (ref 0–41)
ANION GAP SERPL CALCULATED.3IONS-SCNC: 8 MEQ/L (ref 9–15)
AST SERPL-CCNC: 17 U/L (ref 0–40)
BILIRUB SERPL-MCNC: 0.3 MG/DL (ref 0.2–0.7)
BILIRUBIN DIRECT: <0.2 MG/DL (ref 0–0.4)
BILIRUBIN, INDIRECT: ABNORMAL MG/DL (ref 0–0.6)
BUN BLDV-MCNC: 30 MG/DL (ref 8–23)
CALCIUM SERPL-MCNC: 8.3 MG/DL (ref 8.5–9.9)
CHLORIDE BLD-SCNC: 105 MEQ/L (ref 95–107)
CO2: 25 MEQ/L (ref 20–31)
CREAT SERPL-MCNC: 0.97 MG/DL (ref 0.7–1.2)
EKG ATRIAL RATE: 54 BPM
EKG ATRIAL RATE: 69 BPM
EKG ATRIAL RATE: 76 BPM
EKG P AXIS: 52 DEGREES
EKG P AXIS: 60 DEGREES
EKG P AXIS: 65 DEGREES
EKG P-R INTERVAL: 144 MS
EKG P-R INTERVAL: 146 MS
EKG P-R INTERVAL: 152 MS
EKG Q-T INTERVAL: 376 MS
EKG Q-T INTERVAL: 382 MS
EKG Q-T INTERVAL: 400 MS
EKG QRS DURATION: 88 MS
EKG QRS DURATION: 92 MS
EKG QRS DURATION: 94 MS
EKG QTC CALCULATION (BAZETT): 379 MS
EKG QTC CALCULATION (BAZETT): 409 MS
EKG QTC CALCULATION (BAZETT): 423 MS
EKG R AXIS: 63 DEGREES
EKG R AXIS: 65 DEGREES
EKG R AXIS: 73 DEGREES
EKG T AXIS: 61 DEGREES
EKG T AXIS: 73 DEGREES
EKG T AXIS: 77 DEGREES
EKG VENTRICULAR RATE: 54 BPM
EKG VENTRICULAR RATE: 69 BPM
EKG VENTRICULAR RATE: 76 BPM
GFR AFRICAN AMERICAN: >60
GFR NON-AFRICAN AMERICAN: >60
GLUCOSE BLD-MCNC: 123 MG/DL (ref 70–99)
HCT VFR BLD CALC: 38 % (ref 42–52)
HEMOGLOBIN: 12.4 G/DL (ref 13.7–17.5)
LV EF: 55 %
LVEF MODALITY: NORMAL
MCH RBC QN AUTO: 29 PG (ref 25.7–32.2)
MCHC RBC AUTO-ENTMCNC: 32.6 % (ref 32.3–36.5)
MCV RBC AUTO: 89 FL (ref 79–92.2)
PDW BLD-RTO: 13.3 % (ref 11.6–14.4)
PLATELET # BLD: 188 K/UL (ref 163–337)
POTASSIUM REFLEX MAGNESIUM: 3.8 MEQ/L (ref 3.4–4.9)
RBC # BLD: 4.27 M/UL (ref 4.63–6.08)
SODIUM BLD-SCNC: 138 MEQ/L (ref 135–144)
TOTAL PROTEIN: 5.9 G/DL (ref 6.3–8)
TROPONIN: 0.02 NG/ML (ref 0–0.01)
TROPONIN: <0.01 NG/ML (ref 0–0.01)
TROPONIN: <0.01 NG/ML (ref 0–0.01)
WBC # BLD: 9.1 K/UL (ref 4.2–9)

## 2021-07-24 PROCEDURE — 2580000003 HC RX 258: Performed by: INTERNAL MEDICINE

## 2021-07-24 PROCEDURE — 1210000000 HC MED SURG R&B

## 2021-07-24 PROCEDURE — 80076 HEPATIC FUNCTION PANEL: CPT

## 2021-07-24 PROCEDURE — 2060000000 HC ICU INTERMEDIATE R&B

## 2021-07-24 PROCEDURE — 84484 ASSAY OF TROPONIN QUANT: CPT

## 2021-07-24 PROCEDURE — 85027 COMPLETE CBC AUTOMATED: CPT

## 2021-07-24 PROCEDURE — 6370000000 HC RX 637 (ALT 250 FOR IP): Performed by: INTERNAL MEDICINE

## 2021-07-24 PROCEDURE — 93306 TTE W/DOPPLER COMPLETE: CPT

## 2021-07-24 PROCEDURE — 93010 ELECTROCARDIOGRAM REPORT: CPT | Performed by: INTERNAL MEDICINE

## 2021-07-24 PROCEDURE — 6370000000 HC RX 637 (ALT 250 FOR IP): Performed by: NURSE PRACTITIONER

## 2021-07-24 PROCEDURE — 36415 COLL VENOUS BLD VENIPUNCTURE: CPT

## 2021-07-24 PROCEDURE — 80048 BASIC METABOLIC PNL TOTAL CA: CPT

## 2021-07-24 PROCEDURE — 93005 ELECTROCARDIOGRAM TRACING: CPT

## 2021-07-24 PROCEDURE — 99222 1ST HOSP IP/OBS MODERATE 55: CPT | Performed by: INTERNAL MEDICINE

## 2021-07-24 PROCEDURE — 6360000002 HC RX W HCPCS: Performed by: INTERNAL MEDICINE

## 2021-07-24 RX ORDER — NITROGLYCERIN 0.4 MG/1
0.4 TABLET SUBLINGUAL EVERY 5 MIN PRN
Status: DISCONTINUED | OUTPATIENT
Start: 2021-07-24 | End: 2021-07-29 | Stop reason: HOSPADM

## 2021-07-24 RX ORDER — PROMETHAZINE HYDROCHLORIDE 12.5 MG/1
12.5 TABLET ORAL EVERY 6 HOURS PRN
Status: DISCONTINUED | OUTPATIENT
Start: 2021-07-24 | End: 2021-07-24 | Stop reason: HOSPADM

## 2021-07-24 RX ORDER — CARVEDILOL 12.5 MG/1
12.5 TABLET ORAL 2 TIMES DAILY WITH MEALS
Status: DISCONTINUED | OUTPATIENT
Start: 2021-07-24 | End: 2021-07-29 | Stop reason: HOSPADM

## 2021-07-24 RX ORDER — ONDANSETRON 2 MG/ML
4 INJECTION INTRAMUSCULAR; INTRAVENOUS EVERY 6 HOURS PRN
Status: CANCELLED | OUTPATIENT
Start: 2021-07-24

## 2021-07-24 RX ORDER — PROMETHAZINE HYDROCHLORIDE 12.5 MG/1
12.5 TABLET ORAL EVERY 6 HOURS PRN
Status: CANCELLED | OUTPATIENT
Start: 2021-07-24

## 2021-07-24 RX ORDER — TAMSULOSIN HYDROCHLORIDE 0.4 MG/1
0.4 CAPSULE ORAL DAILY
Status: DISCONTINUED | OUTPATIENT
Start: 2021-07-25 | End: 2021-07-29 | Stop reason: HOSPADM

## 2021-07-24 RX ORDER — LANOLIN ALCOHOL/MO/W.PET/CERES
400 CREAM (GRAM) TOPICAL DAILY
Status: DISCONTINUED | OUTPATIENT
Start: 2021-07-25 | End: 2021-07-29 | Stop reason: HOSPADM

## 2021-07-24 RX ORDER — PROMETHAZINE HYDROCHLORIDE 12.5 MG/1
12.5 TABLET ORAL EVERY 6 HOURS PRN
Status: DISCONTINUED | OUTPATIENT
Start: 2021-07-24 | End: 2021-07-29 | Stop reason: HOSPADM

## 2021-07-24 RX ORDER — TAMSULOSIN HYDROCHLORIDE 0.4 MG/1
0.4 CAPSULE ORAL DAILY
Status: DISCONTINUED | OUTPATIENT
Start: 2021-07-24 | End: 2021-07-24 | Stop reason: HOSPADM

## 2021-07-24 RX ORDER — ACETAMINOPHEN 650 MG/1
650 SUPPOSITORY RECTAL EVERY 6 HOURS PRN
Status: DISCONTINUED | OUTPATIENT
Start: 2021-07-24 | End: 2021-07-24 | Stop reason: HOSPADM

## 2021-07-24 RX ORDER — SODIUM CHLORIDE 0.9 % (FLUSH) 0.9 %
10 SYRINGE (ML) INJECTION EVERY 12 HOURS SCHEDULED
Status: DISCONTINUED | OUTPATIENT
Start: 2021-07-24 | End: 2021-07-29 | Stop reason: HOSPADM

## 2021-07-24 RX ORDER — NITROGLYCERIN 0.4 MG/1
0.4 TABLET SUBLINGUAL EVERY 5 MIN PRN
Status: DISCONTINUED | OUTPATIENT
Start: 2021-07-24 | End: 2021-07-24 | Stop reason: HOSPADM

## 2021-07-24 RX ORDER — ONDANSETRON 2 MG/ML
4 INJECTION INTRAMUSCULAR; INTRAVENOUS EVERY 6 HOURS PRN
Status: DISCONTINUED | OUTPATIENT
Start: 2021-07-24 | End: 2021-07-29 | Stop reason: HOSPADM

## 2021-07-24 RX ORDER — FINASTERIDE 5 MG/1
5 TABLET, FILM COATED ORAL DAILY
Status: DISCONTINUED | OUTPATIENT
Start: 2021-07-25 | End: 2021-07-29 | Stop reason: HOSPADM

## 2021-07-24 RX ORDER — POLYETHYLENE GLYCOL 3350 17 G/17G
17 POWDER, FOR SOLUTION ORAL DAILY PRN
Status: DISCONTINUED | OUTPATIENT
Start: 2021-07-24 | End: 2021-07-24 | Stop reason: HOSPADM

## 2021-07-24 RX ORDER — ASPIRIN 81 MG/1
81 TABLET, CHEWABLE ORAL DAILY
Status: DISCONTINUED | OUTPATIENT
Start: 2021-07-25 | End: 2021-07-29 | Stop reason: HOSPADM

## 2021-07-24 RX ORDER — SODIUM CHLORIDE 9 MG/ML
INJECTION, SOLUTION INTRAVENOUS CONTINUOUS
Status: DISCONTINUED | OUTPATIENT
Start: 2021-07-24 | End: 2021-07-24

## 2021-07-24 RX ORDER — OXYCODONE HYDROCHLORIDE 5 MG/1
5 TABLET ORAL EVERY 6 HOURS PRN
Status: DISCONTINUED | OUTPATIENT
Start: 2021-07-24 | End: 2021-07-29 | Stop reason: HOSPADM

## 2021-07-24 RX ORDER — ASPIRIN 81 MG/1
81 TABLET, CHEWABLE ORAL DAILY
Status: DISCONTINUED | OUTPATIENT
Start: 2021-07-24 | End: 2021-07-24 | Stop reason: HOSPADM

## 2021-07-24 RX ORDER — SODIUM CHLORIDE 0.9 % (FLUSH) 0.9 %
10 SYRINGE (ML) INJECTION PRN
Status: DISCONTINUED | OUTPATIENT
Start: 2021-07-24 | End: 2021-07-29 | Stop reason: HOSPADM

## 2021-07-24 RX ORDER — ATORVASTATIN CALCIUM 40 MG/1
40 TABLET, FILM COATED ORAL NIGHTLY
Status: CANCELLED | OUTPATIENT
Start: 2021-07-24

## 2021-07-24 RX ORDER — SODIUM CHLORIDE 0.9 % (FLUSH) 0.9 %
10 SYRINGE (ML) INJECTION PRN
Status: CANCELLED | OUTPATIENT
Start: 2021-07-24

## 2021-07-24 RX ORDER — ASPIRIN 81 MG/1
81 TABLET, CHEWABLE ORAL DAILY
Status: CANCELLED | OUTPATIENT
Start: 2021-07-24

## 2021-07-24 RX ORDER — SODIUM CHLORIDE 9 MG/ML
25 INJECTION, SOLUTION INTRAVENOUS PRN
Status: DISCONTINUED | OUTPATIENT
Start: 2021-07-24 | End: 2021-07-24 | Stop reason: HOSPADM

## 2021-07-24 RX ORDER — ONDANSETRON 2 MG/ML
4 INJECTION INTRAMUSCULAR; INTRAVENOUS EVERY 6 HOURS PRN
Status: DISCONTINUED | OUTPATIENT
Start: 2021-07-24 | End: 2021-07-24 | Stop reason: HOSPADM

## 2021-07-24 RX ORDER — TAMSULOSIN HYDROCHLORIDE 0.4 MG/1
0.4 CAPSULE ORAL DAILY
Status: CANCELLED | OUTPATIENT
Start: 2021-07-24

## 2021-07-24 RX ORDER — CLOPIDOGREL BISULFATE 75 MG/1
75 TABLET ORAL DAILY
Status: DISCONTINUED | OUTPATIENT
Start: 2021-07-24 | End: 2021-07-24 | Stop reason: HOSPADM

## 2021-07-24 RX ORDER — LEVOTHYROXINE SODIUM 0.03 MG/1
50 TABLET ORAL DAILY
Status: DISCONTINUED | OUTPATIENT
Start: 2021-07-24 | End: 2021-07-24 | Stop reason: HOSPADM

## 2021-07-24 RX ORDER — FINASTERIDE 5 MG/1
5 TABLET, FILM COATED ORAL DAILY
Status: DISCONTINUED | OUTPATIENT
Start: 2021-07-24 | End: 2021-07-24 | Stop reason: HOSPADM

## 2021-07-24 RX ORDER — SODIUM CHLORIDE 9 MG/ML
25 INJECTION, SOLUTION INTRAVENOUS PRN
Status: CANCELLED | OUTPATIENT
Start: 2021-07-24

## 2021-07-24 RX ORDER — MORPHINE SULFATE 2 MG/ML
2 INJECTION, SOLUTION INTRAMUSCULAR; INTRAVENOUS EVERY 4 HOURS PRN
Status: DISCONTINUED | OUTPATIENT
Start: 2021-07-24 | End: 2021-07-29 | Stop reason: HOSPADM

## 2021-07-24 RX ORDER — POLYETHYLENE GLYCOL 3350 17 G/17G
17 POWDER, FOR SOLUTION ORAL DAILY PRN
Status: DISCONTINUED | OUTPATIENT
Start: 2021-07-24 | End: 2021-07-29 | Stop reason: HOSPADM

## 2021-07-24 RX ORDER — FINASTERIDE 5 MG/1
5 TABLET, FILM COATED ORAL DAILY
Status: CANCELLED | OUTPATIENT
Start: 2021-07-24

## 2021-07-24 RX ORDER — ACETAMINOPHEN 325 MG/1
650 TABLET ORAL EVERY 6 HOURS PRN
Status: DISCONTINUED | OUTPATIENT
Start: 2021-07-24 | End: 2021-07-24 | Stop reason: HOSPADM

## 2021-07-24 RX ORDER — MORPHINE SULFATE 2 MG/ML
2 INJECTION, SOLUTION INTRAMUSCULAR; INTRAVENOUS EVERY 4 HOURS PRN
Status: CANCELLED | OUTPATIENT
Start: 2021-07-24

## 2021-07-24 RX ORDER — LEVOTHYROXINE SODIUM 0.05 MG/1
50 TABLET ORAL DAILY
Status: DISCONTINUED | OUTPATIENT
Start: 2021-07-25 | End: 2021-07-29 | Stop reason: HOSPADM

## 2021-07-24 RX ORDER — ACETAMINOPHEN 325 MG/1
650 TABLET ORAL EVERY 6 HOURS PRN
Status: DISCONTINUED | OUTPATIENT
Start: 2021-07-24 | End: 2021-07-29 | Stop reason: HOSPADM

## 2021-07-24 RX ORDER — SODIUM CHLORIDE 0.9 % (FLUSH) 0.9 %
10 SYRINGE (ML) INJECTION EVERY 12 HOURS SCHEDULED
Status: CANCELLED | OUTPATIENT
Start: 2021-07-24

## 2021-07-24 RX ORDER — OXYCODONE HYDROCHLORIDE 5 MG/1
5 TABLET ORAL EVERY 6 HOURS PRN
Status: CANCELLED | OUTPATIENT
Start: 2021-07-24

## 2021-07-24 RX ORDER — SODIUM CHLORIDE 0.9 % (FLUSH) 0.9 %
10 SYRINGE (ML) INJECTION PRN
Status: DISCONTINUED | OUTPATIENT
Start: 2021-07-24 | End: 2021-07-24 | Stop reason: HOSPADM

## 2021-07-24 RX ORDER — NITROGLYCERIN 0.4 MG/1
0.4 TABLET SUBLINGUAL EVERY 5 MIN PRN
Status: CANCELLED | OUTPATIENT
Start: 2021-07-24

## 2021-07-24 RX ORDER — POLYETHYLENE GLYCOL 3350 17 G/17G
17 POWDER, FOR SOLUTION ORAL DAILY PRN
Status: CANCELLED | OUTPATIENT
Start: 2021-07-24

## 2021-07-24 RX ORDER — CARVEDILOL 12.5 MG/1
12.5 TABLET ORAL 2 TIMES DAILY WITH MEALS
Status: CANCELLED | OUTPATIENT
Start: 2021-07-24

## 2021-07-24 RX ORDER — LEVOTHYROXINE SODIUM 0.03 MG/1
50 TABLET ORAL DAILY
Status: CANCELLED | OUTPATIENT
Start: 2021-07-25

## 2021-07-24 RX ORDER — SODIUM CHLORIDE 9 MG/ML
INJECTION, SOLUTION INTRAVENOUS CONTINUOUS
Status: CANCELLED | OUTPATIENT
Start: 2021-07-24 | End: 2021-07-24

## 2021-07-24 RX ORDER — OXYCODONE HYDROCHLORIDE 5 MG/1
5 TABLET ORAL EVERY 6 HOURS PRN
Status: DISCONTINUED | OUTPATIENT
Start: 2021-07-24 | End: 2021-07-24 | Stop reason: HOSPADM

## 2021-07-24 RX ORDER — ACETAMINOPHEN 650 MG/1
650 SUPPOSITORY RECTAL EVERY 6 HOURS PRN
Status: DISCONTINUED | OUTPATIENT
Start: 2021-07-24 | End: 2021-07-29 | Stop reason: HOSPADM

## 2021-07-24 RX ORDER — ACETAMINOPHEN 650 MG/1
650 SUPPOSITORY RECTAL EVERY 6 HOURS PRN
Status: CANCELLED | OUTPATIENT
Start: 2021-07-24

## 2021-07-24 RX ORDER — CLOPIDOGREL BISULFATE 75 MG/1
75 TABLET ORAL DAILY
Status: DISCONTINUED | OUTPATIENT
Start: 2021-07-25 | End: 2021-07-26

## 2021-07-24 RX ORDER — SODIUM CHLORIDE 9 MG/ML
25 INJECTION, SOLUTION INTRAVENOUS PRN
Status: DISCONTINUED | OUTPATIENT
Start: 2021-07-24 | End: 2021-07-29 | Stop reason: HOSPADM

## 2021-07-24 RX ORDER — ATORVASTATIN CALCIUM 40 MG/1
40 TABLET, FILM COATED ORAL NIGHTLY
Status: DISCONTINUED | OUTPATIENT
Start: 2021-07-24 | End: 2021-07-24 | Stop reason: HOSPADM

## 2021-07-24 RX ORDER — CARVEDILOL 12.5 MG/1
12.5 TABLET ORAL 2 TIMES DAILY WITH MEALS
Status: DISCONTINUED | OUTPATIENT
Start: 2021-07-24 | End: 2021-07-24 | Stop reason: HOSPADM

## 2021-07-24 RX ORDER — CLOPIDOGREL BISULFATE 75 MG/1
75 TABLET ORAL DAILY
Status: CANCELLED | OUTPATIENT
Start: 2021-07-24

## 2021-07-24 RX ORDER — ACETAMINOPHEN 325 MG/1
650 TABLET ORAL EVERY 6 HOURS PRN
Status: CANCELLED | OUTPATIENT
Start: 2021-07-24

## 2021-07-24 RX ORDER — SODIUM CHLORIDE 0.9 % (FLUSH) 0.9 %
10 SYRINGE (ML) INJECTION EVERY 12 HOURS SCHEDULED
Status: DISCONTINUED | OUTPATIENT
Start: 2021-07-24 | End: 2021-07-24 | Stop reason: HOSPADM

## 2021-07-24 RX ORDER — ATORVASTATIN CALCIUM 40 MG/1
40 TABLET, FILM COATED ORAL NIGHTLY
Status: DISCONTINUED | OUTPATIENT
Start: 2021-07-24 | End: 2021-07-29 | Stop reason: HOSPADM

## 2021-07-24 RX ORDER — MORPHINE SULFATE 2 MG/ML
2 INJECTION, SOLUTION INTRAMUSCULAR; INTRAVENOUS EVERY 4 HOURS PRN
Status: DISCONTINUED | OUTPATIENT
Start: 2021-07-24 | End: 2021-07-24 | Stop reason: HOSPADM

## 2021-07-24 RX ORDER — ASPIRIN 81 MG/1
81 TABLET, CHEWABLE ORAL DAILY
Qty: 30 TABLET | Refills: 3 | Status: SHIPPED | OUTPATIENT
Start: 2021-07-24

## 2021-07-24 RX ORDER — SODIUM CHLORIDE 9 MG/ML
INJECTION, SOLUTION INTRAVENOUS CONTINUOUS
Status: DISCONTINUED | OUTPATIENT
Start: 2021-07-24 | End: 2021-07-24 | Stop reason: HOSPADM

## 2021-07-24 RX ADMIN — TAMSULOSIN HYDROCHLORIDE 0.4 MG: 0.4 CAPSULE ORAL at 09:32

## 2021-07-24 RX ADMIN — ATORVASTATIN CALCIUM 40 MG: 40 TABLET, FILM COATED ORAL at 19:50

## 2021-07-24 RX ADMIN — Medication 10 ML: at 19:49

## 2021-07-24 RX ADMIN — CARVEDILOL 12.5 MG: 12.5 TABLET, FILM COATED ORAL at 09:32

## 2021-07-24 RX ADMIN — FINASTERIDE 5 MG: 5 TABLET, FILM COATED ORAL at 09:32

## 2021-07-24 RX ADMIN — LEVOTHYROXINE SODIUM 50 MCG: 0.03 TABLET ORAL at 05:41

## 2021-07-24 RX ADMIN — MAGNESIUM OXIDE 400 MG: 400 TABLET ORAL at 09:32

## 2021-07-24 RX ADMIN — CARVEDILOL 12.5 MG: 12.5 TABLET, FILM COATED ORAL at 16:02

## 2021-07-24 RX ADMIN — NITROGLYCERIN 0.5 INCH: 20 OINTMENT TOPICAL at 22:26

## 2021-07-24 RX ADMIN — CLOPIDOGREL BISULFATE 75 MG: 75 TABLET ORAL at 09:33

## 2021-07-24 RX ADMIN — ENOXAPARIN SODIUM 90 MG: 100 INJECTION SUBCUTANEOUS at 16:01

## 2021-07-24 RX ADMIN — SODIUM CHLORIDE: 9 INJECTION, SOLUTION INTRAVENOUS at 00:39

## 2021-07-24 ASSESSMENT — PAIN SCALES - GENERAL
PAINLEVEL_OUTOF10: 2
PAINLEVEL_OUTOF10: 0
PAINLEVEL_OUTOF10: 0
PAINLEVEL_OUTOF10: 2
PAINLEVEL_OUTOF10: 0
PAINLEVEL_OUTOF10: 0
PAINLEVEL_OUTOF10: 2

## 2021-07-24 ASSESSMENT — PAIN DESCRIPTION - PROGRESSION

## 2021-07-24 ASSESSMENT — PAIN - FUNCTIONAL ASSESSMENT
PAIN_FUNCTIONAL_ASSESSMENT: PREVENTS OR INTERFERES SOME ACTIVE ACTIVITIES AND ADLS
PAIN_FUNCTIONAL_ASSESSMENT: PREVENTS OR INTERFERES SOME ACTIVE ACTIVITIES AND ADLS
PAIN_FUNCTIONAL_ASSESSMENT: ACTIVITIES ARE NOT PREVENTED

## 2021-07-24 ASSESSMENT — ENCOUNTER SYMPTOMS
ABDOMINAL DISTENTION: 0
DIARRHEA: 0
SHORTNESS OF BREATH: 0
NAUSEA: 0
APNEA: 0
CHEST TIGHTNESS: 0
COLOR CHANGE: 0
VOMITING: 0

## 2021-07-24 ASSESSMENT — PAIN DESCRIPTION - DESCRIPTORS
DESCRIPTORS: DULL
DESCRIPTORS: TIGHTNESS

## 2021-07-24 ASSESSMENT — PAIN DESCRIPTION - PAIN TYPE
TYPE: ACUTE PAIN
TYPE: ACUTE PAIN

## 2021-07-24 ASSESSMENT — PAIN DESCRIPTION - LOCATION
LOCATION: CHEST
LOCATION: CHEST

## 2021-07-24 ASSESSMENT — PAIN DESCRIPTION - ONSET: ONSET: ON-GOING

## 2021-07-24 ASSESSMENT — PAIN DESCRIPTION - FREQUENCY
FREQUENCY: CONTINUOUS
FREQUENCY: CONTINUOUS

## 2021-07-24 ASSESSMENT — PAIN DESCRIPTION - ORIENTATION
ORIENTATION: MID
ORIENTATION: MID

## 2021-07-24 NOTE — H&P
Hospital Medicine History & Physical      PCP: Jesika Moore MD    Date of Admission: 7/23/2021    Date of Service: 7/24/21      Chief Complaint:  CP/dyspnea      History Of Present Illness:  77 y.o. male who presented to [facility] with above complains. He has prolong history of CAD, MI in 2012, had resent card cath in December 2020, still having periodic chest heaviness/discomfort with physical activity. For the past 3-4 days intensity and frequency of this episodes increased. Yesterday in a store he developed upper middle chest pain/heaviness with L arm numbness which lasted 10-15 min. The same time he had sweats, nausea, dyspnea. Similar to episode during his MI.  At this point he came to ER for further evaluation and after initial stabilization was admitted for further management     Past Medical History:          Diagnosis Date    Actinic keratosis     Basal cell carcinoma, trunk 12/2017    left upper back    Basal cell carcinoma, trunk     BPH (benign prostatic hyperplasia)     CAD (coronary artery disease) 2012    has 4 cardiac stents    Heart attack ([address]     History of MI (myocardial infarction) 12/11/2020    History of mycobacterial infection 6/22/2018    Hyperlipidemia     meds > 15 yrs    Hypertension     meds > 6 yrs / denies TIA or stroke    Hypertrophic scar     Myocardial infarct ([address] 11/2012    Osteoarthritis     all joints    Overweight 12/22/2017    Status post coronary angioplasty     Thyroid disease     meds > 1 month       Past Surgical History:          Procedure Laterality Date    ACHILLES TENDON SURGERY Left 7/10/2020    LEFT REPAIR OF RUPTURED ACHILLES TENDON, performed by Brenda Vences DPM at [address] [place] [address] Bilateral 2015    COLONOSCOPY  3/23/15        COLONOSCOPY N/A 5/11/2020    COLORECTAL CANCER SCREENING, HIGH RISK performed by Amy Phillip MD at [address] [place] [address]  2012    CYSTOSCOPY  05/17/2017    W/COMPLEX CYSTOMETROGRAM-COMPLEX UROFLOW-TRANSRECTAL US PROSTATE    EYE SURGERY      Lasik OU    JOINT REPLACEMENT Left 2010    LTKR    GA TOTAL KNEE ARTHROPLASTY Right 3/15/2018    RIGHT KNEE TOTAL KNEE  ARTHROPLASTY performed by Mina Garcia MD at [address] Right     twice    SHOULDER SURGERY Right      RCR    TONSILLECTOMY      TURP N/A 7/17/2017    GREEN LIGHT LASER TRANSURETHRAL RESECTIOIN PROSTATE performed by John Sullivan MD at [address] [place] [address] PROSTATE/TRANSRECTAL  06/03/15    Cystoscop, cath rem, US prostate   [address]    due to snoring       Medications Prior to Admission:      Prior to Admission medications    Medication Sig Start Date End Date Taking?  Authorizing Provider   aspirin 81 MG chewable tablet Take 1 tablet by mouth daily 7/24/21  Yes Santiago Carroll MD   clopidogrel (PLAVIX) 75 MG tablet take 1 tablet by mouth once daily 6/29/21  Yes Dylan Naik, DO   tamsulosin (FLOMAX) 0.4 MG capsule take 1 capsule by mouth twice a day 5/3/21  Yes Cynthia Gutierrez MD   carvedilol (COREG) 12.5 MG tablet take 1 tablet by mouth twice a day with food 5/3/21  Yes Cynthia Gutierrez MD   levothyroxine (SYNTHROID) 50 MCG tablet take 1 tablet by mouth once daily 5/3/21  Yes Cynthia Gutierrez MD   hydroCHLOROthiazide (HYDRODIURIL) 25 MG tablet take 1 tablet by mouth once daily 4/16/21  Yes Cynthia Gutierrez MD   finasteride (PROSCAR) 5 MG tablet Take 1 tablet by mouth daily take 1 tablet by mouth once daily 4/16/21  Yes Cynthia Gutierrez MD   diclofenac (VOLTAREN) 75 MG EC tablet take 1 tablet by mouth twice a day if needed for pain 2/5/21  Yes Dylan Moncadaiday, DO   atorvastatin (LIPITOR) 40 MG tablet take 1 tablet by mouth once daily 12/28/20  Yes Dylan Naik, DO   Niacin (VITAMIN B-3 PO) Take by mouth   Yes Historical Provider, MD   nitroGLYCERIN (NITROSTAT) 0.4 MG SL tablet Place 1 tablet under the tongue every 5 minutes as needed for Chest pain Dissolve 1 tab under tongue at first sign of chest pain every 5 minutes as needed. If pain persists after taking 3 tabs in a 15-minute period, or the pain is different than is typically experienced, call 9-1-1 immediately. 8/14/20  Yes Jennefer Meckel, MD   magnesium oxide (MAG-OX) 400 MG tablet Take 400 mg by mouth daily   Yes Historical Provider, MD   Tammy Kelley [facility] by Does not apply route Exp 5 years 6/11/20  Yes Jennefer Meckel, MD   Multiple Vitamins-Minerals (MULTI COMPLETE PO) Take by mouth   Yes Historical Provider, MD       Allergies:  Ace inhibitors and Lisinopril    Social History:      The patient currently lives home    TOBACCO:   reports that he quit smoking about 54 years ago. He started smoking about 60 years ago. He has a 7.00 pack-year smoking history. He has never used smokeless tobacco.  ETOH:   reports no history of alcohol use. Family History:       Reviewed in detail and negative for DM, CAD, Cancer, CVA. Positive as follows:        Problem Relation Age of Onset    Kidney Disease Father     Other Mother         Perforated intestine    No Known Problems Sister     No Known Problems Brother     No Known Problems Sister     No Known Problems Brother     No Known Problems Son        REVIEW OF SYSTEMS:   Pertinent positives as noted in the HPI. All other systems reviewed and negative. PHYSICAL EXAM:    /61   Pulse 56   Temp 98.2 °F (36.8 °C) (Oral)   Resp 18   Ht 5' 8\" (1.727 m)   Wt 201 lb 1.6 oz (91.2 kg)   SpO2 96%   BMI 30.58 kg/m²     General appearance:  No apparent distress, appears stated age and cooperative. HEENT:  Normal cephalic, atraumatic without obvious deformity. Pupils equal, round, and reactive to light. Extra ocular muscles intact. Conjunctivae/corneas clear. Neck: Supple, with full range of motion. No jugular venous distention. Trachea midline. Respiratory:  Normal respiratory effort.  Clear to auscultation, bilaterally without Rales/Wheezes/Rhonchi. Cardiovascular:  Regular rate and rhythm with normal S1/S2 without murmurs, rubs or gallops. Abdomen: Soft, non-tender, non-distended with normal bowel sounds. Musculoskeletal:  No clubbing, cyanosis or edema bilaterally. Full range of motion without deformity. Skin: Skin color, texture, turgor normal.  No rashes or lesions. Neurologic:  Neurovascularly intact without any focal sensory/motor deficits. Cranial nerves: II-XII intact, grossly non-focal.  Psychiatric:  Alert and oriented, thought content appropriate, normal insight  Capillary Refill: Brisk,< 3 seconds   Peripheral Pulses: +2 palpable, equal bilaterally       Labs:     Recent Labs     07/23/21 2233 07/24/21  0526   WBC 10.7* 9.1*   HGB 13.6* 12.4*   HCT 41.1* 38.0*    188     Recent Labs     07/23/21 2233 07/24/21  0526    138   K 4.2 3.8    105   CO2 26 25   BUN 41* 30*   CREATININE 1.17 0.97   CALCIUM 8.7 8.3*     Recent Labs     07/23/21 2233 07/24/21  0526   AST 25 17   ALT 33 29   BILIDIR  --  <0.2   BILITOT 0.5 0.3   ALKPHOS 64 59     Recent Labs     07/23/21 2233   INR 1.0     Recent Labs     07/23/21 2233 07/24/21  0526   CKTOTAL 155  --    TROPONINI <0.010 0.018*       Urinalysis:      Lab Results   Component Value Date    NITRU Negative 11/23/2018    WBCUA 3-5 11/23/2018    BACTERIA Rare 11/23/2018    RBCUA 0-2 11/23/2018    BLOODU moderate 05/24/2019    BLOODU MODERATE 11/23/2018    SPECGRAV 1.015 05/24/2019    SPECGRAV 1.007 11/23/2018    GLUCOSEU neg 05/24/2019    GLUCOSEU Negative 11/23/2018           XR CHEST PORTABLE   Final Result      NO EVIDENCE OF ACTIVE CARDIOPULMONARY DISEASE, BY PORTABLE CHEST RADIOGRAPHY. ASSESSMENT:    There are no active hospital problems to display for this patient. PLAN:        DVT Prophylaxis:   Diet: ADULT DIET;  Regular; Low Fat/Low Chol/High Fiber/DYLAN  Code Status: Full Code    PT/OT Eval Status:     Dispo - CP/heaviness, dyspnea, CAD history, elevated troponin today AM- discussed with cardiology service- pt needs to be transferred to [address] [place] [address] for further evaluation for card cath  CAD, post PTCA- dual antiplatelet therapy as ordered  HTN- controlled  Obesity with BMI 30%- supportive care   Patient remained full code during this admission   Medically stable for transfer to [address] [place] [address] for further evaluation/treatment per cardiology request           Miki Carroll MD    Thank you Ayden Loaiza MD for the opportunity to be involved in this patient's care. If you have any questions or concerns please feel free to contact me.

## 2021-07-24 NOTE — ED PROVIDER NOTES
Negative for abdominal pain, diarrhea and vomiting. Genitourinary: Negative for difficulty urinating, dysuria, flank pain and hematuria. Musculoskeletal: Negative for back pain and myalgias. Skin: Negative for color change and rash. Neurological: Negative for dizziness, weakness, numbness and headaches. Hematological: Negative for adenopathy. Except as noted above the remainder of the review of systems was reviewed and negative.        PAST MEDICAL HISTORY     Past Medical History:   Diagnosis Date    Actinic keratosis     Basal cell carcinoma, trunk 12/2017    left upper back    Basal cell carcinoma, trunk     BPH (benign prostatic hyperplasia)     CAD (coronary artery disease) 2012    has 4 cardiac stents    [facility]     History of MI (myocardial infarction) 12/11/2020    History of mycobacterial infection 6/22/2018    Hyperlipidemia     meds > 15 yrs    Hypertension     meds > 6 yrs / denies TIA or stroke    Hypertrophic scar     Myocardial infarct ([facility] 11/2012    Osteoarthritis     all joints    Overweight 12/22/2017    Status post coronary angioplasty     Thyroid disease     meds > 1 month         SURGICAL HISTORY       Past Surgical History:   Procedure Laterality Date    ACHILLES TENDON SURGERY Left 7/10/2020    LEFT REPAIR OF RUPTURED ACHILLES TENDON, performed by Tiffanie Earl DPM at [address] [place] [address] Bilateral 2015    COLONOSCOPY  3/23/15        COLONOSCOPY N/A 5/11/2020    COLORECTAL CANCER SCREENING, HIGH RISK performed by Huey Carr MD at [address] [place] [address]  2012    CYSTOSCOPY  05/17/2017    W/COMPLEX CYSTOMETROGRAM-COMPLEX UROFLOW-TRANSRECTAL US PROSTATE    EYE SURGERY      Lasik OU    JOINT REPLACEMENT Left 2010    LTKR    [facility] TOTAL KNEE ARTHROPLASTY Right 3/15/2018    RIGHT KNEE TOTAL KNEE  ARTHROPLASTY performed [facility] Franny Ratliff [facility] at [facility] Right     twice    SHOULDER SURGERY Right      RCR    TONSILLECTOMY      TURP N/A 7/17/2017    GREEN LIGHT LASER TRANSURETHRAL RESECTIOIN PROSTATE performed by Jamison Connolly MD at [address] [place] [address] PROSTATE/TRANSRECTAL  06/03/15    Cystoscop, cath rem,  prostate   [address]    due to snoring         CURRENT MEDICATIONS       Previous Medications    ATORVASTATIN (LIPITOR) 40 MG TABLET    take 1 tablet by mouth once daily    CARVEDILOL (COREG) 12.5 MG TABLET    take 1 tablet by mouth twice a day with food    CLOPIDOGREL (PLAVIX) 75 MG TABLET    take 1 tablet by mouth once daily    DICLOFENAC (VOLTAREN) 75 MG EC TABLET    take 1 tablet by mouth twice a day if needed for pain    FINASTERIDE (PROSCAR) 5 MG TABLET    Take 1 tablet by mouth daily take 1 tablet by mouth once daily    HANDICAP PLACARD MISC    by Does not apply route Exp 5 years    HYDROCHLOROTHIAZIDE (HYDRODIURIL) 25 MG TABLET    take 1 tablet by mouth once daily    LEVOTHYROXINE (SYNTHROID) 50 MCG TABLET    take 1 tablet by mouth once daily    MAGNESIUM OXIDE (MAG-OX) 400 MG TABLET    Take 400 mg by mouth daily    MULTIPLE VITAMINS-MINERALS (MULTI COMPLETE PO)    Take by mouth    NIACIN (VITAMIN B-3 PO)    Take by mouth    NITROGLYCERIN (NITROSTAT) 0.4 MG SL TABLET    Place 1 tablet under the tongue every 5 minutes as needed for Chest pain Dissolve 1 tab under tongue at first sign of chest pain every 5 minutes as needed. If pain persists after taking 3 tabs in a 15-minute period, or the pain is different than is typically experienced, call 9-1-1 immediately.     TAMSULOSIN (FLOMAX) 0.4 MG CAPSULE    take 1 capsule by mouth twice a day       ALLERGIES     Ace inhibitors and Lisinopril    FAMILY HISTORY       Family History   Problem Relation Age of Onset    Kidney Disease Father     Other Mother         Perforated intestine    No Known Problems Sister     No Known Problems Brother     No Known Problems Sister     No Known Problems Brother     No Known Problems Son           SOCIAL HISTORY       Social History     Socioeconomic History    Marital status:      Spouse name: None    Number of children: None    Years of education: None    Highest education level: None   Occupational History    None   Tobacco Use    Smoking status: Former Smoker     Packs/day: 1.00     Years: 7.00     Pack years: 7.00     Start date: 1961     Quit date: 3/17/1967     Years since quittin.3    Smokeless tobacco: Never Used    Tobacco comment: Quit for [employer]   Vaping Use    Vaping Use: Never used   Substance and Sexual Activity    Alcohol use: No     Types: 30 Cans of beer per week     Comment: No alcohol  since     Drug use: No    Sexual activity: None   Other Topics Concern    None   Social History Narrative    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     Social Determinants of Health     Financial Resource Strain: Low Risk     Difficulty of Paying Living Expenses: Not hard at all   Food Insecurity: No Food Insecurity    Worried About [address] in the Last Year: Never true   [address] in the Last Year: Never true   Transportation Needs:     Lack of Transportation (Medical):      Lack of Transportation (Non-Medical):    Physical Activity:     Days of Exercise per Week:     Minutes of Exercise per Session:    Stress:     Feeling of Stress :    Social Connections:     Frequency of Communication with Friends and Family:     Frequency of Social Gatherings with Friends and Family:     Attends Voodoo Services:     Active Member of Clubs or Organizations:     Attends Club or Organization Meetings:     Marital Status:    Intimate Partner Violence:     Fear of Current or Ex-Partner:     Emotionally Abused:     Physically Abused:     Sexually Abused:            PHYSICAL EXAM    (up to 7 for level 4, 8 or more for level 5)     ED Triage Vitals [21 2204]   BP Temp Temp Source Pulse Resp SpO2 Height Weight   (!) 156/74 98.3 °F (36.8 °C) Oral 72 18 98 % 5' 8\" (1.727 m) 189 lb 3.2 oz (85.8 kg)         Physical Exam   General appearance: Patient is awake alert interactive appropriate nontoxic in no acute distress  Head is atraumatic normocephalic  Eyes pupils are equal and reactive sclera white conjunctive are pink  Oral pharyngeal cavity is pink with good moisture, no exudates or ulcerations no asymmetry, the airway is widely patent  Neck: Supple no meningeal signs no adenopathy no JVD  Heart: Regular rate and rhythm no gross murmurs rubs or clicks  Lungs: Breath sounds are clear with good air movement throughout no active wheezes rales or rhonchi no respiratory distress  Abdomen: Soft nontender with good bowel sounds no rebound rigidity or guarding no firm or pulsatile masses, no gross distention, femoral pulses full and symmetric  Back: Nontender to palpation no costovertebral angle tenderness  Skin: Warm and dry without rashes  Lower extremities: No edema or calf tenderness or asymmetry. Rectal examination: Clear to light tan secretions Hemoccult negative on testing    DIAGNOSTIC RESULTS     EKG: All EKG's are interpreted by the Emergency Department Physician who either signs or Co-signs this chart in the absence of a cardiologist.    EKG interpreted by ED physician indication chest pain: Normal sinus rhythm at 76 with nonspecific ST-T change no acute infarction pattern.   Appears similar when compared to previous of 12/29/2020    RADIOLOGY:   Non-plain film images such as CT, Ultrasound and MRI are read by the radiologist. Plain radiographic images are visualized and preliminarily interpreted by the emergency physician with the below findings:    Chest x-ray one view interpreted by ED physician indication chest pain: Mediastinum are within normal limits lung fields are clear there are no acute consolidations vascular congestion or pneumothorax appreciated      Interpretation per the Radiologist below, if available at the time of this note:    XR CHEST PORTABLE    (Results Pending)       LABS:  Labs Reviewed   COMPREHENSIVE METABOLIC PANEL - Abnormal; Notable for the following components:       Result Value    Glucose 139 (*)     BUN 41 (*)     All other components within normal limits   CBC WITH AUTO DIFFERENTIAL - Abnormal; Notable for the following components:    WBC 10.7 (*)     Hemoglobin 13.6 (*)     Hematocrit 41.1 (*)     Neutrophils % 69.4 (*)     Eosinophils % 0.7 (*)     Neutrophils Absolute 7.4 (*)     Monocytes Absolute 1.1 (*)     All other components within normal limits   PROTIME-INR   APTT   CK   TROPONIN     BUN significantly elevated at 41 -occult testing of stool negative    All other labs were within normal range or not returned as of this dictation. EMERGENCY DEPARTMENT COURSE and DIFFERENTIAL DIAGNOSIS/MDM:   Vitals:    Vitals:    07/23/21 2204   BP: (!) 156/74   Pulse: 72   Resp: 18   Temp: 98.3 °F (36.8 °C)   TempSrc: Oral   SpO2: 98%   Weight: 189 lb 3.2 oz (85.8 kg)   Height: 5' 8\" (1.727 m)     Treatment and course: Patient was placed on a cardiac monitor with continuous pulse ox monitoring and IV was established baby aspirin 4 p.o. were given nitroglycerin paste 1 inch was applied. Patient resting comfortably pain-free through the emergency department course: Normal saline 500 mL IV bolus was given with elevated BUN    Heart score is a 5    Coordination of CARE: I did place a call out to the hospitalist for admission for further evaluation     FINAL IMPRESSION      1. Chest pain, unspecified type          DISPOSITION/PLAN   DISPOSITION Decision To Admit 07/23/2021 11:13:28 PM  Patient awaiting bed placement in stable condition    PATIENT REFERRED TO:  No follow-up provider specified.     DISCHARGE MEDICATIONS:  New Prescriptions    No medications on file     Controlled Substances Monitoring:     No flowsheet data found.    (Please note that portions of this note were completed with a voice recognition program.  Efforts were made to edit the dictations but occasionally words are mis-transcribed.)    Lorena Stewart DO (electronically signed)  Attending Emergency Physician            Lorena Stewart DO  07/23/21 2045

## 2021-07-24 NOTE — PROGRESS NOTES
Pt admitted to the unit via w/c from ER. Pt is A&O x 4. Pleasant and cooperative with staff and care. Pt oriented on call light and is able to use call light to make needs known. Skin assessment completed. Skin to heels and cooccyx are intact and shows no s/s of pressure trauma. No further complaints voiced. Call light within reach. Safety maintained.

## 2021-07-24 NOTE — PROGRESS NOTES
Hospitalist Progress Note      PCP: Joseline Mccormick MD    Date of Admission: 7/24/2021    Chief Complaint:    No chief complaint on file. Subjective:  Patient denies fevers, chills; denies burning micturition or diarrhea. Had diaphoresis with SOB and chest heaviness last night. 12 point ROS negative other than mentioned above     Medications:  Reviewed    Infusion Medications    sodium chloride      sodium chloride       Scheduled Medications    [START ON 7/25/2021] enoxaparin  40 mg Subcutaneous Daily    sodium chloride flush  10 mL Intravenous 2 times per day    [START ON 7/25/2021] aspirin  81 mg Oral Daily    atorvastatin  40 mg Oral Nightly    carvedilol  12.5 mg Oral BID WC    [START ON 7/25/2021] clopidogrel  75 mg Oral Daily    [START ON 7/25/2021] finasteride  5 mg Oral Daily    [START ON 7/25/2021] levothyroxine  50 mcg Oral Daily    [START ON 7/25/2021] magnesium oxide  400 mg Oral Daily    [START ON 7/25/2021] tamsulosin  0.4 mg Oral Daily     PRN Meds: sodium chloride, acetaminophen **OR** acetaminophen, polyethylene glycol, promethazine **OR** ondansetron, sodium chloride flush, morphine, nitroGLYCERIN, oxyCODONE    No intake or output data in the 24 hours ending 07/24/21 1512    Exam:    BP (!) 160/75   Pulse 56   Temp 98.4 °F (36.9 °C) (Oral)   Resp 18   Ht 5' 8\" (1.727 m)   Wt 191 lb 14.4 oz (87 kg)   SpO2 100%   BMI 29.18 kg/m²     General appearance: No apparent distress, appears stated age and cooperative. HEENT: Conjunctivae/corneas clear. Neck: . Trachea midline. Respiratory:  Normal respiratory effort. Clear to auscultation  Cardiovascular: Regular rate and rhythm   Abdomen: Soft, non-tender, non-distended with normal bowel sounds. Musculoskeletal: No clubbing, cyanosis or edema bilaterally.   Neuro: Non Focal.   Capillary Refill: Brisk,< 3 seconds   Peripheral Pulses: +2 palpable, equal bilaterally     Labs:   Recent Labs     07/23/21  2233 07/24/21  0526   WBC 10.7* 9.1*   HGB 13.6* 12.4*   HCT 41.1* 38.0*    188     Recent Labs     07/23/21 2233 07/24/21  0526    138   K 4.2 3.8    105   CO2 26 25   BUN 41* 30*   CREATININE 1.17 0.97   CALCIUM 8.7 8.3*     Recent Labs     07/23/21 2233 07/24/21  0526   AST 25 17   ALT 33 29   BILIDIR  --  <0.2   BILITOT 0.5 0.3   ALKPHOS 64 59     Recent Labs     07/23/21 2233   INR 1.0     Recent Labs     07/23/21 2233 07/24/21  0526   CKTOTAL 155  --    TROPONINI <0.010 0.018*     Urinalysis:      Lab Results   Component Value Date    NITRU Negative 11/23/2018    WBCUA 3-5 11/23/2018    BACTERIA Rare 11/23/2018    RBCUA 0-2 11/23/2018    BLOODU moderate 05/24/2019    BLOODU MODERATE 11/23/2018    SPECGRAV 1.015 05/24/2019    SPECGRAV 1.007 11/23/2018    GLUCOSEU neg 05/24/2019    GLUCOSEU Negative 11/23/2018     Radiology:  No orders to display     Assessment/Plan:    #NSTEMI    - Plan for [address] Monday; will change lovenox to 1mg/kg BID    - cardiology is managing; continue aspirin and plavix    #HTN/HLD/CAD    - continue home meds    Active Hospital Problems    Diagnosis Date Noted    Elevated troponin [R77.8] 07/24/2021     Additional work up or/and treatment plan may be added today or then after based on clinical progression. I am managing a portion of pt care. Some medical issues are handled by other specialists. Additional work up and treatment should be done in out pt setting by pt PCP and other out pt providers. In addition to examining and evaluating pt, I spent additional time explaining care, normal and abnormal findings, and treatment plan. All of pt questions were answered. Counseling, diet and education were  provided. Case will be discussed with nursing staff when appropriate. Family will be updated if and when appropriate. Diet: ADULT DIET;  Regular; Low Fat/Low Chol/High Fiber/DYLAN    Code Status: Full Code    PT/OT Eval     Electronically signed by Omayra Villa MD on 7/24/2021 at 3:12 PM

## 2021-07-24 NOTE — PROGRESS NOTES
Report called to [facility], Pt transferring to  bed 181. Squad on the way up to unit for ambulance transport. Pt aware.

## 2021-07-24 NOTE — PROGRESS NOTES
Troponin level elevated. Vitals assessed and WNL. Pt c/o mid sternal pain and describes pain as mild and dull and does not radiate to either arm. Dr. Bairon Culp notified. Pt offered PRN pain medication. Pt states he is a former alcoholic and doesn't want to form another habit. No further complaints voiced. Call light within reach. Safety maintained.

## 2021-07-24 NOTE — CONSULTS
Consult Note  Patient: Zakia Mac  Unit/Bed: L286/Q142-90  YOB: 1945  MRN: [id number]  Acct: [de-identified]   Admitting Diagnosis: Elevated troponin [R77.8]  Date:  7/24/2021  Hospital Day: 0      Chief Complaint:  Chest pain, elevated troponins    History of Present Illness:  20-year-old gentleman who presented to [facility] with chest pain dyspnea. Known history of CAD with MI in 2012. Recent cardiac catheterizatio December 20. He was in the grocery store and had some chest heaviness with numbness of the arm and broke out in a sweat came to the ER was admitted.  We are consulted because of elevated troponins    Allergies   Allergen Reactions    Ace Inhibitors Other (See Comments)     Cough      Lisinopril Other (See Comments)     Cough         Current Facility-Administered Medications   Medication Dose Route Frequency Provider Last Rate Last Admin    0.9 % sodium chloride infusion  25 mL Intravenous PRN Ilene Sanford MD        acetaminophen (TYLENOL) tablet 650 mg  650 mg Oral Q6H PRN Ilene Sanford MD        Or    acetaminophen (TYLENOL) suppository 650 mg  650 mg Rectal Q6H PRN Ilene Sanford MD        [START ON 7/25/2021] enoxaparin (LOVENOX) injection 40 mg  40 mg Subcutaneous Daily Ilene Sanford MD        polyethylene glycol [facility]) packet 17 g  17 g Oral Daily PRN Ilene Sanford MD        promethazine (PHENERGAN) tablet 12.5 mg  12.5 mg Oral Q6H PRN Ilene Sanford MD        Or    [facility]dansetron [facility]) injection 4 mg  4 mg Intravenous Q6H PRN Ilene Sanford MD        sodium chloride flush 0.9 % injection 10 mL  10 mL Intravenous 2 times per day Ilene Sanford MD        sodium chloride flush 0.9 % injection 10 mL  10 mL Intravenous PRN Ilene Sanford MD        0.9 % sodium chloride infusion   Intravenous Continuous Ilene Sanford MD        [START ON 7/25/2021] aspirin chewable tablet 81 mg  81 mg Oral Daily Ilene Sanford MD        atorvastatin (LIPITOR) tablet 40 mg  40 mg Oral Nightly Fadi Libby Bajwa MD        carvedilol (COREG) tablet 12.5 mg  12.5 mg Oral BID WC Tobin Evans MD        [START ON 7/25/2021] clopidogrel (PLAVIX) tablet 75 mg  75 mg Oral Daily Tobin Evans MD        [START ON 7/25/2021] finasteride (PROSCAR) tablet 5 mg  5 mg Oral Daily Tobin Evans MD        [START ON 7/25/2021] levothyroxine (SYNTHROID) tablet 50 mcg  50 mcg Oral Daily Tobin Evans MD        [START ON 7/25/2021] magnesium oxide (MAG-OX) tablet 400 mg  400 mg Oral Daily Tobin Evans MD        morphine (PF) injection 2 mg  2 mg Intravenous Q4H PRN Tobin Evans MD        nitroGLYCERIN (NITROSTAT) SL tablet 0.4 mg  0.4 mg Sublingual Q5 Min PRN Tobin Evans MD        oxyCODONE (ROXICODONE) immediate release tablet 5 mg  5 mg Oral Q6H PRN Tobin Evans MD        [START ON 7/25/2021] tamsulosin (FLOMAX) capsule 0.4 mg  0.4 mg Oral Daily Tobin Evans MD           PMHx:  Past Medical History:   Diagnosis Date    Actinic keratosis     Basal cell carcinoma, trunk 12/2017    left upper back    Basal cell carcinoma, trunk     BPH (benign prostatic hyperplasia)     CAD (coronary artery disease) 2012    has 4 cardiac stents    Heart attack ([facility] [address]     History of MI (myocardial infarction) 12/11/2020    History of mycobacterial infection 6/22/2018    Hyperlipidemia     meds > 15 yrs    Hypertension     meds > 6 yrs / denies TIA or stroke    Hypertrophic scar     Myocardial infarct ([address] 11/2012    Osteoarthritis     all joints    Overweight 12/22/2017    Status post coronary angioplasty     Thyroid disease     meds > 1 month       PSHx:  Past Surgical History:   Procedure Laterality Date    ACHILLES TENDON SURGERY Left 7/10/2020    LEFT REPAIR OF RUPTURED ACHILLES TENDON, performed by Yenifer Rosa DPM at  [facility] Bilateral 2015    COLONOSCOPY  3/23/15        COLONOSCOPY N/A 5/11/2020    COLORECTAL CANCER SCREENING, HIGH RISK performed by Deng Jiménez MD at [facility]  [facility] [place] WITH STENT PLACEMENT  2012    CYSTOSCOPY  2017    W/COMPLEX CYSTOMETROGRAM-COMPLEX UROFLOW-TRANSRECTAL US PROSTATE    EYE SURGERY      Lasik OU    JOINT REPLACEMENT Left     LTKR    SD TOTAL KNEE ARTHROPLASTY Right 3/15/2018    RIGHT KNEE TOTAL KNEE  ARTHROPLASTY performed by Radha Restrepo MD at [facility] Right     twice    SHOULDER SURGERY Right      RCR    TONSILLECTOMY      TURP N/A 2017    GREEN LIGHT LASER TRANSURETHRAL RESECTIOIN PROSTATE performed by Alina Hewitt MD at [address] [place] [address] PROSTATE/TRANSRECTAL  06/03/15    Cystoscop, cath rem, US prostate    UVULECTOMY      due to snoring       Social Hx:  Social History     Socioeconomic History    Marital status:      Spouse name: Not on file    Number of children: Not on file    Years of education: Not on file    Highest education level: Not on file   Occupational History    Not on file   Tobacco Use    Smoking status: Former Smoker     Packs/day: 1.00     Years: 7.00     Pack years: 7.00     Start date: 1961     Quit date: 3/17/1967     Years since quittin.3    Smokeless tobacco: Never Used    Tobacco comment: Quit for  [employer]   Vaping Use    Vaping Use: Never used   Substance and Sexual Activity    Alcohol use: No     Types: 30 Cans of beer per week     Comment: No alcohol  since     Drug use: No    Sexual activity: Not on file   Other Topics Concern    Not on file   Social History Narrative    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     Social Determinants of Health     Financial Resource Strain: Low Risk     Difficulty of Paying Living Expenses: Not hard at all   Food Insecurity: No Food Insecurity    Worried About [address] in the Last Year: Never true    [facility] in the Last Year: Never true   Transportation Needs:     Lack of Transportation (Medical):      Lack of Transportation (Non-Medical):    Physical Activity:     Days of Exercise per Week:     Minutes of Exercise per Session:    Stress:     Feeling of Stress :    Social Connections:     Frequency of Communication with Friends and Family:     Frequency of Social Gatherings with Friends and Family:     Attends Oriental orthodox Services:     Active Member of Clubs or Organizations:     Attends Club or Organization Meetings:     Marital Status:    Intimate Partner Violence:     Fear of Current or Ex-Partner:     Emotionally Abused:     Physically Abused:     Sexually Abused:        Family Hx:  Family History   Problem Relation Age of Onset    Kidney Disease Father     Other Mother         Perforated intestine    No Known Problems Sister     No Known Problems Brother     No Known Problems Sister     No Known Problems Brother     No Known Problems Son        Review of Systems:   Review of Systems   Constitutional: Negative for appetite change, diaphoresis and fatigue. Respiratory: Negative for apnea, chest tightness and shortness of breath. Cardiovascular: Negative for chest pain, palpitations and leg swelling. Gastrointestinal: Negative for abdominal distention, diarrhea, nausea and vomiting. Skin: Negative for color change, pallor, rash and wound. Neurological: Negative for dizziness, syncope, weakness, light-headedness, numbness and headaches. Psychiatric/Behavioral: Negative for agitation, behavioral problems and confusion. The patient is not nervous/anxious and is not hyperactive. All other systems reviewed and are negative. Physical Examination:    BP (!) 160/75   Pulse 56   Temp 98.4 °F (36.9 °C) (Oral)   Resp 18   Ht 5' 8\" (1.727 m)   Wt 191 lb 14.4 oz (87 kg)   SpO2 100%   BMI 29.18 kg/m²    Physical Exam  Constitutional:       Appearance: He is well-developed. HENT:      Head: Atraumatic.    Eyes:      Conjunctiva/sclera: Conjunctivae normal.      Pupils: Pupils are equal, round, and reactive to light. Neck:      Thyroid: No thyromegaly. Vascular: No JVD. Trachea: No tracheal deviation. Cardiovascular:      Rate and Rhythm: Normal rate and regular rhythm. Heart sounds: No murmur heard. No friction rub. No gallop. Pulmonary:      Effort: No respiratory distress. Breath sounds: No wheezing or rales. Chest:      Chest wall: No tenderness. Abdominal:      General: There is no distension. Palpations: Abdomen is soft. There is no mass. Tenderness: There is no abdominal tenderness. There is no guarding or rebound. Musculoskeletal:         General: Normal range of motion. Lymphadenopathy:      Cervical: No cervical adenopathy. Skin:     General: Skin is warm. Neurological:      Mental Status: He is alert and oriented to person, place, and time.          LABS:  CBC:  Lab Results   Component Value Date    WBC 9.1 07/24/2021    RBC 4.27 07/24/2021    HGB 12.4 07/24/2021    HCT 38.0 07/24/2021    MCV 89.0 07/24/2021    MCH 29.0 07/24/2021    MCHC 32.6 07/24/2021    RDW 13.3 07/24/2021     07/24/2021    MPV 8.2 03/26/2015     CBC with Differential:   Lab Results   Component Value Date    WBC 9.1 07/24/2021    RBC 4.27 07/24/2021    HGB 12.4 07/24/2021    HCT 38.0 07/24/2021     07/24/2021    MCV 89.0 07/24/2021    MCH 29.0 07/24/2021    MCHC 32.6 07/24/2021    RDW 13.3 07/24/2021    LYMPHOPCT 18.3 07/23/2021    MONOPCT 10.2 07/23/2021    BASOPCT 0.2 07/23/2021    MONOSABS 1.1 07/23/2021    LYMPHSABS 2.0 07/23/2021    EOSABS 0.1 07/23/2021    BASOSABS 0.0 07/23/2021     CMP:    Lab Results   Component Value Date     07/24/2021    K 3.8 07/24/2021     07/24/2021    CO2 25 07/24/2021    BUN 30 07/24/2021    CREATININE 0.97 07/24/2021    GFRAA >60.0 07/24/2021    LABGLOM >60.0 07/24/2021    GLUCOSE 123 07/24/2021    PROT 5.9 07/24/2021    LABALBU 3.4 07/24/2021    CALCIUM 8.3 07/24/2021    BILITOT 0.3 07/24/2021    ALKPHOS 59 07/24/2021    AST 17 07/24/2021    ALT 29 07/24/2021     BMP:    Lab Results   Component Value Date     07/24/2021    K 3.8 07/24/2021     07/24/2021    CO2 25 07/24/2021    BUN 30 07/24/2021    LABALBU 3.4 07/24/2021    CREATININE 0.97 07/24/2021    CALCIUM 8.3 07/24/2021    GFRAA >60.0 07/24/2021    LABGLOM >60.0 07/24/2021    GLUCOSE 123 07/24/2021     Magnesium:  No results found for: MG  Troponin:    Lab Results   Component Value Date    TROPONINI 0.018 07/24/2021       Radiology:  XR CHEST PORTABLE    Result Date: 7/24/2021  XR CHEST PORTABLE : 7/23/2021 CLINICAL HISTORY:  cough . COMPARISON: Abdomen series with chest 11/3/2018 and CT urogram 6/2/2021. TECHNIQUE: A portable upright AP radiograph of the chest was obtained. FINDINGS: Shallow inspiratory volumes are present without significant infiltrate identified. There is no cardiomegaly, significant pleural effusion, vascular congestion, pneumothorax, or displaced fractures identified. NO EVIDENCE OF ACTIVE CARDIOPULMONARY DISEASE, BY PORTABLE CHEST RADIOGRAPHY. EKG:   Assessment:    Active Hospital Problems    Diagnosis Date Noted    Elevated troponin [R77.8] 07/24/2021         Known CAD           Remote MI in 2012        Remote smoker       Plan:  1.  Cardiac catheterization on Monday            Electronically signed by Neo Barlow MD on 7/24/2021 at 12:40 PM

## 2021-07-24 NOTE — PROGRESS NOTES
I called and spoke with Carolyne Thompson at transfer center requesting bed on 1 [facility] at [facility]. Dr Tyrese Fulton informed me that Dr Mark Branham was accepting, needing bed on 1 [facility], Non-Stemi diagnosis. I spoke with Isha Whyte and confirmed they can take this patient stat to [facility] once we receive a bed as long as they are not on another call.

## 2021-07-25 LAB
TROPONIN: <0.01 NG/ML (ref 0–0.01)

## 2021-07-25 PROCEDURE — 6370000000 HC RX 637 (ALT 250 FOR IP): Performed by: NURSE PRACTITIONER

## 2021-07-25 PROCEDURE — 84484 ASSAY OF TROPONIN QUANT: CPT

## 2021-07-25 PROCEDURE — 36415 COLL VENOUS BLD VENIPUNCTURE: CPT

## 2021-07-25 PROCEDURE — 6370000000 HC RX 637 (ALT 250 FOR IP): Performed by: INTERNAL MEDICINE

## 2021-07-25 PROCEDURE — 99233 SBSQ HOSP IP/OBS HIGH 50: CPT | Performed by: INTERNAL MEDICINE

## 2021-07-25 PROCEDURE — 2060000000 HC ICU INTERMEDIATE R&B

## 2021-07-25 PROCEDURE — 2580000003 HC RX 258: Performed by: INTERNAL MEDICINE

## 2021-07-25 PROCEDURE — 6360000002 HC RX W HCPCS: Performed by: INTERNAL MEDICINE

## 2021-07-25 RX ORDER — BACITRACIN, NEOMYCIN, POLYMYXIN B 400; 3.5; 5 [USP'U]/G; MG/G; [USP'U]/G
OINTMENT TOPICAL 4 TIMES DAILY PRN
Status: DISCONTINUED | OUTPATIENT
Start: 2021-07-25 | End: 2021-07-29 | Stop reason: HOSPADM

## 2021-07-25 RX ORDER — SODIUM CHLORIDE 9 MG/ML
25 INJECTION, SOLUTION INTRAVENOUS PRN
Status: DISCONTINUED | OUTPATIENT
Start: 2021-07-25 | End: 2021-07-29 | Stop reason: HOSPADM

## 2021-07-25 RX ORDER — SODIUM CHLORIDE 9 MG/ML
INJECTION, SOLUTION INTRAVENOUS CONTINUOUS
Status: DISCONTINUED | OUTPATIENT
Start: 2021-07-25 | End: 2021-07-26 | Stop reason: SDUPTHER

## 2021-07-25 RX ORDER — SODIUM CHLORIDE 0.9 % (FLUSH) 0.9 %
5-40 SYRINGE (ML) INJECTION EVERY 12 HOURS SCHEDULED
Status: DISCONTINUED | OUTPATIENT
Start: 2021-07-25 | End: 2021-07-29 | Stop reason: HOSPADM

## 2021-07-25 RX ORDER — SODIUM CHLORIDE 0.9 % (FLUSH) 0.9 %
5-40 SYRINGE (ML) INJECTION PRN
Status: DISCONTINUED | OUTPATIENT
Start: 2021-07-25 | End: 2021-07-29 | Stop reason: HOSPADM

## 2021-07-25 RX ADMIN — Medication 10 ML: at 20:19

## 2021-07-25 RX ADMIN — CARVEDILOL 12.5 MG: 12.5 TABLET, FILM COATED ORAL at 18:11

## 2021-07-25 RX ADMIN — NITROGLYCERIN 0.5 INCH: 20 OINTMENT TOPICAL at 20:20

## 2021-07-25 RX ADMIN — FINASTERIDE 5 MG: 5 TABLET, FILM COATED ORAL at 08:02

## 2021-07-25 RX ADMIN — CLOPIDOGREL BISULFATE 75 MG: 75 TABLET ORAL at 08:02

## 2021-07-25 RX ADMIN — ENOXAPARIN SODIUM 90 MG: 100 INJECTION SUBCUTANEOUS at 05:04

## 2021-07-25 RX ADMIN — CARVEDILOL 12.5 MG: 12.5 TABLET, FILM COATED ORAL at 08:02

## 2021-07-25 RX ADMIN — ATORVASTATIN CALCIUM 40 MG: 40 TABLET, FILM COATED ORAL at 20:19

## 2021-07-25 RX ADMIN — NITROGLYCERIN 0.5 INCH: 20 OINTMENT TOPICAL at 08:02

## 2021-07-25 RX ADMIN — BACITRACIN, NEOMYCIN, POLYMYXIN B 1 G: 400; 3.5; 5 OINTMENT TOPICAL at 20:20

## 2021-07-25 RX ADMIN — ASPIRIN 81 MG: 81 TABLET, CHEWABLE ORAL at 08:02

## 2021-07-25 RX ADMIN — LEVOTHYROXINE SODIUM 50 MCG: 0.05 TABLET ORAL at 05:04

## 2021-07-25 RX ADMIN — Medication 400 MG: at 08:02

## 2021-07-25 RX ADMIN — Medication 10 ML: at 08:03

## 2021-07-25 RX ADMIN — TAMSULOSIN HYDROCHLORIDE 0.4 MG: 0.4 CAPSULE ORAL at 08:02

## 2021-07-25 ASSESSMENT — PAIN DESCRIPTION - PROGRESSION
CLINICAL_PROGRESSION: NOT CHANGED

## 2021-07-25 ASSESSMENT — ENCOUNTER SYMPTOMS
SHORTNESS OF BREATH: 0
APNEA: 0
CHEST TIGHTNESS: 0
COLOR CHANGE: 0

## 2021-07-25 ASSESSMENT — PAIN SCALES - GENERAL
PAINLEVEL_OUTOF10: 0
PAINLEVEL_OUTOF10: 0

## 2021-07-25 NOTE — PROGRESS NOTES
Labs     07/23/21 2233 07/24/21 0526    138   K 4.2 3.8    105   CO2 26 25   BUN 41* 30*   CREATININE 1.17 0.97   CALCIUM 8.7 8.3*     Recent Labs     07/23/21 2233 07/24/21  0526   AST 25 17   ALT 33 29   BILIDIR  --  <0.2   BILITOT 0.5 0.3   ALKPHOS 64 59     Recent Labs     07/23/21 2233   INR 1.0     Recent Labs     07/23/21 2233 07/24/21  0526 07/24/21 2050 07/25/21  0026 07/25/21  0644   CKTOTAL 155  --   --   --   --    TROPONINI <0.010   < > <0.010 <0.010 <0.010    < > = values in this interval not displayed. Urinalysis:      Lab Results   Component Value Date    NITRU Negative 11/23/2018    WBCUA 3-5 11/23/2018    BACTERIA Rare 11/23/2018    RBCUA 0-2 11/23/2018    BLOODU moderate 05/24/2019    BLOODU MODERATE 11/23/2018    SPECGRAV 1.015 05/24/2019    SPECGRAV 1.007 11/23/2018    GLUCOSEU neg 05/24/2019    GLUCOSEU Negative 11/23/2018     Radiology:  No orders to display     Assessment/Plan:    #NSTEMI    - Plan [facility] Monday; discussed with cardiology and no need for therapeutic heparin at this time per cardiology so will d/c    - cardiology is managing; continue aspirin and plavix    #HTN/HLD/CAD    - continue home meds    Active Hospital Problems    Diagnosis Date Noted    Elevated troponin [R77.8] 07/24/2021     Additional work up or/and treatment plan may be added today or then after based on clinical progression. I am managing a portion of pt care. Some medical issues are handled by other specialists. Additional work up and treatment should be done in out pt setting by pt PCP and other out pt providers. In addition to examining and evaluating pt, I spent additional time explaining care, normal and abnormal findings, and treatment plan. All of pt questions were answered. Counseling, diet and education were  provided. Case will be discussed with nursing staff when appropriate. Family will be updated if and when appropriate. Diet: ADULT DIET;  Regular; Low Fat/Low Chol/High Fiber/DYLAN    Code Status: Full Code    PT/OT Eval     Electronically signed by Michelle Cottrell MD on 7/25/2021 at 1:25 PM

## 2021-07-25 NOTE — PROGRESS NOTES
Progress Note  Patient: Zakia Mac  Unit/Bed: P041/H110-70  YOB: 1945  MRN: [id number]  Acct: [de-identified]   Admitting Diagnosis: Elevated troponin [R77.8]  Date:  7/24/2021  Hospital Day: 1    Chief Complaint:  Angina    Subjective  Known to have CAD. Prior stenting done 7 months ago. Presented to [facility] with chest pain. Troponins were borderline elevated continue to have chest pain was transferred to [facility] currently chest pain-free he had some episode of chest pain last night was given Nitropaste    Review of Systems:   Review of Systems   Constitutional: Negative for appetite change, diaphoresis and fatigue. Respiratory: Negative for apnea, chest tightness and shortness of breath. Cardiovascular: Negative for chest pain, palpitations and leg swelling. Skin: Negative for color change, pallor, rash and wound. Neurological: Negative for dizziness, syncope, weakness, light-headedness and headaches. Psychiatric/Behavioral: Negative for agitation, behavioral problems and confusion. The patient is not nervous/anxious and is not hyperactive. Physical Examination:    BP (!) 149/85   Pulse 63   Temp 97.9 °F (36.6 °C) (Oral)   Resp 18   Ht 5' 8\" (1.727 m)   Wt 191 lb 3.2 oz (86.7 kg)   SpO2 95%   BMI 29.07 kg/m²    Physical Exam  Cardiovascular:      Rate and Rhythm: Normal rate and regular rhythm. Pulmonary:      Effort: Pulmonary effort is normal.      Breath sounds: Normal breath sounds. Neurological:      General: No focal deficit present.    Psychiatric:         Mood and Affect: Mood normal.         LABS:  CBC:   Lab Results   Component Value Date    WBC 9.1 07/24/2021    RBC 4.27 07/24/2021    HGB 12.4 07/24/2021    HCT 38.0 07/24/2021    MCV 89.0 07/24/2021    MCH 29.0 07/24/2021    MCHC 32.6 07/24/2021    RDW 13.3 07/24/2021     07/24/2021    MPV 8.2 03/26/2015     CBC with Differential:   Lab Results   Component Value Date    WBC 9.1 07/24/2021    RBC 4.27 07/24/2021    HGB 12.4 07/24/2021    HCT 38.0 07/24/2021     07/24/2021    MCV 89.0 07/24/2021    MCH 29.0 07/24/2021    MCHC 32.6 07/24/2021    RDW 13.3 07/24/2021    LYMPHOPCT 18.3 07/23/2021    MONOPCT 10.2 07/23/2021    BASOPCT 0.2 07/23/2021    MONOSABS 1.1 07/23/2021    LYMPHSABS 2.0 07/23/2021    EOSABS 0.1 07/23/2021    BASOSABS 0.0 07/23/2021     CMP:    Lab Results   Component Value Date     07/24/2021    K 3.8 07/24/2021     07/24/2021    CO2 25 07/24/2021    BUN 30 07/24/2021    CREATININE 0.97 07/24/2021    GFRAA >60.0 07/24/2021    LABGLOM >60.0 07/24/2021    GLUCOSE 123 07/24/2021    PROT 5.9 07/24/2021    LABALBU 3.4 07/24/2021    CALCIUM 8.3 07/24/2021    BILITOT 0.3 07/24/2021    ALKPHOS 59 07/24/2021    AST 17 07/24/2021    ALT 29 07/24/2021     BMP:    Lab Results   Component Value Date     07/24/2021    K 3.8 07/24/2021     07/24/2021    CO2 25 07/24/2021    BUN 30 07/24/2021    LABALBU 3.4 07/24/2021    CREATININE 0.97 07/24/2021    CALCIUM 8.3 07/24/2021    GFRAA >60.0 07/24/2021    LABGLOM >60.0 07/24/2021    GLUCOSE 123 07/24/2021     Magnesium:  No results found for: MG  Troponin:    Lab Results   Component Value Date    TROPONINI <0.010 07/25/2021       Radiology:  Echocardiogram complete 2D with doppler with color    Result Date: 7/24/2021  Transthoracic Echocardiography Report (TTE)  Demographics   Patient Name    Eldon Gene  Gender               Male                  T   Patient Number  [id number]        Race                                                    Ethnicity   Visit Number    [id number]       Room Number          T693   Corporate ID                    Date of Study        07/24/2021   Accession       [id number]      Referring Physician  Number   Date of Birth   1945      Sonographer          Aneta Washburn   Age             77 year(s)      Interpreting         [facility]                                  Physician Cardiology                                                       [facility] Radha Bottoms  Procedure Type of Study   TTE procedure:ECHO COMPLETE 2D W/DOP W/COLOR. Procedure Date Date: 07/24/2021 Start: 01:42 PM Study Location: Portable Technical Quality: Adequate visualization Indications:LVF. Patient Status: Routine Height: 68 inches Weight: 191 pounds BSA: 2 m^2 BMI: 29.04 kg/m^2 BP: 160/75 mmHg  Conclusions   Summary  Normal tricuspid valve structure and function. There is mild ( 1 +) tricuspid regurgitation with estimated RVSP of 45 mm  Hg. Moderately dilated left atrium. Left ventricular ejection fraction is visually estimated at 55%. Impaired relaxation compatible with diastolic dysfunction. ( reversed E/A  ratio)   Signature   ----------------------------------------------------------------  Electronically signed by Delilah Chamorro(Interpreting physician)  on 07/24/2021 03:49 PM  ----------------------------------------------------------------   Findings  Left Ventricle Left ventricular ejection fraction is visually estimated at 55%. Impaired relaxation compatible with diastolic dysfunction. ( reversed E/A ratio) Right Ventricle Normal right ventricle structure and function. Left Atrium Moderately dilated left atrium. Right Atrium Normal right atrium. Mitral Valve Normal mitral valve structure and function. Tricuspid Valve Normal tricuspid valve structure and function. There is mild ( 1 +) tricuspid regurgitation with estimated RVSP of 45 mm Hg. Aortic Valve Normal aortic valve structure and function. Pulmonic Valve Normal pulmonic valve structure and function. Pericardial Effusion No evidence of pericardial effusion. Pleural Effusion No evidence of pleural effusion. Aorta \ Miscellaneous Miscellaneous normal findings were found.  M-Mode Measurements (cm)   LVIDd: 4.84 cm                         LVIDs: 3.04 cm  IVSd: 1.05 cm                          IVSs: 1.54 cm  LVPWd: 1.07 cm                         LVPWs: 1.73 cm  Rt. Vent.  Dimension: 2.73 cm           AO Root Dimension: 2.71 cm                                         ACS: 1.74 cm                                         LA: 4.3 cm                                         LVOT: 1.95 cm  Doppler Measurements:                                  MV Peak E-Wave: 0.02 m/s  TR Velocity:2.93 m/s  TR Gradient:34.3 mmHg                                  Estimated RAP:5 mmHg                                 RVSP:39.3 mmHg  Valves  Mitral Valve   Peak E-Wave: 0.02 m/s              Peak Gradient: 0 mmHg  Mean Velocity: 0.49 m/s            Area (continuity): 2.27 cm^2  Mean Gradient: 1.11 mmHg   Aortic Valve   Cusp Separation: 1.74 cm   Tricuspid Valve   Estimated RVSP: 39.3 mmHg               Estimated RAP: 5 mmHg  TR Velocity: 2.93 m/s                   TR Gradient: 34.3 mmHg   Pulmonic Valve   Peak Velocity: 1.09 m/s            Peak Gradient: 4.75 mmHg                                     Estimated PASP: 39.3 mmHg   LVOT   Peak Velocity: 1.19 m/s              Mean Velocity: 0.77 m/s  Peak Gradient: 5.71 mmHg             Mean Gradient: 2.75 mmHg  LVOT Diameter: 1.95 cm               LVOT VTI: 21.62 cm  Structures  Left Atrium   LA Dimension: 4.3 cm                         LA Area: 19.01 cm^2  LA/Aorta: 1.59  LA Volume/Index: 46.51 ml /23 m^2   Left Ventricle   Diastolic Dimension: 8.95 cm          Systolic Dimension: 9.37 cm  Septum Diastolic: 6.03 cm             Septum Systolic: 4.14 cm  PW Diastolic: 2.77 cm                 PW Systolic: 1.35 cm                                        FS: 37.2 %  LV EDV/LV EDV Index: 109.76 ml/55 m^2 LV ESV/LV ESV Index: 36.03 ml/18 m^2  EF Calculated: 67.2 %   LVOT Diameter: 1.95 cm   Right Atrium   RA Systolic Pressure: 5 mmHg   Right Ventricle   Diastolic Dimension: 6.40 cm                                     RV Systolic Pressure: 22.5 mmHg  Aorta/ Miscellaneous Aorta   Aortic Root: 2.71 cm  LVOT Diameter: 1.95 cm  Pulmonary Vein:     A Reversal Velocity: 0.23 m/s      XR CHEST PORTABLE    Result Date: 7/24/2021  XR CHEST PORTABLE : 7/23/2021 CLINICAL HISTORY:  cough . COMPARISON: Abdomen series with chest 11/3/2018 and CT urogram 6/2/2021. TECHNIQUE: A portable upright AP radiograph of the chest was obtained. FINDINGS: Shallow inspiratory volumes are present without significant infiltrate identified. There is no cardiomegaly, significant pleural effusion, vascular congestion, pneumothorax, or displaced fractures identified. NO EVIDENCE OF ACTIVE CARDIOPULMONARY DISEASE, BY PORTABLE CHEST RADIOGRAPHY. EKG:  Assessment:    Active Hospital Problems    Diagnosis Date Noted    Elevated troponin [R77.8] 07/24/2021         Stenting 7 months ago        Plan:  1.  Catheterization in the morning  Electronically signed by Dangelo Berrios MD on 7/25/2021 at 11:20 AM

## 2021-07-25 NOTE — FLOWSHEET NOTE
Pt requests for nitro-patch. Pippa Law NP notified. Nitro patch applied to left shoulder. Pt's call light within reach. Urinal provided to pt as well.

## 2021-07-26 ENCOUNTER — APPOINTMENT (OUTPATIENT)
Dept: CARDIAC CATH/INVASIVE PROCEDURES | Age: 76
DRG: 282 | End: 2021-07-26
Attending: INTERNAL MEDICINE
Payer: MEDICARE

## 2021-07-26 LAB
ANION GAP SERPL CALCULATED.3IONS-SCNC: 9 MEQ/L (ref 9–15)
BUN BLDV-MCNC: 21 MG/DL (ref 8–23)
CALCIUM SERPL-MCNC: 8.6 MG/DL (ref 8.5–9.9)
CHLORIDE BLD-SCNC: 106 MEQ/L (ref 95–107)
CO2: 25 MEQ/L (ref 20–31)
CREAT SERPL-MCNC: 0.91 MG/DL (ref 0.7–1.2)
GFR AFRICAN AMERICAN: >60
GFR NON-AFRICAN AMERICAN: >60
GLUCOSE BLD-MCNC: 104 MG/DL (ref 70–99)
HCT VFR BLD CALC: 39.2 % (ref 42–52)
HEMOGLOBIN: 13.1 G/DL (ref 14–18)
INR BLD: 1.1
MCH RBC QN AUTO: 30.3 PG (ref 27–31.3)
MCHC RBC AUTO-ENTMCNC: 33.5 % (ref 33–37)
MCV RBC AUTO: 90.4 FL (ref 80–100)
PDW BLD-RTO: 13.9 % (ref 11.5–14.5)
PLATELET # BLD: 192 K/UL (ref 130–400)
POTASSIUM SERPL-SCNC: 4 MEQ/L (ref 3.4–4.9)
PROTHROMBIN TIME: 14 SEC (ref 12.3–14.9)
RBC # BLD: 4.34 M/UL (ref 4.7–6.1)
SODIUM BLD-SCNC: 140 MEQ/L (ref 135–144)
TROPONIN: <0.01 NG/ML (ref 0–0.01)
WBC # BLD: 9.6 K/UL (ref 4.8–10.8)

## 2021-07-26 PROCEDURE — 2580000003 HC RX 258: Performed by: INTERNAL MEDICINE

## 2021-07-26 PROCEDURE — 2709999900 HC NON-CHARGEABLE SUPPLY

## 2021-07-26 PROCEDURE — 85610 PROTHROMBIN TIME: CPT

## 2021-07-26 PROCEDURE — 2580000003 HC RX 258

## 2021-07-26 PROCEDURE — 6370000000 HC RX 637 (ALT 250 FOR IP): Performed by: NURSE PRACTITIONER

## 2021-07-26 PROCEDURE — 6370000000 HC RX 637 (ALT 250 FOR IP): Performed by: INTERNAL MEDICINE

## 2021-07-26 PROCEDURE — 6360000002 HC RX W HCPCS

## 2021-07-26 PROCEDURE — 2060000000 HC ICU INTERMEDIATE R&B

## 2021-07-26 PROCEDURE — 4A023N7 MEASUREMENT OF CARDIAC SAMPLING AND PRESSURE, LEFT HEART, PERCUTANEOUS APPROACH: ICD-10-PCS | Performed by: INTERNAL MEDICINE

## 2021-07-26 PROCEDURE — 93458 L HRT ARTERY/VENTRICLE ANGIO: CPT | Performed by: INTERNAL MEDICINE

## 2021-07-26 PROCEDURE — B2111ZZ FLUOROSCOPY OF MULTIPLE CORONARY ARTERIES USING LOW OSMOLAR CONTRAST: ICD-10-PCS | Performed by: INTERNAL MEDICINE

## 2021-07-26 PROCEDURE — 85027 COMPLETE CBC AUTOMATED: CPT

## 2021-07-26 PROCEDURE — 80048 BASIC METABOLIC PNL TOTAL CA: CPT

## 2021-07-26 PROCEDURE — 84484 ASSAY OF TROPONIN QUANT: CPT

## 2021-07-26 PROCEDURE — 2500000003 HC RX 250 WO HCPCS

## 2021-07-26 PROCEDURE — 36415 COLL VENOUS BLD VENIPUNCTURE: CPT

## 2021-07-26 PROCEDURE — 99024 POSTOP FOLLOW-UP VISIT: CPT | Performed by: INTERNAL MEDICINE

## 2021-07-26 PROCEDURE — 6360000004 HC RX CONTRAST MEDICATION: Performed by: INTERNAL MEDICINE

## 2021-07-26 PROCEDURE — B2151ZZ FLUOROSCOPY OF LEFT HEART USING LOW OSMOLAR CONTRAST: ICD-10-PCS | Performed by: INTERNAL MEDICINE

## 2021-07-26 RX ORDER — ISOSORBIDE MONONITRATE 30 MG/1
30 TABLET, EXTENDED RELEASE ORAL DAILY
Status: DISCONTINUED | OUTPATIENT
Start: 2021-07-26 | End: 2021-07-27

## 2021-07-26 RX ORDER — SODIUM CHLORIDE 9 MG/ML
INJECTION, SOLUTION INTRAVENOUS CONTINUOUS
Status: DISPENSED | OUTPATIENT
Start: 2021-07-26 | End: 2021-07-27

## 2021-07-26 RX ORDER — ACETAMINOPHEN 325 MG/1
650 TABLET ORAL EVERY 4 HOURS PRN
Status: DISCONTINUED | OUTPATIENT
Start: 2021-07-26 | End: 2021-07-29 | Stop reason: HOSPADM

## 2021-07-26 RX ORDER — HYDRALAZINE HYDROCHLORIDE 20 MG/ML
10 INJECTION INTRAMUSCULAR; INTRAVENOUS EVERY 10 MIN PRN
Status: DISCONTINUED | OUTPATIENT
Start: 2021-07-26 | End: 2021-07-29 | Stop reason: HOSPADM

## 2021-07-26 RX ORDER — LABETALOL HYDROCHLORIDE 5 MG/ML
10 INJECTION, SOLUTION INTRAVENOUS EVERY 30 MIN PRN
Status: DISCONTINUED | OUTPATIENT
Start: 2021-07-26 | End: 2021-07-29 | Stop reason: HOSPADM

## 2021-07-26 RX ADMIN — IOPAMIDOL 45 ML: 612 INJECTION, SOLUTION INTRAVENOUS at 17:42

## 2021-07-26 RX ADMIN — ATORVASTATIN CALCIUM 40 MG: 40 TABLET, FILM COATED ORAL at 21:12

## 2021-07-26 RX ADMIN — CARVEDILOL 12.5 MG: 12.5 TABLET, FILM COATED ORAL at 10:49

## 2021-07-26 RX ADMIN — CARVEDILOL 12.5 MG: 12.5 TABLET, FILM COATED ORAL at 21:12

## 2021-07-26 RX ADMIN — SODIUM CHLORIDE: 9 INJECTION, SOLUTION INTRAVENOUS at 21:12

## 2021-07-26 RX ADMIN — FINASTERIDE 5 MG: 5 TABLET, FILM COATED ORAL at 10:50

## 2021-07-26 RX ADMIN — CLOPIDOGREL BISULFATE 75 MG: 75 TABLET ORAL at 10:49

## 2021-07-26 RX ADMIN — Medication 10 ML: at 21:12

## 2021-07-26 RX ADMIN — NITROGLYCERIN 0.5 INCH: 20 OINTMENT TOPICAL at 10:50

## 2021-07-26 RX ADMIN — Medication 400 MG: at 10:50

## 2021-07-26 RX ADMIN — ASPIRIN 81 MG: 81 TABLET, CHEWABLE ORAL at 10:49

## 2021-07-26 RX ADMIN — TAMSULOSIN HYDROCHLORIDE 0.4 MG: 0.4 CAPSULE ORAL at 10:50

## 2021-07-26 RX ADMIN — LEVOTHYROXINE SODIUM 50 MCG: 0.05 TABLET ORAL at 05:09

## 2021-07-26 RX ADMIN — ISOSORBIDE MONONITRATE 30 MG: 30 TABLET, EXTENDED RELEASE ORAL at 21:12

## 2021-07-26 RX ADMIN — SODIUM CHLORIDE: 9 INJECTION, SOLUTION INTRAVENOUS at 05:24

## 2021-07-26 NOTE — PROGRESS NOTES
CALCIUM 8.6 07/26/2021    GFRAA >60.0 07/26/2021    LABGLOM >60.0 07/26/2021    GLUCOSE 104 07/26/2021     Magnesium:  No results found for: MG  Troponin:    Lab Results   Component Value Date    TROPONINI <0.010 07/26/2021       Radiology:  Echocardiogram complete 2D with doppler with color    Result Date: 7/24/2021  Transthoracic Echocardiography Report (TTE)  Demographics   Patient Name    Sascha Coe  Gender               Male                  T   Patient Number  [id number]        Race                                                    Ethnicity   Visit Number    [id number]       Room Number          K011   Corporate ID                    Date of Study        07/24/2021   Accession       [id number]      Referring Physician  Number   Date of Birth   1945      Sonographer          Aneta Washburn   Age             77 year(s)      Interpreting         [facility])                                  Physician            Cardiology                                                       [address] [place] [address]  Procedure Type of Study   TTE procedure:ECHO COMPLETE 2D W/DOP W/COLOR. Procedure Date Date: 07/24/2021 Start: 01:42 PM Study Location: Portable Technical Quality: Adequate visualization Indications:LVF. Patient Status: Routine Height: 68 inches Weight: 191 pounds BSA: 2 m^2 BMI: 29.04 kg/m^2 BP: 160/75 mmHg  Conclusions   Summary  Normal tricuspid valve structure and function. There is mild ( 1 +) tricuspid regurgitation with estimated RVSP of 45 mm  Hg. Moderately dilated left atrium. Left ventricular ejection fraction is visually estimated at 55%. Impaired relaxation compatible with diastolic dysfunction.  ( reversed E/A  ratio)   Signature   ----------------------------------------------------------------  Electronically signed by Ivy Chamorro(Interpreting physician)  on 07/24/2021 03:49 PM  ----------------------------------------------------------------   Findings  Left Ventricle Left ventricular ejection fraction is visually estimated at 55%. Impaired relaxation compatible with diastolic dysfunction. ( reversed E/A ratio) Right Ventricle Normal right ventricle structure and function. Left Atrium Moderately dilated left atrium. Right Atrium Normal right atrium. Mitral Valve Normal mitral valve structure and function. Tricuspid Valve Normal tricuspid valve structure and function. There is mild ( 1 +) tricuspid regurgitation with estimated RVSP of 45 mm Hg. Aortic Valve Normal aortic valve structure and function. Pulmonic Valve Normal pulmonic valve structure and function. Pericardial Effusion No evidence of pericardial effusion. Pleural Effusion No evidence of pleural effusion. Aorta \ Miscellaneous Miscellaneous normal findings were found. M-Mode Measurements (cm)   LVIDd: 4.84 cm                         LVIDs: 3.04 cm  IVSd: 1.05 cm                          IVSs: 1.54 cm  LVPWd: 1.07 cm                         LVPWs: 1.73 cm  Rt. Vent.  Dimension: 2.73 cm           AO Root Dimension: 2.71 cm                                         ACS: 1.74 cm                                         LA: 4.3 cm                                         LVOT: 1.95 cm  Doppler Measurements:                                  MV Peak E-Wave: 0.02 m/s  TR Velocity:2.93 m/s  TR Gradient:34.3 mmHg                                  Estimated RAP:5 mmHg                                 RVSP:39.3 mmHg  Valves  Mitral Valve   Peak E-Wave: 0.02 m/s              Peak Gradient: 0 mmHg  Mean Velocity: 0.49 m/s            Area (continuity): 2.27 cm^2  Mean Gradient: 1.11 mmHg   Aortic Valve   Cusp Separation: 1.74 cm   Tricuspid Valve   Estimated RVSP: 39.3 mmHg               Estimated RAP: 5 mmHg  TR Velocity: 2.93 m/s                   TR Gradient: 34.3 mmHg   Pulmonic Valve   Peak Velocity: 1.09 m/s            Peak Gradient: 4.75 mmHg                                     Estimated PASP: 39.3 mmHg   LVOT   Peak Velocity: 1.19 m/s Mean Velocity: 0.77 m/s  Peak Gradient: 5.71 mmHg             Mean Gradient: 2.75 mmHg  LVOT Diameter: 1.95 cm               LVOT VTI: 21.62 cm  Structures  Left Atrium   LA Dimension: 4.3 cm                         LA Area: 19.01 cm^2  LA/Aorta: 1.59  LA Volume/Index: 46.51 ml /23 m^2   Left Ventricle   Diastolic Dimension: 6.98 cm          Systolic Dimension: 8.09 cm  Septum Diastolic: 7.91 cm             Septum Systolic: 2.55 cm  PW Diastolic: 0.88 cm                 PW Systolic: 4.58 cm                                        FS: 37.2 %  LV EDV/LV EDV Index: 109.76 ml/55 m^2 LV ESV/LV ESV Index: 36.03 ml/18 m^2  EF Calculated: 67.2 %   LVOT Diameter: 1.95 cm   Right Atrium   RA Systolic Pressure: 5 mmHg   Right Ventricle   Diastolic Dimension: 9.36 cm                                     RV Systolic Pressure: 71.5 mmHg  Aorta/ Miscellaneous Aorta   Aortic Root: 2.71 cm  LVOT Diameter: 1.95 cm  Pulmonary Vein:     A Reversal Velocity: 0.23 m/s         EKG:     Assessment:    Active Hospital Problems    Diagnosis Date Noted    Elevated troponin [R77.8] 07/24/2021         Prior angioplasty stent     Plan:  1.  Cardiac catheterization today  Electronically signed by Angel Gaona MD on 7/26/2021 at 1:15 PM

## 2021-07-26 NOTE — BRIEF OP NOTE
Section of Cardiology  Adult Brief Cardiac Cath Procedure Note        Procedure(s):  LHC, b/l coronary angio    Pre-operative Diagnosis:  nstemi    H&P Status: Completed and reviewed.      Post-operative Diagnosis:      LV EF of 55%  LM normal   LAD 95% proximal ISR  CX mild to mod disease proximal  RCA  Large caliber, mild disease    Findings:  See full report    Complications:  none    Primary Proceduralist:   Dr.Wes Naik DO    Plan    Transfer to [facility] for CABG      Full procedure note to follow

## 2021-07-26 NOTE — DISCHARGE SUMMARY
Discharge Summary    Date: 7/26/2021  Patient Name: Roxy Martinez YOB: 1945 Age: 77 y.o. Admit Date: 7/24/2021  Discharge Date: 7/26/2021  Discharge Condition: Good    Admission Diagnosis  Elevated troponin (R77.8)     Discharge Diagnosis  Active Problems: Elevated troponinResolved Problems: * No resolved hospital problems. [facility] Stay  Narrative of Hospital Course:  Patient presented with very typical symptoms and mildly elevated cardiac enzymes underwent cardiac catheterization showing 99% proximal LAD in-stent restenosis which is recurrent from December 2020. Echo performed showing ejection fraction of 40%, grade 1 diastolic dysfunction, mild LVH, mild pulmonary tension with no significant valve abnormalities. Patient was transferred to [facility] to undergo CABG. Plavix was discontinued. Patient was transferred in stable and satisfactory condition    Consultants:  David Lucia    Surgeries/procedures Performed:       Treatments:    Cardiac Medications    Beta-Blocker and Other    Discharge Plan/Disposition:  Home    Hospital/Incidental Findings Requiring Follow Up:    Patient Instructions:    Diet:    Activity:  For number of days (if applicable): Other Instructions:    Provider Follow-Up:   No follow-ups on file. Significant Diagnostic Studies:    Recent Labs:  Admission on 07/24/2021Troponin                                      Date: 07/24/2021Value: <0.010      Ref range: 0.000 - 0.010 ng*  Status: Final              Comment: Methodology by Troponin T. Troponin                                      Date: 07/24/2021Value: <0.010      Ref range: 0.000 - 0.010 ng*  Status: Final              Comment: Methodology by Troponin T. Troponin                                      Date: 07/25/2021Value: <0.010      Ref range: 0.000 - 0.010 ng*  Status: Final              Comment: Methodology by Troponin T. Troponin                                      Date: 07/25/2021Value: <0.010      Ref range: 0.000 - 0.010 ng*  Status: Final              Comment: Methodology by Troponin T. Troponin                                      Date: 07/25/2021Value: <0.010      Ref range: 0.000 - 0.010 ng*  Status: Final              Comment: Methodology by Troponin T. Troponin                                      Date: 07/25/2021Value: <0.010      Ref range: 0.000 - 0.010 ng*  Status: Final              Comment: Methodology by Troponin T. Troponin                                      Date: 07/26/2021Value: <0.010      Ref range: 0.000 - 0.010 ng*  Status: Final              Comment: Methodology by Troponin T. Troponin                                      Date: 07/26/2021Value: <0.010      Ref range: 0.000 - 0.010 ng*  Status: Final              Comment: Methodology by Troponin T. Sodium                                        Date: 07/26/2021Value: 140         Ref range: 135 - 144 mEq/L    Status: FinalPotassium                                     Date: 07/26/2021Value: 4.0         Ref range: 3.4 - 4.9 mEq/L    Status: FinalChloride                                      Date: 07/26/2021Value: 106         Ref range: 95 - 107 mEq/L     Status: FinalCO2                                           Date: 07/26/2021Value: 25          Ref range: 20 - 31 mEq/L      Status: FinalAnion Gap                                     Date: 07/26/2021Value: 9           Ref range: 9 - 15 mEq/L       Status: FinalGlucose                                       Date: 07/26/2021Value: 104*        Ref range: 70 - 99 mg/dL      Status: FinalBUN                                           Date: 07/26/2021Value: 21          Ref range: 8 - 23 mg/dL       Status: FinalCREATININE                                    Date: 07/26/2021Value: 0.91        Ref range: 0.70 - 1.20 mg/dL  Status: FinalGFR Non-                      Date: 07/26/2021Value: >60.0       Ref range: >60                Status: Final 07/26/2021Value: <0.010      Ref range: 0.000 - 0.010 ng*  Status: Final              Comment: Methodology by Troponin T.------------    Radiology last 7 days:  XR CHEST PORTABLEResult Date: 7/24/2021NO EVIDENCE OF ACTIVE CARDIOPULMONARY DISEASE, BY PORTABLE CHEST RADIOGRAPHY.       [unfilled]    Discharge Medications    Current Discharge Medication List    Current Discharge Medication List    Current Discharge Medication ListCONTINUE these medications which have NOT CHANGEDaspirin 81 MG chewable tabletTake 1 tablet by mouth dailyQty: 30 tablet Refills: 3clopidogrel (PLAVIX) 75 MG tablettake 1 tablet by mouth once dailyQty: 90 tablet Refills: 1Associated Diagnoses:Coronary artery disease involving native coronary artery of native heart without angina pectoristamsulosin (FLOMAX) 0.4 MG capsuletake 1 capsule by mouth twice a dayQty: 180 capsule Refills: 1Associated Diagnoses:BPH with obstruction/lower urinary tract symptomscarvedilol (COREG) 12.5 MG tablettake 1 tablet by mouth twice a day with foodQty: 180 tablet Refills: 1levothyroxine (SYNTHROID) 50 MCG tablettake 1 tablet by mouth once dailyQty: 90 tablet Refills: 1Associated Diagnoses:Hypothyroidism, unspecified typehydroCHLOROthiazide (HYDRODIURIL) 25 MG tablettake 1 tablet by mouth once dailyQty: 90 tablet Refills: 3finasteride (PROSCAR) 5 MG tabletTake 1 tablet by mouth daily take 1 tablet by mouth once dailyQty: 90 tablet Refills: 3Associated Diagnoses:BPH with obstruction/lower urinary tract symptomsdiclofenac (VOLTAREN) 75 MG EC tablettake 1 tablet by mouth twice a day if needed for painQty: 180 tablet Refills: 3atorvastatin (LIPITOR) 40 MG tablettake 1 tablet by mouth once dailyQty: 90 tablet Refills: 3Associated Diagnoses:Mixed hyperlipidemiaNiacin (VITAMIN B-3 PO)Take by mouthnitroGLYCERIN (NITROSTAT) 0.4 MG SL tabletPlace 1 tablet under the tongue every 5 minutes as needed for Chest pain Dissolve 1 tab under tongue at first sign of chest pain every 5 minutes as needed. If pain persists after taking 3 tabs in a 15-minute period, or the pain is different than is typically experienced, call 9-1-1 immediately. Qty: 25 tablet Refills: 1Associated Diagnoses:Coronary artery disease involving native coronary artery of native heart without angina pectorismagnesium oxide (MAG-OX) 400 MG tabletTake 400 mg by mouth dailyHandicap Placard MISCby Does not apply route Exp 5 yearsQty: 1 each Refills: 0Associated Diagnoses:Pain of left heel; Left foot dropMultiple Vitamins-Minerals (MULTI COMPLETE PO)Take by mouth    Current Discharge Medication List    Time Spent on Discharge:5E] minutes were spent in patient examination, evaluation, counseling as well as medication reconciliation, prescriptions for required medications, discharge plan, and follow up.     Electronically signed by Zak Yan DO on 7/26/21 at 7:55 PM EDT

## 2021-07-27 LAB — TROPONIN: 0.01 NG/ML (ref 0–0.01)

## 2021-07-27 PROCEDURE — APPSS30 APP SPLIT SHARED TIME 16-30 MINUTES: Performed by: PHYSICIAN ASSISTANT

## 2021-07-27 PROCEDURE — 2580000003 HC RX 258: Performed by: INTERNAL MEDICINE

## 2021-07-27 PROCEDURE — 6360000002 HC RX W HCPCS: Performed by: INTERNAL MEDICINE

## 2021-07-27 PROCEDURE — 36415 COLL VENOUS BLD VENIPUNCTURE: CPT

## 2021-07-27 PROCEDURE — 6370000000 HC RX 637 (ALT 250 FOR IP): Performed by: INTERNAL MEDICINE

## 2021-07-27 PROCEDURE — 2060000000 HC ICU INTERMEDIATE R&B

## 2021-07-27 PROCEDURE — 84484 ASSAY OF TROPONIN QUANT: CPT

## 2021-07-27 PROCEDURE — 99232 SBSQ HOSP IP/OBS MODERATE 35: CPT | Performed by: INTERNAL MEDICINE

## 2021-07-27 RX ORDER — ISOSORBIDE MONONITRATE 60 MG/1
60 TABLET, EXTENDED RELEASE ORAL DAILY
Status: DISCONTINUED | OUTPATIENT
Start: 2021-07-28 | End: 2021-07-29 | Stop reason: HOSPADM

## 2021-07-27 RX ADMIN — SODIUM CHLORIDE, PRESERVATIVE FREE 10 ML: 5 INJECTION INTRAVENOUS at 20:29

## 2021-07-27 RX ADMIN — Medication 400 MG: at 08:02

## 2021-07-27 RX ADMIN — ISOSORBIDE MONONITRATE 30 MG: 30 TABLET, EXTENDED RELEASE ORAL at 08:02

## 2021-07-27 RX ADMIN — ASPIRIN 81 MG: 81 TABLET, CHEWABLE ORAL at 08:02

## 2021-07-27 RX ADMIN — TAMSULOSIN HYDROCHLORIDE 0.4 MG: 0.4 CAPSULE ORAL at 08:02

## 2021-07-27 RX ADMIN — CARVEDILOL 12.5 MG: 12.5 TABLET, FILM COATED ORAL at 17:21

## 2021-07-27 RX ADMIN — ENOXAPARIN SODIUM 40 MG: 40 INJECTION SUBCUTANEOUS at 08:02

## 2021-07-27 RX ADMIN — Medication 10 ML: at 08:05

## 2021-07-27 RX ADMIN — ATORVASTATIN CALCIUM 40 MG: 40 TABLET, FILM COATED ORAL at 20:25

## 2021-07-27 RX ADMIN — LEVOTHYROXINE SODIUM 50 MCG: 0.05 TABLET ORAL at 05:42

## 2021-07-27 RX ADMIN — CARVEDILOL 12.5 MG: 12.5 TABLET, FILM COATED ORAL at 08:02

## 2021-07-27 RX ADMIN — Medication 10 ML: at 20:25

## 2021-07-27 RX ADMIN — FINASTERIDE 5 MG: 5 TABLET, FILM COATED ORAL at 08:02

## 2021-07-27 ASSESSMENT — PAIN SCALES - GENERAL
PAINLEVEL_OUTOF10: 0
PAINLEVEL_OUTOF10: 0

## 2021-07-27 ASSESSMENT — ENCOUNTER SYMPTOMS
SHORTNESS OF BREATH: 0
ABDOMINAL PAIN: 0
NAUSEA: 0
COUGH: 0
COLOR CHANGE: 0
VOMITING: 0

## 2021-07-27 NOTE — PROGRESS NOTES
Progress Note  Patient: Zakia Mac  Unit/Bed: W367/M179-91  YOB: 1945  MRN: [id number]  Acct: [de-identified]   Admitting Diagnosis: Elevated troponin [R77.8]  Date:  7/24/2021  Hospital Day: 3    Chief Complaint:  Chest pain    Subjective      7/27/21: Underwent cardiac catheterization by Dr. Vince Benoit yesterday which revealed 95% proximal ISR of LAD with otherwise mild to moderate disease and normal LV function. Given recurrent ISR of LAD with last PTCA in December 2020, patient recommended for transfer to [facility] for CABG. Plavix has been discontinued in anticipation for surgery. Resting comfortably in bed in no acute distress. Complaining of mild chest pressure at a 2 out of 10 with any type of exertion but is chest pain-free at rest.  No shortness of breath complaints. Right radial site stable. He is hemodynamically stable. On telemetry he is maintaining sinus rhythm with heart rate 60s to 70s currently. Heart rate was as low as 49 earlier this morning. Awaiting transfer to [facility] when bed available. 7/25/21: Known to have CAD. Prior stenting done 7 months ago. Presented to [facility] with chest pain. Troponins were borderline elevated continue to have chest pain was transferred to [facility] currently chest pain-free he had some episode of chest pain last night was given Nitropaste    924/21: 79-year-old gentleman who presented to [facility] with chest pain dyspnea. Known history of CAD with MI in 2012. Recent cardiac catheterizatio December 20. He was in the grocery store and had some chest heaviness with numbness of the arm and broke out in a sweat came to the ER was admitted. We are consulted because of elevated troponins    Review of Systems:   Review of Systems   Constitutional: Negative for activity change. HENT: Negative for congestion. Respiratory: Negative for cough and shortness of breath. Cardiovascular: Positive for chest pain (with exertion).  Negative for palpitations and leg swelling. Gastrointestinal: Negative for abdominal pain, nausea and vomiting. Genitourinary: Negative for difficulty urinating. Musculoskeletal: Negative for arthralgias. Skin: Negative for color change, pallor and rash. Neurological: Negative for dizziness and syncope. Psychiatric/Behavioral: Negative for agitation and behavioral problems. Physical Examination:    /60   Pulse 76   Temp 97.7 °F (36.5 °C) (Oral)   Resp 16   Ht 5' 8\" (1.727 m)   Wt 196 lb 1.6 oz (89 kg)   SpO2 96%   BMI 29.82 kg/m²    Physical Exam  Constitutional:       General: He is not in acute distress. Appearance: Normal appearance. He is obese. HENT:      Head: Normocephalic and atraumatic. Cardiovascular:      Rate and Rhythm: Normal rate and regular rhythm. Pulmonary:      Effort: Pulmonary effort is normal. No respiratory distress. Breath sounds: No wheezing, rhonchi or rales. Abdominal:      Palpations: Abdomen is soft. Tenderness: There is no abdominal tenderness. Musculoskeletal:         General: Normal range of motion. Cervical back: Normal range of motion and neck supple. Right lower leg: No edema. Left lower leg: No edema. Comments: Right radial site soft, nontender, no hematoma; 2+ right radial pulse   Skin:     General: Skin is warm and dry. Neurological:      General: No focal deficit present. Mental Status: He is alert and oriented to person, place, and time. Cranial Nerves: No cranial nerve deficit.    Psychiatric:         Mood and Affect: Mood normal.         Behavior: Behavior normal.         LABS:  CBC:   Lab Results   Component Value Date    WBC 9.6 07/26/2021    RBC 4.34 07/26/2021    HGB 13.1 07/26/2021    HCT 39.2 07/26/2021    MCV 90.4 07/26/2021    MCH 30.3 07/26/2021    MCHC 33.5 07/26/2021    RDW 13.9 07/26/2021     07/26/2021    MPV 8.2 03/26/2015     CBC with Differential:   Lab Results   Component Value Date    WBC ----------------------------------------------------------------        [facility] 7/26/21:  Procedure(s):  C, b/l coronary angio  Pre-operative Diagnosis:  nstemi  H&P Status: Completed and reviewed.      Post-operative Diagnosis:    LV EF of 55%  LM normal   LAD 95% proximal ISR  CX mild to mod disease proximal  RCA  Large caliber, mild disease     Findings:  See full report  Complications:  none  Primary Proceduralist:   Dr.Wes Naik DO     Plan  Transfer to [facility] for CABG      EKG 7/24/21: SB 54, no acute ischemic changes, QTc 379ms     Telemetry 7/27/21: SR 60s-70s, PACs, HR as low as 49bpm this morning      Assessment:    Active Hospital Problems    Diagnosis Date Noted    Elevated troponin [R77.8] 07/24/2021      1. Elevated troponin s/p [facility] 7/26/21 with 95% prox ISR of LAD--CABG recommended  2. Angina  3. Hx CAD s/p PCI  4. HTN  5. Dyslipidemia  6. Normal LVF EF 55% per echo 7/24/21  7. PHTN with RVSP 45mmHg     Plan:  1. Continue current medications-aspirin 81 mg p.o. daily, Coreg 12.5 mg p.o. twice daily, Lipitor 40 mg p.o. daily at bedtime, titrate Imdur 60 mg p.o. daily, magnesium oxide 400 mg p.o. daily, levothyroxine 50 mcg p.o. daily, sublingual nitroglycerin as needed for chest pain  2. Plavix discontinued on 7/26/2021 in anticipation for CABG  3. Cardiac diet recommended  4. Monitor on telemetry for any tachycardia or bradycardia arrhythmias  5. Maintain potassium greater than 4, magnesium greater than 2  6. GI/DVT prophylaxis  7. Await transfer to [facility] for CABG when bed available  8.  Will follow        Electronically signed by DEAN Yin on 7/27/2021 at 11:33 AM

## 2021-07-27 NOTE — PROGRESS NOTES
Hospitalist Progress Note      PCP: Val Patterson MD    Date of Admission: 7/24/2021    Chief Complaint:    No chief complaint on file. Subjective:  Patient denies fevers, chills; no active cp, sob. 12 point ROS negative other than mentioned above     Medications:  Reviewed    Infusion Medications    sodium chloride      sodium chloride       Scheduled Medications    isosorbide mononitrate  30 mg Oral Daily    sodium chloride flush  5-40 mL Intravenous 2 times per day    enoxaparin  40 mg Subcutaneous Daily    sodium chloride flush  10 mL Intravenous 2 times per day    aspirin  81 mg Oral Daily    atorvastatin  40 mg Oral Nightly    carvedilol  12.5 mg Oral BID WC    finasteride  5 mg Oral Daily    levothyroxine  50 mcg Oral Daily    magnesium oxide  400 mg Oral Daily    tamsulosin  0.4 mg Oral Daily     PRN Meds: acetaminophen, hydrALAZINE, labetalol, sodium chloride flush, sodium chloride, neomycin-bacitracin-polymyxin, sodium chloride, acetaminophen **OR** acetaminophen, polyethylene glycol, promethazine **OR** ondansetron, sodium chloride flush, morphine, nitroGLYCERIN, oxyCODONE      Intake/Output Summary (Last 24 hours) at 7/27/2021 1047  Last data filed at 7/27/2021 0911  Gross per 24 hour   Intake 922.5 ml   Output 400 ml   Net 522.5 ml       Exam:    /60   Pulse 76   Temp 97.7 °F (36.5 °C) (Oral)   Resp 16   Ht 5' 8\" (1.727 m)   Wt 196 lb 1.6 oz (89 kg)   SpO2 96%   BMI 29.82 kg/m²     General appearance: No apparent distress, appears stated age and cooperative. HEENT: Conjunctivae/corneas clear. Neck: . Trachea midline. Respiratory:  Normal respiratory effort. Clear to auscultation  Cardiovascular: Regular rate and rhythm   Abdomen: Soft, non-tender, non-distended with normal bowel sounds. Musculoskeletal: No clubbing, cyanosis or edema bilaterally.   Neuro: Non Focal.   Capillary Refill: Brisk,< 3 seconds   Peripheral Pulses: +2 palpable, equal bilaterally     Labs:   Recent Labs     07/26/21  0627   WBC 9.6   HGB 13.1*   HCT 39.2*        Recent Labs     07/26/21  0627      K 4.0      CO2 25   BUN 21   CREATININE 0.91   CALCIUM 8.6     No results for input(s): AST, ALT, BILIDIR, BILITOT, ALKPHOS in the last 72 hours. Recent Labs     07/26/21  0627   INR 1.1     Recent Labs     07/26/21  0033 07/26/21  0627 07/26/21  1159   TROPONINI <0.010 <0.010 <0.010     Urinalysis:      Lab Results   Component Value Date    NITRU Negative 11/23/2018    WBCUA 3-5 11/23/2018    BACTERIA Rare 11/23/2018    RBCUA 0-2 11/23/2018    BLOODU moderate 05/24/2019    BLOODU MODERATE 11/23/2018    SPECGRAV 1.015 05/24/2019    SPECGRAV 1.007 11/23/2018    GLUCOSEU neg 05/24/2019    GLUCOSEU Negative 11/23/2018     Radiology:  No orders to display     Assessment/Plan:    #NSTEMI    - Plan for [facility] Monday; discussed with cardiology and no need for therapeutic heparin at this time per cardiology so will d/c    - cardiology is managing; continue aspirin and plavix   7/26 - await heart cath today    #HTN/HLD/CAD    - continue home meds    Active Hospital Problems    Diagnosis Date Noted    Elevated troponin [R77.8] 07/24/2021     Additional work up or/and treatment plan may be added today or then after based on clinical progression. I am managing a portion of pt care. Some medical issues are handled by other specialists. Additional work up and treatment should be done in out pt setting by pt PCP and other out pt providers. In addition to examining and evaluating pt, I spent additional time explaining care, normal and abnormal findings, and treatment plan. All of pt questions were answered. Counseling, diet and education were  provided. Case will be discussed with nursing staff when appropriate. Family will be updated if and when appropriate. Diet: ADULT DIET;  Regular; Low Fat/Low Chol/High Fiber/DYALN    Code Status: Full Code    PT/OT Eval     Electronically signed by Shalom Marina MD on 7/27/2021 at 10:47 AM

## 2021-07-27 NOTE — CARE COORDINATION
[facility] Case Management Initial Discharge Assessment    Met with Patient to discuss discharge plan. PCP: Junior Costa MD                                Date of Last Visit: 3WKS     If no PCP, list provided? N/A    Discharge Planning    Living Arrangements: independently at home    Who do you live with? SPOUSE    Who helps you with your care:  self    If lives at home:     Do you have any barriers navigating in your home? no    Patient can perform ADL? Yes    Current Services (outpatient and in home) :  None    Dialysis: No    Is transportation available to get to your appointments? Yes    DME Equipment:  no    Respiratory equipment: None    Respiratory provider:  no     Pharmacy:  yes - [facility] with Medication Assistance Program?  No      Patient agreeable to [facility] [address]? Declined    Patient agreeable to SNF/Rehab? Declined    Other discharge needs identified? N/A    Initial Discharge Plan? (Note: please see concurrent daily documentation for any updates after initial note).     HOME, INDEPENDENT AND NO NEEDS    Readmission Risk              Risk of Unplanned Readmission:  12         Electronically signed by Neeraj Velarde RN on 7/25/2021 at 2:51 PM
PLAN TO TX TO [facility] ONCE BED AVAILABLE.
no hx of pancreatitis in family

## 2021-07-27 NOTE — FLOWSHEET NOTE
Pt's cath site free of redness, drainage and hematoma. Pulses palpable. Site unchanged entire shift. PT updated that no bed is available at [facility] at this time. Pt's call light within reach.

## 2021-07-28 VITALS
OXYGEN SATURATION: 96 % | RESPIRATION RATE: 16 BRPM | HEIGHT: 68 IN | SYSTOLIC BLOOD PRESSURE: 104 MMHG | BODY MASS INDEX: 29.72 KG/M2 | WEIGHT: 196.1 LBS | HEART RATE: 61 BPM | TEMPERATURE: 98.6 F | DIASTOLIC BLOOD PRESSURE: 68 MMHG

## 2021-07-28 PROCEDURE — 6370000000 HC RX 637 (ALT 250 FOR IP): Performed by: INTERNAL MEDICINE

## 2021-07-28 PROCEDURE — 2580000003 HC RX 258: Performed by: INTERNAL MEDICINE

## 2021-07-28 PROCEDURE — 99232 SBSQ HOSP IP/OBS MODERATE 35: CPT | Performed by: INTERNAL MEDICINE

## 2021-07-28 PROCEDURE — 6360000002 HC RX W HCPCS: Performed by: INTERNAL MEDICINE

## 2021-07-28 PROCEDURE — 2060000000 HC ICU INTERMEDIATE R&B

## 2021-07-28 PROCEDURE — 6370000000 HC RX 637 (ALT 250 FOR IP): Performed by: PHYSICIAN ASSISTANT

## 2021-07-28 RX ADMIN — CARVEDILOL 12.5 MG: 12.5 TABLET, FILM COATED ORAL at 08:53

## 2021-07-28 RX ADMIN — ASPIRIN 81 MG: 81 TABLET, CHEWABLE ORAL at 08:52

## 2021-07-28 RX ADMIN — Medication 10 ML: at 08:53

## 2021-07-28 RX ADMIN — Medication 400 MG: at 08:52

## 2021-07-28 RX ADMIN — Medication 10 ML: at 22:14

## 2021-07-28 RX ADMIN — ISOSORBIDE MONONITRATE 60 MG: 60 TABLET, EXTENDED RELEASE ORAL at 08:52

## 2021-07-28 RX ADMIN — CARVEDILOL 12.5 MG: 12.5 TABLET, FILM COATED ORAL at 17:08

## 2021-07-28 RX ADMIN — ENOXAPARIN SODIUM 40 MG: 40 INJECTION SUBCUTANEOUS at 08:53

## 2021-07-28 RX ADMIN — ATORVASTATIN CALCIUM 40 MG: 40 TABLET, FILM COATED ORAL at 21:45

## 2021-07-28 RX ADMIN — TAMSULOSIN HYDROCHLORIDE 0.4 MG: 0.4 CAPSULE ORAL at 08:52

## 2021-07-28 RX ADMIN — LEVOTHYROXINE SODIUM 50 MCG: 0.05 TABLET ORAL at 05:51

## 2021-07-28 RX ADMIN — FINASTERIDE 5 MG: 5 TABLET, FILM COATED ORAL at 08:53

## 2021-07-28 RX ADMIN — SODIUM CHLORIDE, PRESERVATIVE FREE 10 ML: 5 INJECTION INTRAVENOUS at 08:55

## 2021-07-28 ASSESSMENT — PAIN SCALES - GENERAL
PAINLEVEL_OUTOF10: 0

## 2021-07-28 ASSESSMENT — ENCOUNTER SYMPTOMS
COUGH: 0
VOMITING: 0
NAUSEA: 0
COLOR CHANGE: 0
ABDOMINAL PAIN: 0
SHORTNESS OF BREATH: 0

## 2021-07-28 NOTE — PROGRESS NOTES
Hospitalist Progress Note      PCP: Brian Mauro MD    Date of Admission: 7/24/2021    Chief Complaint:    No chief complaint on file. Subjective:  Patient denies fevers, chills; no active cp, sob. 12 point ROS negative other than mentioned above     Medications:  Reviewed    Infusion Medications    sodium chloride      sodium chloride       Scheduled Medications    isosorbide mononitrate  60 mg Oral Daily    sodium chloride flush  5-40 mL Intravenous 2 times per day    enoxaparin  40 mg Subcutaneous Daily    sodium chloride flush  10 mL Intravenous 2 times per day    aspirin  81 mg Oral Daily    atorvastatin  40 mg Oral Nightly    carvedilol  12.5 mg Oral BID WC    finasteride  5 mg Oral Daily    levothyroxine  50 mcg Oral Daily    magnesium oxide  400 mg Oral Daily    tamsulosin  0.4 mg Oral Daily     PRN Meds: acetaminophen, hydrALAZINE, labetalol, sodium chloride flush, sodium chloride, neomycin-bacitracin-polymyxin, sodium chloride, acetaminophen **OR** acetaminophen, polyethylene glycol, promethazine **OR** ondansetron, sodium chloride flush, morphine, nitroGLYCERIN, oxyCODONE      Intake/Output Summary (Last 24 hours) at 7/28/2021 1542  Last data filed at 7/28/2021 1227  Gross per 24 hour   Intake 720 ml   Output --   Net 720 ml       Exam:    BP (!) 125/55   Pulse 60   Temp 98.1 °F (36.7 °C)   Resp 18   Ht 5' 8\" (1.727 m)   Wt 196 lb 1.6 oz (89 kg)   SpO2 98%   BMI 29.82 kg/m²     General appearance: No apparent distress, appears stated age and cooperative. HEENT: Conjunctivae/corneas clear. Neck: . Trachea midline. Respiratory:  Normal respiratory effort. Clear to auscultation  Cardiovascular: Regular rate and rhythm   Abdomen: Soft, non-tender, non-distended with normal bowel sounds. Musculoskeletal: No clubbing, cyanosis or edema bilaterally.   Neuro: Non Focal.   Capillary Refill: Brisk,< 3 seconds   Peripheral Pulses: +2 palpable, equal bilaterally     Labs:   Recent Labs 07/26/21  0627   WBC 9.6   HGB 13.1*   HCT 39.2*        Recent Labs     07/26/21  0627      K 4.0      CO2 25   BUN 21   CREATININE 0.91   CALCIUM 8.6     No results for input(s): AST, ALT, BILIDIR, BILITOT, ALKPHOS in the last 72 hours. Recent Labs     07/26/21  0627   INR 1.1     Recent Labs     07/26/21  0627 07/26/21  1159 07/27/21  1214   TROPONINI <0.010 <0.010 0.012*     Urinalysis:      Lab Results   Component Value Date    NITRU Negative 11/23/2018    WBCUA 3-5 11/23/2018    BACTERIA Rare 11/23/2018    RBCUA 0-2 11/23/2018    BLOODU moderate 05/24/2019    BLOODU MODERATE 11/23/2018    SPECGRAV 1.015 05/24/2019    SPECGRAV 1.007 11/23/2018    GLUCOSEU neg 05/24/2019    GLUCOSEU Negative 11/23/2018     Radiology:  No orders to display     Assessment/Plan:    #NSTEMI    - Plan for [facility] Monday; discussed with cardiology and no need for therapeutic heparin at this time per cardiology so will d/c    - cardiology is managing; continue aspirin and plavix   7/26 - await heart cath today   7/27 - noted multi vessel disease on cath, plan to tx to [facility] for CABG, awaiting bed.   7/28 - await transfer    #HTN/HLD/CAD    - continue home meds    Active Hospital Problems    Diagnosis Date Noted    Elevated troponin [R77.8] 07/24/2021     Additional work up or/and treatment plan may be added today or then after based on clinical progression. I am managing a portion of pt care. Some medical issues are handled by other specialists. Additional work up and treatment should be done in out pt setting by pt PCP and other out pt providers. In addition to examining and evaluating pt, I spent additional time explaining care, normal and abnormal findings, and treatment plan. All of pt questions were answered. Counseling, diet and education were  provided. Case will be discussed with nursing staff when appropriate. Family will be updated if and when appropriate. Diet: ADULT DIET;  Regular; Low Fat/Low Chol/High Fiber/DYLAN    Code Status: Full Code    PT/OT Eval     Electronically signed by Checo Zuñiga MD on 7/28/2021 at 3:42 PM

## 2021-07-28 NOTE — PROGRESS NOTES
Hospitalist Progress Note      PCP: Emeli King MD    Date of Admission: 7/24/2021    Chief Complaint:    No chief complaint on file. Subjective:  Patient denies fevers, chills; no active cp, sob. 12 point ROS negative other than mentioned above     Medications:  Reviewed    Infusion Medications    sodium chloride      sodium chloride       Scheduled Medications    isosorbide mononitrate  60 mg Oral Daily    sodium chloride flush  5-40 mL Intravenous 2 times per day    enoxaparin  40 mg Subcutaneous Daily    sodium chloride flush  10 mL Intravenous 2 times per day    aspirin  81 mg Oral Daily    atorvastatin  40 mg Oral Nightly    carvedilol  12.5 mg Oral BID WC    finasteride  5 mg Oral Daily    levothyroxine  50 mcg Oral Daily    magnesium oxide  400 mg Oral Daily    tamsulosin  0.4 mg Oral Daily     PRN Meds: acetaminophen, hydrALAZINE, labetalol, sodium chloride flush, sodium chloride, neomycin-bacitracin-polymyxin, sodium chloride, acetaminophen **OR** acetaminophen, polyethylene glycol, promethazine **OR** ondansetron, sodium chloride flush, morphine, nitroGLYCERIN, oxyCODONE      Intake/Output Summary (Last 24 hours) at 7/28/2021 1030  Last data filed at 7/28/2021 0945  Gross per 24 hour   Intake 360 ml   Output --   Net 360 ml       Exam:    /69   Pulse 55   Temp 98.2 °F (36.8 °C) (Oral)   Resp 18   Ht 5' 8\" (1.727 m)   Wt 196 lb 1.6 oz (89 kg)   SpO2 98%   BMI 29.82 kg/m²     General appearance: No apparent distress, appears stated age and cooperative. HEENT: Conjunctivae/corneas clear. Neck: . Trachea midline. Respiratory:  Normal respiratory effort. Clear to auscultation  Cardiovascular: Regular rate and rhythm   Abdomen: Soft, non-tender, non-distended with normal bowel sounds. Musculoskeletal: No clubbing, cyanosis or edema bilaterally.   Neuro: Non Focal.   Capillary Refill: Brisk,< 3 seconds   Peripheral Pulses: +2 palpable, equal bilaterally     Labs:   Recent Labs 07/26/21  0627   WBC 9.6   HGB 13.1*   HCT 39.2*        Recent Labs     07/26/21  0627      K 4.0      CO2 25   BUN 21   CREATININE 0.91   CALCIUM 8.6     No results for input(s): AST, ALT, BILIDIR, BILITOT, ALKPHOS in the last 72 hours. Recent Labs     07/26/21  0627   INR 1.1     Recent Labs     07/26/21  0627 07/26/21  1159 07/27/21  1214   TROPONINI <0.010 <0.010 0.012*     Urinalysis:      Lab Results   Component Value Date    NITRU Negative 11/23/2018    WBCUA 3-5 11/23/2018    BACTERIA Rare 11/23/2018    RBCUA 0-2 11/23/2018    BLOODU moderate 05/24/2019    BLOODU MODERATE 11/23/2018    SPECGRAV 1.015 05/24/2019    SPECGRAV 1.007 11/23/2018    GLUCOSEU neg 05/24/2019    GLUCOSEU Negative 11/23/2018     Radiology:  No orders to display     Assessment/Plan:    #NSTEMI    - Plan for [facility] Monday; discussed with cardiology and no need for therapeutic heparin at this time per cardiology so will d/c    - cardiology is managing; continue aspirin and plavix   7/26 - await heart cath today   7/27 - noted multi vessel disease on cath, plan to tx to [facility] for CABG, awaiting bed. #HTN/HLD/CAD    - continue home meds    Active Hospital Problems    Diagnosis Date Noted    Elevated troponin [R77.8] 07/24/2021     Additional work up or/and treatment plan may be added today or then after based on clinical progression. I am managing a portion of pt care. Some medical issues are handled by other specialists. Additional work up and treatment should be done in out pt setting by pt PCP and other out pt providers. In addition to examining and evaluating pt, I spent additional time explaining care, normal and abnormal findings, and treatment plan. All of pt questions were answered. Counseling, diet and education were  provided. Case will be discussed with nursing staff when appropriate. Family will be updated if and when appropriate. Diet: ADULT DIET;  Regular; Low Fat/Low Chol/High Fiber/DYLAN    Code Status: Full Code    PT/OT Eval     Electronically signed by Elle Ramon MD on 7/28/2021 at 10:30 AM

## 2021-07-29 NOTE — PROGRESS NOTES
Progress Note  Patient: Kaylin Wahl  Unit/Bed: H036/B811-56  YOB: 1945  MRN: [id number]  Acct: [de-identified]   Admitting Diagnosis: Elevated troponin [R77.8]  Date:  7/24/2021  Hospital Day: 4    Chief Complaint:  Chest pain    Subjective    7-28-21: HD stable. No angina at this time. Awaiting transfer to [facility]. plavix on hold. NSR on tele. 7/27/21: Underwent cardiac catheterization by Dr. William Conley yesterday which revealed 95% proximal ISR of LAD with otherwise mild to moderate disease and normal LV function. Given recurrent ISR of LAD with last PTCA in December 2020, patient recommended for transfer to [facility] for CABG. Plavix has been discontinued in anticipation for surgery. Resting comfortably in bed in no acute distress. Complaining of mild chest pressure at a 2 out of 10 with any type of exertion but is chest pain-free at rest.  No shortness of breath complaints. Right radial site stable. He is hemodynamically stable. On telemetry he is maintaining sinus rhythm with heart rate 60s to 70s currently. Heart rate was as low as 49 earlier this morning. Awaiting transfer to [facility] when bed available. 7/25/21: Known to have CAD. Prior stenting done 7 months ago. [facility]ed to [facility] with chest pain. Troponins were borderline elevated continue to have chest pain was transferred to [facility] currently chest pain-free he had some episode of chest pain last night was given Nitropaste    924/21: 71-year-old gentleman who presented to [facility] with chest pain dyspnea. Known history of CAD with MI in 2012. Recent cardiac catheterizatio December 20. He was in the grocery store and had some chest heaviness with numbness of the arm and broke out in a sweat came to the ER was admitted. We are consulted because of elevated troponins    Review of Systems:   Review of Systems   Constitutional: Negative for activity change. HENT: Negative for congestion.     Respiratory: Negative for cough and shortness of breath. Cardiovascular: Positive for chest pain (with exertion). Negative for palpitations and leg swelling. Gastrointestinal: Negative for abdominal pain, nausea and vomiting. Genitourinary: Negative for difficulty urinating. Musculoskeletal: Negative for arthralgias. Skin: Negative for color change, pallor and rash. Neurological: Negative for dizziness and syncope. Psychiatric/Behavioral: Negative for agitation and behavioral problems. Physical Examination:    /68   Pulse 61   Temp 98.6 °F (37 °C)   Resp 16   Ht 5' 8\" (1.727 m)   Wt 196 lb 1.6 oz (89 kg)   SpO2 96%   BMI 29.82 kg/m²    Physical Exam  Constitutional:       General: He is not in acute distress. Appearance: Normal appearance. He is obese. HENT:      Head: Normocephalic and atraumatic. Cardiovascular:      Rate and Rhythm: Normal rate and regular rhythm. Pulmonary:      Effort: Pulmonary effort is normal. No respiratory distress. Breath sounds: No wheezing, rhonchi or rales. Abdominal:      Palpations: Abdomen is soft. Tenderness: There is no abdominal tenderness. Musculoskeletal:         General: Normal range of motion. Cervical back: Normal range of motion and neck supple. Right lower leg: No edema. Left lower leg: No edema. Comments: Right radial site soft, nontender, no hematoma; 2+ right radial pulse   Skin:     General: Skin is warm and dry. Neurological:      General: No focal deficit present. Mental Status: He is alert and oriented to person, place, and time. Cranial Nerves: No cranial nerve deficit.    Psychiatric:         Mood and Affect: Mood normal.         Behavior: Behavior normal.         LABS:  CBC:   Lab Results   Component Value Date    WBC 9.6 07/26/2021    RBC 4.34 07/26/2021    HGB 13.1 07/26/2021    HCT 39.2 07/26/2021    MCV 90.4 07/26/2021    MCH 30.3 07/26/2021    MCHC 33.5 07/26/2021    RDW 13.9 07/26/2021     07/26/2021    MPV 8.2 03/26/2015     CBC with Differential:   Lab Results   Component Value Date    WBC 9.6 07/26/2021    RBC 4.34 07/26/2021    HGB 13.1 07/26/2021    HCT 39.2 07/26/2021     07/26/2021    MCV 90.4 07/26/2021    MCH 30.3 07/26/2021    MCHC 33.5 07/26/2021    RDW 13.9 07/26/2021    LYMPHOPCT 18.3 07/23/2021    MONOPCT 10.2 07/23/2021    BASOPCT 0.2 07/23/2021    MONOSABS 1.1 07/23/2021    LYMPHSABS 2.0 07/23/2021    EOSABS 0.1 07/23/2021    BASOSABS 0.0 07/23/2021     CMP:    Lab Results   Component Value Date     07/26/2021    K 4.0 07/26/2021    K 3.8 07/24/2021     07/26/2021    CO2 25 07/26/2021    BUN 21 07/26/2021    CREATININE 0.91 07/26/2021    GFRAA >60.0 07/26/2021    LABGLOM >60.0 07/26/2021    GLUCOSE 104 07/26/2021    PROT 5.9 07/24/2021    LABALBU 3.4 07/24/2021    CALCIUM 8.6 07/26/2021    BILITOT 0.3 07/24/2021    ALKPHOS 59 07/24/2021    AST 17 07/24/2021    ALT 29 07/24/2021     BMP:    Lab Results   Component Value Date     07/26/2021    K 4.0 07/26/2021    K 3.8 07/24/2021     07/26/2021    CO2 25 07/26/2021    BUN 21 07/26/2021    LABALBU 3.4 07/24/2021    CREATININE 0.91 07/26/2021    CALCIUM 8.6 07/26/2021    GFRAA >60.0 07/26/2021    LABGLOM >60.0 07/26/2021    GLUCOSE 104 07/26/2021     Magnesium:  No results found for: MG  Troponin:    Lab Results   Component Value Date    TROPONINI 0.012 07/27/2021       Radiology:  No results found. Echocardiogram 7/24/21:  Conclusions      Summary   Normal tricuspid valve structure and function. There is mild ( 1 +) tricuspid regurgitation with estimated RVSP of 45 mm   Hg. Moderately dilated left atrium. Left ventricular ejection fraction is visually estimated at 55%. Impaired relaxation compatible with diastolic dysfunction.  ( reversed E/A   ratio)      Signature      ----------------------------------------------------------------   Electronically signed by Ivy OneillInterpreting physician)   on 07/24/2021 03:49 PM   ----------------------------------------------------------------        [facility] 7/26/21:  Procedure(s):  [facility], b/l coronary angio  Pre-operative Diagnosis:  nstemi  H&P Status: Completed and reviewed.      Post-operative Diagnosis:    LV EF of 55%  LM normal   LAD 95% proximal ISR  CX mild to mod disease proximal  RCA  Large caliber, mild disease     Findings:  See full report  Complications:  none  Primary Proceduralist:   Dr.Wes Heena STODDARD     Plan  Transfer to [facility] for CABG      EKG 7/24/21: SB 54, no acute ischemic changes, QTc 379ms     Telemetry 7/27/21: SR 60s-70s, PACs, HR as low as 49bpm this morning      Assessment:    Active Hospital Problems    Diagnosis Date Noted    Elevated troponin [R77.8] 07/24/2021     Priority: Low      1. Elevated troponin s/p [facility] 7/26/21 with 95% prox ISR of LAD--CABG recommended  2. Angina  3. Hx CAD s/p PCI  4. HTN  5. Dyslipidemia  6. Normal LVF EF 55% per echo 7/24/21  7. PHTN with RVSP 45mmHg     Plan:  1. Continue current medications-aspirin 81 mg p.o. daily, Coreg 12.5 mg p.o. twice daily, Lipitor 40 mg p.o. daily at bedtime, titrate Imdur 60 mg p.o. daily, magnesium oxide 400 mg p.o. daily, levothyroxine 50 mcg p.o. daily, sublingual nitroglycerin as needed for chest pain  2. Plavix discontinued on 7/26/2021 in anticipation for CABG  3. Cardiac diet recommended  4. Monitor on telemetry for any tachycardia or bradycardia arrhythmias  5. Maintain potassium greater than 4, magnesium greater than 2  6. GI/DVT prophylaxis  7. Await transfer to [facility] for CABG when bed available  8. Check labs.          Electronically signed by Collin Andino DO on 7/28/2021 at 9:41 PM

## 2021-07-29 NOTE — FLOWSHEET NOTE
Pt transported to [facility] via [address] [place] [address]. IV in place/VSS/SpO2 94%, pt placed on 2L NC for comfort. Pt denies CP/dizziness/SOB. Tele monitor removed. Pt belongings collected & verified. Report delivered c no additional questions/concerns.  Electronically signed by Corinne Lawrence RN on 7/29/2021 at 12:00 AM

## 2021-08-05 ENCOUNTER — TELEPHONE (OUTPATIENT)
Dept: FAMILY MEDICINE CLINIC | Age: 76
End: 2021-08-05

## 2021-08-05 NOTE — TELEPHONE ENCOUNTER
Pt had a MI on 7/23 and he ended up having a open heart bypass surgery on 7/30/21. Patient just wanted you to be aware. He was able to go home yesterday. He can not drive for 8 weeks. Home care is following him. Do you need to see him?

## 2021-08-11 ENCOUNTER — VIRTUAL VISIT (OUTPATIENT)
Dept: FAMILY MEDICINE CLINIC | Age: 76
End: 2021-08-11
Payer: MEDICARE

## 2021-08-11 DIAGNOSIS — I25.119 CORONARY ARTERY DISEASE INVOLVING NATIVE CORONARY ARTERY OF NATIVE HEART WITH ANGINA PECTORIS (HCC): Primary | ICD-10-CM

## 2021-08-11 DIAGNOSIS — Z95.1 S/P CORONARY ARTERY BYPASS GRAFT X 1: ICD-10-CM

## 2021-08-11 PROCEDURE — 99442 PR PHYS/QHP TELEPHONE EVALUATION 11-20 MIN: CPT | Performed by: FAMILY MEDICINE

## 2021-08-11 RX ORDER — PANTOPRAZOLE SODIUM 20 MG/1
TABLET, DELAYED RELEASE ORAL
COMMUNITY
Start: 2021-08-04 | End: 2022-03-15

## 2021-08-11 RX ORDER — ACETAMINOPHEN 325 MG/1
325-650 TABLET ORAL EVERY 6 HOURS PRN
COMMUNITY
Start: 2021-08-04

## 2021-08-11 NOTE — PROGRESS NOTES
2021    TELEHEALTH EVALUATION -- Audio/Visual (During HCXDL-43 public health emergency)    Due to [place] 19 outbreak, patient's office visit was converted to a virtual visit. Patient was contacted and agreed to proceed with a virtual visit via Telephone Visit  The risks and benefits of converting to a virtual visit were discussed in light of the current infectious disease epidemic. Patient also understood that insurance coverage and co-pays are up to their individual insurance plans.     HPI:    Marisela Lewis (:  1945) has requested an audio/video evaluation for the following concern(s):  Chief Complaint   Patient presents with    Follow-Up from Hospital     [facility], heart attack, surgery on 21, bypass       Phone follow up from recent hospitalization    [facility]    Stent occluded in LAD     Transferred to [facility] for open heart Surgery     S/P on 2021 Off pump CABGx1 (LIMA-LAD)    Discharged on 21    Followed up with [facility] cardiac surgery yesterday         Patient Active Problem List   Diagnosis    Hyperlipidemia    Hypertension    Osteoarthritis    CAD (coronary artery disease)    Actinic keratosis    BPH with obstruction/lower urinary tract symptoms    Urinary retention    Basal cell carcinoma, trunk    Overweight    Status post LASIK surgery    Osteoarthritis of right knee    Infection of toe    Hypertrophic scar    Status post total right knee replacement    ARB intolerance    Generalized abdominal pain    Senile nuclear sclerosis    Regular astigmatism    Presbyopia    Acute pain of left shoulder    Rotator cuff tendinitis, left    Osteoarthritis of left shoulder    Biceps strain, left, initial encounter    History of colon polyps    History of MI (myocardial infarction)    Angina at rest [facility])    Chest pain    Elevated troponin     Past Medical History:   Diagnosis Date    Actinic keratosis     Basal cell carcinoma, trunk 12/2017    left upper back    Basal cell carcinoma, trunk     BPH (benign prostatic hyperplasia)     CAD (coronary artery disease) 2012    has 4 cardiac stents    Heart attack ([address]     History of MI (myocardial infarction) 12/11/2020    History of mycobacterial infection 6/22/2018    Hyperlipidemia     meds > 15 yrs    Hypertension     meds > 6 yrs / denies TIA or stroke    Hypertrophic scar     Myocardial infarct ([address] 11/2012    Osteoarthritis     all joints    Overweight 12/22/2017    Status post coronary angioplasty     Thyroid disease     meds > 1 month         Review of Systems  Otherwise physically feels healthy without sob/palpitations/chest pain/f/c/n/v/weight gain/weight loss/abd pain      Prior to Visit Medications    Medication Sig Taking?  Authorizing Provider   acetaminophen (TYLENOL) 325 MG tablet Take 325-650 mg by mouth every 6 hours as needed Yes Historical Provider, MD   pantoprazole (PROTONIX) 20 MG tablet Take one tablet daily for  14 days Yes Historical Provider, MD   Zinc Sulfate (ZINC 15 PO) Take by mouth Yes Historical Provider, MD   aspirin 81 MG chewable tablet Take 1 tablet by mouth daily Yes Princess Maradiaga MD   clopidogrel (PLAVIX) 75 MG tablet take 1 tablet by mouth once daily Yes Dylan Naik, DO   tamsulosin (FLOMAX) 0.4 MG capsule take 1 capsule by mouth twice a day Yes Judith Castillo MD   carvedilol (COREG) 12.5 MG tablet take 1 tablet by mouth twice a day with food Yes Judith Castillo MD   levothyroxine (SYNTHROID) 50 MCG tablet take 1 tablet by mouth once daily Yes Judith Castillo MD   finasteride (PROSCAR) 5 MG tablet Take 1 tablet by mouth daily take 1 tablet by mouth once daily Yes Judith Castillo MD   atorvastatin (LIPITOR) 40 MG tablet take 1 tablet by mouth once daily  Patient taking differently: Take 80 mg by mouth daily  Yes Dylan Naik, DO   nitroGLYCERIN (NITROSTAT) 0.4 MG SL tablet Place 1 tablet under the tongue every 5 minutes as needed for Chest pain Dissolve 1 tab under tongue at first sign of chest pain every 5 minutes as needed. If pain persists after taking 3 tabs in a 15-minute period, or the pain is different than is typically experienced, call 9-1-1 immediately. Yes Yonis Castro MD   magnesium oxide (MAG-OX) 400 MG tablet Take 400 mg by mouth daily  Yes Historical Provider, MD   Tammy Kelley [address] [facility] by Does not apply route Exp 5 years Yes Yonis Castro MD   Multiple Vitamins-Minerals (MULTI COMPLETE PO) Take by mouth Yes Historical Provider, MD       Social History     Tobacco Use    Smoking status: Former Smoker     Packs/day: 1.00     Years: 7.00     Pack years: 7.00     Start date: 1961     Quit date: 3/17/1967     Years since quittin.4    Smokeless tobacco: Never Used    Tobacco comment: Quit for [employer]   Vaping Use    Vaping Use: Never used   Substance Use Topics    Alcohol use: No     Types: 30 Cans of beer per week     Comment: No alcohol  since     Drug use: No            PHYSICAL EXAMINATION:  Patient-Reported Vitals 2021   Patient-Reported Weight 189.8 lb   Patient-Reported Height 5'8   Patient-Reported Systolic 987   Patient-Reported Diastolic 60         No exam  Phone visit  Patient in no significant distress, conversant    Due to this being a TeleHealth encounter, evaluation of the following organ systems is limited: Vitals/Constitutional/EENT/Resp/CV/GI//MS/Neuro/Skin/Heme-Lymph-Imm. Lab Results   Component Value Date    WBC 9.6 2021    HGB 13.1 (L) 2021    HCT 39.2 (L) 2021     2021    CHOL 156 06/15/2021    TRIG 136 06/15/2021    HDL 34 (L) 06/15/2021    ALT 29 2021    AST 17 2021     2021    K 4.0 2021     2021    CREATININE 0.91 2021    BUN 21 2021    CO2 25 2021    TSH 2.300 06/15/2021    PSA 0.29 06/15/2021    INR 1.1 2021    LABA1C 5.4 2019         ASSESSMENT/PLAN:     Diagnosis Orders   1. Coronary artery disease involving native coronary artery of native heart with angina pectoris ([address] [address]     2. S/P coronary artery bypass graft x 1         F/u with specialists   Avoid nsaids  Tylenol for pain   Cardiac rehab to begin in a few weeks    No follow-ups on file. An  electronic signature was used to authenticate this note. --Abida Trujillo MD on 8/11/2021 at 2:05 PM        Pursuant to the emergency declaration under the [address] [place] [address], [facility] waiver authority and the [employer] Act, this Virtual  Visit was conducted, with patient's consent, to reduce the patient's risk of exposure to COVID-19 and provide continuity of care for an established patient.     11 minute call

## 2021-08-23 PROBLEM — R77.8 ELEVATED TROPONIN: Status: RESOLVED | Noted: 2021-07-24 | Resolved: 2021-08-23

## 2021-08-23 PROBLEM — R79.89 ELEVATED TROPONIN: Status: RESOLVED | Noted: 2021-07-24 | Resolved: 2021-08-23

## 2021-09-13 ENCOUNTER — OFFICE VISIT (OUTPATIENT)
Dept: FAMILY MEDICINE CLINIC | Age: 76
End: 2021-09-13
Payer: MEDICARE

## 2021-09-13 VITALS
BODY MASS INDEX: 29.07 KG/M2 | HEIGHT: 68 IN | HEART RATE: 87 BPM | WEIGHT: 191.8 LBS | DIASTOLIC BLOOD PRESSURE: 80 MMHG | SYSTOLIC BLOOD PRESSURE: 110 MMHG | TEMPERATURE: 100.5 F | OXYGEN SATURATION: 97 %

## 2021-09-13 DIAGNOSIS — E03.9 HYPOTHYROIDISM, UNSPECIFIED TYPE: ICD-10-CM

## 2021-09-13 DIAGNOSIS — E78.2 MIXED HYPERLIPIDEMIA: ICD-10-CM

## 2021-09-13 DIAGNOSIS — I25.119 CORONARY ARTERY DISEASE INVOLVING NATIVE CORONARY ARTERY OF NATIVE HEART WITH ANGINA PECTORIS (HCC): Primary | ICD-10-CM

## 2021-09-13 DIAGNOSIS — I10 ESSENTIAL HYPERTENSION: ICD-10-CM

## 2021-09-13 DIAGNOSIS — E78.5 HYPERLIPIDEMIA, UNSPECIFIED HYPERLIPIDEMIA TYPE: ICD-10-CM

## 2021-09-13 PROCEDURE — 99213 OFFICE O/P EST LOW 20 MIN: CPT | Performed by: FAMILY MEDICINE

## 2021-09-13 RX ORDER — ATORVASTATIN CALCIUM 80 MG/1
80 TABLET, FILM COATED ORAL DAILY
COMMUNITY
Start: 2021-09-13 | End: 2022-01-31 | Stop reason: SDUPTHER

## 2021-09-13 RX ORDER — FUROSEMIDE 20 MG/1
10 TABLET ORAL
COMMUNITY
End: 2022-03-15

## 2021-09-13 RX ORDER — POTASSIUM CHLORIDE 750 MG/1
10 TABLET, EXTENDED RELEASE ORAL 2 TIMES DAILY
COMMUNITY
End: 2022-03-15

## 2021-09-13 NOTE — PROGRESS NOTES
Chief Complaint   Patient presents with    Follow Up After Procedure     derm follow up, exscion by Dr. Anne-Marie Elliott Problem     heart attack, coranry bypass, fatigue, camps in ankles and thighs, very painful in bed     Discuss Medications       HPI:  River Euceda is a 77 y.o. male     history MI  Hx CAD   statin, b-blocker, asa    Temp slightly elevated today     Reporting cramps in ankles/thighs    Is on lasix/potassium/magnesium        Wt Readings from Last 3 Encounters:   09/13/21 191 lb 12.8 oz (87 kg)   07/27/21 196 lb 1.6 oz (89 kg)   07/24/21 201 lb 1.6 oz (91.2 kg)           Past Medical History:   Diagnosis Date    Actinic keratosis     Basal cell carcinoma, trunk 12/2017    left upper back    Basal cell carcinoma, trunk     BPH (benign prostatic hyperplasia)     CAD (coronary artery disease) 2012    has 4 cardiac stents    [facility]     History of MI (myocardial infarction) 12/11/2020    History of mycobacterial infection 6/22/2018    Hyperlipidemia     meds > 15 yrs    Hypertension     meds > 6 yrs / denies TIA or stroke    Hypertrophic scar     Myocardial infarct ([address] 11/2012    Osteoarthritis     all joints    Overweight 12/22/2017    Status post coronary angioplasty     Thyroid disease     meds > 1 month     Past Surgical History:   Procedure Laterality Date    ACHILLES TENDON SURGERY Left 7/10/2020    LEFT REPAIR OF RUPTURED ACHILLES TENDON, performed by Yue Moody DPM at [facility] Bilateral 2015    COLONOSCOPY  3/23/15        COLONOSCOPY N/A 5/11/2020    COLORECTAL CANCER SCREENING, HIGH RISK performed by Melisa Nicholson MD at [address] [place] [address]  2012    CYSTOSCOPY  05/17/2017    W/COMPLEX CYSTOMETROGRAM-COMPLEX UROFLOW-TRANSRECTAL US PROSTATE    EYE SURGERY      Lasik OU    JOINT REPLACEMENT Left 2010    LTKR    SD TOTAL KNEE ARTHROPLASTY Right 3/15/2018    RIGHT KNEE TOTAL KNEE  ARTHROPLASTY performed by Radha Restrepo MD at [address] Right     twice    SHOULDER SURGERY Right      RCR    TONSILLECTOMY      TURP N/A 2017    GREEN LIGHT LASER TRANSURETHRAL RESECTIOIN PROSTATE performed by Alina Hewitt MD at [address]  06/03/15    Cystoscop, cath [facility],  prostate    UVULECTOMY      due to snoring     Family History   Problem Relation Age of Onset    Kidney Disease Father     Other Mother         Perforated intestine    No Known Problems Sister     No Known Problems Brother     No Known Problems Sister     No Known Problems Brother     No Known Problems Son      Social History     Socioeconomic History    Marital status:      Spouse name: None    Number of children: None    Years of education: None    Highest education level: None   Occupational History    None   Tobacco Use    Smoking status: Former Smoker     Packs/day: 1.00     Years: 7.00     Pack years: 7.00     Start date: 1961     Quit date: 3/17/1967     Years since quittin.6    Smokeless tobacco: Never Used    Tobacco comment: Quit for [employer]   Vaping Use    Vaping Use: Never used   Substance and Sexual Activity    Alcohol use: No     Types: 30 Cans of beer per week     Comment: No alcohol  since     Drug use: No    Sexual activity: None   Other Topics Concern    None   Social History Narrative    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 in the Last Year: Never true   Transportation Needs:     Lack of Transportation (Medical):      Lack of Transportation (Non-Medical):    Physical Activity:     Days of Exercise per Week:     Minutes of Exercise per Session:    Stress:     Feeling of Stress :    Social Connections:     Frequency of Communication with Friends and Family:     Frequency of Social Gatherings with Friends and Family:     Attends Evangelical Services:     Active Member of Clubs or Organizations:     Attends Club or Organization Meetings:     Marital Status:    Intimate Partner Violence:     Fear of Current or Ex-Partner:     Emotionally Abused:     Physically Abused:     Sexually Abused:      Current Outpatient Medications   Medication Sig Dispense Refill    potassium chloride (KLOR-CON M) 10 MEQ extended release tablet Take 10 mEq by mouth 2 times daily      furosemide (LASIX) 20 MG tablet Take 10 mg by mouth      atorvastatin (LIPITOR) 80 MG tablet Take 1 tablet by mouth daily      acetaminophen (TYLENOL) 325 MG tablet Take 325-650 mg by mouth every 6 hours as needed      pantoprazole (PROTONIX) 20 MG tablet Take one tablet daily for  14 days      Zinc Sulfate (ZINC 15 PO) Take by mouth       aspirin 81 MG chewable tablet Take 1 tablet by mouth daily 30 tablet 3    clopidogrel (PLAVIX) 75 MG tablet take 1 tablet by mouth once daily 90 tablet 1    tamsulosin (FLOMAX) 0.4 MG capsule take 1 capsule by mouth twice a day 180 capsule 1    carvedilol (COREG) 12.5 MG tablet take 1 tablet by mouth twice a day with food 180 tablet 1    levothyroxine (SYNTHROID) 50 MCG tablet take 1 tablet by mouth once daily 90 tablet 1    finasteride (PROSCAR) 5 MG tablet Take 1 tablet by mouth daily take 1 tablet by mouth once daily 90 tablet 3    nitroGLYCERIN (NITROSTAT) 0.4 MG SL tablet Place 1 tablet under the tongue every 5 minutes as needed for Chest pain Dissolve 1 tab under tongue at first sign of chest pain every 5 minutes as needed. If pain persists after taking 3 tabs in a 15-minute period, or the pain is different than is typically experienced, call 9-1-1 immediately.  25 tablet 1    magnesium oxide (MAG-OX) 400 MG tablet Take 400 mg by mouth daily       Handicap Placard MISC by Does not apply route Exp 5 years 1 each 0    Multiple Vitamins-Minerals (MULTI COMPLETE PO) Take by mouth       No current facility-administered medications for this visit. Allergies   Allergen Reactions    Ace Inhibitors Other (See Comments)     Cough      Lisinopril Other (See Comments)     Cough         Review of Systems:   General ROS: some fatigue   Respiratory ROS: coughing spells, mainly at nighttime  Cardiovascular ROS: +SERNA  Gastrointestinal ROS: no abdominal pain, change in bowel habits, or black or bloody stools  Genito-Urinary ROS: , BPH and ED  Musculoskeletal ROS: some hand joint pain  Neurological ROS: numbness in his hands    In general patient otherwise reports feeling well. Physical Exam:  /80 (Site: Left Upper Arm)   Pulse 87   Temp 100.5 °F (38.1 °C)   Ht 5' 8\" (1.727 m)   Wt 191 lb 12.8 oz (87 kg)   SpO2 97%   BMI 29.16 kg/m²     Gen: Well, NAD, Alert, Oriented x 3   HEENT: EOMI, eyes clear, MMM  Skin: without rash or jaundice  Neck: no significant lymphadenopathy or thyromegaly  Lungs: CTA B w/out Rales/Wheezes/Rhonchi, Good respiratory effort   Heart: RRR, S1S2, w/out M/R/G, non-displaced PMI   Ext: No C/C/E Bilaterally. A&P   Diagnosis Orders   1. Coronary artery disease involving native coronary artery of native heart with angina pectoris [facility])  [facility]   2. Mixed hyperlipidemia  atorvastatin (LIPITOR) 80 MG tablet   3. Essential hypertension     4. Hyperlipidemia, unspecified hyperlipidemia type     5.  Hypothyroidism, unspecified type         F/u with cardiology    Chronic conditions are stable  Continue current regimen  Follow up with appropriate specialists and here routinely for ongoing monitoring of chronic conditions    Healthy diet and exercise   Risk factor reduction    Follow up with urology    F/u 6 mos      Selin Brito MD

## 2021-10-08 ENCOUNTER — HOSPITAL ENCOUNTER (OUTPATIENT)
Dept: CARDIAC REHAB | Age: 76
Setting detail: THERAPIES SERIES
Discharge: HOME | End: 2021-10-08
Payer: MEDICARE

## 2021-10-08 PROCEDURE — G0423 INTENS CARDIAC REHAB NO EXER: HCPCS

## 2021-10-08 PROCEDURE — G0422 INTENS CARDIAC REHAB W/EXERC: HCPCS

## 2021-10-08 NOTE — PROGRESS NOTES
Video Education Report - ICR/CR    Name:  Maria Luz Ramirez     Date:  10/8/2021  MRN: [id number]      Session Length: 34:22 min    Recommended Videos        [x]01 [employer] - Program Overview   34:22    []02 Overview of Pritikin Eating Plan   34:10    []03 Becoming a Merla Rash   33:08     []04 Diseases of Our Time - Part 1   34:22    []05 Calorie Density     33:39   []06 Label Reading - Part 1    32:15   []07 Move it      32.54   []08 Healthy Minds, Bodies, Hearts   32:14   []09 Dining Out - Part 1    32:28   []10 Heart Disease Risk Reduction   48:01   []79 Metabolic Syndrome and Belly Fat  31:52   []12 Facts on Fat     35:29   []13 Diseases of Our Time - Part 2   33:07   []14 Biology of Weight Control   32:36   []15 Biomechanical Limitations   35:20   []16 Nutrition Action Plan    34:23    Additional Videos         []17 Hypertension & Heart Disease   32:39        []18 Cooking Breakfasts and Snacks  32:00   []19 Planning Your Eating Strategy   33:30   []20 Label Reading - Part 2    32:36  []21 Cooking Soups and Desserts   31:41  []22 How Our Thoughts Can Heal Our Hearts 33:05  []23 Targeting Your Nutrition Priorities  33:58  []24 Healthy Salads & Dressings   35:32  []25 Dining Out - Part 2    32:35  []26 Cooking Dinner and Sides   35:06  []27 Sleep Disorders     33:14  []28 Menu Workshop     32:06  []29 Decoding Lab Results    32:42     []30 Vitamins and Minerals    32:54  []31 Exercise Action Plan    32:26  []32 Body Composition    34:03  []33 Improving Performance    32:13  []34 Fueling a Healthy Body    31:32  []35 Introduction to Yoga    33:47  []36 Aging-Enhancing the Quality of Your Life 33:22  []37 Smoking Cessation    36:19    Comments:  Video completed, binder reviewed, and handouts given

## 2021-10-11 ENCOUNTER — HOSPITAL ENCOUNTER (OUTPATIENT)
Dept: CARDIAC REHAB | Age: 76
Setting detail: THERAPIES SERIES
Discharge: HOME | End: 2021-10-11
Payer: MEDICARE

## 2021-10-11 PROCEDURE — G0422 INTENS CARDIAC REHAB W/EXERC: HCPCS

## 2021-10-11 PROCEDURE — G0423 INTENS CARDIAC REHAB NO EXER: HCPCS

## 2021-10-11 NOTE — PROGRESS NOTES
Video Education Report - ICR/CR    Name:  Pedro Baptiste     Date:  10/11/2021  MRN: [id number]     Session #:  1  Session Length: 45 min    Recommended Videos        []01 [employer] - Program Overview   34:22    []02 Overview of Prit[employer]in Eating Plan   34:10    []03 Becoming a Yasmin Squaxin   33:08     []04 Diseases of Our Time - Part 1   34:22    []05 Calorie Density     33:39   []06 Label Reading - Part 1    32:15   []07 Move it      32.54   []08 Healthy Minds, Bodies, Hearts   32:14   [x]09 Dining Out - Part 1    32:28   []10 Heart Disease Risk Reduction   52:09   []56 Metabolic Syndrome and Belly Fat  31:52   []12 Facts on Fat     35:29   []13 Diseases of Our Time - Part 2   33:07   []14 Biology of Weight Control   32:36   []15 Biomechanical Limitations   35:20   []16 Nutrition Action Plan    34:23    Additional Videos         []17 Hypertension & Heart Disease   32:39        []18 Cooking Breakfasts and Snacks  32:00   []19 Planning Your Eating Strategy   33:30   []20 Label Reading - Part 2    32:36  []21 Cooking Soups and Desserts   31:41  []22 How Our Thoughts Can Heal Our Hearts 33:05  []23 Targeting Your Nutrition Priorities  33:58  []24 Healthy Salads & Dressings   35:32  []25 Dining Out - Part 2    32:35  []26 Cooking Dinner and Sides   35:06  []27 Sleep Disorders     33:14  []28 Menu Workshop     32:06  []29 Decoding Lab Results    32:42     []30 Vitamins and Minerals    32:54  []31 Exercise Action Plan    32:26  []32 Body Composition    34:03  []33 Improving Performance    32:13  []34 Fueling a Healthy Body    31:32  []35 Introduction to Yoga    33:47  []36 Aging-Enhancing the Quality of Your Life 33:22  []37 Smoking Cessation    36:19    Comments:  Video completed.

## 2021-10-13 ENCOUNTER — HOSPITAL ENCOUNTER (OUTPATIENT)
Dept: CARDIAC REHAB | Age: 76
Setting detail: THERAPIES SERIES
Discharge: HOME | End: 2021-10-13
Payer: MEDICARE

## 2021-10-13 PROCEDURE — G0422 INTENS CARDIAC REHAB W/EXERC: HCPCS

## 2021-10-13 PROCEDURE — G0423 INTENS CARDIAC REHAB NO EXER: HCPCS

## 2021-10-13 NOTE — PROGRESS NOTES
[address] [place] [address]  Cardiac Rehabilitation Department    Mariana KIM.:  1945    MRN:  [id number]    Date: 10/13/2021      Session Length:  54 min   Session # 1    EXERCISE WORKSHOP:  Heart Disease & Risk Reduction                      The purpose of this lesson is to provide a high-level overview of the heart, heart disease, and how the Pritikin lifestyle positively impacts risk factors. At the conclusion of this workshop, Judie Benoit patients will understand the anatomy and physiology of the heart. Additionally, they will understand how [employer]s three pillars impact the risk factors, the progression, and the management of heart disease. Readiness to change:    ( ) Pre-contemplative   ( ) Contemplative - ambivalent about change    (x) Action - ready to set action plan and implement   ( ) Maintenance - has made change and is trying, and or practicing different alternative behaviors     Additional Notes:      Leslie King was in the Workshop with the RN for 55 minutes. The content was presented via Powerpoint, lecture, and patient participation based format. Motivational interviewing was utilized when needed, to promote change. Educational materials reviewed and offered. Patient voiced understanding.     Electronically signed by Jossie Fine RN on 10/13/2021 at 12:02 PM

## 2021-10-15 ENCOUNTER — HOSPITAL ENCOUNTER (OUTPATIENT)
Dept: CARDIAC REHAB | Age: 76
Setting detail: THERAPIES SERIES
Discharge: HOME | End: 2021-10-15
Payer: MEDICARE

## 2021-10-15 PROCEDURE — G0423 INTENS CARDIAC REHAB NO EXER: HCPCS

## 2021-10-15 PROCEDURE — G0422 INTENS CARDIAC REHAB W/EXERC: HCPCS

## 2021-10-15 NOTE — PROGRESS NOTES
Fritz KIM.:  1945  Acct Number: [de-identified]  MRN:  [id number]                [facility] COOKING SCHOOL WORKSHOP             Date: 10/15/2021        Session # 1   Todays class covered:      () Adding Flavor     () Fast Breakfasts     () Salads and Dressings     () Soups and Sauces     () Simple Sides     () Appetizers and Snacks     () Delicious Desserts     () Plant Based Proteins     () Fast Evening Meals     () Weekend Breakfasts     () Efficiency Cooking     (x) [employer]     Patients were shown how to choose, prep, and cook; substitutions and other options were given. Samples were offered. Recipes were given and questions answered. The patient above was in the [employer] for 60 minutes.       Electronically signed by Charmaine Villalpando RD, LD on 10/15/2021 at 11:18 AM

## 2021-10-18 ENCOUNTER — HOSPITAL ENCOUNTER (OUTPATIENT)
Dept: CARDIAC REHAB | Age: 76
Setting detail: THERAPIES SERIES
Discharge: HOME | End: 2021-10-18
Payer: MEDICARE

## 2021-10-18 PROCEDURE — G0423 INTENS CARDIAC REHAB NO EXER: HCPCS

## 2021-10-18 PROCEDURE — G0422 INTENS CARDIAC REHAB W/EXERC: HCPCS

## 2021-10-18 NOTE — PROGRESS NOTES
Video Education Report - ICR/CR    Name:  Andra Peña     Date:  10/18/2021  MRN: [id number]     Session #:  2  Session Length: 45 min    Recommended Videos        []01 [employer] - Program Overview   34:22    [x]02 Overview of [employer]rit[employer]in Eating Plan   34:10    []03 Becoming a Syble Nigh   33:08     []04 Diseases of Our Time - Part 1   34:22    []05 Calorie Density     33:39   []06 Label Reading - Part 1    32:15   []07 Move it      32.54   []08 Healthy Minds, Bodies, Hearts   32:14   []09 Dining Out - Part 1    32:28   []10 Heart Disease Risk Reduction   07:10   []24 Metabolic Syndrome and Belly Fat  31:52   []12 Facts on Fat     35:29   []13 Diseases of Our Time - Part 2   33:07   []14 Biology of Weight Control   32:36   []15 Biomechanical Limitations   35:20   []16 Nutrition Action Plan    34:23    Additional Videos         []17 Hypertension & Heart Disease   32:39        []18 Cooking Breakfasts and Snacks  32:00   []19 Planning Your Eating Strategy   33:30   []20 Label Reading - Part 2    32:36  []21 Cooking Soups and Desserts   31:41  []22 How Our Thoughts Can Heal Our Hearts 33:05  []23 Targeting Your Nutrition Priorities  33:58  []24 Healthy Salads & Dressings   35:32  []25 Dining Out - Part 2    32:35  []26 Cooking Dinner and Sides   35:06  []27 Sleep Disorders     33:14  []28 Menu Workshop     32:06  []29 Decoding Lab Results    32:42     []30 Vitamins and Minerals    32:54  []31 Exercise Action Plan    32:26  []32 Body Composition    34:03  []33 Improving Performance    32:13  []34 Fueling a Healthy Body    31:32  []35 Introduction to Yoga    33:47  []36 Aging-Enhancing the Quality of Your Life 33:22  []37 Smoking Cessation    36:19    Comments:  Video completed.

## 2021-10-20 ENCOUNTER — HOSPITAL ENCOUNTER (OUTPATIENT)
Dept: CARDIAC REHAB | Age: 76
Setting detail: THERAPIES SERIES
Discharge: HOME | End: 2021-10-20
Payer: MEDICARE

## 2021-10-20 PROCEDURE — G0422 INTENS CARDIAC REHAB W/EXERC: HCPCS

## 2021-10-20 PROCEDURE — G0423 INTENS CARDIAC REHAB NO EXER: HCPCS

## 2021-10-20 NOTE — PROGRESS NOTES
Colleen KIM.:  1945    Acct Number: [de-identified]   MRN:  [id number]                         [facility] NUTRITION WORKSHOP             Date: 10/20/2021        Session # 1    Todays class covered:    ()  Fueling a Healthy Body  ()  Label Reading  ()  Menu Selection  ()  Mindful Eating  (x)  Targeting Nutrition Priorities    Readiness to change:    ( ) Pre-contemplative   ( x) Contemplative - ambivalent about change    ( ) Action - ready to set action plan and implement   ( ) Maintenance - has made change and is trying, and or practicing different alternative behaviors     Notes:  Pt was engaged during lecture and activity. Contributed to group discussion. Susan Farrell was in the Workshop with the Dietitian for 60 minutes. The content was presented via Powerpoint, lecture, and patient participation based format. Motivational interviewing was utilized when needed, to promote change. Patient voiced understanding.     Electronically signed by Sharon Vizcaino RD, LD on 10/20/2021 at 11:29 AM

## 2021-10-22 ENCOUNTER — HOSPITAL ENCOUNTER (OUTPATIENT)
Dept: CARDIAC REHAB | Age: 76
Setting detail: THERAPIES SERIES
Discharge: HOME | End: 2021-10-22
Payer: MEDICARE

## 2021-10-22 PROCEDURE — G0423 INTENS CARDIAC REHAB NO EXER: HCPCS

## 2021-10-22 PROCEDURE — G0422 INTENS CARDIAC REHAB W/EXERC: HCPCS

## 2021-10-22 NOTE — PROGRESS NOTES
Alisha KIM.:  1945  Acct Number: [de-identified]  MRN:  [id number]                [facility] COOKING SCHOOL WORKSHOP             Date: 10/22/2021        Session #  2  Todays class covered:      (x) Adding Flavor     () Fast Breakfasts     () Salads and Dressings     () Soups and Sauces     () Simple Sides     () Appetizers and Snacks     () Delicious Desserts     () Plant Based Proteins     () Fast Evening Meals     () Weekend Breakfasts     () Efficiency Cooking     () [facility]     Patients were shown how to choose, prep, and cook; substitutions and other options were given. Samples were offered. Recipes were given and questions answered. The patient above was in the [employer] for 60 minutes.       Electronically signed by Idalia Sanchez RD, DEVYN on 10/22/2021 at 11:22 AM

## 2021-10-25 ENCOUNTER — HOSPITAL ENCOUNTER (OUTPATIENT)
Dept: CARDIAC REHAB | Age: 76
Setting detail: THERAPIES SERIES
Discharge: HOME | End: 2021-10-25
Payer: MEDICARE

## 2021-10-25 PROCEDURE — G0423 INTENS CARDIAC REHAB NO EXER: HCPCS

## 2021-10-25 PROCEDURE — G0422 INTENS CARDIAC REHAB W/EXERC: HCPCS

## 2021-10-27 ENCOUNTER — HOSPITAL ENCOUNTER (OUTPATIENT)
Dept: CARDIAC REHAB | Age: 76
Setting detail: THERAPIES SERIES
Discharge: HOME | End: 2021-10-27
Payer: MEDICARE

## 2021-10-27 PROCEDURE — G0423 INTENS CARDIAC REHAB NO EXER: HCPCS

## 2021-10-27 PROCEDURE — G0422 INTENS CARDIAC REHAB W/EXERC: HCPCS

## 2021-10-27 NOTE — CONSULTS
Pedro KIM.:  1945    Acct Number: [de-identified]   MRN:  [id number]                          [facility] HEALTHY MIND-SET WORKSHOP             Date: 10/27/2021        Session #:    Todays class covered:    ( x )  Stress and Health Workshop:  [facility] patient will learn about the body's adaptive response that is triggered by a variety of stressors. Patient will gain insight into the toll that chronic stress takes on their health, both emotionally and physically. ( ) Taking Charge of Stress Workshop:  [facility] patient will learn and practice a variety of stress management techniques. Patient will be able to effectively apply coping mechanisms in perceived stressful situations. ( ) New Thoughts New Behaviors Workshop: [facility] patient will learn and practice techniques for developing effective health and lifestyle goals. Patient will be able to effectively apply the goal setting process learned to develop at least one new personal goal.     ( ) Managing Moods & Relationships Workshop:  [facility] patient will learn how emotional and chronic stress factors can impact their hearts. They will learn healthy ways to handle stress and utilize positive coping mechanisms. In addition, [facility] patient will learn ways to improve communication skills. Chucho Brooks actively participated and verbalized understanding. Total time in the Healthy Mind-Set class was 50 minutes.     Electronically signed by Karol Reyes PTA on 10/27/2021 at 11:42 AM

## 2021-10-28 DIAGNOSIS — E03.9 HYPOTHYROIDISM, UNSPECIFIED TYPE: ICD-10-CM

## 2021-10-28 DIAGNOSIS — N40.1 BPH WITH OBSTRUCTION/LOWER URINARY TRACT SYMPTOMS: ICD-10-CM

## 2021-10-28 DIAGNOSIS — N13.8 BPH WITH OBSTRUCTION/LOWER URINARY TRACT SYMPTOMS: ICD-10-CM

## 2021-10-28 RX ORDER — TAMSULOSIN HYDROCHLORIDE 0.4 MG/1
CAPSULE ORAL
Qty: 180 CAPSULE | Refills: 1 | Status: SHIPPED | OUTPATIENT
Start: 2021-10-28 | End: 2022-07-11

## 2021-10-28 RX ORDER — LEVOTHYROXINE SODIUM 0.05 MG/1
TABLET ORAL
Qty: 90 TABLET | Refills: 1 | Status: SHIPPED | OUTPATIENT
Start: 2021-10-28 | End: 2022-04-25

## 2021-10-28 NOTE — TELEPHONE ENCOUNTER
----- Message from Won Abdi sent at 10/28/2021 11:37 AM EDT -----  Subject: Message to Provider    QUESTIONS  Information for Provider? Patient would like to see Dr. Masoud kelley for   skin check. Please call back with appnt.   ---------------------------------------------------------------------------  --------------  CALL BACK INFO  What is the best way for the office to contact you? OK to leave message on   voicemail  Preferred Call Back Phone Number? [phone]  ---------------------------------------------------------------------------  --------------  SCRIPT ANSWERS  Relationship to Patient?  Self

## 2021-10-29 ENCOUNTER — HOSPITAL ENCOUNTER (OUTPATIENT)
Dept: CARDIAC REHAB | Age: 76
Setting detail: THERAPIES SERIES
Discharge: HOME | End: 2021-10-29
Payer: MEDICARE

## 2021-10-29 PROCEDURE — G0422 INTENS CARDIAC REHAB W/EXERC: HCPCS

## 2021-10-29 PROCEDURE — G0423 INTENS CARDIAC REHAB NO EXER: HCPCS

## 2021-10-29 NOTE — PROGRESS NOTES
Torri KIM.:  1945  Acct Number: [de-identified]  MRN:  [id number]                [facility] COOKING SCHOOL WORKSHOP             Date: 10/29/2021        Session #  3  Todays class covered:      () Adding Flavor     (x) Fast Breakfasts     () Salads and Dressings     () Soups and Sauces     () Simple Sides     () Appetizers and Snacks     () Delicious Desserts     () Plant Based Proteins     () Fast Evening Meals     () Weekend Breakfasts     () Efficiency Cooking     () One [facility]     Patients were shown how to choose, prep, and cook; substitutions and other options were given. Samples were offered. Recipes were given and questions answered. The patient above was in the [employer] for 60 minutes.       Electronically signed by Valdo García RD, LD on 10/29/2021 at 11:30 AM

## 2021-11-01 ENCOUNTER — HOSPITAL ENCOUNTER (OUTPATIENT)
Dept: CARDIAC REHAB | Age: 76
Setting detail: THERAPIES SERIES
Discharge: HOME | End: 2021-11-01
Payer: MEDICARE

## 2021-11-01 PROCEDURE — G0423 INTENS CARDIAC REHAB NO EXER: HCPCS

## 2021-11-01 PROCEDURE — G0422 INTENS CARDIAC REHAB W/EXERC: HCPCS

## 2021-11-01 NOTE — PROGRESS NOTES
Video Education Report - ICR/CR    Name:  Melanie Luna     Date:  11/1/2021  MRN: [id number]     Session #:  4  Session Length: 45 min    Recommended Videos        []01 [employer] - Program Overview   34:22    []02 Overview of Pritikin Eating Plan   34:10    []03 Becoming a [employer]   33:08     [x]04 Diseases of Our Time - Part 1   34:22    []05 Calorie Density     33:39   []06 Label Reading - Part 1    32:15   []07 Move it      32.54   []08 Healthy Minds, Bodies, Hearts   32:14   []09 Dining Out - Part 1    32:28   []10 Heart Disease Risk Reduction   63:14   []10 Metabolic Syndrome and Belly Fat  31:52   []12 Facts on Fat     35:29   []13 Diseases of Our Time - Part 2   33:07   []14 Biology of Weight Control   32:36   []15 Biomechanical Limitations   35:20   []16 Nutrition Action Plan    34:23    Additional Videos         []17 Hypertension & Heart Disease   32:39        []18 Cooking Breakfasts and Snacks  32:00   []19 Planning Your Eating Strategy   33:30   []20 Label Reading - Part 2    32:36  []21 Cooking Soups and Desserts   31:41  []22 How Our Thoughts Can Heal Our Hearts 33:05  []23 Targeting Your Nutrition Priorities  33:58  []24 Healthy Salads & Dressings   35:32  []25 Dining Out - Part 2    32:35  []26 Cooking Dinner and Sides   35:06  []27 Sleep Disorders     33:14  []28 Menu Workshop     32:06  []29 Decoding Lab Results    32:42     []30 Vitamins and Minerals    32:54  []31 Exercise Action Plan    32:26  []32 Body Composition    34:03  []33 Improving Performance    32:13  []34 Fueling a Healthy Body    31:32  []35 Introduction to Yoga    33:47  []36 Aging-Enhancing the Quality of Your Life 33:22  []37 Smoking Cessation    36:19    Comments:  Video completed.

## 2021-11-03 ENCOUNTER — HOSPITAL ENCOUNTER (OUTPATIENT)
Dept: CARDIAC REHAB | Age: 76
Setting detail: THERAPIES SERIES
Discharge: HOME | End: 2021-11-03
Payer: MEDICARE

## 2021-11-03 PROCEDURE — G0422 INTENS CARDIAC REHAB W/EXERC: HCPCS

## 2021-11-03 PROCEDURE — G0423 INTENS CARDIAC REHAB NO EXER: HCPCS

## 2021-11-03 NOTE — PROGRESS NOTES
[facility]  Cardiac Rehabilitation Department    Dell KIM.:  1945    MRN:  [id number]    Date: 11/3/2021      Session Length:  45 min   Session # 1    EXERCISE WORKSHOP:  Understanding the Benefits of Exercise                      The purpose of today's class is to promote a comprehensive and effective weekly exercise routine in order to improve ICR patients overall level of fitness. At the conclusion of this workshop, [facility] patients will understand the benefits of incorporating physical activity and regular exercise into their weekly routines. Patients will understand the FITT (Frequency, Intensity, Time, and Type) principle and how this principle impacts their fitness levels. In addition, safety concerns and other considerations for exercise and cardiac rehab will be addressed by the presenter. Readiness to change:    ( ) Pre-contemplative   ( ) Contemplative - ambivalent about change    (x) Action - ready to set action plan and implement   ( ) Maintenance - has made change and is trying, and or practicing different alternative behaviors     Additional Notes:  Patient was engaged and contributed to the conversation     Kunal Del Castillo was in the Workshop with the Exercise Specialist for 45 minutes. The content was presented via Powerpoint, lecture, and patient participation based format. Motivational interviewing was utilized when needed, to promote change. Patient voiced understanding.     Electronically signed by Anuel Lazo on 11/3/2021 at 12:04 PM

## 2021-11-05 ENCOUNTER — HOSPITAL ENCOUNTER (OUTPATIENT)
Dept: CARDIAC REHAB | Age: 76
Setting detail: THERAPIES SERIES
Discharge: HOME | End: 2021-11-05
Payer: MEDICARE

## 2021-11-05 PROCEDURE — G0422 INTENS CARDIAC REHAB W/EXERC: HCPCS

## 2021-11-05 PROCEDURE — G0423 INTENS CARDIAC REHAB NO EXER: HCPCS

## 2021-11-05 NOTE — PROGRESS NOTES
Claudio KIM.:  1945  Acct Number: [de-identified]  MRN:  [id number]                [facility] COOKING SCHOOL WORKSHOP             Date: 2021        Session #  4  Todays class covered:      () Adding Flavor     () Fast Breakfasts     (x) Salads and Dressings     () Soups and Sauces     () Simple Sides     () Appetizers and Snacks     () Delicious Desserts     () Plant Based Proteins     () Fast Evening Meals     () Weekend Breakfasts     () Efficiency Cooking     () One Isaías Tanner     Patients were shown how to choose, prep, and cook; substitutions and other options were given. Samples were offered. Recipes were given and questions answered. The patient above was in the [employer] for 60 minutes.       Electronically signed by Yossi Turner RD, DEVYN on 2021 at 11:40 AM

## 2021-11-08 ENCOUNTER — HOSPITAL ENCOUNTER (OUTPATIENT)
Dept: CARDIAC REHAB | Age: 76
Setting detail: THERAPIES SERIES
Discharge: HOME | End: 2021-11-08
Payer: MEDICARE

## 2021-11-08 PROCEDURE — G0423 INTENS CARDIAC REHAB NO EXER: HCPCS

## 2021-11-08 PROCEDURE — G0422 INTENS CARDIAC REHAB W/EXERC: HCPCS

## 2021-11-08 NOTE — PROGRESS NOTES
Video Education Report - ICR/CR    Name:  Charo Gomez     Date:  11/8/2021  MRN: [id number]     Session #:  5  Session Length: 45 min    Recommended Videos        []01 Pritikin [employer] - Program Overview   34:22    []02 Overview of Pritikin Eating Plan             34:10    []03 Becoming a Dora Sheyenne   33:08     []04 Diseases of Our Time - Part 1   34:22    []05 Calorie Density     33:39   []06 Label Reading - Part 1    32:15   []07 Move it      32.54   []08 Healthy Minds, Bodies, Hearts   32:14   []09 Dining Out - Part 1    32:28   []10 Heart Disease Risk Reduction   71:19   []44 Metabolic Syndrome and Belly Fat  31:52   []12 Facts on Fat     35:29   [x]13 Diseases of Our Time - Part 2   33:07   []14 Biology of Weight Control   32:36   []15 Biomechanical Limitations   35:20   []16 Nutrition Action Plan    34:23    Additional Videos         []17 Hypertension & Heart Disease   32:39        []18 Cooking Breakfasts and Snacks  32:00   []19 Planning Your Eating Strategy   33:30   []20 Label Reading - Part 2    32:36  []21 Cooking Soups and Desserts   31:41  []22 How Our Thoughts Can Heal Our Hearts 33:05  []23 Targeting Your Nutrition Priorities  33:58  []24 Healthy Salads & Dressings   35:32  []25 Dining Out - Part 2    32:35  []26 Cooking Dinner and Sides   35:06  []27 Sleep Disorders     33:14  []28 Menu Workshop     32:06  []29 Decoding Lab Results    32:42     []30 Vitamins and Minerals    32:54  []31 Exercise Action Plan    32:26  []32 Body Composition    34:03  []33 Improving Performance    32:13  []34 Fueling a Healthy Body    31:32  []35 Introduction to Yoga    33:47  []36 Aging-Enhancing the Quality of Your Life 33:22  []37 Smoking Cessation    36:19    Comments:  Video completed.

## 2021-11-10 ENCOUNTER — HOSPITAL ENCOUNTER (OUTPATIENT)
Dept: CARDIAC REHAB | Age: 76
Setting detail: THERAPIES SERIES
Discharge: HOME | End: 2021-11-10
Payer: MEDICARE

## 2021-11-10 PROCEDURE — G0423 INTENS CARDIAC REHAB NO EXER: HCPCS

## 2021-11-10 PROCEDURE — G0422 INTENS CARDIAC REHAB W/EXERC: HCPCS

## 2021-11-10 NOTE — PROGRESS NOTES
Zari KIM.:  1945    Acct Number: [de-identified]   MRN:  [id number]                         [facility] NUTRITION WORKSHOP             Date: 11/10/2021        Session # 2    Todays class covered:    ()  Fueling a Healthy Body  (x)  Label Reading  ()  Menu Selection  ()  Mindful Eating  ()  Targeting Nutrition Priorities    Readiness to change:    ( ) Pre-contemplative   (x ) Contemplative - ambivalent about change    ( ) Action - ready to set action plan and implement   ( ) Maintenance - has made change and is trying, and or practicing different alternative behaviors     Notes:  Pt engaged during lecture and activity. Contributed to group discussion. Geroge Bosworth was in the Workshop with the Dietitian for 60 minutes. The content was presented via Powerpoint, lecture, and patient participation based format. Motivational interviewing was utilized when needed, to promote change. Patient voiced understanding.     Electronically signed by Christine Lee RD, DEVYN on 11/10/2021 at 11:44 AM

## 2021-11-12 ENCOUNTER — HOSPITAL ENCOUNTER (OUTPATIENT)
Dept: CARDIAC REHAB | Age: 76
Setting detail: THERAPIES SERIES
Discharge: HOME | End: 2021-11-12
Payer: MEDICARE

## 2021-11-12 PROCEDURE — G0422 INTENS CARDIAC REHAB W/EXERC: HCPCS

## 2021-11-12 PROCEDURE — G0423 INTENS CARDIAC REHAB NO EXER: HCPCS

## 2021-11-12 NOTE — PROGRESS NOTES
Colleen ONEIL:  1945  Acct Number: [de-identified]  MRN:  [id number]                [facility] COOKING SCHOOL WORKSHOP             Date: 2021        Session #  5  Todays class covered:      () Adding Flavor     () Fast Breakfasts     () Salads and Dressings     (x) Soups and Sauces     () Simple Sides     () Appetizers and Snacks     () Delicious Desserts     () Plant Based Proteins     () Fast Evening Meals     () Weekend Breakfasts     () Efficiency Cooking     () One [facility]     Patients were shown how to choose, prep, and cook; substitutions and other options were given. Samples were offered. Recipes were given and questions answered. The patient above was in the [employer] for 60 minutes.       Electronically signed by Sharon Vizcaino RD, LD on 2021 at 11:49 AM

## 2021-11-15 ENCOUNTER — HOSPITAL ENCOUNTER (OUTPATIENT)
Dept: CARDIAC REHAB | Age: 76
Setting detail: THERAPIES SERIES
Discharge: HOME | End: 2021-11-15
Payer: MEDICARE

## 2021-11-15 PROCEDURE — G0422 INTENS CARDIAC REHAB W/EXERC: HCPCS

## 2021-11-15 PROCEDURE — G0423 INTENS CARDIAC REHAB NO EXER: HCPCS

## 2021-11-17 ENCOUNTER — HOSPITAL ENCOUNTER (OUTPATIENT)
Dept: CARDIAC REHAB | Age: 76
Setting detail: THERAPIES SERIES
Discharge: HOME | End: 2021-11-17
Payer: MEDICARE

## 2021-11-17 PROCEDURE — G0422 INTENS CARDIAC REHAB W/EXERC: HCPCS

## 2021-11-17 PROCEDURE — G0423 INTENS CARDIAC REHAB NO EXER: HCPCS

## 2021-11-17 NOTE — PROGRESS NOTES
[facility] [place]    Marcell Body     :  1945    MRN:  [id number]    Date: 2021      Session Length:  39 min   Session # 3    EXERCISE WORKSHOP:  Heart Disease & Risk Reduction                      The purpose of this lesson is to provide a high-level overview of the heart, heart disease, blood pressure, arrhythmias and heart failure. Stressed the importance of how the Pritikin lifestyle positively impacts risk factors. At the conclusion of this workshop, Jeremías Ramsey patients will understand the anatomy and physiology of the heart. Additionally, they will understand how Pritikins three pillars impact the risk factors, the progression, and the management of heart disease. Readiness to change:    ( ) Pre-contemplative   ( ) Contemplative - ambivalent about change    (x) Action - ready to set action plan and implement   ( ) Maintenance - has made change and is trying, and or practicing different alternative behaviors     Additional Notes:      Rogerio Light was in the Workshop with the RN for 45 minutes. The content was presented via Powerpoint, lecture, and patient participation based format. Motivational interviewing was utilized when needed, to promote change. Patient voiced understanding.     Electronically signed by Renu Montgomery RN on 2021 at 11:51 AM

## 2021-11-17 NOTE — PROGRESS NOTES
Andra KIM.:  1945    Acct Number: [de-identified]   MRN:  [id number]        [facility] NUTRITION 1:1 Counseling             Date:   21     Session # 1     Todays class covered:    1:1 Counseling Session  Receptiveness to education/goals:  (x ) Agreeable ( ) No Interest   ( ) Refused  Evaluation of education:  (x ) Indicates understanding   ( ) Needs reinforcement     () Unsuccessful     Readiness to change:    ( ) Pre-contemplative   (x ) Contemplative - ambivalent about change    ( ) Action - ready to set action plan and implement   ( ) Maintenance - has made change and is trying, and or practicing different alternative behaviors     GOALS:  1. Improve heart health  2. Drink more water    Gabrielle Damian reports making some changes since starting [facility] including those listed below. Food Recall:  Breakfast:  Wants to eat more oatmeal. 1/2 English muffin with slice of robb summer cheese and hard boiled egg. Sometimes dry cereal.  Lunch:  Varies. If out of the house, will stop somewhere for lunch. Gets a salad when eats out. Sometimes eats salad when at home. Dinner:  Paula Cerda. Chicken from [address] Snacks: meat and cheese roll-up with 2 slices deli turkey, 2 slices cheese  Dessert: Sometimes Michael's peanut butter cup. Mixed berries with Monk Fruit sweetener and whipped cream.  Main Beverages:  2 cups of coffee with heavy whipping cream, decaf coffee with heavy whipping cream in evening, water, 2% milk with each meal    Grocery Shopping: wife and pt  Meal Prep/Cooking: pt and son  Dining Out: Subway salads, [place] rarely, Mannie's pecan chicken salad with vinaigrette, [employer]ube salads with vinaigrette    Likes to eat 2 meals per day to maintain weight. Pt limited by time so doesn't cook as often as would like to but tries to increase veggie consumption during the day, so chooses salads when goes out to eat. Starting to read the labels for sodium.  Pt recently bought low sodium broth. Encouraged pt to also look for reduced sodium deli meats and low fat cheeses. Pt does cut the fat off of steaks. Pt states wishes to drink more water and will try to make an effort to increase this. Has switched from ranch to vinaigrette on salads. Doesn't eat much whole grains. Wanting to try to eat more oatmeal. Doesn't eat yogurt. Chooses 2% milk which is down from whole milk. Is not interested at this time in choosing 1% milk. Provided pt with guidance of how to wean down to 1% milk if desires in the future. Overall, pt making good progress with plans to increase whole grains, veggies, and water consumption as well as starting to lower sodium consumption. Pt very interested in the sample meal plans in the binder and plans to make some of these meals in the future. Juan Alberto Sheppard was counseled by the Dietitian for 60 minutes. Motivational Interviewing was used to help promote change. Patient voiced understanding. Provided pt with pt binder.     Electronically signed by Marjan Pham RD, LD on 11/17/2021 at 11:05 AM

## 2021-11-19 ENCOUNTER — HOSPITAL ENCOUNTER (OUTPATIENT)
Dept: CARDIAC REHAB | Age: 76
Setting detail: THERAPIES SERIES
Discharge: HOME | End: 2021-11-19
Payer: MEDICARE

## 2021-11-19 PROCEDURE — G0423 INTENS CARDIAC REHAB NO EXER: HCPCS

## 2021-11-19 PROCEDURE — G0422 INTENS CARDIAC REHAB W/EXERC: HCPCS

## 2021-11-19 NOTE — PROGRESS NOTES
Michaela KIM.:  1945  Acct Number: [de-identified]  MRN:  [id number]                [facility] COOKING SCHOOL WORKSHOP             Date: 2021        Session #  6  Todays class covered:      () Adding Flavor     () Fast Breakfasts     () Salads and Dressings     () Soups and Sauces     (x) Simple Sides     () Appetizers and Snacks     () Delicious Desserts     () Plant Based Proteins     () Fast Evening Meals     () Weekend Breakfasts     () Efficiency Cooking     () One [facility]     Patients were shown how to choose, prep, and cook; substitutions and other options were given. Samples were offered. Recipes were given and questions answered. The patient above was in the [employer] for 60 minutes.       Electronically signed by Ning Coppola RD, LD on 2021 at 11:36 AM

## 2021-11-22 ENCOUNTER — HOSPITAL ENCOUNTER (OUTPATIENT)
Dept: CARDIAC REHAB | Age: 76
Setting detail: THERAPIES SERIES
Discharge: HOME | End: 2021-11-22
Payer: MEDICARE

## 2021-11-22 PROCEDURE — G0423 INTENS CARDIAC REHAB NO EXER: HCPCS

## 2021-11-22 PROCEDURE — G0422 INTENS CARDIAC REHAB W/EXERC: HCPCS

## 2021-11-22 NOTE — PROGRESS NOTES
Video Education Report - ICR/CR    Name:  Ni Chaney     Date:  11/22/2021  MRN: [id number]     Session #:  7  Session Length: 45 min    Recommended Videos        []01 Prit[employer]in [employer] - Program Overview   34:22    []02 Overview of Pritikin Eating Plan             34:10    []03 Becoming a Mercedes Kam   33:08     []04 Diseases of Our Time - Part 1   34:22    []05 Calorie Density     33:39   []06 Label Reading - Part 1    32:15   []07 Move it      32.54   []08 Healthy Minds, Bodies, Hearts   32:14   []09 Dining Out - Part 1    32:28   []10 Heart Disease Risk Reduction   68:61   [D]05 Metabolic Syndrome and Belly Fat  31:52   []12 Facts on Fat     35:29   []13 Diseases of Our Time - Part 2   33:07   []14 Biology of Weight Control   32:36   []15 Biomechanical Limitations   35:20   []16 Nutrition Action Plan    34:23    Additional Videos         []17 Hypertension & Heart Disease   32:39        []18 Cooking Breakfasts and Snacks  32:00   []19 Planning Your Eating Strategy   33:30   []20 Label Reading - Part 2    32:36  []21 Cooking Soups and Desserts   31:41  []22 How Our Thoughts Can Heal Our Hearts 33:05  []23 Targeting Your Nutrition Priorities  33:58  []24 Healthy Salads & Dressings   35:32  []25 Dining Out - Part 2    32:35  []26 Cooking Dinner and Sides   35:06  []27 Sleep Disorders     33:14  []28 Menu Workshop     32:06  []29 Decoding Lab Results    32:42     []30 Vitamins and Minerals    32:54  []31 Exercise Action Plan    32:26  []32 Body Composition    34:03  []33 Improving Performance    32:13  []34 Fueling a Healthy Body    31:32  []35 Introduction to Yoga    33:47  []36 Aging-Enhancing the Quality of Your Life 33:22  []37 Smoking Cessation    36:19    Comments:  Video completed.

## 2021-11-24 ENCOUNTER — HOSPITAL ENCOUNTER (OUTPATIENT)
Dept: CARDIAC REHAB | Age: 76
Setting detail: THERAPIES SERIES
Discharge: HOME | End: 2021-11-24
Payer: MEDICARE

## 2021-11-24 PROCEDURE — G0423 INTENS CARDIAC REHAB NO EXER: HCPCS

## 2021-11-24 PROCEDURE — G0422 INTENS CARDIAC REHAB W/EXERC: HCPCS

## 2021-11-24 NOTE — PROGRESS NOTES
Fritz KIM.:  1945    Acct Number: [de-identified]   MRN:  [id number]                          [facility] HEALTHY MIND-SET WORKSHOP             Date: 2021        Session #: 2    Todays class covered:    ( )  Stress and Health Workshop:  [facility] patient will learn about the body's adaptive response that is triggered by a variety of stressors. Patient will gain insight into the toll that chronic stress takes on their health, both emotionally and physically. ( x) Taking Charge of Stress Workshop:  [facility] patient will learn and practice a variety of stress management techniques. Patient will be able to effectively apply coping mechanisms in perceived stressful situations. ( ) New Thoughts New Behaviors Workshop: [facility] patient will learn and practice techniques for developing effective health and lifestyle goals. Patient will be able to effectively apply the goal setting process learned to develop at least one new personal goal.     ( ) Managing Moods & Relationships Workshop:  [facility] patient will learn how emotional and chronic stress factors can impact their hearts. They will learn healthy ways to handle stress and utilize positive coping mechanisms. In addition, [facility] patient will learn ways to improve communication skills. Leidy Guzman actively participated and verbalized understanding. Total time in the Healthy Mind-Set class was 60 minutes.     Electronically signed by Charmaine Villalpando RD, LD on 2021 at 11:24 AM

## 2021-11-26 ENCOUNTER — HOSPITAL ENCOUNTER (OUTPATIENT)
Dept: CARDIAC REHAB | Age: 76
Setting detail: THERAPIES SERIES
Discharge: HOME | End: 2021-11-26
Payer: MEDICARE

## 2021-11-26 PROCEDURE — G0423 INTENS CARDIAC REHAB NO EXER: HCPCS

## 2021-11-26 PROCEDURE — G0422 INTENS CARDIAC REHAB W/EXERC: HCPCS

## 2021-11-26 NOTE — PROGRESS NOTES
Lizette KIM.:  1945  Acct Number: [de-identified]  MRN:  [id number]                [facility] COOKING SCHOOL WORKSHOP             Date: 2021        Session #  7  Todays class covered:      () Adding Flavor     () Fast Breakfasts     () Salads and Dressings     () Soups and Sauces     () Simple Sides     (x) Appetizers and Snacks     () Delicious Desserts     () Plant Based Proteins     () Fast Evening Meals     () Weekend Breakfasts     () Efficiency Cooking     () [facility]     Patients were shown how to choose, prep, and cook; substitutions and other options were given. Samples were offered. Recipes were given and questions answered. The patient above was in the [employer] for 60 minutes.       Electronically signed by Brandon Parikh RD, LD on 2021 at 11:44 AM

## 2021-11-29 ENCOUNTER — HOSPITAL ENCOUNTER (OUTPATIENT)
Dept: CARDIAC REHAB | Age: 76
Setting detail: THERAPIES SERIES
Discharge: HOME | End: 2021-11-29
Payer: MEDICARE

## 2021-11-29 PROCEDURE — G0423 INTENS CARDIAC REHAB NO EXER: HCPCS

## 2021-11-29 PROCEDURE — G0422 INTENS CARDIAC REHAB W/EXERC: HCPCS

## 2021-11-29 NOTE — PROGRESS NOTES
Video Education Report - ICR/CR    Name:  Dewayne Sosa     Date:  11/29/2021  MRN: [id number]     Session #:  8  Session Length: 45 min    Recommended Videos        []01 Pritikin Solutions - Program Overview   34:22    []02 Overview of Pritikin Eating Plan             34:10    []03 Becoming a Gary Alexi   33:08     []04 Diseases of Our Time - Part 1   34:22    []05 Calorie Density     33:39   []06 Label Reading - Part 1    32:15   []07 Move it      32.54   []08 Healthy Minds, Bodies, Hearts   32:14   []09 Dining Out - Part 1    32:28   []10 Heart Disease Risk Reduction   15:27   []05 Metabolic Syndrome and Belly Fat  31:52   []12 Facts on Fat     35:29   []13 Diseases of Our Time - Part 2   33:07   []14 Biology of Weight Control   32:36   []15 Biomechanical Limitations   35:20   []16 Nutrition Action Plan    34:23    Additional Videos         [x]17 Hypertension & Heart Disease   32:39        []18 Cooking Breakfasts and Snacks  32:00   []19 Planning Your Eating Strategy   33:30   []20 Label Reading - Part 2    32:36  []21 Cooking Soups and Desserts   31:41  []22 How Our Thoughts Can Heal Our Hearts 33:05  []23 Targeting Your Nutrition Priorities  33:58  []24 Healthy Salads & Dressings   35:32  []25 Dining Out - Part 2    32:35  []26 Cooking Dinner and Sides   35:06  []27 Sleep Disorders     33:14  []28 Menu Workshop     32:06  []29 Decoding Lab Results    32:42     []30 Vitamins and Minerals    32:54  []31 Exercise Action Plan    32:26  []32 Body Composition    34:03  []33 Improving Performance    32:13  []34 Fueling a Healthy Body    31:32  []35 Introduction to Yoga    33:47  []36 Aging-Enhancing the Quality of Your Life 33:22  []37 Smoking Cessation    36:19    Comments:  Video completed.

## 2021-12-01 ENCOUNTER — HOSPITAL ENCOUNTER (OUTPATIENT)
Dept: CARDIAC REHAB | Age: 76
Setting detail: THERAPIES SERIES
Discharge: HOME | End: 2021-12-01
Payer: MEDICARE

## 2021-12-01 PROCEDURE — G0423 INTENS CARDIAC REHAB NO EXER: HCPCS

## 2021-12-01 PROCEDURE — G0422 INTENS CARDIAC REHAB W/EXERC: HCPCS

## 2021-12-01 NOTE — PROGRESS NOTES
Maria Luz Ramirez YOB: 1945    Acct Number: [de-identified]   MRN:  [id number]                         [facility] NUTRITION WORKSHOP             Date: 2021        Session # 3    Todays class covered:    ()  Fueling a Healthy Body  ()  Label Reading  (x)  Menu Selection  ()  Mindful Eating  ()  Targeting Nutrition Priorities    Readiness to change:    ( ) Pre-contemplative   (x ) Contemplative - ambivalent about change    ( ) Action - ready to set action plan and implement   ( ) Maintenance - has made change and is trying, and or practicing different alternative behaviors     Notes:  Pt was engaged during lecture and activity. Contributed to group discussion. Johanna Huddleston was in the Workshop with the Dietitian for 60 minutes. The content was presented via Powerpoint, lecture, and patient participation based format. Motivational interviewing was utilized when needed, to promote change. Patient voiced understanding.     Electronically signed by Pamela Cárdenas RD, DEVYN on 2021 at 12:25 PM

## 2021-12-03 ENCOUNTER — HOSPITAL ENCOUNTER (OUTPATIENT)
Dept: CARDIAC REHAB | Age: 76
Setting detail: THERAPIES SERIES
Discharge: HOME | End: 2021-12-03
Payer: MEDICARE

## 2021-12-03 PROCEDURE — G0423 INTENS CARDIAC REHAB NO EXER: HCPCS

## 2021-12-03 PROCEDURE — G0422 INTENS CARDIAC REHAB W/EXERC: HCPCS

## 2021-12-03 NOTE — PROGRESS NOTES
Katerine Patterson YOB: 1945  Acct Number: [de-identified]  MRN:  [id number]                [facility] COOKING SCHOOL WORKSHOP             Date: 12/3/2021        Session #  8  Todays class covered:      () Adding Flavor     () Fast Breakfasts     () Salads and Dressings     () Soups and Sauces     () Simple Sides     () Appetizers and Snacks     (x) Delicious Desserts     () Plant Based Proteins     () Fast Evening Meals     () Weekend Breakfasts     () Efficiency Cooking     () One [facility]     Patients were shown how to choose, prep, and cook; substitutions and other options were given. Samples were offered. Recipes were given and questions answered. The patient above was in the [employer] for 60 minutes.       Electronically signed by Marjan Pham RD, DEVYN on 12/3/2021 at 3:32 PM

## 2021-12-06 ENCOUNTER — HOSPITAL ENCOUNTER (OUTPATIENT)
Dept: CARDIAC REHAB | Age: 76
Setting detail: THERAPIES SERIES
Discharge: HOME | End: 2021-12-06
Payer: MEDICARE

## 2021-12-06 PROCEDURE — G0422 INTENS CARDIAC REHAB W/EXERC: HCPCS

## 2021-12-06 PROCEDURE — G0423 INTENS CARDIAC REHAB NO EXER: HCPCS

## 2021-12-08 ENCOUNTER — HOSPITAL ENCOUNTER (OUTPATIENT)
Dept: CARDIAC REHAB | Age: 76
Setting detail: THERAPIES SERIES
Discharge: HOME | End: 2021-12-08
Payer: MEDICARE

## 2021-12-08 PROCEDURE — G0423 INTENS CARDIAC REHAB NO EXER: HCPCS

## 2021-12-08 PROCEDURE — G0422 INTENS CARDIAC REHAB W/EXERC: HCPCS

## 2021-12-08 NOTE — PROGRESS NOTES
Fritz KIM.:  1945    Acct Number: [de-identified]   MRN:  [id number]                          [facility] HEALTHY MIND-SET WORKSHOP             Date: 2021        Session #: 3    Todays class covered:    ( )  Stress and Health Workshop:  [facility] patient will learn about the body's adaptive response that is triggered by a variety of stressors. Patient will gain insight into the toll that chronic stress takes on their health, both emotionally and physically. ( ) Taking Charge of Stress Workshop:  [facility] patient will learn and practice a variety of stress management techniques. Patient will be able to effectively apply coping mechanisms in perceived stressful situations. (x ) New Thoughts New Behaviors Workshop: [facility] patient will learn and practice techniques for developing effective health and lifestyle goals. Patient will be able to effectively apply the goal setting process learned to develop at least one new personal goal.     ( ) Managing Moods & Relationships Workshop:  [facility] patient will learn how emotional and chronic stress factors can impact their hearts. They will learn healthy ways to handle stress and utilize positive coping mechanisms. In addition, [facility] patient will learn ways to improve communication skills. Leidy Guzman actively participated and verbalized understanding. Total time in the Healthy Mind-Set class was 60 minutes.     Electronically signed by Charmaine Villalpando RD, LD on 2021 at 12:11 PM

## 2021-12-10 ENCOUNTER — HOSPITAL ENCOUNTER (OUTPATIENT)
Dept: CARDIAC REHAB | Age: 76
Setting detail: THERAPIES SERIES
Discharge: HOME | End: 2021-12-10
Payer: MEDICARE

## 2021-12-10 PROCEDURE — G0422 INTENS CARDIAC REHAB W/EXERC: HCPCS

## 2021-12-10 PROCEDURE — G0423 INTENS CARDIAC REHAB NO EXER: HCPCS

## 2021-12-13 ENCOUNTER — HOSPITAL ENCOUNTER (OUTPATIENT)
Dept: CARDIAC REHAB | Age: 76
Setting detail: THERAPIES SERIES
Discharge: HOME | End: 2021-12-13
Payer: MEDICARE

## 2021-12-13 PROCEDURE — G0422 INTENS CARDIAC REHAB W/EXERC: HCPCS

## 2021-12-13 PROCEDURE — G0423 INTENS CARDIAC REHAB NO EXER: HCPCS

## 2021-12-15 ENCOUNTER — HOSPITAL ENCOUNTER (OUTPATIENT)
Dept: CARDIAC REHAB | Age: 76
Setting detail: THERAPIES SERIES
Discharge: HOME | End: 2021-12-15
Payer: MEDICARE

## 2021-12-15 PROCEDURE — G0423 INTENS CARDIAC REHAB NO EXER: HCPCS

## 2021-12-15 PROCEDURE — G0422 INTENS CARDIAC REHAB W/EXERC: HCPCS

## 2021-12-15 NOTE — PROGRESS NOTES
Video Education Report - ICR/CR    Name:  Pedro Baptiste     Date:  12/15/2021  MRN: [id number]     Session #:  11  Session Length: 32:26 min    Recommended Videos        []01 Pritikin Solutions - Program Overview   34:22    []02 Overview of Pritikin Eating Plan             34:10    []03 Becoming a Yasmin Yauco   33:08     []04 Diseases of Our Time - Part 1   34:22    []05 Calorie Density     33:39   []06 Label Reading - Part 1    32:15   []07 Move it      32.54   []08 Healthy Minds, Bodies, Hearts   32:14   []09 Dining Out - Part 1    32:28   []10 Heart Disease Risk Reduction   36:89   []63 Metabolic Syndrome and Belly Fat  31:52   []12 Facts on Fat     35:29   []13 Diseases of Our Time - Part 2   33:07   []14 Biology of Weight Control   32:36   []15 Biomechanical Limitations   35:20   []16 Nutrition Action Plan    34:23    Additional Videos         []17 Hypertension & Heart Disease   32:39        []18 Cooking Breakfasts and Snacks  32:00   []19 Planning Your Eating Strategy   33:30   []20 Label Reading - Part 2    32:36  []21 Cooking Soups and Desserts   31:41  []22 How Our Thoughts Can Heal Our Hearts 33:05  []23 Targeting Your Nutrition Priorities  33:58  []24 Healthy Salads & Dressings   35:32  []25 Dining Out - Part 2    32:35  []26 Cooking Dinner and Sides   35:06  []27 Sleep Disorders     33:14  []28 Menu Workshop     32:06  []29 Decoding Lab Results    32:42     []30 Vitamins and Minerals    32:54  [x]31 Exercise Action Plan    32:26  []32 Body Composition    34:03  []33 Improving Performance    32:13  []34 Fueling a Healthy Body    31:32  []35 Introduction to Yoga    33:47  []36 Aging-Enhancing the Quality of Your Life 33:22  []37 Smoking Cessation    36:19    Comments:  Video completed.  Patient engaged in video disscusion

## 2021-12-17 ENCOUNTER — HOSPITAL ENCOUNTER (OUTPATIENT)
Dept: CARDIAC REHAB | Age: 76
Setting detail: THERAPIES SERIES
Discharge: HOME | End: 2021-12-17
Payer: MEDICARE

## 2021-12-17 PROCEDURE — G0422 INTENS CARDIAC REHAB W/EXERC: HCPCS

## 2021-12-17 PROCEDURE — G0423 INTENS CARDIAC REHAB NO EXER: HCPCS

## 2021-12-17 NOTE — PROGRESS NOTES
Dewayne Sosa YOB: 1945  Acct Number: [de-identified]  MRN:  [id number]                [facility] COOKING SCHOOL WORKSHOP             Date: 2021        Session #  10  Todays class covered:      () Adding Flavor     () Fast Breakfasts     () Salads and Dressings     () Soups and Sauces     () Simple Sides     () Appetizers and Snacks     () Delicious Desserts     () Plant Based Proteins     (x) Fast Evening Meals     () Weekend Breakfasts     () Efficiency Cooking     () One [facility]     Patients were shown how to choose, prep, and cook; substitutions and other options were given. Samples were offered. Recipes were given and questions answered. The patient above was in the [employer] for 60 minutes.       Electronically signed by Damián Ramos RD, DEVYN on 2021 at 12:00 PM

## 2021-12-20 ENCOUNTER — TELEPHONE (OUTPATIENT)
Dept: CARDIOLOGY CLINIC | Age: 76
End: 2021-12-20

## 2021-12-20 ENCOUNTER — HOSPITAL ENCOUNTER (OUTPATIENT)
Dept: CARDIAC REHAB | Age: 76
Setting detail: THERAPIES SERIES
Discharge: HOME | End: 2021-12-20
Payer: MEDICARE

## 2021-12-20 PROCEDURE — G0422 INTENS CARDIAC REHAB W/EXERC: HCPCS

## 2021-12-20 PROCEDURE — G0423 INTENS CARDIAC REHAB NO EXER: HCPCS

## 2021-12-20 NOTE — TELEPHONE ENCOUNTER
Received fax from [facility] for Atorvastatin 40mg. Records show patient current dose is 80mg. Please advise of correct dose.  Refill needs sent to [facility] in [place]

## 2021-12-20 NOTE — PROGRESS NOTES
Video Education Report - ICR/CR    Name:  Lizette Sims     Date:  12/20/2021  MRN: [id number]     Session #:  12  Session Length: 45 min    Recommended Videos        []01 [employer] - Program Overview   34:22    []02 Overview of Pritikin Eating Plan             34:10    []03 Becoming a Rabia Katy   33:08     []04 Diseases of Our Time - Part 1   34:22    []05 Calorie Density     33:39   []06 Label Reading - Part 1    32:15   []07 Move it      32.54   []08 Healthy Minds, Bodies, Hearts   32:14   []09 Dining Out - Part 1    32:28   []10 Heart Disease Risk Reduction   57:83   []15 Metabolic Syndrome and Belly Fat  31:52   []12 Facts on Fat     35:29   []13 Diseases of Our Time - Part 2   33:07   []14 Biology of Weight Control   32:36   []15 Biomechanical Limitations   35:20   []16 Nutrition Action Plan    34:23    Additional Videos         []17 Hypertension & Heart Disease   32:39        []18 Cooking Breakfasts and Snacks  32:00   []19 Planning Your Eating Strategy   33:30   []20 Label Reading - Part 2    32:36  []21 Cooking Soups and Desserts   31:41  []22 How Our Thoughts Can Heal Our Hearts 33:05  []23 Targeting Your Nutrition Priorities  33:58  []24 Healthy Salads & Dressings   35:32  []25 Dining Out - Part 2    32:35  []26 Cooking Dinner and Sides   35:06  []27 Sleep Disorders     33:14  []28 Menu Workshop     32:06  []29 Decoding Lab Results    32:42     []30 Vitamins and Minerals    32:54  []31 Exercise Action Plan    32:26  [x]32 Body Composition    34:03  []33 Improving Performance    32:13  []34 Fueling a Healthy Body    31:32  []35 Introduction to Yoga    33:47  []36 Aging-Enhancing the Quality of Your Life 33:22  []37 Smoking Cessation    36:19    Comments:  Video completed.

## 2021-12-22 ENCOUNTER — HOSPITAL ENCOUNTER (OUTPATIENT)
Dept: CARDIAC REHAB | Age: 76
Setting detail: THERAPIES SERIES
Discharge: HOME | End: 2021-12-22
Payer: MEDICARE

## 2021-12-22 PROCEDURE — G0422 INTENS CARDIAC REHAB W/EXERC: HCPCS

## 2021-12-22 PROCEDURE — G0423 INTENS CARDIAC REHAB NO EXER: HCPCS

## 2021-12-27 ENCOUNTER — HOSPITAL ENCOUNTER (OUTPATIENT)
Dept: CARDIAC REHAB | Age: 76
Setting detail: THERAPIES SERIES
Discharge: HOME | End: 2021-12-27
Payer: MEDICARE

## 2021-12-27 PROCEDURE — G0422 INTENS CARDIAC REHAB W/EXERC: HCPCS

## 2021-12-27 PROCEDURE — G0423 INTENS CARDIAC REHAB NO EXER: HCPCS

## 2021-12-27 NOTE — PROGRESS NOTES
Video Education Report - ICR/CR    Name:  Reyes Priest     Date:  12/27/2021  MRN: [id number]     Session #:  15  Session Length: 45 min    Recommended Videos        []01 [employer] - Program Overview   34:22    []02 Overview of [employer] Eating Plan             34:10    []03 Becoming a Breann Rosales   33:08     []04 Diseases of Our Time - Part 1   34:22    []05 Calorie Density     33:39   []06 Label Reading - Part 1    32:15   []07 Move it      32.54   []08 Healthy Minds, Bodies, Hearts   32:14   []09 Dining Out - Part 1    32:28   []10 Heart Disease Risk Reduction   35:69   []02 Metabolic Syndrome and Belly Fat  31:52   []12 Facts on Fat     35:29   []13 Diseases of Our Time - Part 2   33:07   []14 Biology of Weight Control   32:36   []15 Biomechanical Limitations   35:20   []16 Nutrition Action Plan    34:23    Additional Videos         []17 Hypertension & Heart Disease   32:39        []18 Cooking Breakfasts and Snacks  32:00   [x]19 Planning Your Eating Strategy   33:30   []20 Label Reading - Part 2    32:36  []21 Cooking Soups and Desserts   31:41  []22 How Our Thoughts Can Heal Our Hearts 33:05  []23 Targeting Your Nutrition Priorities  33:58  []24 Healthy Salads & Dressings   35:32  []25 Dining Out - Part 2    32:35  []26 Cooking Dinner and Sides   35:06  []27 Sleep Disorders     33:14  []28 Menu Workshop     32:06  []29 Decoding Lab Results    32:42     []30 Vitamins and Minerals    32:54  []31 Exercise Action Plan    32:26  []32 Body Composition    34:03  []33 Improving Performance    32:13  []34 Fueling a Healthy Body    31:32  []35 Introduction to Yoga    33:47  []36 Aging-Enhancing the Quality of Your Life 33:22  []37 Smoking Cessation    36:19    Comments:  Video completed.

## 2021-12-29 ENCOUNTER — HOSPITAL ENCOUNTER (OUTPATIENT)
Dept: CARDIAC REHAB | Age: 76
Setting detail: THERAPIES SERIES
Discharge: HOME | End: 2021-12-29
Payer: MEDICARE

## 2021-12-29 PROCEDURE — G0422 INTENS CARDIAC REHAB W/EXERC: HCPCS

## 2021-12-29 PROCEDURE — G0423 INTENS CARDIAC REHAB NO EXER: HCPCS

## 2021-12-29 NOTE — PROGRESS NOTES
[facility]  Cardiac Rehabilitation Department    Alvarado Bachelor     :  1945    MRN:  [id number]    Date: 2021      Session Length:  60 min   Session # 4    EXERCISE WORKSHOP:  Heart Disease & Risk Reduction                      The purpose of this lesson is to provide a high-level overview of the heart, heart disease, and how the Pritikin lifestyle positively impacts risk factors. CAD Part 1. At the conclusion of this workshop, Catarino Ortega patients will understand the anatomy and physiology of the heart. Additionally, they will understand how [employer]s three pillars impact the risk factors, the progression, and the management of heart disease. Readiness to change:    ( ) Pre-contemplative   ( ) Contemplative - ambivalent about change    (x) Action - ready to set action plan and implement   ( ) Maintenance - has made change and is trying, and or practicing different alternative behaviors     Additional Notes:      Silviano Chin was in the Workshop with the RN for 60 minutes. The content was presented via Powerpoint, lecture, and patient participation based format, for remediation of educational content. Motivational interviewing was utilized when needed, to promote change. Patient voiced understanding.     Electronically signed by Rahel Turcios RN on 2021 at 11:38 AM

## 2022-01-31 DIAGNOSIS — E78.2 MIXED HYPERLIPIDEMIA: ICD-10-CM

## 2022-01-31 RX ORDER — ATORVASTATIN CALCIUM 80 MG/1
80 TABLET, FILM COATED ORAL DAILY
Qty: 90 TABLET | Refills: 3 | Status: SHIPPED | OUTPATIENT
Start: 2022-01-31

## 2022-03-06 DIAGNOSIS — I25.10 CORONARY ARTERY DISEASE INVOLVING NATIVE CORONARY ARTERY OF NATIVE HEART WITHOUT ANGINA PECTORIS: ICD-10-CM

## 2022-03-06 NOTE — TELEPHONE ENCOUNTER
----- Message from Jenny Thomas sent at 3/5/2022 11:56 AM EST -----  Subject: Refill Request    QUESTIONS  Name of Medication? clopidogrel (PLAVIX) 75 MG tablet  Patient-reported dosage and instructions? 75 MG Tab 1 PO  How many days do you have left? 1  Pref[facility]? [facility] Pharmacy phone number (if available)? [phone]  Additional Information for Provider? Please refill ASAP  ---------------------------------------------------------------------------  --------------  CALL BACK INFO  What is the best way for the office to contact you? OK to leave message on   voicemail  Preferred Call Back Phone Number?  [phone]

## 2022-03-07 ENCOUNTER — TELEPHONE (OUTPATIENT)
Dept: FAMILY MEDICINE CLINIC | Age: 77
End: 2022-03-07

## 2022-03-07 RX ORDER — CLOPIDOGREL BISULFATE 75 MG/1
TABLET ORAL
Qty: 90 TABLET | Refills: 1 | Status: SHIPPED | OUTPATIENT
Start: 2022-03-07 | End: 2022-07-25

## 2022-03-15 ENCOUNTER — OFFICE VISIT (OUTPATIENT)
Dept: FAMILY MEDICINE CLINIC | Age: 77
End: 2022-03-15
Payer: MEDICARE

## 2022-03-15 VITALS
OXYGEN SATURATION: 97 % | WEIGHT: 177 LBS | HEART RATE: 62 BPM | BODY MASS INDEX: 26.83 KG/M2 | HEIGHT: 68 IN | SYSTOLIC BLOOD PRESSURE: 120 MMHG | TEMPERATURE: 97.4 F | DIASTOLIC BLOOD PRESSURE: 76 MMHG

## 2022-03-15 DIAGNOSIS — I25.10 CORONARY ARTERY DISEASE INVOLVING NATIVE CORONARY ARTERY OF NATIVE HEART WITHOUT ANGINA PECTORIS: Primary | ICD-10-CM

## 2022-03-15 DIAGNOSIS — E03.9 HYPOTHYROIDISM, UNSPECIFIED TYPE: ICD-10-CM

## 2022-03-15 DIAGNOSIS — Z95.1 S/P CORONARY ARTERY BYPASS GRAFT X 1: ICD-10-CM

## 2022-03-15 DIAGNOSIS — N13.8 BPH WITH OBSTRUCTION/LOWER URINARY TRACT SYMPTOMS: ICD-10-CM

## 2022-03-15 DIAGNOSIS — E55.9 VITAMIN D DEFICIENCY: ICD-10-CM

## 2022-03-15 DIAGNOSIS — Z12.5 SCREENING PSA (PROSTATE SPECIFIC ANTIGEN): ICD-10-CM

## 2022-03-15 DIAGNOSIS — I10 ESSENTIAL HYPERTENSION: ICD-10-CM

## 2022-03-15 DIAGNOSIS — U07.1 COVID-19: ICD-10-CM

## 2022-03-15 DIAGNOSIS — N40.1 BPH WITH OBSTRUCTION/LOWER URINARY TRACT SYMPTOMS: ICD-10-CM

## 2022-03-15 DIAGNOSIS — I25.119 CORONARY ARTERY DISEASE INVOLVING NATIVE CORONARY ARTERY OF NATIVE HEART WITH ANGINA PECTORIS (HCC): ICD-10-CM

## 2022-03-15 DIAGNOSIS — E78.2 MIXED HYPERLIPIDEMIA: ICD-10-CM

## 2022-03-15 PROCEDURE — 99214 OFFICE O/P EST MOD 30 MIN: CPT | Performed by: FAMILY MEDICINE

## 2022-03-15 RX ORDER — MULTIVIT WITH MINERALS/LUTEIN
250 TABLET ORAL DAILY
COMMUNITY

## 2022-03-15 NOTE — PROGRESS NOTES
Chief Complaint   Patient presents with    Coronary Artery Disease     6 month        HPI:  William Saleh is a 77 y.o. male     history MI  Hx CAD   statin, b-blocker, asa    Has lost weight   He did have covid    Fasting for lent     Currently no refills     Patient Active Problem List   Diagnosis    Hyperlipidemia    Hypertension    Osteoarthritis    CAD (coronary artery disease)    Actinic keratosis    BPH with obstruction/lower urinary tract symptoms    Urinary retention    Basal cell carcinoma, trunk    Overweight    Status post LASIK surgery    Osteoarthritis of right knee    Infection of toe    Hypertrophic scar    Status post total right knee replacement    ARB intolerance    Generalized abdominal pain    Senile nuclear sclerosis    Regular astigmatism    Presbyopia    Acute pain of left shoulder    Rotator cuff tendinitis, left    Osteoarthritis of left shoulder    Biceps strain, left, initial encounter    History of colon polyps    History of MI (myocardial infarction)    Angina at rest [facility])    Chest pain         Wt Readings from Last 3 Encounters:   03/15/22 177 lb (80.3 kg)   09/13/21 191 lb 12.8 oz (87 kg)   07/27/21 196 lb 1.6 oz (89 kg)           Past Medical History:   Diagnosis Date    Actinic keratosis     Basal cell carcinoma, trunk 12/2017    left upper back    Basal cell carcinoma, trunk     BPH (benign prostatic hyperplasia)     CAD (coronary artery disease) 2012    has 4 cardiac stents    Heart attack ([address]     History of MI (myocardial infarction) 12/11/2020    History of mycobacterial infection 6/22/2018    Hyperlipidemia     meds > 15 yrs    Hypertension     meds > 6 yrs / denies TIA or stroke    Hypertrophic scar     Myocardial infarct ([address] 11/2012    Osteoarthritis     all joints    Overweight 12/22/2017    Status post coronary angioplasty     Thyroid disease     meds > 1 month     Past Surgical History:   Procedure Laterality Date    ACHILLES TENDON SURGERY Left 7/10/2020    LEFT REPAIR OF RUPTURED ACHILLES TENDON, performed by Edyta Landis DPM at [address] Bilateral 2015    COLONOSCOPY  3/23/15        COLONOSCOPY N/A 2020    COLORECTAL CANCER SCREENING, HIGH RISK performed by Tato Wiseman MD at [facility]      CYSTOSCOPY  2017    W/COMPLEX CYSTOMETROGRAM-COMPLEX UROFLOW-TRANSRECTAL US PROSTATE    EYE SURGERY      Lasik OU    JOINT REPLACEMENT Left     LTKR    [facility] TOTAL KNEE ARTHROPLASTY Right 3/15/2018    RIGHT KNEE TOTAL KNEE  ARTHROPLASTY performed by Victorina Henry MD at [place] [address] Right     twice    SHOULDER SURGERY Right      RCR    TONSILLECTOMY      TURP N/A 2017    GREEN LIGHT LASER TRANSURETHRAL RESECTIOIN PROSTATE performed by Heladio Berger MD at [address] [place] [address] PROSTATE/TRANSRECTAL  06/03/15    Cystoscop, cath rem, US prostate    UVULECTOMY      due to snoring     Family History   Problem Relation Age of Onset    Kidney Disease Father     Other Mother         Perforated intestine    No Known Problems Sister     No Known Problems Brother     No Known Problems Sister     No Known Problems Brother     No Known Problems Son      Social History     Socioeconomic History    Marital status:      Spouse name: None    Number of children: None    Years of education: None    Highest education level: None   Occupational History    None   Tobacco Use    Smoking status: Former Smoker     Packs/day: 1.00     Years: 7.00     Pack years: 7.00     Start date: 1961     Quit date: 3/17/1967     Years since quittin.0    Smokeless tobacco: Never Used    Tobacco comment: Quit for [employer]   Vaping Use    Vaping Use: Never used   Substance and Sexual Activity    Alcohol use: No     Types: 30 Cans of beer per week     Comment: No alcohol  since February, 1987    Drug use: No    Sexual activity: None   Other Topics Concern    None   Social History Narrative    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     Social Determinants of Health     Financial Resource Strain: Low Risk     Difficulty of Paying Living Expenses: Not hard at all   Food Insecurity: No Food Insecurity    Worried About [address] in the Last Year: Never true   [address] in the Last Year: Never true   Transportation Needs:     Lack of Transportation (Medical): Not on file    Lack of Transportation (Non-Medical):  Not on file   Physical Activity:     Days of Exercise per Week: Not on file    Minutes of Exercise per Session: Not on file   Stress:     Feeling of Stress : Not on file   Social Connections:     Frequency of Communication with Friends and Family: Not on file    Frequency of Social Gatherings with Friends and Family: Not on file    Attends Jainism Services: Not on file    Active Member of [address] [place] [address] or Organizations: Not on file    Attends Club or Organization Meetings: Not on file    Marital Status: Not on file   Intimate Partner Violence:     Fear of Current or Ex-Partner: Not on file    Emotionally Abused: Not on file    Physically Abused: Not on file    Sexually Abused: Not on file   Housing Stability:     Unable to Pay for Housing in the Last Year: Not on file    Number of [place] in the Last Year: Not on file    Unstable Housing in the Last Year: Not on file     Current Outpatient Medications   Medication Sig Dispense Refill    Ascorbic Acid (VITAMIN C) 250 MG tablet Take 250 mg by mouth daily      Niacin (VITAMIN B-3 PO) Take by mouth      clopidogrel (PLAVIX) 75 MG tablet take 1 tablet by mouth once daily 90 tablet 1    atorvastatin (LIPITOR) 80 MG tablet Take 1 tablet by mouth daily 90 tablet 3    tamsulosin (FLOMAX) 0.4 MG capsule take 1 capsule by mouth twice a day 180 capsule 1    levothyroxine (SYNTHROID) 50 MCG tablet take 1 tablet by mouth once daily 90 tablet 1    acetaminophen (TYLENOL) 325 MG tablet Take 325-650 mg by mouth every 6 hours as needed      Zinc Sulfate (ZINC 15 PO) Take by mouth       aspirin 81 MG chewable tablet Take 1 tablet by mouth daily 30 tablet 3    carvedilol (COREG) 12.5 MG tablet take 1 tablet by mouth twice a day with food 180 tablet 1    finasteride (PROSCAR) 5 MG tablet Take 1 tablet by mouth daily take 1 tablet by mouth once daily 90 tablet 3    nitroGLYCERIN (NITROSTAT) 0.4 MG SL tablet Place 1 tablet under the tongue every 5 minutes as needed for Chest pain Dissolve 1 tab under tongue at first sign of chest pain every 5 minutes as needed. If pain persists after taking 3 tabs in a 15-minute period, or the pain is different than is typically experienced, call 9-1-1 immediately. 25 tablet 1    magnesium oxide (MAG-OX) 400 MG tablet Take 400 mg by mouth daily       Handicap Placard MISC by Does not apply route Exp 5 years 1 each 0    Multiple Vitamins-Minerals (MULTI COMPLETE PO) Take by mouth       No current facility-administered medications for this visit. Allergies   Allergen Reactions    Ace Inhibitors Other (See Comments)     Cough      Lisinopril Other (See Comments)     Cough         Review of Systems:   General ROS: some fatigue   Respiratory ROS: coughing spells, mainly at nighttime  Cardiovascular ROS: +SERNA  Gastrointestinal ROS: no abdominal pain, change in bowel habits, or black or bloody stools  Genito-Urinary ROS: , BPH and ED  Musculoskeletal ROS: some hand joint pain  Neurological ROS: numbness in his hands    In general patient otherwise reports feeling well.      Physical Exam:  /76 (Site: Right Upper Arm)   Pulse 62   Temp 97.4 °F (36.3 °C)   Ht 5' 8\" (1.727 m)   Wt 177 lb (80.3 kg)   SpO2 97%   BMI 26.91 kg/m²     Gen: Well, NAD, Alert, Oriented x 3   HEENT: EOMI, eyes clear, MMM  Skin: without rash or jaundice  Neck: no significant lymphadenopathy or thyromegaly  Lungs: CTA B w/out Rales/Wheezes/Rhonchi, Good respiratory effort   Heart: RRR, S1S2, w/out M/R/G, non-displaced PMI   Ext: No C/C/E Bilaterally. A&P   Diagnosis Orders   1. Coronary artery disease involving native coronary artery of native heart without angina pectoris     2. Mixed hyperlipidemia     3. BPH with obstruction/lower urinary tract symptoms     4. Coronary artery disease involving native coronary artery of native heart with angina pectoris ([address]     5. S/P coronary artery bypass graft x 1     6. Hypothyroidism, unspecified type  TSH   7. Essential hypertension  Comprehensive Metabolic Panel    CBC    Lipid Panel   8. Vitamin D deficiency  Vitamin D 25 Hydroxy   9. Screening PSA (prostate specific antigen)  PSA Screening   10.  COVID-19  Covid-19, Antibody       F/u with cardiology    Chronic conditions are stable  Continue current regimen  Follow up with appropriate specialists and here routinely for ongoing monitoring of chronic conditions    Healthy diet and exercise   Risk factor reduction    Follow up with urology    F/u 6 mos      Nick Falcon MD

## 2022-03-17 DIAGNOSIS — I10 ESSENTIAL HYPERTENSION: ICD-10-CM

## 2022-03-17 DIAGNOSIS — E03.9 HYPOTHYROIDISM, UNSPECIFIED TYPE: ICD-10-CM

## 2022-03-17 DIAGNOSIS — U07.1 COVID-19: ICD-10-CM

## 2022-03-17 DIAGNOSIS — Z12.5 SCREENING PSA (PROSTATE SPECIFIC ANTIGEN): ICD-10-CM

## 2022-03-17 DIAGNOSIS — E55.9 VITAMIN D DEFICIENCY: ICD-10-CM

## 2022-03-17 LAB
ALBUMIN SERPL-MCNC: 3.9 G/DL (ref 3.5–4.6)
ALP BLD-CCNC: 77 U/L (ref 35–104)
ALT SERPL-CCNC: 22 U/L (ref 0–41)
ANION GAP SERPL CALCULATED.3IONS-SCNC: 11 MEQ/L (ref 9–15)
AST SERPL-CCNC: 27 U/L (ref 0–40)
BILIRUB SERPL-MCNC: 0.5 MG/DL (ref 0.2–0.7)
BUN BLDV-MCNC: 15 MG/DL (ref 8–23)
CALCIUM SERPL-MCNC: 9.4 MG/DL (ref 8.5–9.9)
CHLORIDE BLD-SCNC: 103 MEQ/L (ref 95–107)
CHOLESTEROL, TOTAL: 136 MG/DL (ref 0–199)
CO2: 24 MEQ/L (ref 20–31)
CREAT SERPL-MCNC: 0.93 MG/DL (ref 0.7–1.2)
GFR AFRICAN AMERICAN: >60
GFR NON-AFRICAN AMERICAN: >60
GLOBULIN: 2.7 G/DL (ref 2.3–3.5)
GLUCOSE BLD-MCNC: 107 MG/DL (ref 70–99)
HCT VFR BLD CALC: 42.8 % (ref 42–52)
HDLC SERPL-MCNC: 44 MG/DL (ref 40–59)
HEMOGLOBIN: 14.1 G/DL (ref 14–18)
LDL CHOLESTEROL CALCULATED: 76 MG/DL (ref 0–129)
MCH RBC QN AUTO: 28.4 PG (ref 27–31.3)
MCHC RBC AUTO-ENTMCNC: 33 % (ref 33–37)
MCV RBC AUTO: 86.2 FL (ref 80–100)
PDW BLD-RTO: 18.3 % (ref 11.5–14.5)
PLATELET # BLD: 210 K/UL (ref 130–400)
POTASSIUM SERPL-SCNC: 4.7 MEQ/L (ref 3.4–4.9)
PROSTATE SPECIFIC ANTIGEN: 0.28 NG/ML (ref 0–4)
RBC # BLD: 4.97 M/UL (ref 4.7–6.1)
SODIUM BLD-SCNC: 138 MEQ/L (ref 135–144)
TOTAL PROTEIN: 6.6 G/DL (ref 6.3–8)
TRIGL SERPL-MCNC: 79 MG/DL (ref 0–150)
TSH SERPL DL<=0.05 MIU/L-ACNC: 2 UIU/ML (ref 0.44–3.86)
WBC # BLD: 5.1 K/UL (ref 4.8–10.8)

## 2022-03-18 LAB
SARS-COV-2 ANTIBODY, TOTAL: POSITIVE
VITAMIN D 25-HYDROXY: 36.6 NG/ML

## 2022-03-19 NOTE — TELEPHONE ENCOUNTER
----- Message from Miroslava Colin sent at 3/19/2022 12:25 PM EDT -----  Subject: Refill Request    QUESTIONS  Name of Medication? carvedilol (COREG) 12.5 MG tablet  Patient-reported dosage and instructions? 12.5 MG tablet  Twice Daily  How many days do you have left? 10  Preferred [facility]? [facility] Pharmacy phone number (if available)? [phone]  Additional Information for Provider? QTY =180   ---------------------------------------------------------------------------  --------------  CALL BACK INFO  What is the best way for the office to contact you? OK to leave message on   voicemail  Preferred Call Back Phone Number?  [phone]

## 2022-03-19 NOTE — TELEPHONE ENCOUNTER
Future Appointments    Encounter Information    Provider Department Appt Notes   5/9/2022 Wallace Alvares MD [facility] Primary Care Appt Reason: Routine Pre-Op   pre-op, left shoulder replacement, screened green.    Booking Code: [id number]   6/16/2022 Wallace Alvares MD [facility] awv       Past Visits    Date Provider Specialty Visit Type Primary Dx   03/15/2022 Wallace Alvares MD Family Medicine Office Visit Coronary artery disease involving native coronary artery of native heart without angina pectoris   09/13/2021 Wallace Alvares MD Family Medicine Office Visit Coronary artery disease involving native coronary artery of native heart with angina pectoris [facility])   08/11/2021 Wallace Alvares MD Family Medicine Virtual Visit Coronary artery disease involving native coronary artery of native heart with angina pectoris [facility])   07/08/2021 Azeb Mcclain DO Family Medicine Office Visit Atypical squamoproliferative skin lesion   06/28/2021 Brendon Begum MD Family Medicine Procedure visit Abnormal skin

## 2022-03-19 NOTE — TELEPHONE ENCOUNTER
----- Message from Garyrosemarie Guerrier sent at 3/19/2022 12:25 PM EDT -----  Subject: Refill Request    QUESTIONS  Name of Medication? carvedilol (COREG) 12.5 MG tablet  Patient-reported dosage and instructions? 12.5 MG tablet  Twice Daily  How many days do you have left? 10  Preferred Pharmacy? [facility]. Pharmacy phone number (if available)? [phone]  Additional Information for Provider? QTY =180   ---------------------------------------------------------------------------  --------------  CALL BACK INFO  What is the best way for the office to contact you? OK to leave message on   voicemail  Preferred Call Back Phone Number?  [phone]

## 2022-03-20 DIAGNOSIS — N13.8 BPH WITH OBSTRUCTION/LOWER URINARY TRACT SYMPTOMS: ICD-10-CM

## 2022-03-20 DIAGNOSIS — N40.1 BPH WITH OBSTRUCTION/LOWER URINARY TRACT SYMPTOMS: ICD-10-CM

## 2022-03-20 NOTE — TELEPHONE ENCOUNTER
Future Appointments    Encounter Information    Provider Department Appt Notes   5/9/2022 Tyrone Fox MD [facility] Primary Care Appt Reason: Routine Pre-Op   pre-op, left shoulder replacement, screened green.    Booking Code: [id number]   6/16/2022 Tyrone Fox MD [facility] Primary [facility] awv       Past Visits    Date Provider Specialty Visit Type Primary Dx   03/15/2022 Tyrone Fox MD Family Medicine Office Visit Coronary artery disease involving native coronary artery of native heart without angina pectoris   09/13/2021 Tyrone Fox MD Family Medicine Office Visit Coronary artery disease involving native coronary artery of native heart with angina pectoris [facility])   08/11/2021 Tyrone Fox MD Family Medicine Virtual Visit Coronary artery disease involving native coronary artery of native heart with angina pectoris [facility])   07/08/2021 Ezequiel Huffman DO Family Medicine Office Visit Atypical squamoproliferative skin lesion   06/28/2021 Pipo David MD Family Medicine Procedure visit Abnormal skin

## 2022-03-21 ENCOUNTER — TELEPHONE (OUTPATIENT)
Dept: FAMILY MEDICINE CLINIC | Age: 77
End: 2022-03-21

## 2022-03-21 RX ORDER — FINASTERIDE 5 MG/1
TABLET, FILM COATED ORAL
Qty: 90 TABLET | Refills: 3 | Status: SHIPPED | OUTPATIENT
Start: 2022-03-21

## 2022-03-21 RX ORDER — CARVEDILOL 12.5 MG/1
TABLET ORAL
Qty: 180 TABLET | Refills: 1 | Status: SHIPPED | OUTPATIENT
Start: 2022-03-21

## 2022-03-21 NOTE — TELEPHONE ENCOUNTER
----- Message from Priya Newsome sent at 3/19/2022 12:25 PM EDT -----  Subject: Refill Request    QUESTIONS  Name of Medication? carvedilol (COREG) 12.5 MG tablet  Patient-reported dosage and instructions? 12.5 MG tablet  Twice Daily  How many days do you have left? 10  Preferred Pharmacy? [facility]. Pharmacy phone number (if available)? [phone]  Additional Information for Provider? QTY =180   ---------------------------------------------------------------------------  --------------  CALL BACK INFO  What is the best way for the office to contact you? OK to leave message on   voicemail  Preferred Call Back Phone Number?  [phone]

## 2022-03-22 NOTE — PROGRESS NOTES
Marleni KIM.:  1945  Acct Number: [de-identified]  MRN:  [id number]                [facility] COOKING SCHOOL WORKSHOP             Date: 12/10/2021        Session #  9  Todays class covered:      () Adding Flavor     () Fast Breakfasts     () Salads and Dressings     () Soups and Sauces     () Simple Sides     () Appetizers and Snacks     () Delicious Desserts     (x) Plant Based Proteins     () Fast Evening Meals     () Weekend Breakfasts     () Efficiency Cooking     () [facility]     Patients were shown how to choose, prep, and cook; substitutions and other options were given. Samples were offered. Recipes were given and questions answered. The patient above was in the [employer] for 60 minutes.       Electronically signed by Elba Ganser, RD, LD on 12/10/2021 at 12:13 PM
No

## 2022-04-19 DIAGNOSIS — I25.10 CORONARY ARTERY DISEASE INVOLVING NATIVE CORONARY ARTERY OF NATIVE HEART WITHOUT ANGINA PECTORIS: ICD-10-CM

## 2022-04-19 RX ORDER — NITROGLYCERIN 0.4 MG/1
0.4 TABLET SUBLINGUAL EVERY 5 MIN PRN
Qty: 25 TABLET | Refills: 1 | Status: SHIPPED | OUTPATIENT
Start: 2022-04-19 | End: 2022-04-21 | Stop reason: SDUPTHER

## 2022-04-19 NOTE — TELEPHONE ENCOUNTER
Pt stated that he is unable to find his nitrostat script and is requesting a new one if possible      LOV-3/15/22  NOV-5/9/22

## 2022-04-21 DIAGNOSIS — I25.10 CORONARY ARTERY DISEASE INVOLVING NATIVE CORONARY ARTERY OF NATIVE HEART WITHOUT ANGINA PECTORIS: ICD-10-CM

## 2022-04-21 RX ORDER — NITROGLYCERIN 0.4 MG/1
0.4 TABLET SUBLINGUAL EVERY 5 MIN PRN
Qty: 25 TABLET | Refills: 1 | Status: SHIPPED | OUTPATIENT
Start: 2022-04-21

## 2022-04-23 DIAGNOSIS — E03.9 HYPOTHYROIDISM, UNSPECIFIED TYPE: ICD-10-CM

## 2022-04-23 NOTE — TELEPHONE ENCOUNTER
Future Appointments    Encounter Information    Provider Department Appt Notes   5/9/2022 Bhavesh Chase MD [facility] Primary [facility] Appt Reason: Routine Pre-Op   pre-op, left shoulder replacement, screened green.    Booking Code: [id number]   6/16/2022 Bhavesh Chase MD [facility] Primary [facility] awv       Past Visits    Date Provider Specialty Visit Type Primary Dx   03/15/2022 Bhavesh Chase MD Family Medicine Office Visit Coronary artery disease involving native coronary artery of native heart without angina pectoris

## 2022-04-25 RX ORDER — LEVOTHYROXINE SODIUM 0.05 MG/1
TABLET ORAL
Qty: 90 TABLET | Refills: 1 | Status: SHIPPED | OUTPATIENT
Start: 2022-04-25 | End: 2022-10-19

## 2022-05-09 ENCOUNTER — OFFICE VISIT (OUTPATIENT)
Dept: FAMILY MEDICINE CLINIC | Age: 77
End: 2022-05-09
Payer: MEDICARE

## 2022-05-09 VITALS
HEIGHT: 66 IN | OXYGEN SATURATION: 98 % | WEIGHT: 179.2 LBS | DIASTOLIC BLOOD PRESSURE: 64 MMHG | TEMPERATURE: 97 F | HEART RATE: 66 BPM | SYSTOLIC BLOOD PRESSURE: 110 MMHG | BODY MASS INDEX: 28.8 KG/M2

## 2022-05-09 DIAGNOSIS — E03.9 HYPOTHYROIDISM, UNSPECIFIED TYPE: ICD-10-CM

## 2022-05-09 DIAGNOSIS — I10 ESSENTIAL HYPERTENSION: ICD-10-CM

## 2022-05-09 DIAGNOSIS — G89.29 CHRONIC LEFT SHOULDER PAIN: Primary | ICD-10-CM

## 2022-05-09 DIAGNOSIS — M25.512 CHRONIC LEFT SHOULDER PAIN: Primary | ICD-10-CM

## 2022-05-09 DIAGNOSIS — I25.10 CORONARY ARTERY DISEASE INVOLVING NATIVE CORONARY ARTERY OF NATIVE HEART WITHOUT ANGINA PECTORIS: ICD-10-CM

## 2022-05-09 PROCEDURE — 99213 OFFICE O/P EST LOW 20 MIN: CPT | Performed by: FAMILY MEDICINE

## 2022-05-09 ASSESSMENT — PATIENT HEALTH QUESTIONNAIRE - PHQ9
SUM OF ALL RESPONSES TO PHQ QUESTIONS 1-9: 0
2. FEELING DOWN, DEPRESSED OR HOPELESS: 0
SUM OF ALL RESPONSES TO PHQ9 QUESTIONS 1 & 2: 0
SUM OF ALL RESPONSES TO PHQ QUESTIONS 1-9: 0
1. LITTLE INTEREST OR PLEASURE IN DOING THINGS: 0

## 2022-05-09 NOTE — PROGRESS NOTES
Chief Complaint   Patient presents with    Pre-op Exam     medical clearence        HPI:  Jairo Ortiz is a 77 y.o. male    Pre op exam/clearance   Was going to have shoulder surgery, but now is actually reconsidering  Still with concern of Covid and he refuses vaccine     History MI  Hx CAD  Statin, b-blocker, asa        Patient Active Problem List   Diagnosis    Hyperlipidemia    Hypertension    Osteoarthritis    CAD (coronary artery disease)    Actinic keratosis    BPH with obstruction/lower urinary tract symptoms    Urinary retention    Basal cell carcinoma, trunk    Overweight    Status post LASIK surgery    Osteoarthritis of right knee    Infection of toe    Hypertrophic scar    Status post total right knee replacement    ARB intolerance    Generalized abdominal pain    Senile nuclear sclerosis    Regular astigmatism    Presbyopia    Acute pain of left shoulder    Rotator cuff tendinitis, left    Osteoarthritis of left shoulder    Biceps strain, left, initial encounter    History of colon polyps    History of MI (myocardial infarction)    Angina at rest [facility])    Chest pain         Wt Readings from Last 3 Encounters:   05/09/22 179 lb 3.2 oz (81.3 kg)   03/15/22 177 lb (80.3 kg)   09/13/21 191 lb 12.8 oz (87 kg)           Past Medical History:   Diagnosis Date    Actinic keratosis     Basal cell carcinoma, trunk 12/2017    left upper back    Basal cell carcinoma, trunk     BPH (benign prostatic hyperplasia)     CAD (coronary artery disease) 2012    has 4 cardiac stents    Heart attack ([address]     History of MI (myocardial infarction) 12/11/2020    History of mycobacterial infection 6/22/2018    Hyperlipidemia     meds > 15 yrs    Hypertension     meds > 6 yrs / denies TIA or stroke    Hypertrophic scar     Myocardial infarct ([address] 11/2012    Osteoarthritis     all joints    Overweight 12/22/2017    Status post coronary angioplasty     Thyroid disease     meds > 1 month     Past Surgical History:   Procedure Laterality Date    ACHILLES TENDON SURGERY Left 7/10/2020    LEFT REPAIR OF RUPTURED ACHILLES TENDON, performed by Joya Land DPM at [address] Bilateral 2015    COLONOSCOPY  3/23/15        COLONOSCOPY N/A 2020    COLORECTAL CANCER SCREENING, HIGH RISK performed by Nellie Ortiz MD at [address] [facility]      CYSTOSCOPY  2017    W/COMPLEX CYSTOMETROGRAM-COMPLEX UROFLOW-TRANSRECTAL US PROSTATE    EYE SURGERY      Lasik OU    JOINT REPLACEMENT Left     LTKR    DC TOTAL KNEE ARTHROPLASTY Right 3/15/2018    RIGHT KNEE TOTAL KNEE  ARTHROPLASTY performed by Parish Cherry MD at [place] [address] Right     twice    SHOULDER SURGERY Right      RCR    TONSILLECTOMY      TURP N/A 2017    GREEN LIGHT LASER TRANSURETHRAL RESECTIOIN PROSTATE performed by Tim Crouch MD at [address] [place] [address] PROSTATE/TRANSRECTAL  06/03/15    Cystoscop, cath rem, US prostate    UVULECTOMY      due to snoring     Family History   Problem Relation Age of Onset    Kidney Disease Father     Other Mother         Perforated intestine    No Known Problems Sister     No Known Problems Brother     No Known Problems Sister     No Known Problems Brother     No Known Problems Son      Social History     Socioeconomic History    Marital status:      Spouse name: None    Number of children: None    Years of education: None    Highest education level: None   Occupational History    None   Tobacco Use    Smoking status: Former Smoker     Packs/day: 1.00     Years: 7.00     Pack years: 7.00     Start date: 1961     Quit date: 3/17/1967     Years since quittin.1    Smokeless tobacco: Never Used    Tobacco comment: Quit for [employer]   Vaping Use    Vaping Use: Never used   Substance and Sexual Activity    Alcohol use:  No Types: 30 Cans of beer per week     Comment: No alcohol  since February, 1987    Drug use: No    Sexual activity: None   Other Topics Concern    None   Social History Narrative    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     Social Determinants of Health     Financial Resource Strain: Low Risk     Difficulty of Paying Living Expenses: Not hard at all   Food Insecurity: No Food Insecurity    Worried About [address] in the Last Year: Never true   [address] in the Last Year: Never true   Transportation Needs:     Lack of Transportation (Medical): Not on file    Lack of Transportation (Non-Medical): Not on file   Physical Activity:     Days of Exercise per Week: Not on file    Minutes of Exercise per Session: Not on file   Stress:     Feeling of Stress : Not on file   Social Connections:     Frequency of Communication with Friends and Family: Not on file    Frequency of Social Gatherings with Friends and Family: Not on file    Attends Restoration Services: Not on file    Active Member of [address] [place] [address] or Organizations: Not on file    Attends Club or Organization Meetings: Not on file    Marital Status: Not on file   Intimate Partner Violence:     Fear of Current or Ex-Partner: Not on file    Emotionally Abused: Not on file    Physically Abused: Not on file    Sexually Abused: Not on file   Housing Stability:     Unable to Pay for Housing in the Last Year: Not on file    Number of [place] in the Last Year: Not on file    Unstable Housing in the Last Year: Not on file     Current Outpatient Medications   Medication Sig Dispense Refill    levothyroxine (SYNTHROID) 50 MCG tablet take 1 tablet by mouth once daily 90 tablet 1    nitroGLYCERIN (NITROSTAT) 0.4 MG SL tablet Place 1 tablet under the tongue every 5 minutes as needed for Chest pain Dissolve 1 tab under tongue at first sign of chest pain every 5 minutes as needed.  If pain persists after taking 3 tabs in a 15-minute period, or the pain is different than is typically experienced, call 9-1-1 immediately. 25 tablet 1    carvedilol (COREG) 12.5 MG tablet take 1 tablet by mouth twice a day with food 180 tablet 1    finasteride (PROSCAR) 5 MG tablet take 1 tablet by mouth once daily 90 tablet 3    Ascorbic Acid (VITAMIN C) 250 MG tablet Take 250 mg by mouth daily      Niacin (VITAMIN B-3 PO) Take by mouth      clopidogrel (PLAVIX) 75 MG tablet take 1 tablet by mouth once daily 90 tablet 1    atorvastatin (LIPITOR) 80 MG tablet Take 1 tablet by mouth daily 90 tablet 3    tamsulosin (FLOMAX) 0.4 MG capsule take 1 capsule by mouth twice a day 180 capsule 1    acetaminophen (TYLENOL) 325 MG tablet Take 325-650 mg by mouth every 6 hours as needed      Zinc Sulfate (ZINC 15 PO) Take by mouth       aspirin 81 MG chewable tablet Take 1 tablet by mouth daily 30 tablet 3    magnesium oxide (MAG-OX) 400 MG tablet Take 400 mg by mouth daily       Handicap Placard MISC by Does not apply route Exp 5 years 1 each 0    Multiple Vitamins-Minerals (MULTI COMPLETE PO) Take by mouth       No current facility-administered medications for this visit. Allergies   Allergen Reactions    Ace Inhibitors Other (See Comments)     Cough      Lisinopril Other (See Comments)     Cough         Review of Systems:   General ROS: negative   Respiratory ROS: coughing spells, mainly at nighttime  Cardiovascular ROS: +SERNA  Gastrointestinal ROS: no abdominal pain, change in bowel habits, or black or bloody stools  Genito-Urinary ROS: , BPH and ED  Musculoskeletal ROS: some hand joint pain  Neurological ROS: numbness in his hands    In general patient otherwise reports feeling well.      Physical Exam:  /64 (Site: Left Upper Arm)   Pulse 66   Temp 97 °F (36.1 °C)   Ht 5' 6\" (1.676 m)   Wt 179 lb 3.2 oz (81.3 kg)   SpO2 98%   BMI 28.92 kg/m²     Gen: Well, NAD, Alert, Oriented x 3   HEENT: EOMI, eyes clear, MMM  Skin: without rash or jaundice  Neck: no significant lymphadenopathy or thyromegaly  Lungs: CTA B w/out Rales/Wheezes/Rhonchi, Good respiratory effort   Heart: RRR, S1S2, w/out M/R/G, non-displaced PMI   Ext: No C/C/E Bilaterally. A&P   Diagnosis Orders   1. Chronic left shoulder pain     2. Hypothyroidism, unspecified type     3. Coronary artery disease involving native coronary artery of native heart without angina pectoris     4.  Essential hypertension       He is deferring surgery at this time     Had recent f/u with cardiology   Will be stopping plavix in the future     Chronic conditions are stable  Continue current regimen  Follow up with appropriate specialists and here routinely for ongoing monitoring of chronic conditions    Healthy diet and exercise   Risk factor reduction    Follow up with urology    F/u 6 mos      Mely Hernandez MD

## 2022-06-08 ENCOUNTER — OFFICE VISIT (OUTPATIENT)
Dept: FAMILY MEDICINE CLINIC | Age: 77
End: 2022-06-08
Payer: MEDICARE

## 2022-06-08 VITALS — TEMPERATURE: 96.5 F | HEART RATE: 54 BPM | OXYGEN SATURATION: 98 %

## 2022-06-08 DIAGNOSIS — Z85.828 HISTORY OF BASAL CELL CARCINOMA (BCC) OF SKIN: ICD-10-CM

## 2022-06-08 DIAGNOSIS — L57.0 ACTINIC KERATOSIS: Primary | ICD-10-CM

## 2022-06-08 PROCEDURE — 17000 DESTRUCT PREMALG LESION: CPT | Performed by: STUDENT IN AN ORGANIZED HEALTH CARE EDUCATION/TRAINING PROGRAM

## 2022-06-08 PROCEDURE — 1123F ACP DISCUSS/DSCN MKR DOCD: CPT | Performed by: STUDENT IN AN ORGANIZED HEALTH CARE EDUCATION/TRAINING PROGRAM

## 2022-06-08 PROCEDURE — 99213 OFFICE O/P EST LOW 20 MIN: CPT | Performed by: STUDENT IN AN ORGANIZED HEALTH CARE EDUCATION/TRAINING PROGRAM

## 2022-06-08 PROCEDURE — 17003 DESTRUCT PREMALG LES 2-14: CPT | Performed by: STUDENT IN AN ORGANIZED HEALTH CARE EDUCATION/TRAINING PROGRAM

## 2022-06-08 SDOH — ECONOMIC STABILITY: FOOD INSECURITY: WITHIN THE PAST 12 MONTHS, YOU WORRIED THAT YOUR FOOD WOULD RUN OUT BEFORE YOU GOT MONEY TO BUY MORE.: NEVER TRUE

## 2022-06-08 SDOH — ECONOMIC STABILITY: FOOD INSECURITY: WITHIN THE PAST 12 MONTHS, THE FOOD YOU BOUGHT JUST DIDN'T LAST AND YOU DIDN'T HAVE MONEY TO GET MORE.: NEVER TRUE

## 2022-06-08 ASSESSMENT — ENCOUNTER SYMPTOMS
ROS SKIN COMMENTS: SKIN LESIONS
SINUS PRESSURE: 0
ABDOMINAL PAIN: 0
VOMITING: 0
SHORTNESS OF BREATH: 0
SORE THROAT: 0
COUGH: 0

## 2022-06-08 ASSESSMENT — SOCIAL DETERMINANTS OF HEALTH (SDOH): HOW HARD IS IT FOR YOU TO PAY FOR THE VERY BASICS LIKE FOOD, HOUSING, MEDICAL CARE, AND HEATING?: NOT HARD AT ALL

## 2022-06-08 NOTE — PATIENT INSTRUCTIONS
Cryosurgery (Liquid Nitrogen Therapy) Aftercare Instructions    Cryosurgery involves using liquid nitrogen (approximately -320 degrees F) to freeze and destroy abnormal skin tissue including, but not limited to, warts, skin tags, seborrheic keratosis, actinic keratosis, and other benign or pre-cancerous growths. Your provider, Dr. Adina Powell, uses liquid nitrogen by spraying the liquid directly on the skin growth or applying it on using a cotton swab. This causes some stinging and burning while the  growth is being frozen and may continue while it thaws. Throbbing may persist on any areas, especially the face or forehead, but this will not last long. (We recommend Tylenol or aspirin  for any persistent pain.)    It is best to leave the procedure site alone. A blister may form in 1-3 days which is desired to allow the damaged tissue (precancerous lesion, wart, or whatever lesion is being removed) to separate from healthy tissue. Please cover the blister with a bandage to prevent blister breakage and dirt exposure. The wound(s) should remain dry while there is a blister. If this is a sweaty location like the foot you may need to change socks multiple times per day. When the blister(s) pop, apply Vaseline (NOT triple antibiotic, like Neosporin) and a bandage to the wound. If blistering does not cause the lesion to separate from healthy skin, gently help separate the lesion from normal skin on day 3-5. You may stop using a Band-Aid once the wound has formed a scab. Continue using Vaseline at least once a day to keep the scab moist.  This will improve wound healing. The scab may appear yellow while moist, this is not a sign of infection. If the wound is infected pus will drain from the site. Please call the office immediately if these symptoms occur. If this treatment was for a large wart you may notice a plug of skin may fall out of the area that was treated.   This is the center of the wart and it is appropriate for it to come out. Healing takes 1-3 weeks, after which the skin may look perfectly normal or slightly lighter in color.

## 2022-06-08 NOTE — PROGRESS NOTES
2022    Zakia Cabrera (:  1945) is a 77 y.o. male, here for evaluation of the following medical concerns:  Chief Complaint   Patient presents with    Skin Problem     Spots on face, neck, and back that he would like looked at. HX of skin cancer. HPI  Skin lesions   Lesions on the face, scalp and ears he would like checked out  History of basal cell, face, chest and back    Review of Systems   Constitutional: Negative for chills and fever. HENT: Negative for congestion, sinus pressure and sore throat. Respiratory: Negative for cough and shortness of breath. Cardiovascular: Negative for chest pain and palpitations. Gastrointestinal: Negative for abdominal pain and vomiting. Musculoskeletal: Negative for arthralgias and myalgias. Skin: Negative for rash and wound. Skin lesions   Neurological: Negative for speech difficulty and light-headedness. Psychiatric/Behavioral: Negative for suicidal ideas. The patient is not nervous/anxious. Prior to Visit Medications    Medication Sig Taking? Authorizing Provider   levothyroxine (SYNTHROID) 50 MCG tablet take 1 tablet by mouth once daily Yes Aracelis Rosado MD   nitroGLYCERIN (NITROSTAT) 0.4 MG SL tablet Place 1 tablet under the tongue every 5 minutes as needed for Chest pain Dissolve 1 tab under tongue at first sign of chest pain every 5 minutes as needed. If pain persists after taking 3 tabs in a 15-minute period, or the pain is different than is typically experienced, call 9-1-1 immediately.  Yes Aracelis Rosado MD   carvedilol (COREG) 12.5 MG tablet take 1 tablet by mouth twice a day with food Yes Aracelis Rosado MD   finasteride (PROSCAR) 5 MG tablet take 1 tablet by mouth once daily Yes Aracelis Rosado MD   Ascorbic Acid (VITAMIN C) 250 MG tablet Take 250 mg by mouth daily Yes Historical Provider, MD   Niacin (VITAMIN B-3 PO) Take by mouth Yes Historical Provider, MD   clopidogrel (PLAVIX) 75 MG tablet take 1 tablet by mouth once daily Yes Yaneli Casey MD   atorvastatin (LIPITOR) 80 MG tablet Take 1 tablet by mouth daily Yes Maite Man, APRN - CNP   tamsulosin (FLOMAX) 0.4 MG capsule take 1 capsule by mouth twice a day Yes Yaneli Casey MD   acetaminophen (TYLENOL) 325 MG tablet Take 325-650 mg by mouth every 6 hours as needed Yes Historical Provider, MD   Zinc Sulfate (ZINC 15 PO) Take by mouth  Yes Historical Provider, MD   aspirin 81 MG chewable tablet Take 1 tablet by mouth daily Yes Melia Min MD   magnesium oxide (MAG-OX) 400 MG tablet Take 400 mg by mouth daily  Yes Historical Provider, MD   Tammy Kelley [address] [facility] by Does not apply route Exp 5 years Yes Yaneli Casey MD   Multiple Vitamins-Minerals (MULTI COMPLETE PO) Take by mouth Yes Historical Provider, MD        There are no discontinued medications.     Allergies   Allergen Reactions    Ace Inhibitors Other (See Comments)     Cough      Lisinopril Other (See Comments)     Cough         Past Medical History:   Diagnosis Date    Actinic keratosis     Basal cell carcinoma, trunk 12/2017    left upper back    Basal cell carcinoma, trunk     BPH (benign prostatic hyperplasia)     CAD (coronary artery disease) 2012    has 4 cardiac stents    Heart attack ([address]     History of MI (myocardial infarction) 12/11/2020    History of mycobacterial infection 6/22/2018    Hyperlipidemia     meds > 15 yrs    Hypertension     meds > 6 yrs / denies TIA or stroke    Hypertrophic scar     Myocardial infarct ([address] 11/2012    Osteoarthritis     all joints    Overweight 12/22/2017    Status post coronary angioplasty     Thyroid disease     meds > 1 month       Past Surgical History:   Procedure Laterality Date    ACHILLES TENDON SURGERY Left 7/10/2020    LEFT REPAIR OF RUPTURED ACHILLES TENDON, performed by Ferny Dasilva DPM at [facility] Bilateral 2015    COLONOSCOPY  3/23/15        COLONOSCOPY N/A 5/11/2020    COLORECTAL CANCER SCREENING, HIGH RISK performed by Vahid Escobar MD at [facility]      CYSTOSCOPY  2017    W/COMPLEX CYSTOMETROGRAM-COMPLEX UROFLOW-TRANSRECTAL US PROSTATE    EYE SURGERY      Lasik OU    JOINT REPLACEMENT Left     LTKR    [place] TOTAL KNEE ARTHROPLASTY Right 3/15/2018    RIGHT KNEE TOTAL KNEE  ARTHROPLASTY performed by Malissa Solomon MD at [facility] Right     twice    SHOULDER SURGERY Right      RCR    TONSILLECTOMY      TURP N/A 2017    GREEN LIGHT LASER TRANSURETHRAL RESECTIOIN PROSTATE performed by Claudette Ronquillo MD at [address] [place] [address] PROSTATE/TRANSRECTAL  06/03/15    Cystoscop, cath rem, US prostate    UVULECTOMY      due to snoring       Social History     Socioeconomic History    Marital status:      Spouse name: Not on file    Number of children: Not on file    Years of education: Not on file    Highest education level: Not on file   Occupational History    Not on file   Tobacco Use    Smoking status: Former Smoker     Packs/day: 1.00     Years: 7.00     Pack years: 7.00     Start date: 1961     Quit date: 3/17/1967     Years since quittin.2    Smokeless tobacco: Never Used    Tobacco comment: Quit for [id number]   Vaping Use    Vaping Use: Never used   Substance and Sexual Activity    Alcohol use: No     Types: 30 Cans of beer per week     Comment: No alcohol  since     Drug use: No    Sexual activity: Not on file   Other Topics Concern    Not on file   Social History Narrative    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     Social Determinants of Health     Financial Resource Strain: Low Risk     Difficulty of Paying Living Expenses: Not hard at all   Food Insecurity: No Food Insecurity    Worried About [address] in the Last Year: Never true    [facility] in the Last Year: Never true   Transportation Needs:  Lack of Transportation (Medical): Not on file    Lack of Transportation (Non-Medical): Not on file   Physical Activity:     Days of Exercise per Week: Not on file    Minutes of Exercise per Session: Not on file   Stress:     Feeling of Stress : Not on file   Social Connections:     Frequency of Communication with Friends and Family: Not on file    Frequency of Social Gatherings with Friends and Family: Not on file    Attends Yazdanism Services: Not on file    Active Member of [address] [place] [address] [employer] or Organizations: Not on file    Attends Club or Organization Meetings: Not on file    Marital Status: Not on file   Intimate Partner Violence:     Fear of Current or Ex-Partner: Not on file    Emotionally Abused: Not on file    Physically Abused: Not on file    Sexually Abused: Not on file   Housing Stability:     Unable to Pay for Housing in the Last Year: Not on file    Number of [place] in the Last Year: Not on file    Unstable Housing in the Last Year: Not on file        Family History   Problem Relation Age of Onset    Kidney Disease Father     Other Mother         Perforated intestine    No Known Problems Sister     No Known Problems Brother     No Known Problems Sister     No Known Problems Brother     No Known Problems Son        Vitals:    06/08/22 0736   Pulse: 54   Temp: (!) 96.5 °F (35.8 °C)   SpO2: 98%       Estimated body mass index is 28.92 kg/m² as calculated from the following:    Height as of 5/9/22: 5' 6\" (1.676 m). Weight as of 5/9/22: 179 lb 3.2 oz (81.3 kg). No results for input(s): WBC, RBC, HGB, HCT, MCV, MCH, MCHC, RDW, PLT, MPV in the last 72 hours. No results for input(s): NA, K, CL, CO2, BUN, CREATININE, GLUCOSE, CALCIUM, PROT, LABALBU, BILITOT, ALKPHOS, AST, ALT in the last 72 hours. Lab Results   Component Value Date    LABA1C 5.4 06/02/2019       No results found. Physical Exam  Constitutional:       General: He is not in acute distress.      Appearance: Normal appearance. HENT:      Head: Normocephalic and atraumatic. Eyes:      Extraocular Movements: Extraocular movements intact. Conjunctiva/sclera: Conjunctivae normal.   Musculoskeletal:         General: No deformity. Normal range of motion. Skin:     Findings: No lesion or rash. Comments: Actinic keratosis of the face, ears and scalp x10   Neurological:      General: No focal deficit present. Mental Status: He is alert. Mental status is at baseline. Psychiatric:         Mood and Affect: Mood normal.         Behavior: Behavior normal.         Thought Content: Thought content normal.         ASSESSMENT/PLAN:  1. Actinic keratosis  Cryotherapy Actinic Keratosis:   Reason for treatment: It was explained that actinic keratosis are precancerous. Consent: The patient understood all the risks and benefits prior to treatment. The risks explained included scarring, hyper and/or hypopigmentation. Although this treatment is highly effective, recurrences do occur and this was explained to the patient. The patient further understood that these lesions are precancerous and may develop into a malignancy. Number of lesions treated/ location: face, scalp, ears x 10    Method: Liquid nitrogen was used to treat the lesion(s) with two freeze-thaw cycles. Post-op: The patient was instructed to clean the site normally twice a day. Signs of infection were reviewed and patient was instructed to call if he/ she develops increasing pain, purulent drainage, or beefy redness. The patient was informed that a blister may occur at the cryo site and that this is an expected event. Coni Early protection was reviewed and patient advised to use sunscreen with SPF 30 or greater on exposed skin when outdoors. Post-op instructions were given orally and in writing.        2. History of basal cell carcinoma (BCC) of skin        There are no discontinued medications.    ---------------------------------------------------------------------  Side effects, adverse effects of the medication prescribed today, as well as treatment plan/ rationale and result expectations have been discussed with the patient who expresses understanding and desires to proceed. Close follow up to evaluate treatment results and for coordination of care. I have reviewed the patient's medical history in detail and updated the computerized patient record. As always, patient is advised that if symptoms worsen in any way they will proceed to the nearest emergency room. --------------------------------------------------------------------    Return if symptoms worsen or fail to improve. An  electronic signature was used to authenticate this note.     --Jh Neal DO on 6/8/2022 at 7:50 AM

## 2022-06-24 ENCOUNTER — OFFICE VISIT (OUTPATIENT)
Dept: FAMILY MEDICINE CLINIC | Age: 77
End: 2022-06-24
Payer: MEDICARE

## 2022-06-24 VITALS
DIASTOLIC BLOOD PRESSURE: 72 MMHG | HEIGHT: 68 IN | BODY MASS INDEX: 26.98 KG/M2 | OXYGEN SATURATION: 99 % | HEART RATE: 67 BPM | WEIGHT: 178 LBS | SYSTOLIC BLOOD PRESSURE: 124 MMHG

## 2022-06-24 DIAGNOSIS — D75.9 THICK BLOOD: ICD-10-CM

## 2022-06-24 DIAGNOSIS — Z00.00 MEDICARE ANNUAL WELLNESS VISIT, SUBSEQUENT: Primary | ICD-10-CM

## 2022-06-24 LAB
INR BLD: 1.1
PROTHROMBIN TIME: 14 SEC (ref 12.3–14.9)

## 2022-06-24 PROCEDURE — G0439 PPPS, SUBSEQ VISIT: HCPCS | Performed by: NURSE PRACTITIONER

## 2022-06-24 PROCEDURE — 1123F ACP DISCUSS/DSCN MKR DOCD: CPT | Performed by: NURSE PRACTITIONER

## 2022-06-24 ASSESSMENT — PATIENT HEALTH QUESTIONNAIRE - PHQ9
SUM OF ALL RESPONSES TO PHQ9 QUESTIONS 1 & 2: 0
SUM OF ALL RESPONSES TO PHQ QUESTIONS 1-9: 0
2. FEELING DOWN, DEPRESSED OR HOPELESS: 0
SUM OF ALL RESPONSES TO PHQ QUESTIONS 1-9: 0
1. LITTLE INTEREST OR PLEASURE IN DOING THINGS: 0

## 2022-06-24 ASSESSMENT — LIFESTYLE VARIABLES: HOW OFTEN DO YOU HAVE A DRINK CONTAINING ALCOHOL: NEVER

## 2022-06-24 NOTE — PROGRESS NOTES
Medicare Annual Wellness Visit    Keaton Restrepo is here for Medicare AWV    Assessment & Plan   Medicare annual wellness visit, subsequent      Recommendations for Preventive Services Due: see orders and patient instructions/AVS.  Recommended screening schedule for the next 5-10 years is provided to the patient in written form: see Patient Instructions/AVS.     Return for Medicare Annual Wellness Visit in 1 year. Subjective   The following acute and/or chronic problems were also addressed today:    Had labs completed previously by Dr. Carmen Gonzalez which were all okay. Had positive covid antibody. Cut his arm a few weeks ago, blood \"hung down approx 2 inches and the width of a soda straw\". Then had another incident when he cut his arm and it clotted quickly. Concerned about how quickly he is clotting. Mentions getting cortisone injections in shoulders and knees previously and wanted to make sure the \"clot shot\" (covid vaccine) was not in there. Patient's complete Health Risk Assessment and screening values have been reviewed and are found in Flowsheets. The following problems were reviewed today and where indicated follow up appointments were made and/or referrals ordered.     Positive Risk Factor Screenings with Interventions:    Fall Risk:  Do you feel unsteady or are you worried about falling? : (!) yes (balance is not what it use to be he states)  2 or more falls in past year?: no  Fall with injury in past year?: no     Fall Risk Interventions:    · Home safety tips provided            General Health and ACP:  General  In general, how would you say your health is?: Fair (states that he feels he is going down hill and is concerned about blood clotting)  In the past 7 days, have you experienced any of the following: New or Increased Pain, New or Increased Fatigue, Loneliness, Social Isolation, Stress or Anger?: (!) Yes  Select all that apply: (!) New or Increased Fatigue  Do you get the social and emotional by mouth every 6 hours as needed Yes Historical Provider, MD   Zinc Sulfate (ZINC 15 PO) Take by mouth  Yes Historical Provider, MD   aspirin 81 MG chewable tablet Take 1 tablet by mouth daily Yes Nova Bess MD   magnesium oxide (MAG-OX) 400 MG tablet Take 400 mg by mouth daily  Yes Historical Provider, MD   Handicap Placard MISC by Does not apply route Exp 5 years Yes Elio Raman MD   Multiple Vitamins-Minerals (MULTI COMPLETE PO) Take by mouth Yes Historical Provider, MD       CareTeam (Including outside providers/suppliers regularly involved in providing care):   Patient Care Team:  Elio Raman MD as PCP - General (Family Medicine)  Elio Raman MD as PCP - [facility] Empaneled Provider  Laxmi Sims MD (Urology)  Zak Yan DO as Consulting Physician (Cardiology)     Reviewed and updated this visit:  Tobacco  Allergies  Meds  Med Hx  Surg Hx  Soc Hx  Fam Hx

## 2022-06-24 NOTE — PATIENT INSTRUCTIONS
Personalized Preventive Plan for Seferino Tomlinson - 6/24/2022  Medicare offers a range of preventive health benefits. Some of the tests and screenings are paid in full while other may be subject to a deductible, co-insurance, and/or copay. Some of these benefits include a comprehensive review of your medical history including lifestyle, illnesses that may run in your family, and various assessments and screenings as appropriate. After reviewing your medical record and screening and assessments performed today your provider may have ordered immunizations, labs, imaging, and/or referrals for you. A list of these orders (if applicable) as well as your Preventive Care list are included within your After Visit Summary for your review. Other Preventive Recommendations:    · A preventive eye exam performed by an eye specialist is recommended every 1-2 years to screen for glaucoma; cataracts, macular degeneration, and other eye disorders. · A preventive dental visit is recommended every 6 months. · Try to get at least 150 minutes of exercise per week or 10,000 steps per day on a pedometer . · Order or download the FREE \"Exercise & Physical Activity: Your Everyday Guide\" from [employer]. Call 1-580.460.4459 or search [employer] online. · You need 0999-3679 mg of calcium and 1976-9483 IU of vitamin D per day. It is possible to meet your calcium requirement with diet alone, but a vitamin D supplement is usually necessary to meet this goal.  · When exposed to the sun, use a sunscreen that protects against both UVA and UVB radiation with an SPF of 30 or greater. Reapply every 2 to 3 hours or after sweating, drying off with a towel, or swimming. · Always wear a seat belt when traveling in a car. Always wear a helmet when riding a bicycle or motorcycle.

## 2022-07-10 DIAGNOSIS — N13.8 BPH WITH OBSTRUCTION/LOWER URINARY TRACT SYMPTOMS: ICD-10-CM

## 2022-07-10 DIAGNOSIS — N40.1 BPH WITH OBSTRUCTION/LOWER URINARY TRACT SYMPTOMS: ICD-10-CM

## 2022-07-10 NOTE — TELEPHONE ENCOUNTER
Requesting medication refill.  Please approve or deny this request.    Rx requested:  Requested Prescriptions     Pending Prescriptions Disp Refills    tamsulosin (FLOMAX) 0.4 MG capsule [Pharmacy Med Name: TAMSULOSIN HCL 0.4 MG CAPSULE] 180 capsule 1     Sig: take 1 capsule by mouth twice a day       Last Office Visit:   5/9/2022    Next Visit Date:  Future Appointments   Date Time Provider Aracely Palmer   6/27/2023  9:30 AM Yazmin Riojas MD [facility] [place]

## 2022-07-11 RX ORDER — TAMSULOSIN HYDROCHLORIDE 0.4 MG/1
CAPSULE ORAL
Qty: 180 CAPSULE | Refills: 1 | Status: SHIPPED | OUTPATIENT
Start: 2022-07-11

## 2022-07-24 DIAGNOSIS — I25.10 CORONARY ARTERY DISEASE INVOLVING NATIVE CORONARY ARTERY OF NATIVE HEART WITHOUT ANGINA PECTORIS: ICD-10-CM

## 2022-07-24 NOTE — TELEPHONE ENCOUNTER
8 hours ago (6:57 AM)                   Future Appointments    Encounter Information    Provider Department Appt Notes   6/27/2023 Nicolette Villalpando MD [facility] Primary Care Return for Cyndi Raymond Annual Wellness Visit in 1 year.        Past Visits    Date Provider Specialty Visit Type Primary Dx   06/24/2022 NAOMY Lui - CNP Family Medicine Office Visit Medicare annual wellness visit, subsequent   06/08/2022 Darrel Whitley DO Family Medicine Office Visit Actinic keratosis   06/03/2022 Dallas Ortiz MD IP Unit Surgery    05/09/2022 Nicolette Villalpando MD Family Medicine Office Visit Chronic left shoulder pain   03/15/2022 Nicolette Villalpando MD Family Medicine Office Visit Coronary artery disease involving native coronary artery of native heart without angina pectoris

## 2022-07-25 RX ORDER — CLOPIDOGREL BISULFATE 75 MG/1
TABLET ORAL
Qty: 90 TABLET | Refills: 1 | Status: SHIPPED | OUTPATIENT
Start: 2022-07-25

## 2022-09-30 ENCOUNTER — OFFICE VISIT (OUTPATIENT)
Dept: FAMILY MEDICINE CLINIC | Age: 77
End: 2022-09-30
Payer: MEDICARE

## 2022-09-30 VITALS
BODY MASS INDEX: 27.74 KG/M2 | HEART RATE: 50 BPM | WEIGHT: 183 LBS | HEIGHT: 68 IN | DIASTOLIC BLOOD PRESSURE: 74 MMHG | OXYGEN SATURATION: 98 % | TEMPERATURE: 97.7 F | SYSTOLIC BLOOD PRESSURE: 120 MMHG

## 2022-09-30 DIAGNOSIS — M25.551 RIGHT HIP PAIN: Primary | ICD-10-CM

## 2022-09-30 PROCEDURE — 99213 OFFICE O/P EST LOW 20 MIN: CPT | Performed by: FAMILY MEDICINE

## 2022-09-30 PROCEDURE — 1123F ACP DISCUSS/DSCN MKR DOCD: CPT | Performed by: FAMILY MEDICINE

## 2022-09-30 RX ORDER — METHYLPREDNISOLONE 4 MG/1
TABLET ORAL
Qty: 1 KIT | Refills: 0 | Status: SHIPPED | OUTPATIENT
Start: 2022-09-30

## 2022-09-30 NOTE — PROGRESS NOTES
Subjective  Chief Complaint   Patient presents with    Hip Pain     Right hip, x 1 year, only thing helped was cortisone injections        Fang oDwd is a 69 y.o. male    Back Pain  Patient presents for evaluation of low back problems. Symptoms have been present for about a year and include pain in the right hip. Had seen Dr. Elmer Armas and then Dr. Karen Younger     Determined problem to be in the right hip    He had injections in the hip which did help    Patient's medications, allergies, past medical, surgical, social and family histories were reviewed and updated as appropriate. ROS  Constitutional: negative for chills, fevers, malaise and sweats  Respiratory: negative for cough, dyspnea on exertion and shortness of breath  Cardiovascular: negative for chest pain and palpitations  Musculoskeletal:positive for back pain and  negative for muscle weakness  Neurological: negative for coordination problems, gait problems, tremors, no bowel or bladder incontinence, no saddle anesthesia    Objective  /74 (Site: Left Upper Arm)   Pulse 50   Temp 97.7 °F (36.5 °C)   Ht 5' 8\" (1.727 m)   Wt 183 lb (83 kg)   SpO2 98%   BMI 27.83 kg/m²     Antalgic gait on right hip   Pain with ROM    Assessment   Diagnosis Orders   1.  Right hip pain  methylPREDNISolone (MEDROL DOSEPACK) 4 MG tablet    XR HIP RIGHT (2-3 VIEWS)    External Referral - Rosalia Rico MD - Joint Replacement, Arthroscopic Surgery, Sports Med            Plan    Rest,Ice/Heat  Medrol ana m     Analgesics  XR hip   Referral back to ortho        lBessing Anderson MD

## 2022-10-17 NOTE — PROGRESS NOTES
Chaperone for Intimate Exam    1. Was chaperone offered as part of the rooming process? offered, declined   2. If Chaperone is declined by patient, NA: chaperone was available and exam completed  3.  Chaperone is n/a Wound care instructions given to patient  STERI-STRIPS (BUTTERFLY) POST SURGERY        Steri-Strips have been placed over your incision site to give added support  Please continue to bathe the area as usual     Eventually the Steri-Strips will begin to peel off on the ends  Snip the raised ends off with scissors and let the rest fall off on their own  If you have any questions, please call our office   [phone]

## 2022-10-18 DIAGNOSIS — E03.9 HYPOTHYROIDISM, UNSPECIFIED TYPE: ICD-10-CM

## 2022-10-18 NOTE — TELEPHONE ENCOUNTER
Future Appointments    Encounter Information    Provider Department Appt Notes   6/27/2023 Mayela Reynoso MD [facility] Primary Care Return for [facility] Annual Wellness Visit in 1 year.      Past Visits    Date Provider Specialty Visit Type Primary Dx   09/30/2022 Mayela Reynoso MD Family Medicine Office Visit Right hip pain   06/24/2022 NAOMY Araujo - CNP Family Medicine Office Visit Medicare annual wellness visit, subsequent

## 2022-10-19 RX ORDER — LEVOTHYROXINE SODIUM 0.05 MG/1
TABLET ORAL
Qty: 90 TABLET | Refills: 1 | Status: SHIPPED | OUTPATIENT
Start: 2022-10-19

## 2022-11-22 ENCOUNTER — OFFICE VISIT (OUTPATIENT)
Dept: FAMILY MEDICINE CLINIC | Age: 77
End: 2022-11-22
Payer: MEDICARE

## 2022-11-22 VITALS
TEMPERATURE: 97.4 F | OXYGEN SATURATION: 98 % | WEIGHT: 187.2 LBS | BODY MASS INDEX: 28.37 KG/M2 | HEART RATE: 44 BPM | HEIGHT: 68 IN | DIASTOLIC BLOOD PRESSURE: 72 MMHG | SYSTOLIC BLOOD PRESSURE: 114 MMHG

## 2022-11-22 DIAGNOSIS — E03.9 HYPOTHYROIDISM, UNSPECIFIED TYPE: ICD-10-CM

## 2022-11-22 DIAGNOSIS — M19.90 OSTEOARTHRITIS, UNSPECIFIED OSTEOARTHRITIS TYPE, UNSPECIFIED SITE: ICD-10-CM

## 2022-11-22 DIAGNOSIS — M16.11 PRIMARY OSTEOARTHRITIS OF RIGHT HIP: Primary | ICD-10-CM

## 2022-11-22 DIAGNOSIS — I25.10 CORONARY ARTERY DISEASE INVOLVING NATIVE CORONARY ARTERY OF NATIVE HEART WITHOUT ANGINA PECTORIS: ICD-10-CM

## 2022-11-22 DIAGNOSIS — I10 ESSENTIAL HYPERTENSION: ICD-10-CM

## 2022-11-22 DIAGNOSIS — M25.551 RIGHT HIP PAIN: ICD-10-CM

## 2022-11-22 DIAGNOSIS — Z01.818 PRE-OP EXAM: ICD-10-CM

## 2022-11-22 DIAGNOSIS — E78.2 MIXED HYPERLIPIDEMIA: ICD-10-CM

## 2022-11-22 PROCEDURE — 3078F DIAST BP <80 MM HG: CPT | Performed by: FAMILY MEDICINE

## 2022-11-22 PROCEDURE — 3074F SYST BP LT 130 MM HG: CPT | Performed by: FAMILY MEDICINE

## 2022-11-22 PROCEDURE — 99213 OFFICE O/P EST LOW 20 MIN: CPT | Performed by: FAMILY MEDICINE

## 2022-11-22 PROCEDURE — 1123F ACP DISCUSS/DSCN MKR DOCD: CPT | Performed by: FAMILY MEDICINE

## 2022-11-22 NOTE — PROGRESS NOTES
Chief Complaint   Patient presents with    Pre-op Exam     Medical liz for hip replacement        HPI:  Zak Kent is a 69 y.o. male    Pre op exam/clearance   Right hip replacement   12/12 at [facility]  Dr. Naga Chand     History MI  Hx CAD  Statin, b-blocker, asa, plavix    He is to have a cardiology clearance appt   Last intervention was over a year ago     He denies any CP or SOB    Aside from hip he is feeling well    Frustrated because affects activity and has gained some weight     Wt Readings from Last 3 Encounters:   11/22/22 187 lb 3.2 oz (84.9 kg)   09/30/22 183 lb (83 kg)   06/24/22 178 lb (80.7 kg)         Patient Active Problem List   Diagnosis    Hyperlipidemia    Hypertension    Osteoarthritis    CAD (coronary artery disease)    Actinic keratosis    BPH with obstruction/lower urinary tract symptoms    Urinary retention    Basal cell carcinoma, trunk    Overweight    Status post LASIK surgery    Osteoarthritis of right knee    Infection of toe    Hypertrophic scar    Status post total right knee replacement    ARB intolerance    Generalized abdominal pain    Senile nuclear sclerosis    Regular astigmatism    Presbyopia    Acute pain of left shoulder    Rotator cuff tendinitis, left    Osteoarthritis of left shoulder    Biceps strain, left, initial encounter    History of colon polyps    History of MI (myocardial infarction)    Angina at rest [facility])    Chest pain                 Past Medical History:   Diagnosis Date    Actinic keratosis     Basal cell carcinoma, trunk 12/2017    left upper back    Basal cell carcinoma, trunk     BPH (benign prostatic hyperplasia)     CAD (coronary artery disease) 2012    has 4 cardiac stents    Heart attack ([address]     History of MI (myocardial infarction) 12/11/2020    History of mycobacterial infection 6/22/2018    Hyperlipidemia     meds > 15 yrs    Hypertension     meds > 6 yrs / denies TIA or stroke    Hypertrophic scar     Myocardial infarct ([address] 11/2012 Osteoarthritis     all joints    Overweight 2017    Status post coronary angioplasty     Thyroid disease     meds > 1 month     Past Surgical History:   Procedure Laterality Date    ACHILLES TENDON SURGERY Left 7/10/2020    LEFT REPAIR OF RUPTURED ACHILLES TENDON, performed by Barbara Colbert DPM at [address] Bilateral 2015    COLONOSCOPY  3/23/15        COLONOSCOPY N/A 2020    COLORECTAL CANCER SCREENING, HIGH RISK performed by Meir Boone MD at [address]  2012    CYSTOSCOPY  2017    W/COMPLEX CYSTOMETROGRAM-COMPLEX UROFLOW-TRANSRECTAL US PROSTATE    EYE SURGERY      Lasik OU    JOINT REPLACEMENT Left     LTKR    MN TOTAL KNEE ARTHROPLASTY Right 3/15/2018    RIGHT KNEE TOTAL KNEE  ARTHROPLASTY performed by Asia Paul MD at [address] Right     twice    SHOULDER SURGERY Right      RCR    TONSILLECTOMY      TURP N/A 2017    GREEN LIGHT LASER TRANSURETHRAL RESECTIOIN PROSTATE performed by Milana Oropeza MD at [facility] PROSTATE/TRANSRECTAL  06/03/15    Cystoscop, cath rem, US prostate    UVULECTOMY      due to snoring     Family History   Problem Relation Age of Onset    Kidney Disease Father     Other Mother         Perforated intestine    No Known Problems Sister     No Known Problems Brother     No Known Problems Sister     No Known Problems Brother     No Known Problems Son      Social History     Socioeconomic History    Marital status:      Spouse name: None    Number of children: None    Years of education: None    Highest education level: None   Tobacco Use    Smoking status: Former     Packs/day: 1.00     Years: 7.00     Pack years: 7.00     Types: Cigarettes     Start date: 1961     Quit date: 3/17/1967     Years since quittin.7    Smokeless tobacco: Never    Tobacco comments:     Quit for [facility] [employer] Use Vaping Use: Never used   Substance and Sexual Activity    Alcohol use: No     Types: 30 Cans of beer per week     Comment: No alcohol  since February, 1987    Drug use: No   Social History Narrative    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 Food Insecurity    Worried About [facility] in the Last Year: Never true    [facility] in the Last Year: Never true   Physical Activity: Sufficiently Active    Days of Exercise per Week: 3 days    Minutes of Exercise per Session: 50 min     Current Outpatient Medications   Medication Sig Dispense Refill    levothyroxine (SYNTHROID) 50 MCG tablet take 1 tablet by mouth once daily 90 tablet 1    clopidogrel (PLAVIX) 75 MG tablet take 1 tablet by mouth once daily 90 tablet 1    tamsulosin (FLOMAX) 0.4 MG capsule take 1 capsule by mouth twice a day 180 capsule 1    nitroGLYCERIN (NITROSTAT) 0.4 MG SL tablet Place 1 tablet under the tongue every 5 minutes as needed for Chest pain Dissolve 1 tab under tongue at first sign of chest pain every 5 minutes as needed. If pain persists after taking 3 tabs in a 15-minute period, or the pain is different than is typically experienced, call 9-1-1 immediately.  25 tablet 1    carvedilol (COREG) 12.5 MG tablet take 1 tablet by mouth twice a day with food 180 tablet 1    finasteride (PROSCAR) 5 MG tablet take 1 tablet by mouth once daily 90 tablet 3    Ascorbic Acid (VITAMIN C) 250 MG tablet Take 250 mg by mouth daily      Niacin (VITAMIN B-3 PO) Take by mouth      atorvastatin (LIPITOR) 80 MG tablet Take 1 tablet by mouth daily 90 tablet 3    acetaminophen (TYLENOL) 325 MG tablet Take 325-650 mg by mouth every 6 hours as needed      Zinc Sulfate (ZINC 15 PO) Take by mouth       aspirin 81 MG chewable tablet Take 1 tablet by mouth daily 30 tablet 3    magnesium oxide (MAG-OX) 400 MG tablet Take 400 mg by mouth daily       Handicap Placard MISC by Does not apply route Exp 5 years 1 each 0    Multiple Vitamins-Minerals (MULTI COMPLETE PO) Take by mouth       No current facility-administered medications for this visit. Allergies   Allergen Reactions    Ace Inhibitors Other (See Comments)     Cough      Lisinopril Other (See Comments)     Cough         Review of Systems:   General ROS: negative   Respiratory ROS: coughing spells, mainly at nighttime  Cardiovascular ROS: +SERNA  Gastrointestinal ROS: no abdominal pain, change in bowel habits, or black or bloody stools  Genito-Urinary ROS: , BPH and ED  Musculoskeletal ROS: some hand joint pain  Neurological ROS: numbness in his hands    In general patient otherwise reports feeling well. Physical Exam:  /72 (Site: Left Upper Arm)   Pulse (!) 44   Temp 97.4 °F (36.3 °C)   Ht 5' 8\" (1.727 m)   Wt 187 lb 3.2 oz (84.9 kg)   SpO2 98%   BMI 28.46 kg/m²     Gen: Well, NAD, Alert, Oriented x 3   HEENT: EOMI, eyes clear, MMM  Skin: without rash or jaundice  Neck: no significant lymphadenopathy or thyromegaly  Lungs: CTA B w/out Rales/Wheezes/Rhonchi, Good respiratory effort   Heart: RRR, S1S2, occ ectopic beat  Ext: No C/C/E Bilaterally. A&P   Diagnosis Orders   1. Primary osteoarthritis of right hip        2. Coronary artery disease involving native coronary artery of native heart without angina pectoris        3. Hypothyroidism, unspecified type        4. Right hip pain        5. Essential hypertension        6. Mixed hyperlipidemia        7. Osteoarthritis, unspecified osteoarthritis type, unspecified site        8.  Pre-op exam            Medically clear for surgery    Will get cardiology clearance     Chronic conditions are stable  Continue current regimen  Follow up with appropriate specialists and here routinely for ongoing monitoring of chronic conditions    Avoid nsaids/asa leading up to surgery   Tylenol for pain           Romel Gray MD

## 2022-12-06 RX ORDER — CARVEDILOL 12.5 MG/1
TABLET ORAL
Qty: 180 TABLET | Refills: 1 | Status: SHIPPED | OUTPATIENT
Start: 2022-12-06

## 2023-01-07 DIAGNOSIS — N40.1 BPH WITH OBSTRUCTION/LOWER URINARY TRACT SYMPTOMS: ICD-10-CM

## 2023-01-07 DIAGNOSIS — N13.8 BPH WITH OBSTRUCTION/LOWER URINARY TRACT SYMPTOMS: ICD-10-CM

## 2023-01-09 RX ORDER — TAMSULOSIN HYDROCHLORIDE 0.4 MG/1
CAPSULE ORAL
Qty: 180 CAPSULE | Refills: 1 | Status: SHIPPED | OUTPATIENT
Start: 2023-01-09

## 2023-01-09 NOTE — TELEPHONE ENCOUNTER
[id number]        To be filled at: [address], [address]          Medication Refill  (Newest Message First)  Tamia Whyte In routed conversation to Myrna Smith Pcp Clinical Staff 2 days ago      Future Appointments    Encounter Information    Provider Department Appt Notes   6/27/2023 Chanelle Roberts MD [facility] Primary Care Return for [facility] Annual Wellness Visit in 1 year. Past Visits    Date Provider Specialty Visit Type Primary Dx   11/22/2022 Chanelle Roberts MD Family Medicine Office Visit Primary osteoarthritis of right hip   09/30/2022 Chanelle Roberts MD Family Medicine Office Visit Right hip pain   06/24/2022 NAOMY Hardin CNP Family Medicine Office Visit Medicare annual wellness visit, subsequent   06/08/2022 Christopher Roa DO Family Medicine Office Visit Actinic keratosis   06/03/2022 Renaldo Aguilar MD IP Unit Surgery     Department [facility] Department does not match this Encounter Department:  Login Department: [facility]  Encounter Department: Myrna Smith Pcp  Encounter Provider: Tim Saint     This information is used for printing, e-prescribing and last encounter SmartLinks. To modify the encounter department or provider, click the Change Encounter Details link below.   Change Encounter Details  Change Encounter Details

## 2023-01-16 ENCOUNTER — TELEPHONE (OUTPATIENT)
Dept: FAMILY MEDICINE CLINIC | Age: 78
End: 2023-01-16

## 2023-01-16 NOTE — TELEPHONE ENCOUNTER
----- Message from Almanivia Brandt sent at 1/13/2023  3:17 PM EST -----  Subject: Message to Provider    QUESTIONS  Information for Provider? Patient wants to schedule with Andrzej Castro DO Dermatology   ---------------------------------------------------------------------------  --------------  [id number] [id number]  [phone]; OK to leave message on voicemail  ---------------------------------------------------------------------------  --------------  SCRIPT ANSWERS  Relationship to Patient?  Self

## 2023-01-17 DIAGNOSIS — E78.2 MIXED HYPERLIPIDEMIA: ICD-10-CM

## 2023-01-17 RX ORDER — ATORVASTATIN CALCIUM 80 MG/1
TABLET, FILM COATED ORAL
Qty: 90 TABLET | Refills: 3 | OUTPATIENT
Start: 2023-01-17

## 2023-01-19 ENCOUNTER — OFFICE VISIT (OUTPATIENT)
Dept: FAMILY MEDICINE CLINIC | Age: 78
End: 2023-01-19
Payer: COMMERCIAL

## 2023-01-19 VITALS — BODY MASS INDEX: 28.34 KG/M2 | TEMPERATURE: 97.8 F | WEIGHT: 187 LBS | HEIGHT: 68 IN

## 2023-01-19 DIAGNOSIS — L57.0 ACTINIC KERATOSES: Primary | ICD-10-CM

## 2023-01-19 PROCEDURE — 17004 DESTROY PREMAL LESIONS 15/>: CPT | Performed by: FAMILY MEDICINE

## 2023-01-19 PROCEDURE — 1123F ACP DISCUSS/DSCN MKR DOCD: CPT | Performed by: FAMILY MEDICINE

## 2023-01-19 PROCEDURE — 99213 OFFICE O/P EST LOW 20 MIN: CPT | Performed by: FAMILY MEDICINE

## 2023-01-19 ASSESSMENT — PATIENT HEALTH QUESTIONNAIRE - PHQ9
SUM OF ALL RESPONSES TO PHQ9 QUESTIONS 1 & 2: 0
SUM OF ALL RESPONSES TO PHQ QUESTIONS 1-9: 0
1. LITTLE INTEREST OR PLEASURE IN DOING THINGS: 0
SUM OF ALL RESPONSES TO PHQ QUESTIONS 1-9: 0
SUM OF ALL RESPONSES TO PHQ QUESTIONS 1-9: 0
2. FEELING DOWN, DEPRESSED OR HOPELESS: 0
SUM OF ALL RESPONSES TO PHQ QUESTIONS 1-9: 0

## 2023-01-19 ASSESSMENT — ENCOUNTER SYMPTOMS: COLOR CHANGE: 1

## 2023-01-19 NOTE — PATIENT INSTRUCTIONS
Cryotherapy instructions    Post op instructions given. A printed copy provided.    It is best to leave blisters alone if they form for the first 1-3 days to allow the desired damaged tissue (precancer lesion, wart, or whatever lesion is being removed) to separate from healthy tissue.     The area should be covered with a bandage to prevent blister breakage and dirt exposure.      The wounds should remain dry while there is a blister, therefore if this is a sweaty location, like the foot ,you may need to change clothing, such as socks, multiple times per day.       Remember that the reason for cryosurgery to be done is to damage skin that is not normal so that it will be removed.  Therefore the area could be red or pink, can sting or burn for the first day or 2, will appear raw when the skin sloughs off.    When the blister(s) pop or patient removes the top as instructed between day 3-5, apply antibiotic (NOT triple antibiotic, one brand is Neosporin) ointment, usually Bacitracin, and a bandage to affected area(s).  The ointment should be applied to the open area as long as it is not covered with skin.  Exposed tissue is meant to be moist.      Once a scab is formed the patient may stop applying ointment.  The scab may appear yellow while moist, don't confuse this with infection.  If the wound is infection pus will drain from the site. If this treatment was for a large wart you may note that a plug of skin may fall out of the area that was treated.  That is the center of the wart and it is appropriate for it to come out.  If exposed skin remains, treat that area as you would a ruptured blister as mentioned above.    Bacitracin sample supplied

## 2023-01-19 NOTE — PROGRESS NOTES
Diagnosis Orders   1. Actinic keratoses  81611 - NM DESTRUC PREMALIGNANT,15+ LESIONS          Return for 3-6 months for skin check. Orders Placed This Encounter   Procedures    01231 - Kelly 76 was seen today for skin exam.    Diagnoses and all orders for this visit:    Actinic keratoses  -     83744 - NM DESTRUC PREMALIGNANT,15+ LESIONS      Return for 3-6 months for skin check. Patient Instructions   Cryotherapy instructions    Post op instructions given. A printed copy provided. It is best to leave blisters alone if they form for the first 1-3 days to allow the desired damaged tissue (precancer lesion, wart, or whatever lesion is being removed) to separate from healthy tissue. The area should be covered with a bandage to prevent blister breakage and dirt exposure. The wounds should remain dry while there is a blister, therefore if this is a sweaty location, like the foot ,you may need to change clothing, such as socks, multiple times per day. Remember that the reason for cryosurgery to be done is to damage skin that is not normal so that it will be removed. Therefore the area could be red or pink, can sting or burn for the first day or 2, will appear raw when the skin sloughs off. When the blister(s) pop or patient removes the top as instructed between day 3-5, apply antibiotic (NOT triple antibiotic, one brand is Neosporin) ointment, usually Bacitracin, and a bandage to affected area(s). The ointment should be applied to the open area as long as it is not covered with skin. Exposed tissue is meant to be moist.      Once a scab is formed the patient may stop applying ointment. The scab may appear yellow while moist, don't confuse this with infection. If the wound is infection pus will drain from the site. If this treatment was for a large wart you may note that a plug of skin may fall out of the area that was treated.   That is the center of the wart and it is appropriate for it to come out. If exposed skin remains, treat that area as you would a ruptured blister as mentioned above. Bacitracin sample supplied                 Patient is noticing a number of itchy lesions on his back. Number of rough lesions on his forehead and face and neck. History of actinic keratosis and skin cancer. Current Outpatient Medications on File Prior to Visit   Medication Sig Dispense Refill    tamsulosin (FLOMAX) 0.4 MG capsule take 1 capsule by mouth twice a day 180 capsule 1    carvedilol (COREG) 12.5 MG tablet take 1 tablet by mouth twice a day with food 180 tablet 1    levothyroxine (SYNTHROID) 50 MCG tablet take 1 tablet by mouth once daily 90 tablet 1    clopidogrel (PLAVIX) 75 MG tablet take 1 tablet by mouth once daily 90 tablet 1    nitroGLYCERIN (NITROSTAT) 0.4 MG SL tablet Place 1 tablet under the tongue every 5 minutes as needed for Chest pain Dissolve 1 tab under tongue at first sign of chest pain every 5 minutes as needed. If pain persists after taking 3 tabs in a 15-minute period, or the pain is different than is typically experienced, call 9-1-1 immediately. 25 tablet 1    finasteride (PROSCAR) 5 MG tablet take 1 tablet by mouth once daily 90 tablet 3    Ascorbic Acid (VITAMIN C) 250 MG tablet Take 250 mg by mouth daily      Niacin (VITAMIN B-3 PO) Take by mouth      atorvastatin (LIPITOR) 80 MG tablet Take 1 tablet by mouth daily 90 tablet 3    acetaminophen (TYLENOL) 325 MG tablet Take 325-650 mg by mouth every 6 hours as needed      Zinc Sulfate (ZINC 15 PO) Take by mouth       aspirin 81 MG chewable tablet Take 1 tablet by mouth daily 30 tablet 3    magnesium oxide (MAG-OX) 400 MG tablet Take 400 mg by mouth daily       Handicap Placard MISC by Does not apply route Exp 5 years 1 each 0    Multiple Vitamins-Minerals (MULTI COMPLETE PO) Take by mouth       No current facility-administered medications on file prior to visit.      Ace inhibitors and Lisinopril    Review of Systems   Constitutional:  Negative for chills and fever. Skin:  Positive for color change. Allergic/Immunologic: Negative for environmental allergies, food allergies and immunocompromised state. Hematological:  Negative for adenopathy. Does not bruise/bleed easily. Psychiatric/Behavioral:  Negative for behavioral problems and sleep disturbance. Temp 97.8 °F (36.6 °C)   Ht 5' 8\" (1.727 m)   Wt 187 lb (84.8 kg)   BMI 28.43 kg/m²   Physical Exam  Constitutional:       General: He is not in acute distress. Appearance: Normal appearance. He is well-developed. He is not toxic-appearing. HENT:      Head: Normocephalic and atraumatic. Right Ear: Hearing and tympanic membrane normal.      Left Ear: Hearing and tympanic membrane normal.      Nose: Nose normal. No nasal deformity. Eyes:      General: Lids are normal.         Right eye: No discharge. Left eye: No discharge. Conjunctiva/sclera: Conjunctivae normal.      Pupils: Pupils are equal, round, and reactive to light. Neck:      Thyroid: No thyroid mass or thyromegaly. Vascular: No JVD. Trachea: Trachea and phonation normal.   Cardiovascular:      Rate and Rhythm: Normal rate and regular rhythm. Pulmonary:      Effort: No accessory muscle usage or respiratory distress. Musculoskeletal:      Cervical back: Full passive range of motion without pain. Comments: FROM all large muscle groups and joints as witnessed when walking to exam table, getting on, and getting off the exam table. Skin:     General: Skin is warm and dry. Findings: No rash. Comments: Erythematous based rough flake lesion see consent for locations. Greater than 14 lesions total   Neurological:      Mental Status: He is alert. Motor: No tremor or atrophy.       Gait: Gait normal.   Psychiatric:         Speech: Speech normal.         Behavior: Behavior normal.         Thought Content: Thought content normal.         Procedure consent  The procedure was discussed with the patient.  All questions were answered and alternative options discussed.  The patient is aware of the risks of bleeding, infection, unsatisfactory scar result.  Informed consent paperwork was signed by the patient.    Liquid nitrogen was applied for 2-6 seconds to affected areas with thaw allowed between dosing for a total of 2 applications.  Applied to 23   lesion(s).     The patient tolerated the procedure well.        Diagnosis Orders   1. Actinic keratoses  02245 - RI DESTRUC PREMALIGNANT,15+ LESIONS          Return for 3-6 months for skin check.    Orders Placed This Encounter   Procedures    26444 - RI DESTRUC PREMALIGNANT,15+ LESIONS                 Patient Instructions   Cryotherapy instructions    Post op instructions given. A printed copy provided.    It is best to leave blisters alone if they form for the first 1-3 days to allow the desired damaged tissue (precancer lesion, wart, or whatever lesion is being removed) to separate from healthy tissue.     The area should be covered with a bandage to prevent blister breakage and dirt exposure.      The wounds should remain dry while there is a blister, therefore if this is a sweaty location, like the foot ,you may need to change clothing, such as socks, multiple times per day.       Remember that the reason for cryosurgery to be done is to damage skin that is not normal so that it will be removed.  Therefore the area could be red or pink, can sting or burn for the first day or 2, will appear raw when the skin sloughs off.    When the blister(s) pop or patient removes the top as instructed between day 3-5, apply antibiotic (NOT triple antibiotic, one brand is Neosporin) ointment, usually Bacitracin, and a bandage to affected area(s).  The ointment should be applied to the open area as long as it is not covered with skin.  Exposed tissue is meant to be moist.      Once a  scab is formed the patient may stop applying ointment. The scab may appear yellow while moist, don't confuse this with infection. If the wound is infection pus will drain from the site. If this treatment was for a large wart you may note that a plug of skin may fall out of the area that was treated. That is the center of the wart and it is appropriate for it to come out. If exposed skin remains, treat that area as you would a ruptured blister as mentioned above. Bacitracin sample supplied             DO NOT USE TRIPLE ANTIBIOTIC ON THE WOUND    It is best to leave blisters alone if they form. They should be covered with a bandage to prevent blister breakage and dirt exposure. The wounds should remain dry while there is a blister, therefore if this is a sweaty location like the foot you may need to change socks multiple times per day. If blister(s) pop or patient pops with a sterilized needle, apply antibiotic (NOT triple antibiotic, one brand is Neosporin) ointment and a bandage to affected area(s). The ointment should be applied to the open area as long as it is not covered with skin. Exposed tissue is meant to be moist.  Once a scab formed she may stop applying ointment. If this treatment was for a large wart you may note that a plug of skin may fall out of the area that was treated. That is the center of the wart and it is appropriate for it to come out. If exposed skin remains, treat that area as you would a ruptured blister as mentioned above.

## 2023-01-21 DIAGNOSIS — E78.2 MIXED HYPERLIPIDEMIA: ICD-10-CM

## 2023-01-23 RX ORDER — ATORVASTATIN CALCIUM 80 MG/1
TABLET, FILM COATED ORAL
Qty: 90 TABLET | Refills: 3 | OUTPATIENT
Start: 2023-01-23

## 2023-01-23 NOTE — TELEPHONE ENCOUNTER
Requesting medication refill. Please approve or deny this request.    Rx requested:  Requested Prescriptions     Pending Prescriptions Disp Refills    atorvastatin (LIPITOR) 80 MG tablet [Pharmacy Med Name: ATORVASTATIN 80 MG TABLET] 90 tablet 3     Sig: take 1 tablet by mouth once daily         Last Office Visit:   Visit date not found      Next Visit Date:  Future Appointments   Date Time Provider Aracely Palmer   6/27/2023  9:30 AM [facility]   7/20/2023 11:00 AM Zuly Lee MD [facility] AT [place]               Please approve or deny.

## 2023-01-28 DIAGNOSIS — E78.2 MIXED HYPERLIPIDEMIA: ICD-10-CM

## 2023-01-30 RX ORDER — ATORVASTATIN CALCIUM 80 MG/1
TABLET, FILM COATED ORAL
Qty: 90 TABLET | Refills: 3 | OUTPATIENT
Start: 2023-01-30

## 2023-01-30 NOTE — TELEPHONE ENCOUNTER
Requesting medication refill.  Please approve or deny this request.    Rx requested:  Requested Prescriptions     Pending Prescriptions Disp Refills    atorvastatin (LIPITOR) 80 MG tablet [Pharmacy Med Name: ATORVASTATIN 80 MG TABLET] 90 tablet 3     Sig: take 1 tablet by mouth once daily         Last Office Visit:   Visit date not found      Next Visit Date:  Future Appointments   Date Time Provider Aracely Palmer   6/27/2023  9:30 AM Karla May MD [facility]   7/20/2023 11:00 AM Lamont Quiroz MD [facility] EM[facility]Y [facility] C[facility] AT [place]

## 2023-02-04 DIAGNOSIS — E78.2 MIXED HYPERLIPIDEMIA: ICD-10-CM

## 2023-02-06 RX ORDER — ATORVASTATIN CALCIUM 80 MG/1
TABLET, FILM COATED ORAL
Qty: 90 TABLET | Refills: 0 | Status: SHIPPED | OUTPATIENT
Start: 2023-02-06 | End: 2023-02-09 | Stop reason: SDUPTHER

## 2023-02-06 NOTE — TELEPHONE ENCOUNTER
Requesting medication refill. Please approve or deny this request.    Rx requested:  Requested Prescriptions     Pending Prescriptions Disp Refills    atorvastatin (LIPITOR) 80 MG tablet [Pharmacy Med Name: ATORVASTATIN 80 MG TABLET] 90 tablet 3     Sig: take 1 tablet by mouth once daily         Last Office Visit:   Visit date not found      Next Visit Date:  Future Appointments   Date Time Provider Aracely Palmer   6/27/2023  9:30 AM Nevin Preston MD [facility]   7/20/2023 11:00 AM Forrest Caballero MD [facility]               Last refill 1/31/2022. Please approve or deny.

## 2023-02-09 DIAGNOSIS — E78.2 MIXED HYPERLIPIDEMIA: ICD-10-CM

## 2023-02-09 RX ORDER — ATORVASTATIN CALCIUM 80 MG/1
TABLET, FILM COATED ORAL
Qty: 90 TABLET | Refills: 2 | Status: SHIPPED | OUTPATIENT
Start: 2023-02-09

## 2023-02-09 NOTE — TELEPHONE ENCOUNTER
Comments: pt would like refills on this medication. Last Office Visit (last PCP visit):   11/22/2022    Next Visit Date:  Future Appointments   Date Time Provider Aracely Palmer   6/27/2023  9:30 AM Zeferino Bonner MD [facility] AT [place]   7/20/2023 11:00 AM Royal Zapata MD [facility] EMERGENCY [facility] AT [place]       **If hasn't been seen in over a year OR hasn't followed up according to last diabetes/ADHD visit, make appointment for patient before sending refill to provider.     Rx requested:  Requested Prescriptions     Pending Prescriptions Disp Refills    atorvastatin (LIPITOR) 80 MG tablet 90 tablet 0

## 2023-02-19 DIAGNOSIS — I25.10 CORONARY ARTERY DISEASE INVOLVING NATIVE CORONARY ARTERY OF NATIVE HEART WITHOUT ANGINA PECTORIS: ICD-10-CM

## 2023-02-19 NOTE — TELEPHONE ENCOUNTER
Comments:     Last Office Visit (last PCP visit):   11/22/2022    Next Visit Date:  Future Appointments   Date Time Provider Aracely Estela   6/27/2023  9:30 AM Renny Chauhan MD [facility] EMERGENCY [facility] CENTER AT [place]   7/20/2023 11:00 AM Jalil Chatterjee MD [facility] EMERGENCY [facility] AT [place]       **If hasn't been seen in over a year OR hasn't followed up according to last diabetes/ADHD visit, make appointment for patient before sending refill to provider.     Rx requested:  Requested Prescriptions     Pending Prescriptions Disp Refills    clopidogrel (PLAVIX) 75 MG tablet [Pharmacy Med Name: CLOPIDOGREL 75 MG TABLET] 90 tablet 1     Sig: take 1 tablet by mouth once daily

## 2023-02-20 RX ORDER — CLOPIDOGREL BISULFATE 75 MG/1
TABLET ORAL
Qty: 90 TABLET | Refills: 1 | Status: SHIPPED | OUTPATIENT
Start: 2023-02-20

## 2023-03-02 ENCOUNTER — OFFICE VISIT (OUTPATIENT)
Dept: FAMILY MEDICINE CLINIC | Age: 78
End: 2023-03-02
Payer: COMMERCIAL

## 2023-03-02 VITALS
HEART RATE: 60 BPM | DIASTOLIC BLOOD PRESSURE: 80 MMHG | TEMPERATURE: 96.2 F | HEIGHT: 68 IN | WEIGHT: 192.4 LBS | BODY MASS INDEX: 29.16 KG/M2 | SYSTOLIC BLOOD PRESSURE: 130 MMHG | OXYGEN SATURATION: 97 %

## 2023-03-02 DIAGNOSIS — M19.011 PRIMARY OSTEOARTHRITIS OF RIGHT SHOULDER: Primary | ICD-10-CM

## 2023-03-02 DIAGNOSIS — E03.9 HYPOTHYROIDISM, UNSPECIFIED TYPE: ICD-10-CM

## 2023-03-02 DIAGNOSIS — I25.10 CORONARY ARTERY DISEASE INVOLVING NATIVE CORONARY ARTERY OF NATIVE HEART WITHOUT ANGINA PECTORIS: ICD-10-CM

## 2023-03-02 DIAGNOSIS — E78.2 MIXED HYPERLIPIDEMIA: ICD-10-CM

## 2023-03-02 DIAGNOSIS — M16.11 PRIMARY OSTEOARTHRITIS OF RIGHT HIP: ICD-10-CM

## 2023-03-02 DIAGNOSIS — I10 ESSENTIAL HYPERTENSION: ICD-10-CM

## 2023-03-02 PROCEDURE — 99213 OFFICE O/P EST LOW 20 MIN: CPT | Performed by: FAMILY MEDICINE

## 2023-03-02 PROCEDURE — 3075F SYST BP GE 130 - 139MM HG: CPT | Performed by: FAMILY MEDICINE

## 2023-03-02 PROCEDURE — 3079F DIAST BP 80-89 MM HG: CPT | Performed by: FAMILY MEDICINE

## 2023-03-02 PROCEDURE — 1123F ACP DISCUSS/DSCN MKR DOCD: CPT | Performed by: FAMILY MEDICINE

## 2023-03-02 SDOH — ECONOMIC STABILITY: FOOD INSECURITY: WITHIN THE PAST 12 MONTHS, THE FOOD YOU BOUGHT JUST DIDN'T LAST AND YOU DIDN'T HAVE MONEY TO GET MORE.: NEVER TRUE

## 2023-03-02 SDOH — ECONOMIC STABILITY: FOOD INSECURITY: WITHIN THE PAST 12 MONTHS, YOU WORRIED THAT YOUR FOOD WOULD RUN OUT BEFORE YOU GOT MONEY TO BUY MORE.: NEVER TRUE

## 2023-03-02 SDOH — ECONOMIC STABILITY: HOUSING INSECURITY
IN THE LAST 12 MONTHS, WAS THERE A TIME WHEN YOU DID NOT HAVE A STEADY PLACE TO SLEEP OR SLEPT IN A SHELTER (INCLUDING NOW)?: NO

## 2023-03-02 SDOH — ECONOMIC STABILITY: INCOME INSECURITY: HOW HARD IS IT FOR YOU TO PAY FOR THE VERY BASICS LIKE FOOD, HOUSING, MEDICAL CARE, AND HEATING?: NOT HARD AT ALL

## 2023-03-02 NOTE — PROGRESS NOTES
Chief Complaint   Patient presents with    Pre-op Exam     Right shoulder replacement, liz        HPI:  Truong Syed is a 69 y.o. male    Pre op exam/clearance   Right shoulder replacement   3/30 Michaela Dillon     Did well with hip replacement     History MI  Hx CAD  Statin, b-blocker, asa, plavix      He denies any CP or SOB    Feeling well     Frustrated because affects activity and has gained some weight     Wt Readings from Last 3 Encounters:   03/02/23 192 lb 6.4 oz (87.3 kg)   01/19/23 187 lb (84.8 kg)   11/22/22 187 lb 3.2 oz (84.9 kg)         Patient Active Problem List   Diagnosis    Hyperlipidemia    Hypertension    Osteoarthritis    CAD (coronary artery disease)    Actinic keratosis    BPH with obstruction/lower urinary tract symptoms    Urinary retention    Basal cell carcinoma, trunk    Overweight    Status post LASIK surgery    Osteoarthritis of right knee    Infection of toe    Hypertrophic scar    Status post total right knee replacement    ARB intolerance    Generalized abdominal pain    Senile nuclear sclerosis    Regular astigmatism    Presbyopia    Acute pain of left shoulder    Rotator cuff tendinitis, left    Osteoarthritis of left shoulder    Biceps strain, left, initial encounter    History of colon polyps    History of MI (myocardial infarction)    Angina at rest [facility])    Chest pain                 Past Medical History:   Diagnosis Date    Actinic keratosis     Basal cell carcinoma, trunk 12/2017    left upper back    Basal cell carcinoma, trunk     BPH (benign prostatic hyperplasia)     CAD (coronary artery disease) 2012    has 4 cardiac stents    Heart attack ([address]     History of MI (myocardial infarction) 12/11/2020    History of mycobacterial infection 6/22/2018    Hyperlipidemia     meds > 15 yrs    Hypertension     meds > 6 yrs / denies TIA or stroke    Hypertrophic scar     Myocardial infarct ([address] 11/2012    Osteoarthritis     all joints    Overweight 12/22/2017    Status post coronary angioplasty     Thyroid disease     meds > 1 month     Past Surgical History:   Procedure Laterality Date    ACHILLES TENDON SURGERY Left 7/10/2020    LEFT REPAIR OF RUPTURED ACHILLES TENDON, performed by Juanito Valentine DPM at [facility] OR    CARPAL TUNNEL RELEASE Bilateral     COLONOSCOPY  3/23/15        COLONOSCOPY N/A 2020    COLORECTAL CANCER SCREENING, HIGH RISK performed by Prosper Dai MD at [facility] CENTER    CORONARY ANGIOPLASTY WITH STENT PLACEMENT      CYSTOSCOPY  2017    W/COMPLEX CYSTOMETROGRAM-COMPLEX UROFLOW-TRANSRECTAL US PROSTATE    EYE SURGERY      Lasik OU    JOINT REPLACEMENT Left     LTKR    OK ARTHRP KNE CONDYLE&PLATU MEDIAL&LAT COMPARTMENTS Right 3/15/2018    RIGHT KNEE TOTAL KNEE  ARTHROPLASTY performed by Elgin De Luna MD at [facility] OR    ROTATOR CUFF REPAIR Right     twice    SHOULDER SURGERY Right      RCR    TONSILLECTOMY      TURP N/A 2017    GREEN LIGHT LASER TRANSURETHRAL RESECTIOIN PROSTATE performed by Vickey Sanchez MD at [facility] OR    ULTRASOUND PROSTATE/TRANSRECTAL  06/03/15    Cystoscop, cath rem, US prostate    UVULECTOMY      due to snoring     Family History   Problem Relation Age of Onset    Kidney Disease Father     Other Mother         Perforated intestine    No Known Problems Sister     No Known Problems Brother     No Known Problems Sister     No Known Problems Brother     No Known Problems Son      Social History     Socioeconomic History    Marital status:      Spouse name: None    Number of children: None    Years of education: None    Highest education level: None   Tobacco Use    Smoking status: Former     Packs/day: 1.00     Years: 7.00     Pack years: 7.00     Types: Cigarettes     Start date: 1961     Quit date: 3/17/1967     Years since quittin.9    Smokeless tobacco: Never    Tobacco comments:     Quit for Lent & [employer]   Vaping Use    Vaping Use: Never used   Substance and Sexual  Activity    Alcohol use: No     Types: 30 Cans of beer per week     Comment: No alcohol  since February, 1987    Drug use: No   Social History Narrative    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 Food Insecurity    Worried About [address] in the Last Year: Never true    [address] [facility] [address] in the Last Year: Never true   Transportation Needs: Unknown    Lack of Transportation (Non-Medical): No   Physical Activity: Sufficiently Active    Days of Exercise per Week: 3 days    Minutes of Exercise per Session: 50 min   Housing Stability: Unknown    Unstable Housing in the Last Year: No     Current Outpatient Medications   Medication Sig Dispense Refill    atorvastatin (LIPITOR) 80 MG tablet take 1 tablet by mouth once daily 90 tablet 2    tamsulosin (FLOMAX) 0.4 MG capsule take 1 capsule by mouth twice a day 180 capsule 1    carvedilol (COREG) 12.5 MG tablet take 1 tablet by mouth twice a day with food 180 tablet 1    levothyroxine (SYNTHROID) 50 MCG tablet take 1 tablet by mouth once daily 90 tablet 1    nitroGLYCERIN (NITROSTAT) 0.4 MG SL tablet Place 1 tablet under the tongue every 5 minutes as needed for Chest pain Dissolve 1 tab under tongue at first sign of chest pain every 5 minutes as needed. If pain persists after taking 3 tabs in a 15-minute period, or the pain is different than is typically experienced, call 9-1-1 immediately.  25 tablet 1    finasteride (PROSCAR) 5 MG tablet take 1 tablet by mouth once daily 90 tablet 3    Ascorbic Acid (VITAMIN C) 250 MG tablet Take 250 mg by mouth daily      Niacin (VITAMIN B-3 PO) Take by mouth      acetaminophen (TYLENOL) 325 MG tablet Take 325-650 mg by mouth every 6 hours as needed      Zinc Sulfate (ZINC 15 PO) Take by mouth       aspirin 81 MG chewable tablet Take 1 tablet by mouth daily 30 tablet 3 magnesium oxide (MAG-OX) 400 MG tablet Take 400 mg by mouth daily       Handsiddhartha Kelley MISC by Does not apply route Exp 5 years 1 each 0    Multiple Vitamins-Minerals (MULTI COMPLETE PO) Take by mouth      clopidogrel (PLAVIX) 75 MG tablet take 1 tablet by mouth once daily (Patient not taking: Reported on 3/2/2023) 90 tablet 1     No current facility-administered medications for this visit. Allergies   Allergen Reactions    Ace Inhibitors Other (See Comments)     Cough      Lisinopril Other (See Comments)     Cough         Review of Systems:   General ROS: negative   Respiratory ROS: coughing spells, mainly at nighttime  Cardiovascular ROS: +SERNA  Gastrointestinal ROS: no abdominal pain, change in bowel habits, or black or bloody stools  Genito-Urinary ROS: , BPH and ED  Musculoskeletal ROS: right shoulder pain   Neurological ROS: numbness in his hands    In general patient otherwise reports feeling well. Physical Exam:  /80 (Site: Left Upper Arm)   Pulse 60   Temp (!) 96.2 °F (35.7 °C)   Ht 5' 8\" (1.727 m)   Wt 192 lb 6.4 oz (87.3 kg)   SpO2 97%   BMI 29.25 kg/m²     Gen: Well, NAD, Alert, Oriented x 3   HEENT: EOMI, eyes clear, MMM  Skin: without rash or jaundice  Neck: no significant lymphadenopathy or thyromegaly  Lungs: CTA B w/out Rales/Wheezes/Rhonchi, Good respiratory effort   Heart: RRR, S1S2, occ ectopic beat  Ext: No C/C/E Bilaterally. A&P   Diagnosis Orders   1. Primary osteoarthritis of right shoulder        2. Coronary artery disease involving native coronary artery of native heart without angina pectoris        3. Mixed hyperlipidemia        4. Primary osteoarthritis of right hip        5. Hypothyroidism, unspecified type        6.  Essential hypertension              Medically clear for surgery    I don't think he needs to see cardiology again since was so recently cleared for hip surgery and had no issues    Chronic conditions are stable  Continue current regimen  Follow up with appropriate specialists and here routinely for ongoing monitoring of chronic conditions    Avoid nsaids/asa leading up to surgery   Tylenol for pain           January Barron MD

## 2023-03-20 ENCOUNTER — HOSPITAL ENCOUNTER (EMERGENCY)
Age: 78
Discharge: HOME | End: 2023-03-20
Payer: COMMERCIAL

## 2023-03-20 VITALS
DIASTOLIC BLOOD PRESSURE: 89 MMHG | TEMPERATURE: 98.3 F | SYSTOLIC BLOOD PRESSURE: 138 MMHG | OXYGEN SATURATION: 99 % | RESPIRATION RATE: 16 BRPM | HEART RATE: 65 BPM | WEIGHT: 184 LBS | BODY MASS INDEX: 27.89 KG/M2 | HEIGHT: 68 IN

## 2023-03-20 DIAGNOSIS — E87.1 HYPONATREMIA: ICD-10-CM

## 2023-03-20 DIAGNOSIS — E87.5 HYPERKALEMIA: Primary | ICD-10-CM

## 2023-03-20 LAB
ALBUMIN SERPL-MCNC: 3.5 G/DL (ref 3.5–4.6)
ALP SERPL-CCNC: 69 U/L (ref 35–104)
ALT SERPL-CCNC: 52 U/L (ref 0–41)
ANION GAP SERPL CALCULATED.3IONS-SCNC: 9 MEQ/L (ref 9–15)
AST SERPL-CCNC: 35 U/L (ref 0–40)
BASOPHILS # BLD: 0 K/UL (ref 0–0.2)
BASOPHILS NFR BLD: 0.5 %
BILIRUB SERPL-MCNC: 0.4 MG/DL (ref 0.2–0.7)
BUN SERPL-MCNC: 12 MG/DL (ref 8–23)
CALCIUM SERPL-MCNC: 9.2 MG/DL (ref 8.5–9.9)
CHLORIDE SERPL-SCNC: 92 MEQ/L (ref 95–107)
CO2 SERPL-SCNC: 27 MEQ/L (ref 20–31)
CREAT SERPL-MCNC: 0.86 MG/DL (ref 0.7–1.2)
EOSINOPHIL # BLD: 0.3 K/UL (ref 0–0.7)
EOSINOPHIL NFR BLD: 4.8 %
ERYTHROCYTE [DISTWIDTH] IN BLOOD BY AUTOMATED COUNT: 14.6 % (ref 11.5–14.5)
GLOBULIN SER CALC-MCNC: 2.9 G/DL (ref 2.3–3.5)
GLUCOSE SERPL-MCNC: 96 MG/DL (ref 70–99)
HCT VFR BLD AUTO: 39.8 % (ref 42–52)
HGB BLD-MCNC: 13.3 G/DL (ref 14–18)
LYMPHOCYTES # BLD: 1.9 K/UL (ref 1–4.8)
LYMPHOCYTES NFR BLD: 33.4 %
MAGNESIUM SERPL-MCNC: 2.1 MG/DL (ref 1.7–2.4)
MCH RBC QN AUTO: 29.4 PG (ref 27–31.3)
MCHC RBC AUTO-ENTMCNC: 33.5 % (ref 33–37)
MCV RBC AUTO: 87.7 FL (ref 79–92.2)
MONOCYTES # BLD: 0.6 K/UL (ref 0.2–0.8)
MONOCYTES NFR BLD: 10.5 %
NEUTROPHILS # BLD: 2.9 K/UL (ref 1.4–6.5)
NEUTS SEG NFR BLD: 50.8 %
PLATELET # BLD AUTO: 160 K/UL (ref 130–400)
POTASSIUM SERPL-SCNC: 5 MEQ/L (ref 3.4–4.9)
PROT SERPL-MCNC: 6.4 G/DL (ref 6.3–8)
RBC # BLD AUTO: 4.54 M/UL (ref 4.7–6.1)
SODIUM SERPL-SCNC: 128 MEQ/L (ref 135–144)
WBC # BLD AUTO: 5.8 K/UL (ref 4.8–10.8)

## 2023-03-20 PROCEDURE — 36415 COLL VENOUS BLD VENIPUNCTURE: CPT

## 2023-03-20 PROCEDURE — 99284 EMERGENCY DEPT VISIT MOD MDM: CPT

## 2023-03-20 PROCEDURE — 93005 ELECTROCARDIOGRAM TRACING: CPT | Performed by: STUDENT IN AN ORGANIZED HEALTH CARE EDUCATION/TRAINING PROGRAM

## 2023-03-20 PROCEDURE — 80053 COMPREHEN METABOLIC PANEL: CPT

## 2023-03-20 PROCEDURE — 85025 COMPLETE CBC W/AUTO DIFF WBC: CPT

## 2023-03-20 PROCEDURE — 83735 ASSAY OF MAGNESIUM: CPT

## 2023-03-20 PROCEDURE — 2580000003 HC RX 258: Performed by: STUDENT IN AN ORGANIZED HEALTH CARE EDUCATION/TRAINING PROGRAM

## 2023-03-20 RX ORDER — 0.9 % SODIUM CHLORIDE 0.9 %
500 INTRAVENOUS SOLUTION INTRAVENOUS ONCE
Status: DISCONTINUED | OUTPATIENT
Start: 2023-03-20 | End: 2023-03-20

## 2023-03-20 RX ORDER — 0.9 % SODIUM CHLORIDE 0.9 %
1000 INTRAVENOUS SOLUTION INTRAVENOUS ONCE
Status: COMPLETED | OUTPATIENT
Start: 2023-03-20 | End: 2023-03-20

## 2023-03-20 RX ADMIN — SODIUM CHLORIDE 1000 ML: 9 INJECTION, SOLUTION INTRAVENOUS at 13:09

## 2023-03-20 ASSESSMENT — PAIN - FUNCTIONAL ASSESSMENT: PAIN_FUNCTIONAL_ASSESSMENT: NONE - DENIES PAIN

## 2023-03-20 NOTE — ED NOTES
Pt states was at clinic to have labs drawn, pt was told to come to ER due to potassium of 6.6. Pt denies any medical complaints at this time. Pt a+o x/s 4, abc's intact, skin warm and dry.      Adan Rashid  03/20/23 1124

## 2023-03-20 NOTE — ED PROVIDER NOTES
[facility] ED  EMERGENCY DEPARTMENT ENCOUNTER      Pt Name: Noman Funes  MRN: [id number]  [place] 1945  Date of evaluation: 3/20/2023  Provider: Lu Hamman, PA-C    CHIEF COMPLAINT       Chief Complaint   Patient presents with    Other     Pt sent here from clinic due to potassium levels. HISTORY OF PRESENT ILLNESS   (Location/Symptom, Timing/Onset, Context/Setting, Quality, Duration, Modifying Factors, Severity)  Note limiting factors. Noman Funes is a 69 y.o. male who patient reviews a past medical history of CAD hypertension BPH hyperlipidemia presents to the emergency department for evaluation of hyperkalemia. He had outpatient labs drawn on 3/18 which showed elevated potassium of 6.6. He was contacted by physician today to come to the emergency department for evaluation. He states that he is asymptomatic at this time. He states he did have diarrhea last week and since resolved. Denies history of similar. Denies fever chills chest pain shortness of breath abdominal pain current nausea vomiting diarrhea dizziness lightheadedness urinary sx back or flank pain. HPI    Nursing Notes were reviewed. REVIEW OF SYSTEMS    (2-9 systems for level 4, 10 or more for level 5)     Review of Systems   Constitutional:  Negative for chills and fever. HENT:  Negative for congestion. Eyes:  Negative for photophobia. Respiratory:  Negative for cough, shortness of breath and wheezing. Cardiovascular:  Negative for chest pain and palpitations. Gastrointestinal:  Negative for abdominal pain, nausea and vomiting. Genitourinary:  Negative for dysuria, frequency and hematuria. Musculoskeletal:  Negative for myalgias. Allergic/Immunologic: Negative for immunocompromised state. Neurological:  Negative for dizziness, weakness and headaches. All other systems reviewed and are negative.     Except as noted above the remainder of the review of systems was reviewed and

## 2023-03-21 ENCOUNTER — OFFICE VISIT (OUTPATIENT)
Dept: FAMILY MEDICINE CLINIC | Age: 78
End: 2023-03-21
Payer: COMMERCIAL

## 2023-03-21 VITALS
TEMPERATURE: 97.1 F | OXYGEN SATURATION: 96 % | BODY MASS INDEX: 28.13 KG/M2 | DIASTOLIC BLOOD PRESSURE: 74 MMHG | WEIGHT: 185 LBS | SYSTOLIC BLOOD PRESSURE: 124 MMHG | HEART RATE: 56 BPM

## 2023-03-21 DIAGNOSIS — E87.5 HYPERKALEMIA: Primary | ICD-10-CM

## 2023-03-21 DIAGNOSIS — E03.9 HYPOTHYROIDISM, UNSPECIFIED TYPE: ICD-10-CM

## 2023-03-21 LAB
EKG ATRIAL RATE: 57 BPM
EKG P AXIS: 57 DEGREES
EKG P-R INTERVAL: 166 MS
EKG Q-T INTERVAL: 404 MS
EKG QRS DURATION: 94 MS
EKG QTC CALCULATION (BAZETT): 393 MS
EKG R AXIS: 53 DEGREES
EKG T AXIS: 77 DEGREES
EKG VENTRICULAR RATE: 57 BPM

## 2023-03-21 PROCEDURE — 3074F SYST BP LT 130 MM HG: CPT | Performed by: FAMILY MEDICINE

## 2023-03-21 PROCEDURE — 93010 ELECTROCARDIOGRAM REPORT: CPT | Performed by: INTERNAL MEDICINE

## 2023-03-21 PROCEDURE — 3078F DIAST BP <80 MM HG: CPT | Performed by: FAMILY MEDICINE

## 2023-03-21 PROCEDURE — 1123F ACP DISCUSS/DSCN MKR DOCD: CPT | Performed by: FAMILY MEDICINE

## 2023-03-21 PROCEDURE — 99214 OFFICE O/P EST MOD 30 MIN: CPT | Performed by: FAMILY MEDICINE

## 2023-03-21 ASSESSMENT — ENCOUNTER SYMPTOMS
SHORTNESS OF BREATH: 0
WHEEZING: 0
ABDOMINAL PAIN: 0
VOMITING: 0
COUGH: 0
PHOTOPHOBIA: 0
NAUSEA: 0

## 2023-03-21 NOTE — PATIENT INSTRUCTIONS
Return in 2 to 4 weeks to have blood drawn. This is to check your thyroid levels and your potassium.

## 2023-03-21 NOTE — PROGRESS NOTES
CYSTOSCOPY  2017    W/COMPLEX CYSTOMETROGRAM-COMPLEX UROFLOW-TRANSRECTAL US PROSTATE    EYE SURGERY      Lasik OU    JOINT REPLACEMENT Left     LTKR    NV ARTHRP KNE CONDYLE&PLATU MEDIAL&LAT COMPARTMENTS Right 3/15/2018    RIGHT KNEE TOTAL KNEE  ARTHROPLASTY performed by Nahed Alegria MD at [address] Right     twice    SHOULDER SURGERY Right      RCR    TONSILLECTOMY      TURP N/A 2017    GREEN LIGHT LASER TRANSURETHRAL RESECTIOIN PROSTATE performed by Arina Young MD at [address] [facility] PROSTATE/TRANSRECTAL  06/03/15    Cystoscop, cath rem, US prostate    UVULECTOMY      due to snoring     Family History   Problem Relation Age of Onset    Kidney Disease Father     Other Mother         Perforated intestine    No Known Problems Sister     No Known Problems Brother     No Known Problems Sister     No Known Problems Brother     No Known Problems Son      Social History     Socioeconomic History    Marital status:      Spouse name: Not on file    Number of children: Not on file    Years of education: Not on file    Highest education level: Not on file   Occupational History    Not on file   Tobacco Use    Smoking status: Former     Packs/day: 1.00     Years: 7.00     Pack years: 7.00     Types: Cigarettes     Start date: 1961     Quit date: 3/17/1967     Years since quittin.0    Smokeless tobacco: Never    Tobacco comments:     Quit for  [employer]   Vaping Use    Vaping Use: Never used   Substance and Sexual Activity    Alcohol use: No     Types: 30 Cans of beer per week     Comment: No alcohol  since     Drug use: No    Sexual activity: Not on file   Other Topics Concern    Not on file   Social History Narrative    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

## 2023-03-27 ENCOUNTER — HOSPITAL ENCOUNTER (OUTPATIENT)
Dept: PREADMISSION TESTING | Age: 78
Discharge: HOME | End: 2023-03-31
Payer: COMMERCIAL

## 2023-03-27 VITALS
SYSTOLIC BLOOD PRESSURE: 141 MMHG | WEIGHT: 182.6 LBS | HEIGHT: 67 IN | BODY MASS INDEX: 28.66 KG/M2 | TEMPERATURE: 97.5 F | RESPIRATION RATE: 16 BRPM | OXYGEN SATURATION: 98 % | DIASTOLIC BLOOD PRESSURE: 72 MMHG | HEART RATE: 52 BPM

## 2023-03-27 LAB
ALBUMIN SERPL-MCNC: 3.9 G/DL (ref 3.5–4.6)
ALP SERPL-CCNC: 74 U/L (ref 35–104)
ALT SERPL-CCNC: 26 U/L (ref 0–41)
ANION GAP SERPL CALCULATED.3IONS-SCNC: 9 MEQ/L (ref 9–15)
APTT PPP: 39.8 SEC (ref 24.4–36.8)
AST SERPL-CCNC: 24 U/L (ref 0–40)
BASOPHILS # BLD: 0.1 K/UL (ref 0–0.2)
BASOPHILS NFR BLD: 0.7 %
BILIRUB SERPL-MCNC: 0.7 MG/DL (ref 0.2–0.7)
BILIRUB UR QL STRIP: NEGATIVE
BUN SERPL-MCNC: 16 MG/DL (ref 8–23)
CALCIUM SERPL-MCNC: 9.4 MG/DL (ref 8.5–9.9)
CHLORIDE SERPL-SCNC: 99 MEQ/L (ref 95–107)
CLARITY UR: CLEAR
CO2 SERPL-SCNC: 27 MEQ/L (ref 20–31)
COLOR UR: YELLOW
CREAT SERPL-MCNC: 0.91 MG/DL (ref 0.7–1.2)
EOSINOPHIL # BLD: 0.2 K/UL (ref 0–0.7)
EOSINOPHIL NFR BLD: 1.6 %
ERYTHROCYTE [DISTWIDTH] IN BLOOD BY AUTOMATED COUNT: 14.9 % (ref 11.5–14.5)
GLOBULIN SER CALC-MCNC: 3.2 G/DL (ref 2.3–3.5)
GLUCOSE SERPL-MCNC: 92 MG/DL (ref 70–99)
GLUCOSE UR STRIP-MCNC: NEGATIVE MG/DL
HCT VFR BLD AUTO: 41.6 % (ref 42–52)
HGB BLD-MCNC: 13.9 G/DL (ref 14–18)
HGB UR QL STRIP: NEGATIVE
INR PPP: 1.1
KETONES UR STRIP-MCNC: NEGATIVE MG/DL
LEUKOCYTE ESTERASE UR QL STRIP: NEGATIVE
LYMPHOCYTES # BLD: 2.5 K/UL (ref 1–4.8)
LYMPHOCYTES NFR BLD: 26 %
MCH RBC QN AUTO: 28.7 PG (ref 27–31.3)
MCHC RBC AUTO-ENTMCNC: 33.3 % (ref 33–37)
MCV RBC AUTO: 86.2 FL (ref 79–92.2)
MONOCYTES # BLD: 1 K/UL (ref 0.2–0.8)
MONOCYTES NFR BLD: 10 %
NEUTROPHILS # BLD: 6 K/UL (ref 1.4–6.5)
NEUTS SEG NFR BLD: 61.7 %
NITRITE UR QL STRIP: NEGATIVE
PH UR STRIP: 6.5 [PH] (ref 5–9)
PLATELET # BLD AUTO: 246 K/UL (ref 130–400)
POTASSIUM SERPL-SCNC: 4.2 MEQ/L (ref 3.4–4.9)
PROT SERPL-MCNC: 7.1 G/DL (ref 6.3–8)
PROT UR STRIP-MCNC: NEGATIVE MG/DL
PROTHROMBIN TIME: 14.7 SEC (ref 12.3–14.9)
RBC # BLD AUTO: 4.83 M/UL (ref 4.7–6.1)
SODIUM SERPL-SCNC: 135 MEQ/L (ref 135–144)
SP GR UR STRIP: 1.01 (ref 1–1.03)
URINE REFLEX TO CULTURE: NORMAL
UROBILINOGEN UR STRIP-ACNC: 0.2 E.U./DL
WBC # BLD AUTO: 9.7 K/UL (ref 4.8–10.8)

## 2023-03-27 PROCEDURE — 80053 COMPREHEN METABOLIC PANEL: CPT

## 2023-03-27 PROCEDURE — 85025 COMPLETE CBC W/AUTO DIFF WBC: CPT

## 2023-03-27 PROCEDURE — 86901 BLOOD TYPING SEROLOGIC RH(D): CPT

## 2023-03-27 PROCEDURE — 86850 RBC ANTIBODY SCREEN: CPT

## 2023-03-27 PROCEDURE — 86900 BLOOD TYPING SEROLOGIC ABO: CPT

## 2023-03-27 PROCEDURE — 87641 MR-STAPH DNA AMP PROBE: CPT

## 2023-03-27 PROCEDURE — 85730 THROMBOPLASTIN TIME PARTIAL: CPT

## 2023-03-27 PROCEDURE — 81003 URINALYSIS AUTO W/O SCOPE: CPT

## 2023-03-27 PROCEDURE — 85610 PROTHROMBIN TIME: CPT

## 2023-03-28 LAB
ABO + RH BLD: NORMAL
BLD GP AB SCN SERPL QL: NORMAL
MRSA, DNA, NASAL: NEGATIVE
SPECIMEN DESCRIPTION: NORMAL

## 2023-03-29 ENCOUNTER — ANESTHESIA EVENT (OUTPATIENT)
Dept: OPERATING ROOM | Age: 78
End: 2023-03-29
Payer: COMMERCIAL

## 2023-03-30 ENCOUNTER — APPOINTMENT (OUTPATIENT)
Dept: ULTRASOUND IMAGING | Age: 78
End: 2023-03-30
Attending: ORTHOPAEDIC SURGERY
Payer: COMMERCIAL

## 2023-03-30 ENCOUNTER — ANESTHESIA (OUTPATIENT)
Dept: OPERATING ROOM | Age: 78
End: 2023-03-30
Payer: COMMERCIAL

## 2023-03-30 ENCOUNTER — APPOINTMENT (OUTPATIENT)
Dept: GENERAL RADIOLOGY | Age: 78
End: 2023-03-30
Attending: ORTHOPAEDIC SURGERY
Payer: COMMERCIAL

## 2023-03-30 ENCOUNTER — HOSPITAL ENCOUNTER (OUTPATIENT)
Age: 78
Setting detail: OBSERVATION
Discharge: HOME | End: 2023-03-31
Attending: ORTHOPAEDIC SURGERY | Admitting: ORTHOPAEDIC SURGERY
Payer: COMMERCIAL

## 2023-03-30 PROBLEM — Z96.611 STATUS POST REVERSE TOTAL SHOULDER REPLACEMENT, RIGHT: Status: ACTIVE | Noted: 2023-03-30

## 2023-03-30 PROCEDURE — A4217 STERILE WATER/SALINE, 500 ML: HCPCS | Performed by: ORTHOPAEDIC SURGERY

## 2023-03-30 PROCEDURE — 2580000003 HC RX 258: Performed by: ORTHOPAEDIC SURGERY

## 2023-03-30 PROCEDURE — 97116 GAIT TRAINING THERAPY: CPT

## 2023-03-30 PROCEDURE — 3600000004 HC SURGERY LEVEL 4 BASE: Performed by: ORTHOPAEDIC SURGERY

## 2023-03-30 PROCEDURE — C1776 JOINT DEVICE (IMPLANTABLE): HCPCS | Performed by: ORTHOPAEDIC SURGERY

## 2023-03-30 PROCEDURE — 2709999900 HC NON-CHARGEABLE SUPPLY: Performed by: ORTHOPAEDIC SURGERY

## 2023-03-30 PROCEDURE — 6360000002 HC RX W HCPCS: Performed by: ORTHOPAEDIC SURGERY

## 2023-03-30 PROCEDURE — 6370000000 HC RX 637 (ALT 250 FOR IP): Performed by: INTERNAL MEDICINE

## 2023-03-30 PROCEDURE — 97162 PT EVAL MOD COMPLEX 30 MIN: CPT

## 2023-03-30 PROCEDURE — 2580000003 HC RX 258: Performed by: ANESTHESIOLOGY

## 2023-03-30 PROCEDURE — 6370000000 HC RX 637 (ALT 250 FOR IP): Performed by: NURSE PRACTITIONER

## 2023-03-30 PROCEDURE — 7100000000 HC PACU RECOVERY - FIRST 15 MIN: Performed by: ORTHOPAEDIC SURGERY

## 2023-03-30 PROCEDURE — 3600000014 HC SURGERY LEVEL 4 ADDTL 15MIN: Performed by: ORTHOPAEDIC SURGERY

## 2023-03-30 PROCEDURE — 2500000003 HC RX 250 WO HCPCS: Performed by: ANESTHESIOLOGY

## 2023-03-30 PROCEDURE — G0378 HOSPITAL OBSERVATION PER HR: HCPCS

## 2023-03-30 PROCEDURE — 73020 X-RAY EXAM OF SHOULDER: CPT

## 2023-03-30 PROCEDURE — 6360000002 HC RX W HCPCS: Performed by: NURSE PRACTITIONER

## 2023-03-30 PROCEDURE — 64415 NJX AA&/STRD BRCH PLXS IMG: CPT | Performed by: ANESTHESIOLOGY

## 2023-03-30 PROCEDURE — 76942 ECHO GUIDE FOR BIOPSY: CPT

## 2023-03-30 PROCEDURE — 3700000000 HC ANESTHESIA ATTENDED CARE: Performed by: ORTHOPAEDIC SURGERY

## 2023-03-30 PROCEDURE — 3700000001 HC ADD 15 MINUTES (ANESTHESIA): Performed by: ORTHOPAEDIC SURGERY

## 2023-03-30 PROCEDURE — 6360000002 HC RX W HCPCS: Performed by: ANESTHESIOLOGY

## 2023-03-30 PROCEDURE — 2720000010 HC SURG SUPPLY STERILE: Performed by: ORTHOPAEDIC SURGERY

## 2023-03-30 PROCEDURE — L3650 SO 8 ABD RESTRAINT PRE OTS: HCPCS | Performed by: ORTHOPAEDIC SURGERY

## 2023-03-30 PROCEDURE — 7100000001 HC PACU RECOVERY - ADDTL 15 MIN: Performed by: ORTHOPAEDIC SURGERY

## 2023-03-30 PROCEDURE — 2580000003 HC RX 258: Performed by: NURSE PRACTITIONER

## 2023-03-30 PROCEDURE — C1894 INTRO/SHEATH, NON-LASER: HCPCS | Performed by: ORTHOPAEDIC SURGERY

## 2023-03-30 PROCEDURE — C1713 ANCHOR/SCREW BN/BN,TIS/BN: HCPCS | Performed by: ORTHOPAEDIC SURGERY

## 2023-03-30 DEVICE — COMPONENT SHLDR CAPPED REVERSED TRAB MTL: Type: IMPLANTABLE DEVICE | Site: SHOULDER | Status: FUNCTIONAL

## 2023-03-30 DEVICE — NCB FULLY THREAD.CANCEL.SCREW Ø4.5, L=38
Type: IMPLANTABLE DEVICE | Site: SHOULDER | Status: FUNCTIONAL
Brand: NCB®

## 2023-03-30 DEVICE — LINER HUM POLY 0+ MM STD 40 MM RVS: Type: IMPLANTABLE DEVICE | Site: SHOULDER | Status: FUNCTIONAL

## 2023-03-30 DEVICE — SPACER HUM L9MM TRABECULAR MTL: Type: IMPLANTABLE DEVICE | Site: SHOULDER | Status: FUNCTIONAL

## 2023-03-30 DEVICE — INVERSE/REVERSE SCREW SYSTEM, 4.5-42
Type: IMPLANTABLE DEVICE | Site: SHOULDER | Status: FUNCTIONAL
Brand: INVERSE/REVERSE

## 2023-03-30 DEVICE — BASEPLATE 26 MM TM RVS 15M: Type: IMPLANTABLE DEVICE | Site: SHOULDER | Status: FUNCTIONAL

## 2023-03-30 DEVICE — TM REVERSE STEM 18MM X 130MM: Type: IMPLANTABLE DEVICE | Site: SHOULDER | Status: FUNCTIONAL

## 2023-03-30 DEVICE — NCB LOCKING CAP
Type: IMPLANTABLE DEVICE | Site: SHOULDER | Status: FUNCTIONAL
Brand: NCB®

## 2023-03-30 RX ORDER — FENTANYL CITRATE 50 UG/ML
50 INJECTION, SOLUTION INTRAMUSCULAR; INTRAVENOUS EVERY 10 MIN PRN
Status: DISCONTINUED | OUTPATIENT
Start: 2023-03-30 | End: 2023-03-30 | Stop reason: HOSPADM

## 2023-03-30 RX ORDER — SODIUM CHLORIDE 9 MG/ML
INJECTION, SOLUTION INTRAVENOUS PRN
Status: DISCONTINUED | OUTPATIENT
Start: 2023-03-30 | End: 2023-03-31 | Stop reason: HOSPADM

## 2023-03-30 RX ORDER — ONDANSETRON 4 MG/1
4 TABLET, ORALLY DISINTEGRATING ORAL EVERY 8 HOURS PRN
Status: DISCONTINUED | OUTPATIENT
Start: 2023-03-30 | End: 2023-03-31 | Stop reason: HOSPADM

## 2023-03-30 RX ORDER — ROPIVACAINE HYDROCHLORIDE 5 MG/ML
INJECTION, SOLUTION EPIDURAL; INFILTRATION; PERINEURAL PRN
Status: DISCONTINUED | OUTPATIENT
Start: 2023-03-30 | End: 2023-03-30 | Stop reason: SDUPTHER

## 2023-03-30 RX ORDER — CARVEDILOL 12.5 MG/1
12.5 TABLET ORAL 2 TIMES DAILY WITH MEALS
Status: DISCONTINUED | OUTPATIENT
Start: 2023-03-30 | End: 2023-03-31 | Stop reason: HOSPADM

## 2023-03-30 RX ORDER — SODIUM CHLORIDE, SODIUM LACTATE, POTASSIUM CHLORIDE, CALCIUM CHLORIDE 600; 310; 30; 20 MG/100ML; MG/100ML; MG/100ML; MG/100ML
INJECTION, SOLUTION INTRAVENOUS CONTINUOUS
Status: DISCONTINUED | OUTPATIENT
Start: 2023-03-30 | End: 2023-03-30 | Stop reason: HOSPADM

## 2023-03-30 RX ORDER — OXYCODONE HCL 10 MG/1
10 TABLET, FILM COATED, EXTENDED RELEASE ORAL ONCE
Status: COMPLETED | OUTPATIENT
Start: 2023-03-30 | End: 2023-03-30

## 2023-03-30 RX ORDER — ONDANSETRON 2 MG/ML
4 INJECTION INTRAMUSCULAR; INTRAVENOUS
Status: DISCONTINUED | OUTPATIENT
Start: 2023-03-30 | End: 2023-03-30 | Stop reason: HOSPADM

## 2023-03-30 RX ORDER — MAGNESIUM HYDROXIDE 1200 MG/15ML
LIQUID ORAL CONTINUOUS PRN
Status: COMPLETED | OUTPATIENT
Start: 2023-03-30 | End: 2023-03-30

## 2023-03-30 RX ORDER — CELECOXIB 100 MG/1
200 CAPSULE ORAL ONCE
Status: COMPLETED | OUTPATIENT
Start: 2023-03-30 | End: 2023-03-30

## 2023-03-30 RX ORDER — SENNA AND DOCUSATE SODIUM 50; 8.6 MG/1; MG/1
1 TABLET, FILM COATED ORAL 2 TIMES DAILY
Status: DISCONTINUED | OUTPATIENT
Start: 2023-03-30 | End: 2023-03-31 | Stop reason: HOSPADM

## 2023-03-30 RX ORDER — ONDANSETRON 2 MG/ML
4 INJECTION INTRAMUSCULAR; INTRAVENOUS EVERY 6 HOURS PRN
Status: DISCONTINUED | OUTPATIENT
Start: 2023-03-30 | End: 2023-03-31 | Stop reason: HOSPADM

## 2023-03-30 RX ORDER — MAGNESIUM HYDROXIDE 1200 MG/15ML
LIQUID ORAL PRN
Status: DISCONTINUED | OUTPATIENT
Start: 2023-03-30 | End: 2023-03-30 | Stop reason: ALTCHOICE

## 2023-03-30 RX ORDER — ROCURONIUM BROMIDE 10 MG/ML
INJECTION, SOLUTION INTRAVENOUS PRN
Status: DISCONTINUED | OUTPATIENT
Start: 2023-03-30 | End: 2023-03-30 | Stop reason: SDUPTHER

## 2023-03-30 RX ORDER — ATORVASTATIN CALCIUM 40 MG/1
80 TABLET, FILM COATED ORAL NIGHTLY
Status: DISCONTINUED | OUTPATIENT
Start: 2023-03-30 | End: 2023-03-31 | Stop reason: HOSPADM

## 2023-03-30 RX ORDER — SODIUM CHLORIDE 0.9 % (FLUSH) 0.9 %
5-40 SYRINGE (ML) INJECTION EVERY 12 HOURS SCHEDULED
Status: DISCONTINUED | OUTPATIENT
Start: 2023-03-30 | End: 2023-03-31 | Stop reason: HOSPADM

## 2023-03-30 RX ORDER — TAMSULOSIN HYDROCHLORIDE 0.4 MG/1
0.4 CAPSULE ORAL 2 TIMES DAILY
Status: DISCONTINUED | OUTPATIENT
Start: 2023-03-30 | End: 2023-03-31 | Stop reason: HOSPADM

## 2023-03-30 RX ORDER — SODIUM CHLORIDE, SODIUM LACTATE, POTASSIUM CHLORIDE, CALCIUM CHLORIDE 600; 310; 30; 20 MG/100ML; MG/100ML; MG/100ML; MG/100ML
INJECTION, SOLUTION INTRAVENOUS CONTINUOUS PRN
Status: DISCONTINUED | OUTPATIENT
Start: 2023-03-30 | End: 2023-03-30 | Stop reason: SDUPTHER

## 2023-03-30 RX ORDER — SODIUM CHLORIDE 0.9 % (FLUSH) 0.9 %
5-40 SYRINGE (ML) INJECTION EVERY 12 HOURS SCHEDULED
Status: DISCONTINUED | OUTPATIENT
Start: 2023-03-30 | End: 2023-03-30 | Stop reason: HOSPADM

## 2023-03-30 RX ORDER — ONDANSETRON 2 MG/ML
INJECTION INTRAMUSCULAR; INTRAVENOUS PRN
Status: DISCONTINUED | OUTPATIENT
Start: 2023-03-30 | End: 2023-03-30 | Stop reason: SDUPTHER

## 2023-03-30 RX ORDER — SODIUM CHLORIDE 0.9 % (FLUSH) 0.9 %
5-40 SYRINGE (ML) INJECTION PRN
Status: DISCONTINUED | OUTPATIENT
Start: 2023-03-30 | End: 2023-03-31 | Stop reason: HOSPADM

## 2023-03-30 RX ORDER — FINASTERIDE 5 MG/1
5 TABLET, FILM COATED ORAL DAILY
Status: DISCONTINUED | OUTPATIENT
Start: 2023-03-30 | End: 2023-03-31 | Stop reason: HOSPADM

## 2023-03-30 RX ORDER — SODIUM CHLORIDE, SODIUM LACTATE, POTASSIUM CHLORIDE, CALCIUM CHLORIDE 600; 310; 30; 20 MG/100ML; MG/100ML; MG/100ML; MG/100ML
INJECTION, SOLUTION INTRAVENOUS
Status: DISPENSED
Start: 2023-03-30 | End: 2023-03-31

## 2023-03-30 RX ORDER — POLYETHYLENE GLYCOL 3350 17 G/17G
17 POWDER, FOR SOLUTION ORAL DAILY PRN
Status: DISCONTINUED | OUTPATIENT
Start: 2023-03-30 | End: 2023-03-31 | Stop reason: HOSPADM

## 2023-03-30 RX ORDER — MEPERIDINE HYDROCHLORIDE 25 MG/ML
12.5 INJECTION INTRAMUSCULAR; INTRAVENOUS; SUBCUTANEOUS
Status: DISCONTINUED | OUTPATIENT
Start: 2023-03-30 | End: 2023-03-30 | Stop reason: HOSPADM

## 2023-03-30 RX ORDER — DIPHENHYDRAMINE HYDROCHLORIDE 50 MG/ML
12.5 INJECTION INTRAMUSCULAR; INTRAVENOUS
Status: DISCONTINUED | OUTPATIENT
Start: 2023-03-30 | End: 2023-03-30 | Stop reason: HOSPADM

## 2023-03-30 RX ORDER — SODIUM CHLORIDE, SODIUM LACTATE, POTASSIUM CHLORIDE, CALCIUM CHLORIDE 600; 310; 30; 20 MG/100ML; MG/100ML; MG/100ML; MG/100ML
INJECTION, SOLUTION INTRAVENOUS CONTINUOUS
Status: DISCONTINUED | OUTPATIENT
Start: 2023-03-30 | End: 2023-03-31 | Stop reason: HOSPADM

## 2023-03-30 RX ORDER — TRANEXAMIC ACID 650 MG/1
1950 TABLET ORAL
Status: DISCONTINUED | OUTPATIENT
Start: 2023-03-30 | End: 2023-03-30 | Stop reason: HOSPADM

## 2023-03-30 RX ORDER — FENTANYL CITRATE 50 UG/ML
INJECTION, SOLUTION INTRAMUSCULAR; INTRAVENOUS PRN
Status: DISCONTINUED | OUTPATIENT
Start: 2023-03-30 | End: 2023-03-30 | Stop reason: SDUPTHER

## 2023-03-30 RX ORDER — ASCORBIC ACID 500 MG
250 TABLET ORAL DAILY
Status: DISCONTINUED | OUTPATIENT
Start: 2023-03-30 | End: 2023-03-31 | Stop reason: HOSPADM

## 2023-03-30 RX ORDER — MIDAZOLAM HYDROCHLORIDE 1 MG/ML
INJECTION INTRAMUSCULAR; INTRAVENOUS PRN
Status: DISCONTINUED | OUTPATIENT
Start: 2023-03-30 | End: 2023-03-30 | Stop reason: SDUPTHER

## 2023-03-30 RX ORDER — TRANEXAMIC ACID 650 MG/1
1950 TABLET ORAL ONCE
Status: COMPLETED | OUTPATIENT
Start: 2023-03-31 | End: 2023-03-31

## 2023-03-30 RX ORDER — METOCLOPRAMIDE HYDROCHLORIDE 5 MG/ML
10 INJECTION INTRAMUSCULAR; INTRAVENOUS
Status: DISCONTINUED | OUTPATIENT
Start: 2023-03-30 | End: 2023-03-30 | Stop reason: HOSPADM

## 2023-03-30 RX ORDER — SODIUM CHLORIDE 0.9 % (FLUSH) 0.9 %
5-40 SYRINGE (ML) INJECTION PRN
Status: DISCONTINUED | OUTPATIENT
Start: 2023-03-30 | End: 2023-03-30 | Stop reason: HOSPADM

## 2023-03-30 RX ORDER — KETOROLAC TROMETHAMINE 15 MG/ML
15 INJECTION, SOLUTION INTRAMUSCULAR; INTRAVENOUS EVERY 6 HOURS
Status: COMPLETED | OUTPATIENT
Start: 2023-03-30 | End: 2023-03-31

## 2023-03-30 RX ORDER — OXYCODONE HYDROCHLORIDE 5 MG/1
10 TABLET ORAL EVERY 4 HOURS PRN
Status: DISCONTINUED | OUTPATIENT
Start: 2023-03-30 | End: 2023-03-31 | Stop reason: HOSPADM

## 2023-03-30 RX ORDER — ACETAMINOPHEN 500 MG
1000 TABLET ORAL ONCE
Status: COMPLETED | OUTPATIENT
Start: 2023-03-30 | End: 2023-03-30

## 2023-03-30 RX ORDER — LEVOTHYROXINE SODIUM 0.03 MG/1
50 TABLET ORAL DAILY
Status: DISCONTINUED | OUTPATIENT
Start: 2023-03-30 | End: 2023-03-31 | Stop reason: HOSPADM

## 2023-03-30 RX ORDER — TRANEXAMIC ACID 650 MG/1
1950 TABLET ORAL ONCE
Status: COMPLETED | OUTPATIENT
Start: 2023-03-30 | End: 2023-03-30

## 2023-03-30 RX ORDER — ACETAMINOPHEN 325 MG/1
650 TABLET ORAL EVERY 6 HOURS
Status: DISCONTINUED | OUTPATIENT
Start: 2023-03-30 | End: 2023-03-31 | Stop reason: HOSPADM

## 2023-03-30 RX ORDER — SODIUM CHLORIDE 9 MG/ML
25 INJECTION, SOLUTION INTRAVENOUS PRN
Status: DISCONTINUED | OUTPATIENT
Start: 2023-03-30 | End: 2023-03-30 | Stop reason: HOSPADM

## 2023-03-30 RX ORDER — LANOLIN ALCOHOL/MO/W.PET/CERES
400 CREAM (GRAM) TOPICAL DAILY
Status: DISCONTINUED | OUTPATIENT
Start: 2023-03-30 | End: 2023-03-31 | Stop reason: HOSPADM

## 2023-03-30 RX ORDER — PROPOFOL 10 MG/ML
INJECTION, EMULSION INTRAVENOUS PRN
Status: DISCONTINUED | OUTPATIENT
Start: 2023-03-30 | End: 2023-03-30 | Stop reason: SDUPTHER

## 2023-03-30 RX ORDER — OXYCODONE HYDROCHLORIDE 5 MG/1
5 TABLET ORAL
Status: DISCONTINUED | OUTPATIENT
Start: 2023-03-30 | End: 2023-03-30 | Stop reason: HOSPADM

## 2023-03-30 RX ORDER — OXYCODONE HYDROCHLORIDE 5 MG/1
5 TABLET ORAL EVERY 4 HOURS PRN
Status: DISCONTINUED | OUTPATIENT
Start: 2023-03-30 | End: 2023-03-31 | Stop reason: HOSPADM

## 2023-03-30 RX ORDER — ASPIRIN 81 MG/1
81 TABLET, CHEWABLE ORAL DAILY
Status: DISCONTINUED | OUTPATIENT
Start: 2023-03-31 | End: 2023-03-31 | Stop reason: HOSPADM

## 2023-03-30 RX ADMIN — SODIUM CHLORIDE, POTASSIUM CHLORIDE, SODIUM LACTATE AND CALCIUM CHLORIDE: 600; 310; 30; 20 INJECTION, SOLUTION INTRAVENOUS at 11:35

## 2023-03-30 RX ADMIN — PROPOFOL 200 MG: 10 INJECTION, EMULSION INTRAVENOUS at 11:12

## 2023-03-30 RX ADMIN — MAGNESIUM OXIDE 400 MG (241.3 MG MAGNESIUM) TABLET 400 MG: TABLET at 22:18

## 2023-03-30 RX ADMIN — KETOROLAC TROMETHAMINE 15 MG: 15 INJECTION, SOLUTION INTRAMUSCULAR; INTRAVENOUS at 22:05

## 2023-03-30 RX ADMIN — CELECOXIB 200 MG: 100 CAPSULE ORAL at 09:04

## 2023-03-30 RX ADMIN — KETOROLAC TROMETHAMINE 15 MG: 15 INJECTION, SOLUTION INTRAMUSCULAR; INTRAVENOUS at 14:49

## 2023-03-30 RX ADMIN — TRANEXAMIC ACID 1950 MG: 650 TABLET ORAL at 09:03

## 2023-03-30 RX ADMIN — OXYCODONE HYDROCHLORIDE 10 MG: 10 TABLET, FILM COATED, EXTENDED RELEASE ORAL at 09:04

## 2023-03-30 RX ADMIN — FINASTERIDE 5 MG: 5 TABLET, FILM COATED ORAL at 22:18

## 2023-03-30 RX ADMIN — ATORVASTATIN CALCIUM 80 MG: 40 TABLET, FILM COATED ORAL at 22:03

## 2023-03-30 RX ADMIN — ACETAMINOPHEN 650 MG: 325 TABLET ORAL at 14:49

## 2023-03-30 RX ADMIN — CEFAZOLIN 2000 MG: 2 INJECTION, POWDER, FOR SOLUTION INTRAMUSCULAR; INTRAVENOUS at 11:05

## 2023-03-30 RX ADMIN — ROCURONIUM BROMIDE 50 MG: 10 INJECTION, SOLUTION INTRAVENOUS at 11:12

## 2023-03-30 RX ADMIN — CEFAZOLIN 2000 MG: 1 INJECTION, POWDER, FOR SOLUTION INTRAMUSCULAR; INTRAVENOUS at 18:21

## 2023-03-30 RX ADMIN — ACETAMINOPHEN 650 MG: 325 TABLET ORAL at 22:04

## 2023-03-30 RX ADMIN — ROPIVACAINE HYDROCHLORIDE 40 ML: 5 INJECTION, SOLUTION EPIDURAL; INFILTRATION; PERINEURAL at 10:53

## 2023-03-30 RX ADMIN — FENTANYL CITRATE 100 MCG: 50 INJECTION INTRAMUSCULAR; INTRAVENOUS at 11:12

## 2023-03-30 RX ADMIN — MIDAZOLAM HYDROCHLORIDE 2 MG: 2 INJECTION, SOLUTION INTRAMUSCULAR; INTRAVENOUS at 10:51

## 2023-03-30 RX ADMIN — ONDANSETRON 4 MG: 2 INJECTION INTRAMUSCULAR; INTRAVENOUS at 11:12

## 2023-03-30 RX ADMIN — ACETAMINOPHEN 1000 MG: 500 TABLET, FILM COATED ORAL at 09:04

## 2023-03-30 RX ADMIN — CARVEDILOL 12.5 MG: 12.5 TABLET, FILM COATED ORAL at 16:54

## 2023-03-30 RX ADMIN — TRANEXAMIC ACID 1950 MG: 650 TABLET ORAL at 16:55

## 2023-03-30 RX ADMIN — SODIUM CHLORIDE, POTASSIUM CHLORIDE, SODIUM LACTATE AND CALCIUM CHLORIDE: 600; 310; 30; 20 INJECTION, SOLUTION INTRAVENOUS at 09:05

## 2023-03-30 RX ADMIN — Medication 250 MG: at 22:18

## 2023-03-30 RX ADMIN — SENNOSIDES AND DOCUSATE SODIUM 1 TABLET: 50; 8.6 TABLET ORAL at 22:05

## 2023-03-30 RX ADMIN — SUGAMMADEX 200 MG: 100 INJECTION, SOLUTION INTRAVENOUS at 12:44

## 2023-03-30 RX ADMIN — SODIUM CHLORIDE, POTASSIUM CHLORIDE, SODIUM LACTATE AND CALCIUM CHLORIDE: 600; 310; 30; 20 INJECTION, SOLUTION INTRAVENOUS at 14:52

## 2023-03-30 RX ADMIN — TAMSULOSIN HYDROCHLORIDE 0.4 MG: 0.4 CAPSULE ORAL at 22:05

## 2023-03-30 ASSESSMENT — PAIN SCALES - GENERAL
PAINLEVEL_OUTOF10: 0

## 2023-03-30 ASSESSMENT — PAIN DESCRIPTION - ORIENTATION
ORIENTATION: RIGHT
ORIENTATION: RIGHT

## 2023-03-30 ASSESSMENT — PAIN DESCRIPTION - DESCRIPTORS
DESCRIPTORS: DISCOMFORT
DESCRIPTORS: DISCOMFORT

## 2023-03-30 ASSESSMENT — PAIN - FUNCTIONAL ASSESSMENT: PAIN_FUNCTIONAL_ASSESSMENT: 0-10

## 2023-03-30 ASSESSMENT — PAIN DESCRIPTION - LOCATION
LOCATION: SHOULDER
LOCATION: SHOULDER

## 2023-03-30 NOTE — H&P
Interval History and Physical    I have interviewed and examined the patient and reviewed the recent History and Physical.  There have been no changes to the recent H&P documentation. No change in ROS or PE. Pt's allergies reviewed and no change List of meds on original H&P    No significant findings with ROS, family hx, social  Hx, ALL, surgical hx, med list or medical history     The patient understands the planned operation and its associated risks and benefits and agrees to proceed. The surgical consent form has been signed. There were no vitals taken for this visit.      Electronically signed by Hyun Ramirez MD on 3/30/2023 at 8:53 AM

## 2023-03-30 NOTE — PROGRESS NOTES
postop OBS- up to 3-5 medical days if complications  Short Term Goal 1: transfers and bed mboitliy modified indpendent and safe wtih NWB RUE in ultrasling  Short Term Goal 2: amb >= 300ft modified indpendent no LOB , no AD and good awareness of RUE safaety in ultrasling  Short Term Goal 3: 8 stairs with L rail up for 4 and R rail down for four- likely to go down partial back wards as pt cannot use RUE to hold stair rail - NWB RUE post R reverse TSA  Short Term Goal 4: HEP in place for LE only if warranted- likely not needed as shoudler surgery  Short Term Goal 5: OT to train ultrasling - spouse assisted if needed- completed by OT  Long Term Goals  Time Frame for Long Term Goals : same as STG postop pt  Patient Goals   Patient Goals : \"I want to be stedy and able to get home tomorrow. \"       Education plan, goals, ultrasling , NWB RUE, call for assist with all mobility         Therapy Time   Individual Concurrent Group Co-treatment   Time In  305         Time Out  355         Minutes  [address] [facility], [id number]

## 2023-03-30 NOTE — PROGRESS NOTES
Patient arrived to room 214, denies pain, admission complete. Medicated as ordered, IVF's infusing, call light in reach. Pt's wife called and updated. Pt provided with po fluids. Oriented to room and plan of care for the day. Sling immobilizer in place, aquacel dressing to right shoulder clean dry and intact.

## 2023-03-30 NOTE — ANESTHESIA PRE PROCEDURE
Department of Anesthesiology  Preprocedure Note       Name:  Gali Mcgraw   Age:  69 y.o.  :  1945                                          MRN:  [id number]         Date:  3/30/2023      Surgeon: Elinor Ruiz):  Billie Lopez MD    Procedure: Procedure(s):  RIGHT SHOULDER TOTAL SHOULDER ARTHROPLASTY-REVERSE GERRI EQUIPMENT, BEACH CHAIR SCALENE BLOCK    Medications prior to admission:   Prior to Admission medications    Medication Sig Start Date End Date Taking? Authorizing Provider   atorvastatin (LIPITOR) 80 MG tablet take 1 tablet by mouth once daily 23   Sussy Quinonez MD   tamsulosin [facility]) 0.4 MG capsule take 1 capsule by mouth twice a day 23   Sussy Quinonez MD   carvedilol (COREG) 12.5 MG tablet take 1 tablet by mouth twice a day with food 22   Sussy Quinonez MD   levothyroxine (SYNTHROID) 50 MCG tablet take 1 tablet by mouth once daily 10/19/22   Sussy Quinonez MD   nitroGLYCERIN (NITROSTAT) 0.4 MG SL tablet Place 1 tablet under the tongue every 5 minutes as needed for Chest pain Dissolve 1 tab under tongue at first sign of chest pain every 5 minutes as needed. If pain persists after taking 3 tabs in a 15-minute period, or the pain is different than is typically experienced, call 9-1-1 immediately.  22   Sussy Quinonez MD   finasteride (PROSCAR) 5 MG tablet take 1 tablet by mouth once daily 3/21/22   Sussy Quinonez MD   Ascorbic Acid (VITAMIN C) 250 MG tablet Take 250 mg by mouth daily    Historical Provider, MD   Niacin (VITAMIN B-3 PO) Take by mouth    Historical Provider, MD   acetaminophen (TYLENOL) 325 MG tablet Take 325-650 mg by mouth every 6 hours as needed 21   Devin Ivy MD   Zinc Sulfate (ZINC 15 PO) Take by mouth     Historical Provider, MD   aspirin 81 MG chewable tablet Take 1 tablet by mouth daily 21   Anjelica Simmons MD   magnesium oxide (MAG-OX) 400 MG tablet Take 400 mg by mouth daily     Historical Provider, MD   Handicap Placard MISC by Does not apply

## 2023-03-30 NOTE — H&P
[address] [address]                              HISTORY AND PHYSICAL    PATIENT NAME: Jaren Rodríguez                   :        1945  MED REC NO:   [id number]                              ROOM:  ACCOUNT NO:   [de-identified]                           ADMIT DATE: 2023  PROVIDER:     Esme Hoffman PA-C      FAMILY PHYSICIAN:  Wes Mcgrath MD    CHIEF COMPLAINT:  Right shoulder pain. HISTORY OF PRESENT ILLNESS:  The patient with right shoulder pain,  weakness, and loss of range of motion. Diagnostic studies showed  degenerative joint disease and rotator cuff tearing. After risks,  benefits and alternatives were discussed, the patient elected to proceed  with right reverse total shoulder arthroplasty. This will be performed  on 2023 at [facility] in [place]. PAST MEDICAL HISTORY:  Significant for osteoarthritis, hypertension,  prostate hypertrophy, hypothyroid, elevated lipids, coronary artery  disease. CURRENT MEDICATIONS:  Nitroglycerin, Mobic which has been stopped,  Lipitor, levothyroxine, hydrochlorothiazide, finasteride, Flomax and  carvedilol. ALLERGIES:  BRILINTA AND LISINOPRIL. PREVIOUS SURGICAL HISTORY:  Significant for right total hip replacement,  also open heart surgery, coronary artery stenting and tonsillectomy. Denies any issues with anesthetic. SOCIAL HISTORY:  The patient does report previous smoking history, but  quit at the age of 25. He also currently denies any alcohol use. FAMILY HISTORY:  Significant for kidney failure. REVIEW OF SYSTEMS:  Noncontributory. PHYSICAL EXAMINATION:  GENERAL:  A 29-year-old male. VITAL SIGNS:  Height 5 feet 8 inches, weight 180. HEENT:  Eyes:  Pupils equally round, reactive to light and  accommodation. Extraocular eye movements are intact. CHEST:  Lungs are clear to auscultation bilaterally.   CARDIAC:  Regular rate and rhythm. No murmurs, rubs, or gallops  appreciated. ABDOMEN:  Soft, nontender, normoactive bowel sounds x4 quadrants. EXTREMITIES:  Right shoulder: The patient has forward flexion to 80  degrees, abduction to 50 degrees, external rotation of 40 degrees and  internal rotation L4-L5. NEUROLOGIC:  CN II through XII grossly intact. ASSESSMENT:  Right shoulder degenerative joint disease and rotator cuff  arthropathy. PLAN:  Right reverse total shoulder arthroplasty. This will be  performed on 03/30/2023 at [facility].         Radha Guerrero    D: 03/29/2023 14:07:19       T: 03/29/2023 19:38:40     [email or web address]  Job#: [id number]     Doc#: [id number]

## 2023-03-30 NOTE — BRIEF OP NOTE
Brief Postoperative Note      Patient: Marcell Body  YOB: 1945  MRN: [id number]    Date of Procedure: 3/30/2023    Pre-Op Diagnosis: RIGHT SHOULDER ROTATOR CUFF ARTHROPATHY    Post-Op Diagnosis: Same       Procedure(s):  RIGHT SHOULDER TOTAL SHOULDER ARTHROPLASTY-REVERSE      Surgeon(s):  Heather Mata MD    Assistant:  Physician Assistant: Amrita Coronado PA-C    Anesthesia: General    Estimated Blood Loss (mL): less than 125     Complications: None    Specimens:   * No specimens in log *    Implants:  Implant Name Type Inv.  Item Serial No.  Lot No. LRB No. Used Action   TM REVERSE STEM 18MM X 130MM - [id number]  TM REVERSE STEM 18MM X 130MM  [employer]- [id number] Right 1 Implanted   BASEPLATE 26 MM TM RVS 37J - [id number]  BASEPLATE 26 MM TM RVS 50S  [employer]- [id number] Right 1 Implanted   INVERSE REVERSE CANCELLOUS SCREW 4.5 X 42MM - [id number]  INVERSE REVERSE CANCELLOUS SCREW 4.5 X 42MM  [employer]- [id number] Right 1 Implanted   Glenose[employer] Centric 40mm +5    GERRI [employer]- [id number] Right 1 Implanted   SPACER HUM L9MM TRABECULAR MTL - [id number]  SPACER HUM L9MM TRABECULAR MTL  [employer]- [id number] Right 1 Implanted   LINER HUM POLY 0+ MM STD 40 MM RVS - [id number]  LINER HUM POLY 0+ MM STD 40 MM RVS  [employer]- [id number] Right 1 Implanted   SCREW CANC FULL THRD [id number] TED NCB - [id number]  SCREW CANC FULL THRD [id number] TED NCB  GERRI [employer]ET TRAUMA-WD  Right 1 Implanted         Drains: * No LDAs found *    Findings: rc arthropathy  rt shoulder    Electronically signed by Heather Mata MD on 3/30/2023 at 12:30 PM

## 2023-03-30 NOTE — ANESTHESIA POSTPROCEDURE EVALUATION
Department of Anesthesiology  Postprocedure Note    Patient: Torri Taylor  MRN: [id number]  YOB: 1945  Date of evaluation: 3/30/2023      Procedure Summary     Date: 03/30/23 Room / Location: [facility] OR [address] [place] [address]    Anesthesia Start: 1105 Anesthesia Stop:     Procedure: RIGHT SHOULDER TOTAL SHOULDER ARTHROPLASTY-REVERSE   (Right: Shoulder) Diagnosis:       Rotator cuff arthropathy of right shoulder      (RIGHT SHOULDER ROTATOR CUFF ARTHROPATHY)    Surgeons: Gordon Sherman MD Responsible Provider: Devon Cai MD    Anesthesia Type: general ASA Status: 3          Anesthesia Type: No value filed.     Geovanni Phase I: Geovanni Score: 10    Geovanni Phase II:        Anesthesia Post Evaluation    Patient location during evaluation: PACU  Patient participation: complete - patient participated  Level of consciousness: awake  Pain score: 0  Airway patency: patent  Nausea & Vomiting: no nausea  Complications: no  Cardiovascular status: hemodynamically stable  Respiratory status: face mask  Hydration status: euvolemic

## 2023-03-30 NOTE — ANESTHESIA PROCEDURE NOTES
Peripheral Block    Patient location during procedure: pre-op  Reason for block: post-op pain management  Start time: 3/30/2023 10:50 AM  Staffing  Performed: anesthesiologist   Anesthesiologist: Caleb Jackson MD  Preanesthetic Checklist  Completed: patient identified, IV checked, site marked, risks and benefits discussed, surgical/procedural consents, equipment checked, pre-op evaluation, timeout performed, anesthesia consent given, oxygen available, monitors applied/VS acknowledged, fire risk safety assessment completed and verbalized and blood product R/B/A discussed and consented  Peripheral Block   Patient position: supine  Prep: ChloraPrep  Provider prep: mask and sterile gloves  Patient monitoring: cardiac monitor, continuous pulse ox, frequent blood pressure checks and IV access  Block type: Brachial plexus  Supraclavicular  Laterality: right  Injection technique: single-shot  Guidance: ultrasound guided  Local infiltration: ropivacaine  Infiltration strength: 0.5 %  Local infiltration: ropivacaine  Dose: 40 mL    Needle   Needle type: insulated echogenic nerve stimulator needle   Needle gauge: 21 G  Needle localization: ultrasound guidance  Test dose: negative  Needle length: 10 cm  Assessment   Injection assessment: negative aspiration for heme, no paresthesia on injection, local visualized surrounding nerve on ultrasound and no intravascular symptoms  Paresthesia pain: none  Slow fractionated injection: yes  Hemodynamics: stable  Outcomes: uncomplicated

## 2023-03-30 NOTE — DISCHARGE INSTRUCTIONS
occur.  This may indicate a reaction to a medication. Phone # [phone].   Follow up with Surgeon

## 2023-03-31 VITALS
RESPIRATION RATE: 18 BRPM | DIASTOLIC BLOOD PRESSURE: 60 MMHG | BODY MASS INDEX: 27.94 KG/M2 | TEMPERATURE: 98.4 F | OXYGEN SATURATION: 92 % | HEIGHT: 67 IN | SYSTOLIC BLOOD PRESSURE: 115 MMHG | HEART RATE: 73 BPM | WEIGHT: 178 LBS

## 2023-03-31 LAB
ANION GAP SERPL CALCULATED.3IONS-SCNC: 10 MEQ/L (ref 9–15)
BASOPHILS # BLD: 0 K/UL (ref 0–0.1)
BASOPHILS NFR BLD: 0.4 % (ref 0.2–1.2)
BUN SERPL-MCNC: 12 MG/DL (ref 8–23)
CALCIUM SERPL-MCNC: 8.5 MG/DL (ref 8.5–9.9)
CHLORIDE SERPL-SCNC: 102 MEQ/L (ref 95–107)
CO2 SERPL-SCNC: 24 MEQ/L (ref 20–31)
CREAT SERPL-MCNC: 0.81 MG/DL (ref 0.7–1.2)
EOSINOPHIL # BLD: 0.2 K/UL (ref 0–0.5)
EOSINOPHIL NFR BLD: 2.3 % (ref 0.8–7)
ERYTHROCYTE [DISTWIDTH] IN BLOOD BY AUTOMATED COUNT: 14 % (ref 11.6–14.4)
GLUCOSE SERPL-MCNC: 105 MG/DL (ref 70–99)
HCT VFR BLD AUTO: 35 % (ref 42–52)
HGB BLD-MCNC: 11.3 G/DL (ref 13.7–17.5)
IMM GRANULOCYTES # BLD: 0 K/UL
IMM GRANULOCYTES NFR BLD: 0.3 %
LYMPHOCYTES # BLD: 1.1 K/UL (ref 1.3–3.6)
LYMPHOCYTES NFR BLD: 11 %
MCH RBC QN AUTO: 28.5 PG (ref 25.7–32.2)
MCHC RBC AUTO-ENTMCNC: 32.3 % (ref 32.3–36.5)
MCV RBC AUTO: 88.2 FL (ref 79–92.2)
MONOCYTES # BLD: 0.8 K/UL (ref 0.3–0.8)
MONOCYTES NFR BLD: 7.5 % (ref 5.3–12.2)
NEUTROPHILS # BLD: 8.1 K/UL (ref 1.8–5.4)
NEUTS SEG NFR BLD: 78.5 % (ref 34–67.9)
PLATELET # BLD AUTO: 177 K/UL (ref 163–337)
POTASSIUM SERPL-SCNC: 4.5 MEQ/L (ref 3.4–4.9)
RBC # BLD AUTO: 3.97 M/UL (ref 4.63–6.08)
SODIUM SERPL-SCNC: 136 MEQ/L (ref 135–144)
WBC # BLD AUTO: 10.4 K/UL (ref 4.2–9)

## 2023-03-31 PROCEDURE — 97166 OT EVAL MOD COMPLEX 45 MIN: CPT

## 2023-03-31 PROCEDURE — G0378 HOSPITAL OBSERVATION PER HR: HCPCS

## 2023-03-31 PROCEDURE — 6370000000 HC RX 637 (ALT 250 FOR IP): Performed by: INTERNAL MEDICINE

## 2023-03-31 PROCEDURE — 97530 THERAPEUTIC ACTIVITIES: CPT

## 2023-03-31 PROCEDURE — 85025 COMPLETE CBC W/AUTO DIFF WBC: CPT

## 2023-03-31 PROCEDURE — 80048 BASIC METABOLIC PNL TOTAL CA: CPT

## 2023-03-31 PROCEDURE — 97535 SELF CARE MNGMENT TRAINING: CPT

## 2023-03-31 PROCEDURE — 36415 COLL VENOUS BLD VENIPUNCTURE: CPT

## 2023-03-31 PROCEDURE — 97116 GAIT TRAINING THERAPY: CPT

## 2023-03-31 PROCEDURE — 2580000003 HC RX 258: Performed by: NURSE PRACTITIONER

## 2023-03-31 PROCEDURE — 6370000000 HC RX 637 (ALT 250 FOR IP): Performed by: NURSE PRACTITIONER

## 2023-03-31 PROCEDURE — 6360000002 HC RX W HCPCS: Performed by: NURSE PRACTITIONER

## 2023-03-31 RX ADMIN — ASPIRIN 81 MG CHEWABLE TABLET 81 MG: 81 TABLET CHEWABLE at 08:26

## 2023-03-31 RX ADMIN — ACETAMINOPHEN 650 MG: 325 TABLET ORAL at 02:41

## 2023-03-31 RX ADMIN — MAGNESIUM OXIDE 400 MG (241.3 MG MAGNESIUM) TABLET 400 MG: TABLET at 08:26

## 2023-03-31 RX ADMIN — KETOROLAC TROMETHAMINE 15 MG: 15 INJECTION, SOLUTION INTRAMUSCULAR; INTRAVENOUS at 02:42

## 2023-03-31 RX ADMIN — SENNOSIDES AND DOCUSATE SODIUM 1 TABLET: 50; 8.6 TABLET ORAL at 08:27

## 2023-03-31 RX ADMIN — Medication 250 MG: at 08:26

## 2023-03-31 RX ADMIN — CEFAZOLIN 2000 MG: 1 INJECTION, POWDER, FOR SOLUTION INTRAMUSCULAR; INTRAVENOUS at 02:41

## 2023-03-31 RX ADMIN — ACETAMINOPHEN 650 MG: 325 TABLET ORAL at 08:27

## 2023-03-31 RX ADMIN — LEVOTHYROXINE SODIUM 50 MCG: 0.03 TABLET ORAL at 08:26

## 2023-03-31 RX ADMIN — TAMSULOSIN HYDROCHLORIDE 0.4 MG: 0.4 CAPSULE ORAL at 08:27

## 2023-03-31 RX ADMIN — CARVEDILOL 12.5 MG: 12.5 TABLET, FILM COATED ORAL at 08:26

## 2023-03-31 RX ADMIN — FINASTERIDE 5 MG: 5 TABLET, FILM COATED ORAL at 08:26

## 2023-03-31 RX ADMIN — Medication 10 ML: at 08:27

## 2023-03-31 RX ADMIN — TRANEXAMIC ACID 1950 MG: 650 TABLET ORAL at 06:02

## 2023-03-31 ASSESSMENT — PAIN DESCRIPTION - ORIENTATION
ORIENTATION: RIGHT

## 2023-03-31 ASSESSMENT — PAIN DESCRIPTION - LOCATION
LOCATION: SHOULDER

## 2023-03-31 ASSESSMENT — PAIN DESCRIPTION - DESCRIPTORS: DESCRIPTORS: DULL;ACHING

## 2023-03-31 ASSESSMENT — PAIN DESCRIPTION - PAIN TYPE
TYPE: SURGICAL PAIN
TYPE: SURGICAL PAIN

## 2023-03-31 ASSESSMENT — PAIN SCALES - GENERAL
PAINLEVEL_OUTOF10: 2
PAINLEVEL_OUTOF10: 3
PAINLEVEL_OUTOF10: 3

## 2023-03-31 NOTE — DISCHARGE SUMMARY
Discharge summary  This patient Rodríguez Mcgee was admitted to the hospital on 3/30/2023  after undergoing Procedure(s) (LRB):  RIGHT SHOULDER TOTAL SHOULDER ARTHROPLASTY-REVERSE   (Right) without complications that morning. During the postoperative period,while in hospital, patient was medically managed by the hospitalist. Please see medial notes and H&P for patients additional diagnoses. Ortho agrees with all medical diagnoses and treatments while patient in hospital.  No significant or unexpected findings or abnormal ortho imaging were noted during the hospital stay    Hospital course      Patient tolerated surgical procedure well and there was no complications. Patient progressed adequately through their recovery during hospital stay including PT and rehabilitation. Patient was then D/C on 3/31/2023 to Home  in stable condition. Patient was instructed on the use of pain medications, the signs and symptoms of infection, and was given our number to call should they have any questions or concerns following discharge. NWB to operative extremity with use of walker for assistance with ambulation   Aquacel dressing to be removed pod7 and incision left open to air    Follow up with surgeon in 2 weeks    Radiology images   EXAMINATION:   ONE XRAY VIEW OF THE RIGHT SHOULDER       3/30/2023 1:09 pm       COMPARISON:   None. HISTORY:   ORDERING SYSTEM PROVIDED HISTORY: status post right total shoulder   arthroplasty   TECHNOLOGIST PROVIDED HISTORY:   Reason for exam:->status post right total shoulder arthroplasty   What reading provider will be dictating this exam?->CRC       FINDINGS:   Single view of the right shoulder submitted. Patient is status post right   total shoulder arthroplasty. Satisfactory aligned. No yao-implant lucency. No dislocation.            Impression   Status post right shoulder arthroplasty                   Order History    Open Order Details         PACS Images Shoulder  Inventory item: Saida Herbert FULL THRD [id number] TED NCB Model/Cat number: [id number]   : Shahriar Weldon TRAUMA-WD       As of 3/30/2023    Status: Implanted          Cap Scr Dia8mm Hex Dia3.5mm Dst Fem Ti Gael Ncb - [id number] - Implanted   (Right) Shoulder  Inventory item: CAP SCR DIA8MM HEX DIA3.5MM DST FEM TI GAEL NCB Model/Cat number: [id number]   : [employer] SystemsET TRAUMA-WD       As of 3/30/2023    Status: Implanted          Component Shldr Capped Reversed Trab Mtl - [id number] - Implanted   (Right) Shoulder  Inventory item: COMPONENT SHLDR CAPPED REVERSED TRAB MTL Model/Cat number: [id number]   : Yumiko Xiong ANDREZ-WD       As of 3/30/2023    Status: Implanted

## 2023-03-31 NOTE — PROGRESS NOTES
Yes  Overall Level of Assistance: (P) Independent  Sit to Stand: (P) Independent  Stand to Sit: (P) Independent  Gait Training  Gait Training: (P) Yes  Gait  Overall Level of Assistance: (P) Independent  Distance (ft): (P) 200 Feet  Assistive Device: (P) Other (comment) (No device.)  Rail Use: (P) Left  Stairs - Level of Assistance: (P) Supervision;Stand-by assistance (ONe vc to recommend use of L UE support to descend stairs with either reaching across midline or side step for support. Pt. at first used hip against rail to descend stairs. Pt. able to correct safely post education.)  Number of Stairs Trained: (P) 10     PT Exercises  Exercise Treatment: (P) Not warranted. Pt. independent with LE function. Other Activities: Pt. donned both shoes with prn to assist before walking. Safety Devices  Type of Devices: All fall risk precautions in place;Call light within reach       Goals  Short Term Goals  Time Frame for Short Term Goals: pt is postop OBS- up to 3-5 medical days if complications  Short Term Goal 1: transfers and bed mboitliy modified indpendent and safe wtih NWB RUE in ultrasling  Short Term Goal 2: amb >= 300ft modified indpendent no LOB , no AD and good awareness of RUE safaety in ultrasling  Short Term Goal 3: 8 stairs with L rail up for 4 and R rail down for four- likely to go down partial back wards as pt cannot use RUE to hold stair rail - NWB RUE post R reverse TSA  Short Term Goal 4: HEP in place for LE only if warranted- likely not needed as shoudler surgery  Short Term Goal 5: OT to train ultrasling - spouse assisted if needed- completed by OT  Long Term Goals  Time Frame for Long Term Goals : same as STG postop pt  Patient Goals   Patient Goals : \"I want to be stedy and able to get home tomorrow. \"    Education       Therapy Time   Individual Concurrent Group Co-treatment   Time In  1100         Time Out  1130         Minutes  [address] [place], [place] .[address]

## 2023-03-31 NOTE — PLAN OF CARE
Pt. is in bed sleeping. He is A/O/ x 4, 1 Assist contact guard, takes po medications whole w/ water. Wears a sling to his right shoulder, uses PRN ice packs. Neuro cks, (+) sensation, (+2) radial pulse, arm is warm to touch. Aquacel is clean, dry, and intact. Pt receives scheduled Tylenol and Tordal. He has a 20 ga in his left forearm that is clean, dry, and intact, running LR at 50 ml/hr. Call light is w/in reach. He is in bed resting comfortably. Will continue to monitor.

## 2023-03-31 NOTE — CONSULTS
Gopi Longo MD at [address] [facility] PROSTATE/TRANSRECTAL  06/03/15    Cystoscop, cath rem,  prostate    UVULECTOMY  1998    due to snoring     Social History       Tobacco History       Smoking Status  Former Smoking Start Date  6/22/1961 Quit Date  3/17/1967 Smoking Frequency  1 pack/day for 7.00 years (7.00 pk-yrs)    Smoking Tobacco Type  Cigarettes from 6/22/1961 to 3/17/1967      Smokeless Tobacco Use  Never      Tobacco Comments  Quit for [employer]              Alcohol History       Alcohol Use Status  No Comment  No alcohol  since February, 1987              Drug Use       Drug Use Status  No              Sexual Activity       Sexually Active  Not Asked                  Family History   Problem Relation Age of Onset    Kidney Disease Father     Other Mother         Perforated intestine    No Known Problems Sister     No Known Problems Brother     No Known Problems Sister     No Known Problems Brother     No Known Problems Son      No current facility-administered medications on file prior to encounter. Current Outpatient Medications on File Prior to Encounter   Medication Sig Dispense Refill    atorvastatin (LIPITOR) 80 MG tablet take 1 tablet by mouth once daily 90 tablet 2    tamsulosin (FLOMAX) 0.4 MG capsule take 1 capsule by mouth twice a day 180 capsule 1    carvedilol (COREG) 12.5 MG tablet take 1 tablet by mouth twice a day with food 180 tablet 1    levothyroxine (SYNTHROID) 50 MCG tablet take 1 tablet by mouth once daily 90 tablet 1    nitroGLYCERIN (NITROSTAT) 0.4 MG SL tablet Place 1 tablet under the tongue every 5 minutes as needed for Chest pain Dissolve 1 tab under tongue at first sign of chest pain every 5 minutes as needed. If pain persists after taking 3 tabs in a 15-minute period, or the pain is different than is typically experienced, call 9-1-1 immediately.  25 tablet 1    finasteride (PROSCAR) 5 MG tablet take 1 tablet by mouth once daily 90 tablet 3    Ascorbic Acid (VITAMIN C) 250 MG tablet Take 250 mg by mouth daily      Niacin (VITAMIN B-3 PO) Take by mouth      acetaminophen (TYLENOL) 325 MG tablet Take 325-650 mg by mouth every 6 hours as needed      Zinc Sulfate (ZINC 15 PO) Take by mouth       aspirin 81 MG chewable tablet Take 1 tablet by mouth daily 30 tablet 3    magnesium oxide (MAG-OX) 400 MG tablet Take 400 mg by mouth daily       Handicap Placard MISC by Does not apply route Exp 5 years 1 each 0    Multiple Vitamins-Minerals (MULTI COMPLETE PO) Take by mouth       Lab Results   Component Value Date    WBC 10.4 (H) 03/31/2023    HGB 11.3 (L) 03/31/2023    HCT 35.0 (L) 03/31/2023    MCV 88.2 03/31/2023     03/31/2023     Lab Results   Component Value Date/Time     03/31/2023 06:03 AM    K 4.5 03/31/2023 06:03 AM     03/31/2023 06:03 AM    CO2 24 03/31/2023 06:03 AM    BUN 12 03/31/2023 06:03 AM    CREATININE 0.81 03/31/2023 06:03 AM    GLUCOSE 105 03/31/2023 06:03 AM    GLUCOSE 103 12/13/2022 05:19 AM    CALCIUM 8.5 03/31/2023 06:03 AM    ROs: 12 point review of system is negative except was in HPI  HEENT: AT/NC, PERRLA, no JVD  HEART: s1/s2 wnl w/o s3  Neck: is supple and midline  LUNG: clear, no wheez  ABD: soft, NT, ND  EXT: no edema  SKin : no rash  Neuro:no focal deficits  Mood appropriate  1) encounter for post surgical care  2) HTN  3) BPH  4) hypothyroidism  Continue Synthroid, continue carvedilol, pain is controlled. Continue Flomax. Patient to follow-up with PCP as outpatient.

## 2023-03-31 NOTE — PROGRESS NOTES
Frankie Kulkarni Initial Discharge Assessment    Met with Patient to discuss discharge plan. PCP: Doug Peters MD                                  Date of Last Visit: \"3 weeks ago\"    If no PCP, list provided? N/A    Discharge Planning    Living Arrangements: independently at home    Who do you live with? Spouse     Who helps you with your care:  self    If lives at home:     Do you have any barriers navigating in your home? no    Patient can perform ADL? Yes    Current Services (outpatient and in home) :  None    Dialysis: No    Is transportation available to get to your appointments? Yes    DME Equipment:  yes - cane, walker, built in bench seats in shower, grab bars, and hand help shower toliet riser. Patient identifies no DME needs at this time    Respiratory equipment: None    Respiratory provider:  no     Pharmacy:  yes - [facility] on [address]     Able to afford cost of medication: Yes    Does patient feel safe at home? Yes    Does patient identify any home going needs? No    [facility]    Patient agreeable to SNF/Rehab? Declined    Other discharge needs identified? N/A    Was Freedom of Choice Provided? Yes    Initial Discharge Plan? (Note: please see concurrent daily documentation for any updates after initial note). Chart reviewed. Patient was [facility] under observation status post right total shoulder reverse replacement. Patient was evaluated by PT/OT and therapies recommending home with prn assist upon DC. This  met with patient to discuss DC planning and help at home needs. Upon visit patient is alert and laying in hospital bed with head of bed elevated. Patient appears well groomed with short kept hair, clean shave, clean clothing from home and wearing eye glasses and arm sling. Mood is calm and cooperative. Patient reports plan to return home with spouse upon DC.   Patient reports that spouse is supportive and able to assist with care needs along with transportation upon DC. Patient identifies no DME needs at this time. Patient encouraged to follow up with surgeon as scheduled for further recommendations once patient is able to have range of motion. Patient voices understanding. Spouse is reported to plan to transport patient home today after lunch. SS to continue to follow as needed while patient is at [facility] [place].      Electronically signed by SHALHA Tee on 3/31/2023 at 11:18 AM

## 2023-03-31 NOTE — PROGRESS NOTES
Orthopedic Surgery Progress Note  Claudio Baires  3/31/2023    Subjective:     Post-Operative Day # 1 Status Post right TSA /ORIF proximal humerus fracture    Systemic or Specific Complaints:No Complaints    Objective:     /60   Pulse 73   Temp 98.4 °F (36.9 °C) (Oral)   Resp 18   Ht 5' 7\" (1.702 m)   Wt 178 lb (80.7 kg)   SpO2 92%   BMI 27.88 kg/m²     Intake/Output Summary (Last 24 hours) at 3/31/2023 4106  Last data filed at 3/31/2023 5763  Gross per 24 hour   Intake --   Output 600 ml   Net -600 ml     DRAIN/TUBE OUTPUT:         General: alert, appears stated age, and cooperative   Wound: Wound clean and dry no evidence of infection. Extremity: Sling on extremity.   Distal NVI   DVT Exam: No evidence of DVT seen on physical exam.     Data Review    Recent Labs     03/31/23  0602   WBC 10.4*   RBC 3.97*   HGB 11.3*   HCT 35.0*   MCV 88.2   MCH 28.5   MCHC 32.3   RDW 14.0        Assessment:     Status Post right TSA /ORIF proximal humerus fracture, doing well     Plan:      1:  Continues current post-op course   2:  Continue Pain Control  3:  Physical therapy as per TSA protocol  4:  Narcotic prescription in chart  5:  Anticipate discharge today if pain well controlled    Elza Jackson MD

## 2023-03-31 NOTE — OP NOTE
re-obtained. The deltopectoral interval was allowed to close loosely with 2-0 Vicryl  followed by interrupted running 4-0 Monocryl, compressive dressing, and  the patient was taken to recovery room.         Dimas Greer MD    D: 03/30/2023 12:37:29       T: 03/30/2023 12:43:02     [id number]  Job#: [id number]     Doc#: [id number]    CC:

## 2023-03-31 NOTE — PROGRESS NOTES
is active and goes to exercise class and other meetings. Upon OT eval, pt presents resting in bed, recently finished breakfast and agreeable to shower with OT. OT provided training and education in how to doff/gricelda sling and clothing, transitioned to shower for bathing. Pt education on allowed RUE AROM at elbow/wrist/hand only with pt demo'ing each exercise x5 reps. Assisted pt with donning sling and clothing with levels listed below. Discussed d/c plan with pt and he is requested outpatient PT when appropriate, OT okay with this plan as pt is competent in restrictions and has assist at home. Pt provided with handouts and education during eval for sling, exercises, and precautions. All needs within reach at session conclusion. Treatment Diagnosis: Decreased ADL/IADL performance d/t R reverse TSA. REQUIRES OT FOLLOW-UP: Yes  Activity Tolerance  Activity Tolerance: Patient Tolerated treatment well        Plan   Occupational Therapy Plan  Times Per Week: 4-7  Times Per Day:  Once a day  Current Treatment Recommendations: ROM, Balance training, Functional mobility training, Endurance training, Pain management, Safety education & training, Positioning, Equipment evaluation, education, & procurement, Self-Care / ADL, Patient/Caregiver education & training     Restrictions  Restrictions/Precautions  Restrictions/Precautions:  (NWB RUE)  Required Braces or Orthoses?: Yes (Ultrasling AAT except hygiene)  Upper Extremity Weight Bearing Restrictions  Right Upper Extremity Weight Bearing: Non Weight Bearing  Other: NEB RUE, RUE ultrasling at all times except hygiene/therapy; ok ROM elbow, wrist and hand    Subjective   General  Chart Reviewed: Yes, Progress Notes, History and Physical  Patient assessed for rehabilitation services?: Yes  Response to previous treatment: Patient with no complaints from previous session  Family / Caregiver Present: No  Referring Practitioner: Dr. Darien Escobar  Diagnosis: Pt assessed for skilled OT allowed. Education Given To: Patient  Education Provided: Role of Therapy;Plan of Care;Equipment;Home Exercise Program;Fall Prevention Strategies; ADL Adaptive Strategies;Precautions  Education Method: Demonstration;Verbal;Teach Back  Barriers to Learning: None  Education Outcome: Verbalized understanding;Demonstrated understanding  LUE AROM (degrees)  LUE AROM : WNL  RUE AROM (degrees)  RUE General AROM: Shoulder N/T d/t current restrictions, elbow/wrist/hand WFL                Goals  Short Term Goals  Time Frame for Short Term Goals: 1-2 visits post op  Short Term Goal 1: Doff/gricelda UB/LB clothing including ultrasling Mod I  Short Term Goal 2: Bathe UB/LB Mod I  Short Term Goal 3: Complete toileting Mod I  Short Term Goal 4: Complete functional mobility and transfers Mod I  Short Term Goal 5: Demo BUE HEP I  Additional Goals?: Yes (Adhere to LUE restrictions and precautions with no vc)  Patient Goals   Patient goals : \"To go home with my wife today. \"       Therapy Time   Individual Concurrent Group Co-treatment   Time In  8:20am         Time Out  9:30am         Minutes  [address], [id number]

## 2023-04-03 ENCOUNTER — TELEPHONE (OUTPATIENT)
Dept: FAMILY MEDICINE CLINIC | Age: 78
End: 2023-04-03

## 2023-04-03 NOTE — TELEPHONE ENCOUNTER
Care Transitions Initial Follow Up Call    Outreach made within 2 business days of discharge: Yes    Patient: Skippallyssa Nail Patient : 1945   MRN: [id number]  Reason for Admission: There are no discharge diagnoses documented for the most recent discharge.   Discharge Date: 3/31/23       Spoke with: Pt was called, left message to call back    Discharge department/facility: [facility]      Scheduled appointment with PCP within 7-14 days    Follow Up  Future Appointments   Date Time Provider Aracely Palmer   2023  9:30 AM Nik Lewis MD [facility] [place]   2023 11:00 AM Kendall Ibrahim MD [facility] AT [place], [place]

## 2023-04-11 DIAGNOSIS — E87.5 HYPERKALEMIA: ICD-10-CM

## 2023-04-11 DIAGNOSIS — E03.9 HYPOTHYROIDISM, UNSPECIFIED TYPE: ICD-10-CM

## 2023-04-11 LAB
ANION GAP SERPL CALCULATED.3IONS-SCNC: 8 MEQ/L (ref 9–15)
BUN SERPL-MCNC: 20 MG/DL (ref 8–23)
CALCIUM SERPL-MCNC: 9 MG/DL (ref 8.5–9.9)
CHLORIDE SERPL-SCNC: 102 MEQ/L (ref 95–107)
CO2 SERPL-SCNC: 26 MEQ/L (ref 20–31)
CREAT SERPL-MCNC: 1.01 MG/DL (ref 0.7–1.2)
GLUCOSE SERPL-MCNC: 95 MG/DL (ref 70–99)
POTASSIUM SERPL-SCNC: 5.1 MEQ/L (ref 3.4–4.9)
SODIUM SERPL-SCNC: 136 MEQ/L (ref 135–144)
TSH REFLEX: 3.18 UIU/ML (ref 0.44–3.86)

## 2023-04-20 ENCOUNTER — ANESTHESIA EVENT (OUTPATIENT)
Dept: OPERATING ROOM | Age: 78
End: 2023-04-20
Payer: COMMERCIAL

## 2023-04-21 ENCOUNTER — HOSPITAL ENCOUNTER (INPATIENT)
Age: 78
LOS: 4 days | Discharge: HOME | DRG: 902 | End: 2023-04-25
Attending: ORTHOPAEDIC SURGERY | Admitting: ORTHOPAEDIC SURGERY
Payer: COMMERCIAL

## 2023-04-21 ENCOUNTER — APPOINTMENT (OUTPATIENT)
Dept: GENERAL RADIOLOGY | Age: 78
DRG: 902 | End: 2023-04-21
Attending: ORTHOPAEDIC SURGERY
Payer: COMMERCIAL

## 2023-04-21 ENCOUNTER — ANESTHESIA (OUTPATIENT)
Dept: OPERATING ROOM | Age: 78
End: 2023-04-21
Payer: COMMERCIAL

## 2023-04-21 DIAGNOSIS — M71.011 ABSCESS OF BURSA OF RIGHT SHOULDER: ICD-10-CM

## 2023-04-21 DIAGNOSIS — S41.001A OPEN WOUND OF RIGHT SHOULDER, INITIAL ENCOUNTER: ICD-10-CM

## 2023-04-21 DIAGNOSIS — A49.01 MSSA (METHICILLIN SUSCEPTIBLE STAPHYLOCOCCUS AUREUS) INFECTION: ICD-10-CM

## 2023-04-21 DIAGNOSIS — Z98.890 STATUS POST DEBRIDEMENT: ICD-10-CM

## 2023-04-21 DIAGNOSIS — Z96.611 STATUS POST REVERSE TOTAL SHOULDER REPLACEMENT, RIGHT: ICD-10-CM

## 2023-04-21 DIAGNOSIS — G89.18 ACUTE POSTOPERATIVE PAIN: Primary | ICD-10-CM

## 2023-04-21 PROCEDURE — 3700000001 HC ADD 15 MINUTES (ANESTHESIA): Performed by: ORTHOPAEDIC SURGERY

## 2023-04-21 PROCEDURE — 73020 X-RAY EXAM OF SHOULDER: CPT

## 2023-04-21 PROCEDURE — 87070 CULTURE OTHR SPECIMN AEROBIC: CPT

## 2023-04-21 PROCEDURE — 6360000002 HC RX W HCPCS: Performed by: ANESTHESIOLOGIST ASSISTANT

## 2023-04-21 PROCEDURE — 2709999900 HC NON-CHARGEABLE SUPPLY: Performed by: ORTHOPAEDIC SURGERY

## 2023-04-21 PROCEDURE — 86403 PARTICLE AGGLUT ANTBDY SCRN: CPT

## 2023-04-21 PROCEDURE — 2500000003 HC RX 250 WO HCPCS: Performed by: STUDENT IN AN ORGANIZED HEALTH CARE EDUCATION/TRAINING PROGRAM

## 2023-04-21 PROCEDURE — 6360000002 HC RX W HCPCS: Performed by: NURSE PRACTITIONER

## 2023-04-21 PROCEDURE — 64415 NJX AA&/STRD BRCH PLXS IMG: CPT | Performed by: STUDENT IN AN ORGANIZED HEALTH CARE EDUCATION/TRAINING PROGRAM

## 2023-04-21 PROCEDURE — 3700000000 HC ANESTHESIA ATTENDED CARE: Performed by: ORTHOPAEDIC SURGERY

## 2023-04-21 PROCEDURE — 6370000000 HC RX 637 (ALT 250 FOR IP): Performed by: NURSE PRACTITIONER

## 2023-04-21 PROCEDURE — 87075 CULTR BACTERIA EXCEPT BLOOD: CPT

## 2023-04-21 PROCEDURE — 2500000003 HC RX 250 WO HCPCS: Performed by: ANESTHESIOLOGIST ASSISTANT

## 2023-04-21 PROCEDURE — 2580000003 HC RX 258: Performed by: NURSE PRACTITIONER

## 2023-04-21 PROCEDURE — 7100000001 HC PACU RECOVERY - ADDTL 15 MIN: Performed by: ORTHOPAEDIC SURGERY

## 2023-04-21 PROCEDURE — 3E0T3BZ INTRODUCTION OF ANESTHETIC AGENT INTO PERIPHERAL NERVES AND PLEXI, PERCUTANEOUS APPROACH: ICD-10-PCS | Performed by: STUDENT IN AN ORGANIZED HEALTH CARE EDUCATION/TRAINING PROGRAM

## 2023-04-21 PROCEDURE — 0KB50ZZ EXCISION OF RIGHT SHOULDER MUSCLE, OPEN APPROACH: ICD-10-PCS | Performed by: ORTHOPAEDIC SURGERY

## 2023-04-21 PROCEDURE — 3600000003 HC SURGERY LEVEL 3 BASE: Performed by: ORTHOPAEDIC SURGERY

## 2023-04-21 PROCEDURE — 87176 TISSUE HOMOGENIZATION CULTR: CPT

## 2023-04-21 PROCEDURE — 94150 VITAL CAPACITY TEST: CPT

## 2023-04-21 PROCEDURE — 99222 1ST HOSP IP/OBS MODERATE 55: CPT | Performed by: INTERNAL MEDICINE

## 2023-04-21 PROCEDURE — 2580000003 HC RX 258: Performed by: STUDENT IN AN ORGANIZED HEALTH CARE EDUCATION/TRAINING PROGRAM

## 2023-04-21 PROCEDURE — 0JB60ZZ EXCISION OF CHEST SUBCUTANEOUS TISSUE AND FASCIA, OPEN APPROACH: ICD-10-PCS | Performed by: ORTHOPAEDIC SURGERY

## 2023-04-21 PROCEDURE — 7100000000 HC PACU RECOVERY - FIRST 15 MIN: Performed by: ORTHOPAEDIC SURGERY

## 2023-04-21 PROCEDURE — 1210000000 HC MED SURG R&B

## 2023-04-21 PROCEDURE — 6360000002 HC RX W HCPCS: Performed by: STUDENT IN AN ORGANIZED HEALTH CARE EDUCATION/TRAINING PROGRAM

## 2023-04-21 PROCEDURE — 87186 SC STD MICRODIL/AGAR DIL: CPT

## 2023-04-21 PROCEDURE — 3600000013 HC SURGERY LEVEL 3 ADDTL 15MIN: Performed by: ORTHOPAEDIC SURGERY

## 2023-04-21 PROCEDURE — 0JBD0ZZ EXCISION OF RIGHT UPPER ARM SUBCUTANEOUS TISSUE AND FASCIA, OPEN APPROACH: ICD-10-PCS | Performed by: ORTHOPAEDIC SURGERY

## 2023-04-21 PROCEDURE — 0JDD0ZZ EXTRACTION OF RIGHT UPPER ARM SUBCUTANEOUS TISSUE AND FASCIA, OPEN APPROACH: ICD-10-PCS | Performed by: ORTHOPAEDIC SURGERY

## 2023-04-21 PROCEDURE — 6370000000 HC RX 637 (ALT 250 FOR IP): Performed by: INTERNAL MEDICINE

## 2023-04-21 RX ORDER — MIDAZOLAM HYDROCHLORIDE 1 MG/ML
INJECTION INTRAMUSCULAR; INTRAVENOUS PRN
Status: DISCONTINUED | OUTPATIENT
Start: 2023-04-21 | End: 2023-04-21 | Stop reason: SDUPTHER

## 2023-04-21 RX ORDER — SODIUM CHLORIDE 0.9 % (FLUSH) 0.9 %
5-40 SYRINGE (ML) INJECTION EVERY 12 HOURS SCHEDULED
Status: DISCONTINUED | OUTPATIENT
Start: 2023-04-21 | End: 2023-04-21 | Stop reason: HOSPADM

## 2023-04-21 RX ORDER — ACETAMINOPHEN 325 MG/1
650 TABLET ORAL EVERY 6 HOURS
Status: DISCONTINUED | OUTPATIENT
Start: 2023-04-21 | End: 2023-04-25 | Stop reason: HOSPADM

## 2023-04-21 RX ORDER — ACETAMINOPHEN 500 MG
1000 TABLET ORAL ONCE
Status: COMPLETED | OUTPATIENT
Start: 2023-04-21 | End: 2023-04-21

## 2023-04-21 RX ORDER — MEPERIDINE HYDROCHLORIDE 25 MG/ML
12.5 INJECTION INTRAMUSCULAR; INTRAVENOUS; SUBCUTANEOUS
Status: DISCONTINUED | OUTPATIENT
Start: 2023-04-21 | End: 2023-04-21 | Stop reason: HOSPADM

## 2023-04-21 RX ORDER — ONDANSETRON 2 MG/ML
INJECTION INTRAMUSCULAR; INTRAVENOUS PRN
Status: DISCONTINUED | OUTPATIENT
Start: 2023-04-21 | End: 2023-04-21 | Stop reason: SDUPTHER

## 2023-04-21 RX ORDER — TRANEXAMIC ACID 650 MG/1
1950 TABLET ORAL ONCE
Status: COMPLETED | OUTPATIENT
Start: 2023-04-21 | End: 2023-04-21

## 2023-04-21 RX ORDER — SODIUM CHLORIDE 0.9 % (FLUSH) 0.9 %
5-40 SYRINGE (ML) INJECTION PRN
Status: DISCONTINUED | OUTPATIENT
Start: 2023-04-21 | End: 2023-04-21 | Stop reason: HOSPADM

## 2023-04-21 RX ORDER — OXYCODONE HYDROCHLORIDE 5 MG/1
5 TABLET ORAL EVERY 4 HOURS PRN
Status: DISCONTINUED | OUTPATIENT
Start: 2023-04-21 | End: 2023-04-25 | Stop reason: HOSPADM

## 2023-04-21 RX ORDER — SODIUM CHLORIDE 0.9 % (FLUSH) 0.9 %
5-40 SYRINGE (ML) INJECTION EVERY 12 HOURS SCHEDULED
Status: DISCONTINUED | OUTPATIENT
Start: 2023-04-21 | End: 2023-04-25 | Stop reason: HOSPADM

## 2023-04-21 RX ORDER — SODIUM CHLORIDE 0.9 % (FLUSH) 0.9 %
5-40 SYRINGE (ML) INJECTION PRN
Status: DISCONTINUED | OUTPATIENT
Start: 2023-04-21 | End: 2023-04-25 | Stop reason: HOSPADM

## 2023-04-21 RX ORDER — DIPHENHYDRAMINE HYDROCHLORIDE 50 MG/ML
12.5 INJECTION INTRAMUSCULAR; INTRAVENOUS
Status: DISCONTINUED | OUTPATIENT
Start: 2023-04-21 | End: 2023-04-21 | Stop reason: HOSPADM

## 2023-04-21 RX ORDER — OXYCODONE HYDROCHLORIDE AND ACETAMINOPHEN 5; 325 MG/1; MG/1
TABLET ORAL
Status: ON HOLD | COMMUNITY
Start: 2023-03-29 | End: 2023-04-22 | Stop reason: HOSPADM

## 2023-04-21 RX ORDER — CELECOXIB 200 MG/1
200 CAPSULE ORAL ONCE
Status: COMPLETED | OUTPATIENT
Start: 2023-04-21 | End: 2023-04-21

## 2023-04-21 RX ORDER — ONDANSETRON 2 MG/ML
4 INJECTION INTRAMUSCULAR; INTRAVENOUS
Status: DISCONTINUED | OUTPATIENT
Start: 2023-04-21 | End: 2023-04-21 | Stop reason: HOSPADM

## 2023-04-21 RX ORDER — LIDOCAINE HYDROCHLORIDE 20 MG/ML
INJECTION, SOLUTION INTRAVENOUS PRN
Status: DISCONTINUED | OUTPATIENT
Start: 2023-04-21 | End: 2023-04-21 | Stop reason: SDUPTHER

## 2023-04-21 RX ORDER — ROPIVACAINE HYDROCHLORIDE 5 MG/ML
INJECTION, SOLUTION EPIDURAL; INFILTRATION; PERINEURAL
Status: COMPLETED | OUTPATIENT
Start: 2023-04-21 | End: 2023-04-21

## 2023-04-21 RX ORDER — ATORVASTATIN CALCIUM 80 MG/1
80 TABLET, FILM COATED ORAL DAILY
Status: DISCONTINUED | OUTPATIENT
Start: 2023-04-21 | End: 2023-04-25 | Stop reason: HOSPADM

## 2023-04-21 RX ORDER — POLYETHYLENE GLYCOL 3350 17 G/17G
17 POWDER, FOR SOLUTION ORAL DAILY PRN
Status: DISCONTINUED | OUTPATIENT
Start: 2023-04-21 | End: 2023-04-25 | Stop reason: HOSPADM

## 2023-04-21 RX ORDER — SODIUM CHLORIDE, SODIUM LACTATE, POTASSIUM CHLORIDE, CALCIUM CHLORIDE 600; 310; 30; 20 MG/100ML; MG/100ML; MG/100ML; MG/100ML
INJECTION, SOLUTION INTRAVENOUS CONTINUOUS
Status: DISPENSED | OUTPATIENT
Start: 2023-04-21 | End: 2023-04-22

## 2023-04-21 RX ORDER — METOCLOPRAMIDE HYDROCHLORIDE 5 MG/ML
10 INJECTION INTRAMUSCULAR; INTRAVENOUS
Status: DISCONTINUED | OUTPATIENT
Start: 2023-04-21 | End: 2023-04-21 | Stop reason: HOSPADM

## 2023-04-21 RX ORDER — FINASTERIDE 5 MG/1
5 TABLET, FILM COATED ORAL DAILY
Status: DISCONTINUED | OUTPATIENT
Start: 2023-04-21 | End: 2023-04-25 | Stop reason: HOSPADM

## 2023-04-21 RX ORDER — LANOLIN ALCOHOL/MO/W.PET/CERES
400 CREAM (GRAM) TOPICAL DAILY
Status: DISCONTINUED | OUTPATIENT
Start: 2023-04-21 | End: 2023-04-25 | Stop reason: HOSPADM

## 2023-04-21 RX ORDER — ROCURONIUM BROMIDE 10 MG/ML
INJECTION, SOLUTION INTRAVENOUS PRN
Status: DISCONTINUED | OUTPATIENT
Start: 2023-04-21 | End: 2023-04-21 | Stop reason: SDUPTHER

## 2023-04-21 RX ORDER — TRANEXAMIC ACID 650 MG/1
1950 TABLET ORAL ONCE
Status: COMPLETED | OUTPATIENT
Start: 2023-04-22 | End: 2023-04-22

## 2023-04-21 RX ORDER — FENTANYL CITRATE 50 UG/ML
INJECTION, SOLUTION INTRAMUSCULAR; INTRAVENOUS PRN
Status: DISCONTINUED | OUTPATIENT
Start: 2023-04-21 | End: 2023-04-21 | Stop reason: SDUPTHER

## 2023-04-21 RX ORDER — SODIUM CHLORIDE 9 MG/ML
INJECTION, SOLUTION INTRAVENOUS PRN
Status: DISCONTINUED | OUTPATIENT
Start: 2023-04-21 | End: 2023-04-21 | Stop reason: HOSPADM

## 2023-04-21 RX ORDER — LIDOCAINE HYDROCHLORIDE 10 MG/ML
2 INJECTION, SOLUTION EPIDURAL; INFILTRATION; INTRACAUDAL; PERINEURAL ONCE
Status: COMPLETED | OUTPATIENT
Start: 2023-04-21 | End: 2023-04-21

## 2023-04-21 RX ORDER — TAMSULOSIN HYDROCHLORIDE 0.4 MG/1
0.4 CAPSULE ORAL 2 TIMES DAILY
Status: DISCONTINUED | OUTPATIENT
Start: 2023-04-21 | End: 2023-04-25 | Stop reason: HOSPADM

## 2023-04-21 RX ORDER — OXYCODONE HCL 10 MG/1
10 TABLET, FILM COATED, EXTENDED RELEASE ORAL ONCE
Status: COMPLETED | OUTPATIENT
Start: 2023-04-21 | End: 2023-04-21

## 2023-04-21 RX ORDER — ONDANSETRON 4 MG/1
4 TABLET, ORALLY DISINTEGRATING ORAL EVERY 8 HOURS PRN
Status: DISCONTINUED | OUTPATIENT
Start: 2023-04-21 | End: 2023-04-25 | Stop reason: HOSPADM

## 2023-04-21 RX ORDER — OXYCODONE HYDROCHLORIDE 5 MG/1
10 TABLET ORAL EVERY 4 HOURS PRN
Status: DISCONTINUED | OUTPATIENT
Start: 2023-04-21 | End: 2023-04-25 | Stop reason: HOSPADM

## 2023-04-21 RX ORDER — LEVOTHYROXINE SODIUM 0.05 MG/1
50 TABLET ORAL DAILY
Status: DISCONTINUED | OUTPATIENT
Start: 2023-04-21 | End: 2023-04-25 | Stop reason: HOSPADM

## 2023-04-21 RX ORDER — CARVEDILOL 12.5 MG/1
12.5 TABLET ORAL 2 TIMES DAILY WITH MEALS
Status: DISCONTINUED | OUTPATIENT
Start: 2023-04-21 | End: 2023-04-25 | Stop reason: HOSPADM

## 2023-04-21 RX ORDER — TRANEXAMIC ACID 650 MG/1
1950 TABLET ORAL
Status: DISCONTINUED | OUTPATIENT
Start: 2023-04-21 | End: 2023-04-21 | Stop reason: HOSPADM

## 2023-04-21 RX ORDER — SENNA AND DOCUSATE SODIUM 50; 8.6 MG/1; MG/1
1 TABLET, FILM COATED ORAL 2 TIMES DAILY
Status: DISCONTINUED | OUTPATIENT
Start: 2023-04-21 | End: 2023-04-25 | Stop reason: HOSPADM

## 2023-04-21 RX ORDER — OXYCODONE HYDROCHLORIDE 5 MG/1
5 TABLET ORAL
Status: DISCONTINUED | OUTPATIENT
Start: 2023-04-21 | End: 2023-04-21 | Stop reason: HOSPADM

## 2023-04-21 RX ORDER — SODIUM CHLORIDE, SODIUM LACTATE, POTASSIUM CHLORIDE, CALCIUM CHLORIDE 600; 310; 30; 20 MG/100ML; MG/100ML; MG/100ML; MG/100ML
INJECTION, SOLUTION INTRAVENOUS CONTINUOUS
Status: DISCONTINUED | OUTPATIENT
Start: 2023-04-21 | End: 2023-04-21 | Stop reason: HOSPADM

## 2023-04-21 RX ORDER — PROPOFOL 10 MG/ML
INJECTION, EMULSION INTRAVENOUS PRN
Status: DISCONTINUED | OUTPATIENT
Start: 2023-04-21 | End: 2023-04-21 | Stop reason: SDUPTHER

## 2023-04-21 RX ORDER — CEPHALEXIN 500 MG/1
500 CAPSULE ORAL 4 TIMES DAILY
Status: ON HOLD | COMMUNITY
End: 2023-04-25 | Stop reason: HOSPADM

## 2023-04-21 RX ORDER — SULFAMETHOXAZOLE AND TRIMETHOPRIM 400; 80 MG/1; MG/1
1 TABLET ORAL 2 TIMES DAILY
Status: ON HOLD | COMMUNITY
Start: 2023-04-19 | End: 2023-04-25 | Stop reason: HOSPADM

## 2023-04-21 RX ORDER — DEXAMETHASONE SODIUM PHOSPHATE 10 MG/ML
INJECTION INTRAMUSCULAR; INTRAVENOUS PRN
Status: DISCONTINUED | OUTPATIENT
Start: 2023-04-21 | End: 2023-04-21 | Stop reason: SDUPTHER

## 2023-04-21 RX ORDER — ONDANSETRON 2 MG/ML
4 INJECTION INTRAMUSCULAR; INTRAVENOUS EVERY 6 HOURS PRN
Status: DISCONTINUED | OUTPATIENT
Start: 2023-04-21 | End: 2023-04-25 | Stop reason: HOSPADM

## 2023-04-21 RX ORDER — LIDOCAINE HYDROCHLORIDE 10 MG/ML
1 INJECTION, SOLUTION EPIDURAL; INFILTRATION; INTRACAUDAL; PERINEURAL
Status: DISCONTINUED | OUTPATIENT
Start: 2023-04-21 | End: 2023-04-21 | Stop reason: HOSPADM

## 2023-04-21 RX ORDER — KETOROLAC TROMETHAMINE 15 MG/ML
15 INJECTION, SOLUTION INTRAMUSCULAR; INTRAVENOUS EVERY 6 HOURS
Status: COMPLETED | OUTPATIENT
Start: 2023-04-21 | End: 2023-04-22

## 2023-04-21 RX ORDER — FENTANYL CITRATE 0.05 MG/ML
50 INJECTION, SOLUTION INTRAMUSCULAR; INTRAVENOUS EVERY 10 MIN PRN
Status: DISCONTINUED | OUTPATIENT
Start: 2023-04-21 | End: 2023-04-21 | Stop reason: HOSPADM

## 2023-04-21 RX ORDER — SODIUM CHLORIDE 9 MG/ML
INJECTION, SOLUTION INTRAVENOUS PRN
Status: DISCONTINUED | OUTPATIENT
Start: 2023-04-21 | End: 2023-04-25 | Stop reason: HOSPADM

## 2023-04-21 RX ORDER — CEFAZOLIN SODIUM IN 0.9 % NACL 2 G/100 ML
2000 PLASTIC BAG, INJECTION (ML) INTRAVENOUS EVERY 8 HOURS
Status: COMPLETED | OUTPATIENT
Start: 2023-04-21 | End: 2023-04-22

## 2023-04-21 RX ORDER — CEFAZOLIN SODIUM IN 0.9 % NACL 2 G/100 ML
2000 PLASTIC BAG, INJECTION (ML) INTRAVENOUS ONCE
Status: COMPLETED | OUTPATIENT
Start: 2023-04-21 | End: 2023-04-21

## 2023-04-21 RX ORDER — SODIUM CHLORIDE 9 MG/ML
25 INJECTION, SOLUTION INTRAVENOUS PRN
Status: DISCONTINUED | OUTPATIENT
Start: 2023-04-21 | End: 2023-04-21 | Stop reason: HOSPADM

## 2023-04-21 RX ADMIN — FENTANYL CITRATE 50 MCG: 50 INJECTION, SOLUTION INTRAMUSCULAR; INTRAVENOUS at 14:00

## 2023-04-21 RX ADMIN — ROPIVACAINE HYDROCHLORIDE 30 ML: 5 INJECTION, SOLUTION EPIDURAL; INFILTRATION; PERINEURAL at 13:35

## 2023-04-21 RX ADMIN — KETOROLAC TROMETHAMINE 15 MG: 15 INJECTION, SOLUTION INTRAMUSCULAR; INTRAVENOUS at 17:51

## 2023-04-21 RX ADMIN — DEXAMETHASONE SODIUM PHOSPHATE 10 MG: 10 INJECTION INTRAMUSCULAR; INTRAVENOUS at 14:18

## 2023-04-21 RX ADMIN — ACETAMINOPHEN 650 MG: 325 TABLET ORAL at 17:51

## 2023-04-21 RX ADMIN — CARVEDILOL 12.5 MG: 12.5 TABLET, FILM COATED ORAL at 17:51

## 2023-04-21 RX ADMIN — OXYCODONE HYDROCHLORIDE 10 MG: 10 TABLET, FILM COATED, EXTENDED RELEASE ORAL at 10:46

## 2023-04-21 RX ADMIN — FINASTERIDE 5 MG: 5 TABLET, FILM COATED ORAL at 17:51

## 2023-04-21 RX ADMIN — ONDANSETRON 4 MG: 2 INJECTION INTRAMUSCULAR; INTRAVENOUS at 14:18

## 2023-04-21 RX ADMIN — SENNOSIDES AND DOCUSATE SODIUM 1 TABLET: 50; 8.6 TABLET ORAL at 21:16

## 2023-04-21 RX ADMIN — ACETAMINOPHEN 1000 MG: 500 TABLET ORAL at 10:46

## 2023-04-21 RX ADMIN — Medication 2000 MG: at 14:09

## 2023-04-21 RX ADMIN — CELECOXIB 200 MG: 200 CAPSULE ORAL at 10:46

## 2023-04-21 RX ADMIN — SUGAMMADEX 200 MG: 100 INJECTION, SOLUTION INTRAVENOUS at 14:44

## 2023-04-21 RX ADMIN — MIDAZOLAM HYDROCHLORIDE 2 MG: 1 INJECTION, SOLUTION INTRAMUSCULAR; INTRAVENOUS at 13:35

## 2023-04-21 RX ADMIN — SODIUM CHLORIDE, POTASSIUM CHLORIDE, SODIUM LACTATE AND CALCIUM CHLORIDE: 600; 310; 30; 20 INJECTION, SOLUTION INTRAVENOUS at 18:13

## 2023-04-21 RX ADMIN — KETOROLAC TROMETHAMINE 15 MG: 15 INJECTION, SOLUTION INTRAMUSCULAR; INTRAVENOUS at 21:16

## 2023-04-21 RX ADMIN — ATORVASTATIN CALCIUM 80 MG: 80 TABLET, FILM COATED ORAL at 21:28

## 2023-04-21 RX ADMIN — TRANEXAMIC ACID 1950 MG: 650 TABLET ORAL at 18:51

## 2023-04-21 RX ADMIN — LIDOCAINE HYDROCHLORIDE 60 MG: 20 INJECTION, SOLUTION INTRAVENOUS at 14:00

## 2023-04-21 RX ADMIN — PROPOFOL 100 MG: 10 INJECTION, EMULSION INTRAVENOUS at 14:00

## 2023-04-21 RX ADMIN — SODIUM CHLORIDE, PRESERVATIVE FREE 10 ML: 5 INJECTION INTRAVENOUS at 21:00

## 2023-04-21 RX ADMIN — VANCOMYCIN HYDROCHLORIDE 1000 MG: 1 INJECTION, POWDER, LYOPHILIZED, FOR SOLUTION INTRAVENOUS at 12:38

## 2023-04-21 RX ADMIN — LIDOCAINE HYDROCHLORIDE 0.1 ML: 10 INJECTION, SOLUTION EPIDURAL; INFILTRATION; INTRACAUDAL; PERINEURAL at 11:10

## 2023-04-21 RX ADMIN — Medication 400 MG: at 17:51

## 2023-04-21 RX ADMIN — TAMSULOSIN HYDROCHLORIDE 0.4 MG: 0.4 CAPSULE ORAL at 21:16

## 2023-04-21 RX ADMIN — ROCURONIUM BROMIDE 50 MG: 10 INJECTION INTRAVENOUS at 14:02

## 2023-04-21 RX ADMIN — SODIUM CHLORIDE, POTASSIUM CHLORIDE, SODIUM LACTATE AND CALCIUM CHLORIDE: 600; 310; 30; 20 INJECTION, SOLUTION INTRAVENOUS at 11:10

## 2023-04-21 RX ADMIN — ACETAMINOPHEN 650 MG: 325 TABLET ORAL at 21:16

## 2023-04-21 RX ADMIN — TRANEXAMIC ACID 1950 MG: 650 TABLET ORAL at 10:46

## 2023-04-21 RX ADMIN — Medication 2000 MG: at 21:16

## 2023-04-21 ASSESSMENT — PAIN SCALES - GENERAL: PAINLEVEL_OUTOF10: 0

## 2023-04-21 ASSESSMENT — ENCOUNTER SYMPTOMS
GASTROINTESTINAL NEGATIVE: 1
EYES NEGATIVE: 1
RESPIRATORY NEGATIVE: 1
ALLERGIC/IMMUNOLOGIC NEGATIVE: 1

## 2023-04-21 ASSESSMENT — PAIN - FUNCTIONAL ASSESSMENT: PAIN_FUNCTIONAL_ASSESSMENT: 0-10

## 2023-04-21 NOTE — ANESTHESIA PROCEDURE NOTES
Peripheral Block    Patient location during procedure: pre-op  Reason for block: post-op pain management and at surgeon's request  Start time: 4/21/2023 1:35 PM  End time: 4/21/2023 1:45 PM  Staffing  Performed: anesthesiologist   Anesthesiologist: Ginny Franklin DO  Preanesthetic Checklist  Completed: patient identified, IV checked, site marked, risks and benefits discussed, surgical/procedural consents, equipment checked, pre-op evaluation, timeout performed, anesthesia consent given, oxygen available and monitors applied/VS acknowledged  Peripheral Block   Patient position: supine  Prep: ChloraPrep  Provider prep: mask and sterile gloves (Sterile probe cover)  Patient monitoring: cardiac monitor, continuous pulse ox, frequent blood pressure checks and IV access  Block type: Brachial plexus  Interscalene  Laterality: right  Injection technique: single-shot  Guidance: nerve stimulator and ultrasound guided  Local infiltration: ropivacaine  Infiltration strength: 0.5 %  Local infiltration: ropivacaine  Dose: 30 mL    Needle   Needle type: combined needle/nerve stimulator   Needle gauge: 22 G  Needle localization: anatomical landmarks and ultrasound guidance  Needle length: 5 cm  Assessment   Injection assessment: negative aspiration for heme, no paresthesia on injection and local visualized surrounding nerve on ultrasound  Paresthesia pain: immediately resolved  Slow fractionated injection: yes  Hemodynamics: stable  Real-time US image taken/store: yes    Additional Notes  Ultrasound image printed and saved in patient chart.     Sterile probe cover used    Medications Administered  ropivacaine (NAROPIN) injection 0.5% - Perineural   30 mL - 4/21/2023 1:35:00 PM

## 2023-04-21 NOTE — ANESTHESIA POSTPROCEDURE EVALUATION
Department of Anesthesiology  Postprocedure Note    Patient: Milana Fitch  MRN: [id number]  YOB: 1945  Date of evaluation: 4/21/2023      Procedure Summary     Date: 04/21/23 Room / Location: [address]    Anesthesia Start: 3572 Anesthesia Stop: 3083    Procedure: RIGHT SHOULDER IRRIGATION AND DEBRIDEMENT WITH WOUND CULTURES AND POSSIBLE LINER EXCHANGE, GERRI POSSIBLE LINER EXCHANGE HAS 9MM SPACER 0 POLY 18 STEM 40 GLENOID + 5MM, SCALENE BLOCK, UPDATE LABS ON ADMIT.  JULITA (Right) Diagnosis:       Open wound of right shoulder, initial encounter      (RIGHT SHOULDER OPEN WOUND)    Surgeons: Gwendolyn Sahu MD Responsible Provider: Izzy Holt DO    Anesthesia Type: General ASA Status: 3          Anesthesia Type: General    Geovanni Phase I: Geovanni Score: 7    Geovanni Phase II:        Anesthesia Post Evaluation    Patient location during evaluation: bedside  Patient participation: complete - patient participated  Level of consciousness: awake and awake and alert  Pain score: 0  Airway patency: patent  Nausea & Vomiting: no nausea and no vomiting  Complications: no  Cardiovascular status: blood pressure returned to baseline and hemodynamically stable  Respiratory status: acceptable  Hydration status: euvolemic

## 2023-04-21 NOTE — ANESTHESIA PRE PROCEDURE
= 3 FB   Dental: normal exam         Pulmonary:normal exam    (+) sleep apnea:                             Cardiovascular:Negative CV ROS  Exercise tolerance: good (>4 METS),   (+) hypertension:, angina:, past MI:, CAD:, CABG/stent:, hyperlipidemia      ECG reviewed               Beta Blocker:  Not on Beta Blocker      ROS comment: Sinus bradycardia with premature atrial complexes  Nonspecific ST abnormality  Abnormal ECG  When compared with ECG of 24-JUL-2021 06:37,  premature atrial complexes are now present     Neuro/Psych:   Negative Neuro/Psych ROS              GI/Hepatic/Renal: Neg GI/Hepatic/Renal ROS            Endo/Other: Negative Endo/Other ROS             Pt had PAT visit. Abdominal:             Vascular: negative vascular ROS. Other Findings:           Anesthesia Plan      general and regional     ASA 3     (ETT  Brachial Plexus)  Induction: intravenous. MIPS: Postoperative opioids intended and Prophylactic antiemetics administered. Anesthetic plan and risks discussed with patient. Plan discussed with CRNA.     Attending anesthesiologist reviewed and agrees with Pre Eval content      Post-op pain plan if not by surgeon: single peripheral nerve block            Ziad Bonilla DO   4/21/2023

## 2023-04-22 LAB
ANION GAP SERPL CALCULATED.3IONS-SCNC: 10 MEQ/L (ref 9–15)
BASOPHILS # BLD: 0 K/UL (ref 0–0.2)
BASOPHILS NFR BLD: 0.3 %
BUN SERPL-MCNC: 22 MG/DL (ref 8–23)
CALCIUM SERPL-MCNC: 8.5 MG/DL (ref 8.5–9.9)
CHLORIDE SERPL-SCNC: 103 MEQ/L (ref 95–107)
CO2 SERPL-SCNC: 24 MEQ/L (ref 20–31)
CREAT SERPL-MCNC: 0.98 MG/DL (ref 0.7–1.2)
EOSINOPHIL # BLD: 0 K/UL (ref 0–0.7)
EOSINOPHIL NFR BLD: 0 %
ERYTHROCYTE [DISTWIDTH] IN BLOOD BY AUTOMATED COUNT: 15.2 % (ref 11.5–14.5)
GLUCOSE SERPL-MCNC: 134 MG/DL (ref 70–99)
HCT VFR BLD AUTO: 35.4 % (ref 42–52)
HGB BLD-MCNC: 11.9 G/DL (ref 14–18)
LYMPHOCYTES # BLD: 1 K/UL (ref 1–4.8)
LYMPHOCYTES NFR BLD: 10.5 %
MCH RBC QN AUTO: 28.9 PG (ref 27–31.3)
MCHC RBC AUTO-ENTMCNC: 33.5 % (ref 33–37)
MCV RBC AUTO: 86.2 FL (ref 79–92.2)
MONOCYTES # BLD: 0.3 K/UL (ref 0.2–0.8)
MONOCYTES NFR BLD: 3 %
NEUTROPHILS # BLD: 8.3 K/UL (ref 1.4–6.5)
NEUTS SEG NFR BLD: 86.2 %
PLATELET # BLD AUTO: 196 K/UL (ref 130–400)
POTASSIUM SERPL-SCNC: 4.8 MEQ/L (ref 3.4–4.9)
RBC # BLD AUTO: 4.11 M/UL (ref 4.7–6.1)
SODIUM SERPL-SCNC: 137 MEQ/L (ref 135–144)
WBC # BLD AUTO: 9.7 K/UL (ref 4.8–10.8)

## 2023-04-22 PROCEDURE — 6370000000 HC RX 637 (ALT 250 FOR IP): Performed by: INTERNAL MEDICINE

## 2023-04-22 PROCEDURE — 6360000002 HC RX W HCPCS: Performed by: ORTHOPAEDIC SURGERY

## 2023-04-22 PROCEDURE — 99232 SBSQ HOSP IP/OBS MODERATE 35: CPT | Performed by: INTERNAL MEDICINE

## 2023-04-22 PROCEDURE — 97530 THERAPEUTIC ACTIVITIES: CPT

## 2023-04-22 PROCEDURE — 97161 PT EVAL LOW COMPLEX 20 MIN: CPT

## 2023-04-22 PROCEDURE — 2580000003 HC RX 258: Performed by: ORTHOPAEDIC SURGERY

## 2023-04-22 PROCEDURE — 2700000000 HC OXYGEN THERAPY PER DAY

## 2023-04-22 PROCEDURE — 1210000000 HC MED SURG R&B

## 2023-04-22 PROCEDURE — 2580000003 HC RX 258: Performed by: NURSE PRACTITIONER

## 2023-04-22 PROCEDURE — 85025 COMPLETE CBC W/AUTO DIFF WBC: CPT

## 2023-04-22 PROCEDURE — 97166 OT EVAL MOD COMPLEX 45 MIN: CPT

## 2023-04-22 PROCEDURE — 80048 BASIC METABOLIC PNL TOTAL CA: CPT

## 2023-04-22 PROCEDURE — 36415 COLL VENOUS BLD VENIPUNCTURE: CPT

## 2023-04-22 PROCEDURE — 6370000000 HC RX 637 (ALT 250 FOR IP): Performed by: NURSE PRACTITIONER

## 2023-04-22 PROCEDURE — 6360000002 HC RX W HCPCS: Performed by: NURSE PRACTITIONER

## 2023-04-22 RX ORDER — SENNA AND DOCUSATE SODIUM 50; 8.6 MG/1; MG/1
1 TABLET, FILM COATED ORAL 2 TIMES DAILY
Qty: 20 TABLET | Refills: 0 | Status: SHIPPED | OUTPATIENT
Start: 2023-04-22 | End: 2023-05-02

## 2023-04-22 RX ORDER — OXYCODONE HYDROCHLORIDE AND ACETAMINOPHEN 5; 325 MG/1; MG/1
1 TABLET ORAL EVERY 6 HOURS PRN
Qty: 28 TABLET | Refills: 0 | Status: SHIPPED | OUTPATIENT
Start: 2023-04-22 | End: 2023-04-29

## 2023-04-22 RX ADMIN — Medication 400 MG: at 09:03

## 2023-04-22 RX ADMIN — FINASTERIDE 5 MG: 5 TABLET, FILM COATED ORAL at 09:03

## 2023-04-22 RX ADMIN — ACETAMINOPHEN 650 MG: 325 TABLET ORAL at 18:31

## 2023-04-22 RX ADMIN — SENNOSIDES AND DOCUSATE SODIUM 1 TABLET: 50; 8.6 TABLET ORAL at 09:03

## 2023-04-22 RX ADMIN — TAMSULOSIN HYDROCHLORIDE 0.4 MG: 0.4 CAPSULE ORAL at 09:03

## 2023-04-22 RX ADMIN — CARVEDILOL 12.5 MG: 12.5 TABLET, FILM COATED ORAL at 09:03

## 2023-04-22 RX ADMIN — LEVOTHYROXINE SODIUM 50 MCG: 0.05 TABLET ORAL at 06:29

## 2023-04-22 RX ADMIN — SODIUM CHLORIDE, PRESERVATIVE FREE 10 ML: 5 INJECTION INTRAVENOUS at 21:42

## 2023-04-22 RX ADMIN — ACETAMINOPHEN 650 MG: 325 TABLET ORAL at 03:24

## 2023-04-22 RX ADMIN — TRANEXAMIC ACID 1950 MG: 650 TABLET ORAL at 06:29

## 2023-04-22 RX ADMIN — Medication 2000 MG: at 07:02

## 2023-04-22 RX ADMIN — SENNOSIDES AND DOCUSATE SODIUM 1 TABLET: 50; 8.6 TABLET ORAL at 21:41

## 2023-04-22 RX ADMIN — VANCOMYCIN HYDROCHLORIDE 1000 MG: 1 INJECTION, POWDER, LYOPHILIZED, FOR SOLUTION INTRAVENOUS at 00:27

## 2023-04-22 RX ADMIN — ACETAMINOPHEN 650 MG: 325 TABLET ORAL at 10:49

## 2023-04-22 RX ADMIN — SODIUM CHLORIDE, PRESERVATIVE FREE 5 ML: 5 INJECTION INTRAVENOUS at 09:10

## 2023-04-22 RX ADMIN — ACETAMINOPHEN 650 MG: 325 TABLET ORAL at 21:41

## 2023-04-22 RX ADMIN — TAMSULOSIN HYDROCHLORIDE 0.4 MG: 0.4 CAPSULE ORAL at 21:41

## 2023-04-22 RX ADMIN — VANCOMYCIN HYDROCHLORIDE 1000 MG: 1 INJECTION, POWDER, LYOPHILIZED, FOR SOLUTION INTRAVENOUS at 12:02

## 2023-04-22 RX ADMIN — ATORVASTATIN CALCIUM 80 MG: 80 TABLET, FILM COATED ORAL at 09:03

## 2023-04-22 RX ADMIN — KETOROLAC TROMETHAMINE 15 MG: 15 INJECTION, SOLUTION INTRAMUSCULAR; INTRAVENOUS at 03:24

## 2023-04-22 RX ADMIN — CARVEDILOL 12.5 MG: 12.5 TABLET, FILM COATED ORAL at 18:31

## 2023-04-22 ASSESSMENT — PAIN SCALES - GENERAL
PAINLEVEL_OUTOF10: 0

## 2023-04-22 ASSESSMENT — ENCOUNTER SYMPTOMS
GASTROINTESTINAL NEGATIVE: 1
RESPIRATORY NEGATIVE: 1

## 2023-04-23 PROBLEM — M71.011 ABSCESS OF BURSA OF RIGHT SHOULDER: Status: ACTIVE | Noted: 2023-04-23

## 2023-04-23 LAB
ANION GAP SERPL CALCULATED.3IONS-SCNC: 9 MEQ/L (ref 9–15)
BASOPHILS # BLD: 0.1 K/UL (ref 0–0.2)
BASOPHILS NFR BLD: 0.5 %
BUN SERPL-MCNC: 23 MG/DL (ref 8–23)
CALCIUM SERPL-MCNC: 8.6 MG/DL (ref 8.5–9.9)
CHLORIDE SERPL-SCNC: 106 MEQ/L (ref 95–107)
CO2 SERPL-SCNC: 24 MEQ/L (ref 20–31)
CREAT SERPL-MCNC: 0.92 MG/DL (ref 0.7–1.2)
EOSINOPHIL # BLD: 0.1 K/UL (ref 0–0.7)
EOSINOPHIL NFR BLD: 1 %
ERYTHROCYTE [DISTWIDTH] IN BLOOD BY AUTOMATED COUNT: 15.6 % (ref 11.5–14.5)
GLUCOSE SERPL-MCNC: 92 MG/DL (ref 70–99)
HCT VFR BLD AUTO: 36.2 % (ref 42–52)
HGB BLD-MCNC: 11.9 G/DL (ref 14–18)
LYMPHOCYTES # BLD: 2.7 K/UL (ref 1–4.8)
LYMPHOCYTES NFR BLD: 25.1 %
MCH RBC QN AUTO: 28.9 PG (ref 27–31.3)
MCHC RBC AUTO-ENTMCNC: 32.9 % (ref 33–37)
MCV RBC AUTO: 87.9 FL (ref 79–92.2)
MONOCYTES # BLD: 0.9 K/UL (ref 0.2–0.8)
MONOCYTES NFR BLD: 7.9 %
NEUTROPHILS # BLD: 7.1 K/UL (ref 1.4–6.5)
NEUTS SEG NFR BLD: 65.5 %
PLATELET # BLD AUTO: 184 K/UL (ref 130–400)
POTASSIUM SERPL-SCNC: 4.9 MEQ/L (ref 3.4–4.9)
RBC # BLD AUTO: 4.11 M/UL (ref 4.7–6.1)
SODIUM SERPL-SCNC: 139 MEQ/L (ref 135–144)
WBC # BLD AUTO: 10.8 K/UL (ref 4.8–10.8)

## 2023-04-23 PROCEDURE — 97116 GAIT TRAINING THERAPY: CPT

## 2023-04-23 PROCEDURE — 6370000000 HC RX 637 (ALT 250 FOR IP): Performed by: INTERNAL MEDICINE

## 2023-04-23 PROCEDURE — 2580000003 HC RX 258: Performed by: NURSE PRACTITIONER

## 2023-04-23 PROCEDURE — 36415 COLL VENOUS BLD VENIPUNCTURE: CPT

## 2023-04-23 PROCEDURE — 1210000000 HC MED SURG R&B

## 2023-04-23 PROCEDURE — 80048 BASIC METABOLIC PNL TOTAL CA: CPT

## 2023-04-23 PROCEDURE — 2580000003 HC RX 258: Performed by: ORTHOPAEDIC SURGERY

## 2023-04-23 PROCEDURE — 85025 COMPLETE CBC W/AUTO DIFF WBC: CPT

## 2023-04-23 PROCEDURE — 6360000002 HC RX W HCPCS: Performed by: ORTHOPAEDIC SURGERY

## 2023-04-23 PROCEDURE — 99232 SBSQ HOSP IP/OBS MODERATE 35: CPT | Performed by: INTERNAL MEDICINE

## 2023-04-23 PROCEDURE — 6370000000 HC RX 637 (ALT 250 FOR IP): Performed by: NURSE PRACTITIONER

## 2023-04-23 PROCEDURE — 2700000000 HC OXYGEN THERAPY PER DAY

## 2023-04-23 PROCEDURE — 6360000002 HC RX W HCPCS: Performed by: INTERNAL MEDICINE

## 2023-04-23 RX ORDER — CEFAZOLIN SODIUM IN 0.9 % NACL 2 G/100 ML
2000 PLASTIC BAG, INJECTION (ML) INTRAVENOUS EVERY 8 HOURS
Status: DISCONTINUED | OUTPATIENT
Start: 2023-04-23 | End: 2023-04-25 | Stop reason: HOSPADM

## 2023-04-23 RX ADMIN — Medication 400 MG: at 08:24

## 2023-04-23 RX ADMIN — ATORVASTATIN CALCIUM 80 MG: 80 TABLET, FILM COATED ORAL at 08:24

## 2023-04-23 RX ADMIN — TAMSULOSIN HYDROCHLORIDE 0.4 MG: 0.4 CAPSULE ORAL at 08:25

## 2023-04-23 RX ADMIN — SENNOSIDES AND DOCUSATE SODIUM 1 TABLET: 50; 8.6 TABLET ORAL at 08:25

## 2023-04-23 RX ADMIN — ACETAMINOPHEN 650 MG: 325 TABLET ORAL at 11:26

## 2023-04-23 RX ADMIN — VANCOMYCIN HYDROCHLORIDE 1000 MG: 1 INJECTION, POWDER, LYOPHILIZED, FOR SOLUTION INTRAVENOUS at 00:57

## 2023-04-23 RX ADMIN — LEVOTHYROXINE SODIUM 50 MCG: 0.05 TABLET ORAL at 05:06

## 2023-04-23 RX ADMIN — ACETAMINOPHEN 650 MG: 325 TABLET ORAL at 23:35

## 2023-04-23 RX ADMIN — TAMSULOSIN HYDROCHLORIDE 0.4 MG: 0.4 CAPSULE ORAL at 21:14

## 2023-04-23 RX ADMIN — VANCOMYCIN HYDROCHLORIDE 1000 MG: 1 INJECTION, POWDER, LYOPHILIZED, FOR SOLUTION INTRAVENOUS at 11:30

## 2023-04-23 RX ADMIN — CARVEDILOL 12.5 MG: 12.5 TABLET, FILM COATED ORAL at 17:53

## 2023-04-23 RX ADMIN — SODIUM CHLORIDE: 9 INJECTION, SOLUTION INTRAVENOUS at 00:56

## 2023-04-23 RX ADMIN — SODIUM CHLORIDE, PRESERVATIVE FREE 10 ML: 5 INJECTION INTRAVENOUS at 21:14

## 2023-04-23 RX ADMIN — SODIUM CHLORIDE, PRESERVATIVE FREE 10 ML: 5 INJECTION INTRAVENOUS at 08:26

## 2023-04-23 RX ADMIN — Medication 2000 MG: at 17:51

## 2023-04-23 RX ADMIN — ACETAMINOPHEN 650 MG: 325 TABLET ORAL at 18:01

## 2023-04-23 RX ADMIN — CARVEDILOL 12.5 MG: 12.5 TABLET, FILM COATED ORAL at 08:23

## 2023-04-23 RX ADMIN — ACETAMINOPHEN 650 MG: 325 TABLET ORAL at 05:06

## 2023-04-23 RX ADMIN — OXYCODONE 5 MG: 5 TABLET ORAL at 13:07

## 2023-04-23 RX ADMIN — FINASTERIDE 5 MG: 5 TABLET, FILM COATED ORAL at 08:25

## 2023-04-23 ASSESSMENT — PAIN SCALES - GENERAL
PAINLEVEL_OUTOF10: 0
PAINLEVEL_OUTOF10: 4

## 2023-04-23 ASSESSMENT — PAIN DESCRIPTION - LOCATION
LOCATION: SHOULDER

## 2023-04-23 ASSESSMENT — ENCOUNTER SYMPTOMS
GASTROINTESTINAL NEGATIVE: 1
RESPIRATORY NEGATIVE: 1

## 2023-04-23 ASSESSMENT — PAIN DESCRIPTION - ORIENTATION
ORIENTATION: RIGHT
ORIENTATION: RIGHT

## 2023-04-23 ASSESSMENT — PAIN DESCRIPTION - DESCRIPTORS: DESCRIPTORS: SORE

## 2023-04-24 ENCOUNTER — APPOINTMENT (OUTPATIENT)
Dept: INTERVENTIONAL RADIOLOGY/VASCULAR | Age: 78
DRG: 902 | End: 2023-04-24
Attending: ORTHOPAEDIC SURGERY
Payer: COMMERCIAL

## 2023-04-24 LAB
ANION GAP SERPL CALCULATED.3IONS-SCNC: 7 MEQ/L (ref 9–15)
BASOPHILS # BLD: 0.1 K/UL (ref 0–0.2)
BASOPHILS NFR BLD: 0.7 %
BUN SERPL-MCNC: 19 MG/DL (ref 8–23)
CALCIUM SERPL-MCNC: 8.9 MG/DL (ref 8.5–9.9)
CHLORIDE SERPL-SCNC: 105 MEQ/L (ref 95–107)
CO2 SERPL-SCNC: 27 MEQ/L (ref 20–31)
CREAT SERPL-MCNC: 0.88 MG/DL (ref 0.7–1.2)
EOSINOPHIL # BLD: 0.4 K/UL (ref 0–0.7)
EOSINOPHIL NFR BLD: 5 %
ERYTHROCYTE [DISTWIDTH] IN BLOOD BY AUTOMATED COUNT: 16 % (ref 11.5–14.5)
GLUCOSE SERPL-MCNC: 86 MG/DL (ref 70–99)
HCT VFR BLD AUTO: 36.1 % (ref 42–52)
HGB BLD-MCNC: 12 G/DL (ref 14–18)
LYMPHOCYTES # BLD: 2.1 K/UL (ref 1–4.8)
LYMPHOCYTES NFR BLD: 30.2 %
MCH RBC QN AUTO: 28.9 PG (ref 27–31.3)
MCHC RBC AUTO-ENTMCNC: 33.1 % (ref 33–37)
MCV RBC AUTO: 87.1 FL (ref 79–92.2)
MONOCYTES # BLD: 0.8 K/UL (ref 0.2–0.8)
MONOCYTES NFR BLD: 11.2 %
NEUTROPHILS # BLD: 3.8 K/UL (ref 1.4–6.5)
NEUTS SEG NFR BLD: 52.9 %
PLATELET # BLD AUTO: 180 K/UL (ref 130–400)
POTASSIUM SERPL-SCNC: 4.5 MEQ/L (ref 3.4–4.9)
RBC # BLD AUTO: 4.15 M/UL (ref 4.7–6.1)
SODIUM SERPL-SCNC: 139 MEQ/L (ref 135–144)
WBC # BLD AUTO: 7.1 K/UL (ref 4.8–10.8)

## 2023-04-24 PROCEDURE — 2709999900 IR PICC WO SQ PORT/PUMP > 5 YEARS

## 2023-04-24 PROCEDURE — 85025 COMPLETE CBC W/AUTO DIFF WBC: CPT

## 2023-04-24 PROCEDURE — 36415 COLL VENOUS BLD VENIPUNCTURE: CPT

## 2023-04-24 PROCEDURE — 36573 INSJ PICC RS&I 5 YR+: CPT

## 2023-04-24 PROCEDURE — 36573 INSJ PICC RS&I 5 YR+: CPT | Performed by: RADIOLOGY

## 2023-04-24 PROCEDURE — 6370000000 HC RX 637 (ALT 250 FOR IP): Performed by: NURSE PRACTITIONER

## 2023-04-24 PROCEDURE — 6370000000 HC RX 637 (ALT 250 FOR IP): Performed by: INTERNAL MEDICINE

## 2023-04-24 PROCEDURE — 1210000000 HC MED SURG R&B

## 2023-04-24 PROCEDURE — 6360000002 HC RX W HCPCS: Performed by: INTERNAL MEDICINE

## 2023-04-24 PROCEDURE — 2580000003 HC RX 258: Performed by: NURSE PRACTITIONER

## 2023-04-24 PROCEDURE — 97116 GAIT TRAINING THERAPY: CPT

## 2023-04-24 PROCEDURE — 80048 BASIC METABOLIC PNL TOTAL CA: CPT

## 2023-04-24 PROCEDURE — 02HV33Z INSERTION OF INFUSION DEVICE INTO SUPERIOR VENA CAVA, PERCUTANEOUS APPROACH: ICD-10-PCS | Performed by: ORTHOPAEDIC SURGERY

## 2023-04-24 RX ADMIN — Medication 400 MG: at 09:50

## 2023-04-24 RX ADMIN — SENNOSIDES AND DOCUSATE SODIUM 1 TABLET: 50; 8.6 TABLET ORAL at 09:49

## 2023-04-24 RX ADMIN — ACETAMINOPHEN 650 MG: 325 TABLET ORAL at 05:49

## 2023-04-24 RX ADMIN — Medication 2000 MG: at 20:14

## 2023-04-24 RX ADMIN — CARVEDILOL 12.5 MG: 12.5 TABLET, FILM COATED ORAL at 12:16

## 2023-04-24 RX ADMIN — SODIUM CHLORIDE: 9 INJECTION, SOLUTION INTRAVENOUS at 15:08

## 2023-04-24 RX ADMIN — TAMSULOSIN HYDROCHLORIDE 0.4 MG: 0.4 CAPSULE ORAL at 20:10

## 2023-04-24 RX ADMIN — FINASTERIDE 5 MG: 5 TABLET, FILM COATED ORAL at 09:50

## 2023-04-24 RX ADMIN — TAMSULOSIN HYDROCHLORIDE 0.4 MG: 0.4 CAPSULE ORAL at 09:50

## 2023-04-24 RX ADMIN — Medication 2000 MG: at 02:15

## 2023-04-24 RX ADMIN — SODIUM CHLORIDE, PRESERVATIVE FREE 10 ML: 5 INJECTION INTRAVENOUS at 20:10

## 2023-04-24 RX ADMIN — Medication 2000 MG: at 12:13

## 2023-04-24 RX ADMIN — SODIUM CHLORIDE, PRESERVATIVE FREE 10 ML: 5 INJECTION INTRAVENOUS at 09:51

## 2023-04-24 RX ADMIN — LEVOTHYROXINE SODIUM 50 MCG: 0.05 TABLET ORAL at 05:49

## 2023-04-24 RX ADMIN — ATORVASTATIN CALCIUM 80 MG: 80 TABLET, FILM COATED ORAL at 09:49

## 2023-04-24 ASSESSMENT — PAIN SCALES - GENERAL
PAINLEVEL_OUTOF10: 0
PAINLEVEL_OUTOF10: 0

## 2023-04-24 NOTE — CARE COORDINATION
IV BENEFIT REQUEST FORM    FAX FROM:  [facility]                        [address] [facility]    REQUESTED BY: Electronically signed by Martha Guillen RN on 2023 at 11:19 AM                                               RN/C3: PHONE: [phone])     DATE:/TIME OF REQUEST: 23  TIME: 11:19 AM      TO: Sarah Braun [address] TO: [phone]    PHONE: [phone]     THIS PATIENT HAS BEEN IDENTIFIED TO POSSIBLY NEED LONG TERM IV'S. PLEASE CHECK INSURANCE COVERAGE FOR THE FOLLOWING PT/DRUGS. PATIENT'S NAME: Laura Melara                              ROOM: 03/E863-02   PATIENT'S : 1945  PATIENT ADDRESS: [address] [place] [address]  SSN:    ()     PAYOR NAME:  Payor: [facility] [employer] / Plan: [address] [place], [place] [address]vd / Product Type: *No Product type* /     DRUG: ANCEF                    DOSE:2000 MG           FREQUENCY: Q 8 HR      __________ CHECK HERE IF PT HAS NO INSURANCE AND REQUESTING SELF PAY COST. *IF [facility] IS NOT A PROVIDER FOR THIS PATIENT, PLEASE FORWARD INFO VIA FAX TO [facility] @ [phone],(PHONE NUMBER: [phone]) TO RUN BENEFIT VERIFICATION AND NOTIFY THE ABOVE C3 OF THIS PLAN. (FAX FACE SHEET WITH DEMOGRAPHICS AND INSURANCE INFO WITH THIS FORM.)  PLEASE FAX BENEFIT INFO TO: THE [phone]    This message is intended only for the use of the individual or entity to which it is addressed and may contain information that is privileged, confidential, and exempt from disclosure under applicable law. If the reader of the notice is not intended recipient of the employee/agent responsible for delivering the message to the intended recipient, you are hereby notified than any dissemination, distribution or copying of this communication is strictly prohibited.  Please contact the sender for further instructions on handling the

## 2023-04-24 NOTE — PROGRESS NOTES
Physical Therapy Med Surg Daily Treatment Note  [facility]/Department: [facility]  [facility]       NAME: Kandy Burgess  : 1945 (3 y.o.)  MRN: [id number]  CODE STATUS: Full Code    Date of Service: 2023    Patient Diagnosis(es): Open wound of right shoulder, initial encounter [S41.001A]  Status post debridement [Z98.890]   No chief complaint on file.     Patient Active Problem List    Diagnosis Date Noted    Angina at rest [facility])     Abscess of bursa of right shoulder 2023    Status post debridement 2023    Status post reverse total shoulder replacement, right 2023    Chest pain 2021    History of MI (myocardial infarction) 2020    History of colon polyps     Acute pain of left shoulder 12/10/2019    Rotator cuff tendinitis, left 12/10/2019    Osteoarthritis of left shoulder 12/10/2019    Biceps strain, left, initial encounter 12/10/2019    Generalized abdominal pain 2018    ARB intolerance 2018    Status post total right knee replacement 03/15/2018    Hypertrophic scar     Osteoarthritis of right knee 2018    Infection of toe 2018    Overweight 2017    Basal cell carcinoma, trunk 2017    BPH with obstruction/lower urinary tract symptoms     Urinary retention     Actinic keratosis     Status post LASIK surgery 2015    Senile nuclear sclerosis 2015    Regular astigmatism 2015    Presbyopia 2015    Hyperlipidemia     Hypertension     Osteoarthritis     CAD (coronary artery disease)         Past Medical History:   Diagnosis Date    Actinic keratosis     Basal cell carcinoma, trunk 2017    left upper back    Basal cell carcinoma, trunk     BPH (benign prostatic hyperplasia)     CAD (coronary artery disease)     has 4 cardiac stents    Heart attack ([facility] [address]     History of MI (myocardial infarction) 2020    History of mycobacterial infection 2018    Hyperlipidemia     meds > 15 yrs

## 2023-04-24 NOTE — PROGRESS NOTES
Plastic surgery note    Afebrile, vital signs stable  Wound VAC in place in right shoulder wound  No periwound erythema  Some serosanguineous drainage  Impression: Status post incision and drainage right shoulder abscess  Continue wound VAC  Okay for patient to be discharged from my standpoint with wound VAC  Follow-up with me at the [facility] wound clinic 1 week from discharge

## 2023-04-24 NOTE — PROGRESS NOTES
DAILY PROGRESS NOTE    Patient doing well  No chest pain or shortness of breath. No calf pain. No issues overnight. Vitals:    23 0710   BP: (!) 144/76   Pulse: 55   Resp: 18   Temp: 97.5 °F (36.4 °C)   SpO2: 93%      Temp (24hrs), Av.8 °F (36.6 °C), Min:97.5 °F (36.4 °C), Max:98.2 °F (36.8 °C)         Exam:   General: No acute distress  Operative extremity:  Dressing clean, dry and intact  Neuro vascular status intact. Block wearing off  Calves soft and non-tender without evidence of DVT.       Labs reviewed:  CBC:   Recent Labs     23  0523   WBC 9.7 10.8 7.1   HGB 11.9* 11.9* 12.0*    184 180       BMP:    Recent Labs     23  0520 23  0523  05    139 139   K 4.8 4.9 4.5    106 105   CO2  24 27   BUN  19   CREATININE 0.98 0.92 0.88   GLUCOSE 134* 92 86         Assessment:  Patient status post irrigation debridement of right reverse total shoulder arthroplasty date of surgery 2023    Plan:   IV antibiotics per infectious disease   Medical management per the medical team  Plastic surgery seen, wound vac applied  Cultures S aureus, likely picc tomorrow, plan is for 6 weeks IV cefazolin   Regular diet      Pari Simms, NAOMY - CNP  2023 8:58 AM

## 2023-04-24 NOTE — PROGRESS NOTES
Wound vac placed today to right anterior shoulder wound. Measurements of open wound healing by secondary intention as follows: 4cmx2.4cmx1.8cm with tunneling at 6:00 measuring 4.4cm. Stitches intact over lateral part of incision. Following clean technique, Wound cleansed with NS, pat dry, placed white foam into tunneling and black foam into wound base making sure both pieces of foam connected. Skin prep applied to yao--wound then first layer of transparent dressing applied. Covered foam and surrounding area with second layer of transparent dressing. Cut a small [place] into transparent dressing over foam and placed trac pad. Wound vac set at 125mmHg. No leaks detected. Patient tolerated well. Oxy 5mg given as a one time dose for pain. Dr Erika Gray in to see patient and checked vac placement.

## 2023-04-24 NOTE — CARE COORDINATION
Patient is from home w/ wife. The patient is independent with mobility and has sling applied to the right shoulder. The patient will need lt term iv antibiotics at d/c as well a a wound vac. Iv check sent for iv cost. Wound vac still needs ordered by Dr. Prabhjot Quigley. Patient is agreeable to [facility] at d/c, freedom of choice provided. CM to follow for continued d/c planning needs.

## 2023-04-25 VITALS
DIASTOLIC BLOOD PRESSURE: 77 MMHG | OXYGEN SATURATION: 95 % | HEART RATE: 69 BPM | TEMPERATURE: 97.9 F | HEIGHT: 67 IN | BODY MASS INDEX: 28.56 KG/M2 | RESPIRATION RATE: 18 BRPM | SYSTOLIC BLOOD PRESSURE: 153 MMHG | WEIGHT: 182 LBS

## 2023-04-25 PROBLEM — A49.01 MSSA (METHICILLIN SUSCEPTIBLE STAPHYLOCOCCUS AUREUS) INFECTION: Status: ACTIVE | Noted: 2023-04-25

## 2023-04-25 PROBLEM — S41.001A OPEN WOUND OF RIGHT SHOULDER: Status: ACTIVE | Noted: 2023-04-25

## 2023-04-25 PROBLEM — G89.18 ACUTE POSTOPERATIVE PAIN: Status: ACTIVE | Noted: 2023-04-25

## 2023-04-25 LAB
GRAM STAIN: ABNORMAL
STERILE FLUID CULTURE/SMEAR: ABNORMAL
STERILE FLUID CULTURE/SMEAR: ABNORMAL

## 2023-04-25 PROCEDURE — 6360000002 HC RX W HCPCS: Performed by: INTERNAL MEDICINE

## 2023-04-25 PROCEDURE — 6370000000 HC RX 637 (ALT 250 FOR IP): Performed by: INTERNAL MEDICINE

## 2023-04-25 PROCEDURE — 97116 GAIT TRAINING THERAPY: CPT

## 2023-04-25 PROCEDURE — 6370000000 HC RX 637 (ALT 250 FOR IP): Performed by: NURSE PRACTITIONER

## 2023-04-25 RX ADMIN — SENNOSIDES AND DOCUSATE SODIUM 1 TABLET: 50; 8.6 TABLET ORAL at 09:35

## 2023-04-25 RX ADMIN — CARVEDILOL 12.5 MG: 12.5 TABLET, FILM COATED ORAL at 09:33

## 2023-04-25 RX ADMIN — ATORVASTATIN CALCIUM 80 MG: 80 TABLET, FILM COATED ORAL at 09:35

## 2023-04-25 RX ADMIN — Medication 2000 MG: at 09:33

## 2023-04-25 RX ADMIN — Medication 400 MG: at 09:34

## 2023-04-25 RX ADMIN — FINASTERIDE 5 MG: 5 TABLET, FILM COATED ORAL at 09:34

## 2023-04-25 RX ADMIN — Medication 2000 MG: at 18:21

## 2023-04-25 RX ADMIN — LEVOTHYROXINE SODIUM 50 MCG: 0.05 TABLET ORAL at 04:21

## 2023-04-25 RX ADMIN — TAMSULOSIN HYDROCHLORIDE 0.4 MG: 0.4 CAPSULE ORAL at 09:33

## 2023-04-25 RX ADMIN — Medication 2000 MG: at 04:21

## 2023-04-25 NOTE — PROGRESS NOTES
[facility] Occupational Therapy      Date: 2023  Patient Name: Ovidio Vasquez        MRN: [id number]  Account: [de-identified]   : 1945  (69 y.o.)  [id number]    Chart reviewed, attempted OT at [phone]. Patient not seen 2° to:    Pt in bed resting upon arrival. Pt declined therapy reporting that he is familiar with sling and dressing techniques. Pt also reports that he is to discharge this evening after IV Antibiotics. Will attempt again when able.     Electronically signed by CLAIRE Menard on 2023 at 3:39 PM

## 2023-04-25 NOTE — CARE COORDINATION
Patient orders obtained for wound vac and order form faxed to [facility]. Awaiting approval and delivery with anticipated delivery date of today. Patient has PICC placed and awaiting final IV ATB orders from ID to order IV's for delivery. Also, awaiting the IV benefit check. [address] has accepted the patient. CM to follow for any new d/c needs or changes to the d/c plan. Patient being discharged home today. The patient has wound vac in room delivered today by . Patient iv to be delivered tomorrow am via [employer] ex per Jose A Owens at [address] The patient has all orders and picc line.  The patients plans to d/c home tonight after 6 pm antibiotic and will have a start of care in am.

## 2023-04-25 NOTE — PROGRESS NOTES
Physical Therapy Med Surg Daily Treatment Note  Facility/Department: [facility]  Room: [place] [id number]       NAME: Lucy Salazar  : 1945 (48 y.o.)  MRN: [id number]  CODE STATUS: Full Code    Date of Service: 2023    Patient Diagnosis(es): Open wound of right shoulder, initial encounter [S41.001A]  Status post debridement [Z98.890]   No chief complaint on file.     Patient Active Problem List    Diagnosis Date Noted    Angina at rest [facility])     Acute postoperative pain 2023    MSSA (methicillin susceptible Staphylococcus aureus) infection 2023    Open wound of right shoulder 2023    Abscess of bursa of right shoulder 2023    Status post debridement 2023    Status post reverse total shoulder replacement, right 2023    Chest pain 2021    History of MI (myocardial infarction) 2020    History of colon polyps     Acute pain of left shoulder 12/10/2019    Rotator cuff tendinitis, left 12/10/2019    Osteoarthritis of left shoulder 12/10/2019    Biceps strain, left, initial encounter 12/10/2019    Generalized abdominal pain 2018    ARB intolerance 2018    Status post total right knee replacement 03/15/2018    Hypertrophic scar     Osteoarthritis of right knee 2018    Infection of toe 2018    Overweight 2017    Basal cell carcinoma, trunk 2017    BPH with obstruction/lower urinary tract symptoms     Urinary retention     Actinic keratosis     Status post LASIK surgery 2015    Senile nuclear sclerosis 2015    Regular astigmatism 2015    Presbyopia 2015    Hyperlipidemia     Hypertension     Osteoarthritis     CAD (coronary artery disease)         Past Medical History:   Diagnosis Date    Actinic keratosis     Basal cell carcinoma, trunk 2017    left upper back    Basal cell carcinoma, trunk     BPH (benign prostatic hyperplasia)     CAD (coronary artery disease)     has 4 cardiac stents    Heart

## 2023-04-25 NOTE — DISCHARGE SUMMARY
Discharge summary  This patient Meggan Larkin was admitted to the hospital on 4/21/2023  after undergoing Procedure(s) (LRB):  RIGHT SHOULDER IRRIGATION AND DEBRIDEMENT WITH WOUND CULTURES (Right) without complications that morning. During the postoperative period,while in hospital, patient was medically managed by the hospitalist. Please see medial notes and H&P for patients additional diagnoses. Ortho agrees with all medical diagnoses and treatments while patient in hospital.  No significant or unexpected findings or abnormal ortho imaging were noted during the hospital stay    Hospital course      Patient tolerated surgical procedure well and there was no complications. Patient progressed adequately through their recovery during hospital stay including PT and rehabilitation. Patient was then D/C on  to Home  in stable condition. Patient was instructed on the use of pain medications, the signs and symptoms of infection, and was given our number to call should they have any questions or concerns following discharge. Based on my clinical judgment, the patient was provided with a 7-day prescription for opioid medication at 30 MED, indicated for treatment of acute pain in the setting of recent s/p irrigation debridement right shoulder. OARRS report was run and has demonstrated an appropriate time course. The patient has been provided with counseling pertaining to safe use of opioid medication. Patient may NWB to operative extremity with use of walker for assistance with ambulation   Aquacel dressing to be removed pod7 and incision left open to air    Follow up with surgeon in 2 weeks  Wound vac orders per Dr Iantha Mcburney  IV antibiotics per infectious disease     Radiology images     EXAMINATION:   ONE XRAY VIEW OF THE RIGHT SHOULDER       4/21/2023 3:18 pm       COMPARISON:   None.        HISTORY:   ORDERING SYSTEM PROVIDED HISTORY: TSA revision   TECHNOLOGIST PROVIDED HISTORY:   Reason for exam:->TSA

## 2023-04-25 NOTE — PROGRESS NOTES
Infectious Disease Progress Note       4/25/2023    Patient is a followup regarding MSSA R shoulder abscess s/p debridement  Had R shoulder arthroplasty 3/30/2023 due to inflammation right shoulder   Had follow-up 2 weeks after surgery 9/81/8422 without complication but several days later developed drainage from superior part of incision with dehiscence  Was taken back to surgery 4/21/2023 - Underwent right shoulder irrigation debridement with cultures and exchange of liners  Findings showed significant purulence superficial part of wound right shoulder - there was abscess with no evidence of connection to the deep tissues    Culture on 4/21/2023 +MSSA    Picc placed and planning on 6 weeks of IV Cefazolin  Estimated Creatinine Clearance: 73 mL/min (based on SCr of 0.88 mg/dL). DC planning. Lab Results   Component Value Date    WBC 7.1 04/24/2023    HGB 12.0 (L) 04/24/2023    HCT 36.1 (L) 04/24/2023    MCV 87.1 04/24/2023     04/24/2023     Lab Results   Component Value Date/Time     04/24/2023 05:22 AM    K 4.5 04/24/2023 05:22 AM     04/24/2023 05:22 AM    CO2 27 04/24/2023 05:22 AM    BUN 19 04/24/2023 05:22 AM    CREATININE 0.88 04/24/2023 05:22 AM    GLUCOSE 86 04/24/2023 05:22 AM    GLUCOSE 103 12/13/2022 05:19 AM    CALCIUM 8.9 04/24/2023 05:22 AM        WBC trends are being monitored. Antibiotic doses are being adjusted per most recent renal labs.      Vitals:    04/25/23 0710   BP: (!) 153/77   Pulse: 69   Resp: 18   Temp: 97.9 °F (36.6 °C)   SpO2: 95%           Patient Active Problem List   Diagnosis    Hyperlipidemia    Hypertension    Osteoarthritis    CAD (coronary artery disease)    Actinic keratosis    BPH with obstruction/lower urinary tract symptoms    Urinary retention    Basal cell carcinoma, trunk    Overweight    Status post LASIK surgery    Osteoarthritis of right knee    Infection of toe    Hypertrophic scar    Status post total right knee replacement    ARB

## 2023-04-25 NOTE — PROGRESS NOTES
DAILY PROGRESS NOTE    Patient doing well  No chest pain or shortness of breath. No calf pain. No issues overnight. Vitals:    23 0710   BP: (!) 153/77   Pulse: 69   Resp: 18   Temp: 97.9 °F (36.6 °C)   SpO2: 95%      Temp (24hrs), Av.9 °F (36.6 °C), Min:97.9 °F (36.6 °C), Max:97.9 °F (36.6 °C)         Exam:   General: No acute distress  Operative extremity:  Dressing clean, dry and intact  Neuro vascular status intact. Block wearing off  Calves soft and non-tender without evidence of DVT. Labs reviewed:  CBC:   Recent Labs     23  0523 23  0522   WBC 10.8 7.1   HGB 11.9* 12.0*    180       BMP:    Recent Labs     23  0523 23  0522    139   K 4.9 4.5    105   CO2 24 27   BUN 23 19   CREATININE 0.92 0.88   GLUCOSE 92 86         Assessment:  Patient status post irrigation debridement of right reverse total shoulder arthroplasty date of surgery 2023    Plan:   IV antibiotics per infectious disease   Medical management per the medical team  Plastic surgery seen, wound vac applied  Cultures S aureus, PICC line placed yesterday  6 weeks IV cefazolin   Regular diet  Can discharge today pending antibiotic script from LATONYA Simms, APRN - CNP  2023 7:53 AM

## 2023-04-25 NOTE — DISCHARGE INSTR - COC
Continuity of Care Form    Patient Name: Ahmet Caballero   :  1945  MRN:  [id number]    Admit date:  2023  Discharge date:  2023    Code Status Order: Full Code   Advance Directives:   Advance Care Flowsheet Documentation       Date/Time Healthcare Directive Type of Healthcare Directive Copy in [address] Agent's Name Healthcare Agent's Phone Number    23 1034 No, patient does not have an advance directive for healthcare treatment -- -- -- -- --            Admitting Physician:  April Larios MD  PCP: Rissa Stahl MD    Discharging Nurse: pd  [address] Unit/Room#: L567/U437-96  Discharging Unit Phone Number: [phone]    Emergency Contact:   Extended Emergency Contact Information  Primary Emergency Contact: Mahendra Burns [address] Phone: [phone]  Mobile Phone: [phone]  Relation: Spouse    Past Surgical History:  Past Surgical History:   Procedure Laterality Date    ACHILLES TENDON SURGERY Left 7/10/2020    LEFT REPAIR OF RUPTURED ACHILLES TENDON, performed by Shaggy Galvan DPM at [address] [place] [address] Bilateral 2015    COLONOSCOPY  3/23/15        COLONOSCOPY N/A 2020    COLORECTAL CANCER SCREENING, HIGH RISK performed by Rupali Sharma MD at [address] [place] [address]      CYSTOSCOPY  2017    W/COMPLEX CYSTOMETROGRAM-COMPLEX UROFLOW-TRANSRECTAL US PROSTATE    EYE SURGERY      Lasik OU    JOINT REPLACEMENT Left     LTKR    ID ARTHRP KNE CONDYLE&PLATU MEDIAL&LAT COMPARTMENTS Right 3/15/2018    RIGHT KNEE TOTAL KNEE  ARTHROPLASTY performed by Warren Michel MD at [place] [address] Right     twice    SHOULDER SURGERY Right      RCR    SHOULDER SURGERY Right 3/30/2023    RIGHT SHOULDER TOTAL SHOULDER ARTHROPLASTY-REVERSE   performed by April Larios MD at [address] [place] [address] Right 2023    RIGHT

## 2023-04-26 ENCOUNTER — TELEPHONE (OUTPATIENT)
Dept: FAMILY MEDICINE CLINIC | Age: 78
End: 2023-04-26

## 2023-04-26 LAB
CULTURE SURGICAL: ABNORMAL
CULTURE SURGICAL: ABNORMAL
CULTURE SURGICAL: NORMAL
ORGANISM: ABNORMAL

## 2023-04-26 NOTE — TELEPHONE ENCOUNTER
Care Transitions Initial Follow Up Call    Outreach made within 2 business days of discharge: Yes    Patient: Meggan Larkin Patient : 1945   MRN: [id number]  Reason for Admission: There are no discharge diagnoses documented for the most recent discharge. Discharge Date: 23       Spoke with: Pt    Discharge department/facility: [facility]    [facility] Interactive Patient Contact:  Was patient able to fill all prescriptions: Yes  Was patient instructed to bring all medications to the follow-up visit: Yes  Is patient taking all medications as directed in the discharge summary? Yes  Does patient understand their discharge instructions: Yes  Does patient have questions or concerns that need addressed prior to 7-14 day follow up office visit: no, pt declines TCM states there is no need for one. Will just keep normal appointment that is scheduled.      Scheduled appointment with PCP within 7-14 days    Follow Up  Future Appointments   Date Time Provider Aracely Palmer   2023  9:30 AM Renee Chase MD [facility] AT [place]   2023 11:00 AM Krissy Thurston MD [facility] AT [place], [place]

## 2023-05-01 ENCOUNTER — OFFICE VISIT (OUTPATIENT)
Dept: FAMILY MEDICINE CLINIC | Age: 78
End: 2023-05-01

## 2023-05-01 ENCOUNTER — HOSPITAL ENCOUNTER (OUTPATIENT)
Dept: LAB | Age: 78
Setting detail: SPECIMEN
Discharge: HOME | End: 2023-05-01
Payer: COMMERCIAL

## 2023-05-01 VITALS
DIASTOLIC BLOOD PRESSURE: 78 MMHG | HEART RATE: 64 BPM | TEMPERATURE: 96.9 F | OXYGEN SATURATION: 96 % | SYSTOLIC BLOOD PRESSURE: 118 MMHG

## 2023-05-01 DIAGNOSIS — T14.8XXA WOUND INFECTION: Primary | ICD-10-CM

## 2023-05-01 DIAGNOSIS — E78.2 MIXED HYPERLIPIDEMIA: ICD-10-CM

## 2023-05-01 DIAGNOSIS — L08.9 WOUND INFECTION: Primary | ICD-10-CM

## 2023-05-01 LAB
ANION GAP SERPL CALCULATED.3IONS-SCNC: 11 MEQ/L (ref 9–15)
BASOPHILS # BLD: 0.1 K/UL (ref 0–0.2)
BASOPHILS NFR BLD: 0.9 %
BUN SERPL-MCNC: 26 MG/DL (ref 8–23)
CALCIUM SERPL-MCNC: 8.6 MG/DL (ref 8.5–9.9)
CHLORIDE SERPL-SCNC: 104 MEQ/L (ref 95–107)
CO2 SERPL-SCNC: 24 MEQ/L (ref 20–31)
CREAT SERPL-MCNC: 0.77 MG/DL (ref 0.7–1.2)
CRP SERPL HS-MCNC: <3 MG/L (ref 0–5)
EOSINOPHIL # BLD: 0.8 K/UL (ref 0–0.7)
EOSINOPHIL NFR BLD: 10.3 %
ERYTHROCYTE [DISTWIDTH] IN BLOOD BY AUTOMATED COUNT: 15.6 % (ref 11.5–14.5)
GLUCOSE SERPL-MCNC: 105 MG/DL (ref 70–99)
HCT VFR BLD AUTO: 37.4 % (ref 42–52)
HGB BLD-MCNC: 12.3 G/DL (ref 14–18)
LYMPHOCYTES # BLD: 2 K/UL (ref 1–4.8)
LYMPHOCYTES NFR BLD: 26.6 %
MCH RBC QN AUTO: 28.9 PG (ref 27–31.3)
MCHC RBC AUTO-ENTMCNC: 32.9 % (ref 33–37)
MCV RBC AUTO: 88 FL (ref 79–92.2)
MONOCYTES # BLD: 0.7 K/UL (ref 0.2–0.8)
MONOCYTES NFR BLD: 9.1 %
NEUTROPHILS # BLD: 4.1 K/UL (ref 1.4–6.5)
NEUTS SEG NFR BLD: 53.1 %
PLATELET # BLD AUTO: 155 K/UL (ref 130–400)
POTASSIUM SERPL-SCNC: 4.8 MEQ/L (ref 3.4–4.9)
RBC # BLD AUTO: 4.24 M/UL (ref 4.7–6.1)
SODIUM SERPL-SCNC: 139 MEQ/L (ref 135–144)
WBC # BLD AUTO: 7.7 K/UL (ref 4.8–10.8)

## 2023-05-01 PROCEDURE — 80048 BASIC METABOLIC PNL TOTAL CA: CPT

## 2023-05-01 PROCEDURE — 85025 COMPLETE CBC W/AUTO DIFF WBC: CPT

## 2023-05-01 PROCEDURE — 86140 C-REACTIVE PROTEIN: CPT

## 2023-05-01 RX ORDER — ATORVASTATIN CALCIUM 80 MG/1
TABLET, FILM COATED ORAL
Qty: 90 TABLET | Refills: 2 | Status: SHIPPED | OUTPATIENT
Start: 2023-05-01

## 2023-05-01 NOTE — TELEPHONE ENCOUNTER
----- Message from Emma rFazier sent at 5/1/2023  8:32 AM EDT -----  Subject: Refill Request    QUESTIONS  Name of Medication? atorvastatin (LIPITOR) 80 MG tablet  Patient-reported dosage and instructions? 80 MG once per day   How many days do you have left? 5  Preferred Pharmacy? [facility] #[id number]  Pharmacy phone number (if available)? [phone]  Additional Information for Provider? 90 Day supply. ---------------------------------------------------------------------------  --------------  Nahed Hint INFO  What is the best way for the office to contact you? OK to leave message on   voicemail  Preferred Call Back Phone Number? [phone]  ---------------------------------------------------------------------------  --------------  SCRIPT ANSWERS  Relationship to Patient?  Self

## 2023-05-01 NOTE — PROGRESS NOTES
Robert Myers (:  1945) is a 69 y.o. male,Established patient, here for evaluation of the following chief complaint(s):  Follow-Up from Hospital (TCM - 7 day TCM. Has wound vac and antibiotic tid. Pt states feeling great. )      SUBJECTIVE    History of present illness:     Hospital f/u     Was admitted  to   Had right shoulder replacement in late March  Reports dehiscence of incision and subsequent infection  Was admitted for wash out  Started on Vancomysin TID for a total of six weeks  Has f/u with ID arranged  Getting weekly labs  Has home health  Also will be following up with wound care  Has wound vac  Feels good since discharged  Denies pain or fevers     Review of Symptoms: As per HPI.      Past Medical History:   Diagnosis Date    Actinic keratosis     Basal cell carcinoma, trunk 2017    left upper back    Basal cell carcinoma, trunk     BPH (benign prostatic hyperplasia)     CAD (coronary artery disease)     has 4 cardiac stents    Heart attack ([address]     History of MI (myocardial infarction) 2020    History of mycobacterial infection 2018    Hyperlipidemia     meds > 15 yrs    Hypertension     meds > 6 yrs / denies TIA or stroke    Hypertrophic scar     Myocardial infarct ([address] 2012    Osteoarthritis     all joints    Overweight 2017    Sleep apnea     Status post coronary angioplasty     Thyroid disease     meds > 1 month     Past Surgical History:   Procedure Laterality Date    ACHILLES TENDON SURGERY Left 7/10/2020    LEFT REPAIR OF RUPTURED ACHILLES TENDON, performed by Norma Benites DPM at [address] [place] [address] Bilateral 2015    COLONOSCOPY  3/23/15        COLONOSCOPY N/A 2020    COLORECTAL CANCER SCREENING, HIGH RISK performed by Olesya Yepez MD at [facility]      CYSTOSCOPY  2017    W/COMPLEX CYSTOMETROGRAM-COMPLEX UROFLOW-TRANSRECTAL US PROSTATE    EYE

## 2023-05-05 NOTE — PROGRESS NOTES
Physician Progress Note      Sara Hassan  CSN #:                  [id number]  :                       1945  ADMIT DATE:       2023 9:53 AM  DISCH DATE:        2023 7:30 PM  RESPONDING  PROVIDER #:        Mac Hussein MD          QUERY TEXT:    Patient underwent right reverse total shoulder arthroplasty on 2023. \"He   developed a small suture abscess that became infected expanding into the   chest area and deep to the subcutaneous tissue. \" Per Op note dated 23   documentation of debridement. To accurately reflect the procedure performed   please document if debridement was excisional or nonexcisional and the deepest   depth of tissue removed as down to and including: The medical record reflects the following:  Risk Factors: age 69  HTN  hypothyroidism  Clinical Indicators:  23 Dr Joaquín Mauricio: \"We utilized the old incision. We opened skin,   subcutaneous tissue, fascia down the chest wall and muscle. At this time, we   debrided subcutaneous tissue, fascia and muscle. There was no debridement of   tendon or bone. There was no bone exposed. At this time, we utilized a sharp   scalpel dissection, forceps and scissors to remove the necrotic tissue. \"  Treatment: IV Ancef  Right shoulder incision and drainage of abscess    Bermudez Koltonraven RN CCDS  [phone]  Options provided:  -- Nonexcisional debridement of muscle  -- Excisional debridement of muscle  -- Other - I will add my own diagnosis  -- Disagree - Not applicable / Not valid  -- Disagree - Clinically unable to determine / Unknown  -- Refer to Clinical Documentation Reviewer    PROVIDER RESPONSE TEXT:    Excisional debridement of muscle of right shoulder was performed during   procedure on 23. Query created by: Scott Howard on 2023 12:03 PM      QUERY TEXT:    Patient underwent right reverse total shoulder arthroplasty on 2023.  \"He   developed a small suture abscess that became

## 2023-05-08 ENCOUNTER — HOSPITAL ENCOUNTER (OUTPATIENT)
Age: 78
Setting detail: SPECIMEN
Discharge: HOME | End: 2023-05-08
Payer: COMMERCIAL

## 2023-05-08 LAB
ANION GAP SERPL CALCULATED.3IONS-SCNC: 10 MEQ/L (ref 9–15)
BASOPHILS # BLD: 0 K/UL (ref 0–0.2)
BASOPHILS NFR BLD: 0.7 %
BUN SERPL-MCNC: 17 MG/DL (ref 8–23)
CALCIUM SERPL-MCNC: 8.7 MG/DL (ref 8.5–9.9)
CHLORIDE SERPL-SCNC: 101 MEQ/L (ref 95–107)
CO2 SERPL-SCNC: 26 MEQ/L (ref 20–31)
CREAT SERPL-MCNC: 0.77 MG/DL (ref 0.7–1.2)
CRP SERPL HS-MCNC: <3 MG/L (ref 0–5)
EOSINOPHIL # BLD: 0.4 K/UL (ref 0–0.7)
EOSINOPHIL NFR BLD: 6.2 %
ERYTHROCYTE [DISTWIDTH] IN BLOOD BY AUTOMATED COUNT: 16 % (ref 11.5–14.5)
GLUCOSE SERPL-MCNC: 85 MG/DL (ref 70–99)
HCT VFR BLD AUTO: 35.8 % (ref 42–52)
HGB BLD-MCNC: 11.7 G/DL (ref 14–18)
LYMPHOCYTES # BLD: 1.9 K/UL (ref 1–4.8)
LYMPHOCYTES NFR BLD: 31.5 %
MCH RBC QN AUTO: 28.9 PG (ref 27–31.3)
MCHC RBC AUTO-ENTMCNC: 32.8 % (ref 33–37)
MCV RBC AUTO: 88.2 FL (ref 79–92.2)
MONOCYTES # BLD: 0.8 K/UL (ref 0.2–0.8)
MONOCYTES NFR BLD: 12.7 %
NEUTROPHILS # BLD: 3 K/UL (ref 1.4–6.5)
NEUTS SEG NFR BLD: 48.9 %
PLATELET # BLD AUTO: 147 K/UL (ref 130–400)
POTASSIUM SERPL-SCNC: 5 MEQ/L (ref 3.4–4.9)
RBC # BLD AUTO: 4.05 M/UL (ref 4.7–6.1)
SODIUM SERPL-SCNC: 137 MEQ/L (ref 135–144)
WBC # BLD AUTO: 6.2 K/UL (ref 4.8–10.8)

## 2023-05-08 PROCEDURE — 85025 COMPLETE CBC W/AUTO DIFF WBC: CPT

## 2023-05-08 PROCEDURE — 80048 BASIC METABOLIC PNL TOTAL CA: CPT

## 2023-05-08 PROCEDURE — 86140 C-REACTIVE PROTEIN: CPT

## 2023-05-09 DIAGNOSIS — N13.8 BPH WITH OBSTRUCTION/LOWER URINARY TRACT SYMPTOMS: ICD-10-CM

## 2023-05-09 DIAGNOSIS — N40.1 BPH WITH OBSTRUCTION/LOWER URINARY TRACT SYMPTOMS: ICD-10-CM

## 2023-05-09 RX ORDER — FINASTERIDE 5 MG/1
TABLET, FILM COATED ORAL
Qty: 90 TABLET | Refills: 3 | Status: SHIPPED | OUTPATIENT
Start: 2023-05-09

## 2023-05-09 NOTE — TELEPHONE ENCOUNTER
Comments:     Last Office Visit (last PCP visit):   3/2/2023    Next Visit Date:  Future Appointments   Date Time Provider Aracely Estela   6/6/2023 12:30 [address]   6/27/2023  9:30 AM Winnie Brand MD [facility] EMERGENCY [facility] AT [place]   7/20/2023 11:00 AM Grover Bauer MD [facility] EMERGENCY [facility] AT [place]       **If hasn't been seen in over a year OR hasn't followed up according to last diabetes/ADHD visit, make appointment for patient before sending refill to provider.     Rx requested:  Requested Prescriptions     Pending Prescriptions Disp Refills    finasteride (PROSCAR) 5 MG tablet [Pharmacy Med Name: FINASTERIDE 5 MG TABLET] 90 tablet 3     Sig: take 1 tablet by mouth once daily

## 2023-05-15 ENCOUNTER — HOSPITAL ENCOUNTER (OUTPATIENT)
Age: 78
Setting detail: SPECIMEN
Discharge: HOME | End: 2023-05-15
Payer: COMMERCIAL

## 2023-05-15 LAB
ANION GAP SERPL CALCULATED.3IONS-SCNC: 9 MEQ/L (ref 9–15)
BASOPHILS # BLD: 0.1 K/UL (ref 0–0.2)
BASOPHILS NFR BLD: 1 %
BUN SERPL-MCNC: 19 MG/DL (ref 8–23)
CALCIUM SERPL-MCNC: 8.7 MG/DL (ref 8.5–9.9)
CHLORIDE SERPL-SCNC: 100 MEQ/L (ref 95–107)
CO2 SERPL-SCNC: 25 MEQ/L (ref 20–31)
CREAT SERPL-MCNC: 0.86 MG/DL (ref 0.7–1.2)
CRP SERPL HS-MCNC: <3 MG/L (ref 0–5)
EOSINOPHIL # BLD: 0.5 K/UL (ref 0–0.7)
EOSINOPHIL NFR BLD: 7.9 %
ERYTHROCYTE [DISTWIDTH] IN BLOOD BY AUTOMATED COUNT: 15.7 % (ref 11.5–14.5)
GLUCOSE SERPL-MCNC: 83 MG/DL (ref 70–99)
HCT VFR BLD AUTO: 36.6 % (ref 42–52)
HGB BLD-MCNC: 12.3 G/DL (ref 14–18)
LYMPHOCYTES # BLD: 1.8 K/UL (ref 1–4.8)
LYMPHOCYTES NFR BLD: 30.2 %
MCH RBC QN AUTO: 29.8 PG (ref 27–31.3)
MCHC RBC AUTO-ENTMCNC: 33.5 % (ref 33–37)
MCV RBC AUTO: 88.7 FL (ref 79–92.2)
MONOCYTES # BLD: 0.6 K/UL (ref 0.2–0.8)
MONOCYTES NFR BLD: 10.2 %
NEUTROPHILS # BLD: 3 K/UL (ref 1.4–6.5)
NEUTS SEG NFR BLD: 50.7 %
PLATELET # BLD AUTO: 153 K/UL (ref 130–400)
POTASSIUM SERPL-SCNC: 4.5 MEQ/L (ref 3.4–4.9)
RBC # BLD AUTO: 4.13 M/UL (ref 4.7–6.1)
SODIUM SERPL-SCNC: 134 MEQ/L (ref 135–144)
WBC # BLD AUTO: 5.9 K/UL (ref 4.8–10.8)

## 2023-05-15 PROCEDURE — 80048 BASIC METABOLIC PNL TOTAL CA: CPT

## 2023-05-15 PROCEDURE — 85025 COMPLETE CBC W/AUTO DIFF WBC: CPT

## 2023-05-15 PROCEDURE — 86140 C-REACTIVE PROTEIN: CPT

## 2023-05-22 ENCOUNTER — HOSPITAL ENCOUNTER (OUTPATIENT)
Age: 78
Setting detail: SPECIMEN
Discharge: HOME | End: 2023-05-22
Payer: COMMERCIAL

## 2023-05-22 LAB
ANION GAP SERPL CALCULATED.3IONS-SCNC: 8 MEQ/L (ref 9–15)
BASOPHILS # BLD: 0 K/UL (ref 0–0.2)
BASOPHILS NFR BLD: 0.6 %
BUN SERPL-MCNC: 21 MG/DL (ref 8–23)
CALCIUM SERPL-MCNC: 8.9 MG/DL (ref 8.5–9.9)
CHLORIDE SERPL-SCNC: 102 MEQ/L (ref 95–107)
CO2 SERPL-SCNC: 24 MEQ/L (ref 20–31)
CREAT SERPL-MCNC: 0.88 MG/DL (ref 0.7–1.2)
CRP SERPL HS-MCNC: <3 MG/L (ref 0–5)
EOSINOPHIL # BLD: 0.4 K/UL (ref 0–0.7)
EOSINOPHIL NFR BLD: 5.5 %
ERYTHROCYTE [DISTWIDTH] IN BLOOD BY AUTOMATED COUNT: 15.9 % (ref 11.5–14.5)
GLUCOSE SERPL-MCNC: 100 MG/DL (ref 70–99)
HCT VFR BLD AUTO: 38.2 % (ref 42–52)
HGB BLD-MCNC: 12.6 G/DL (ref 14–18)
LYMPHOCYTES # BLD: 1.9 K/UL (ref 1–4.8)
LYMPHOCYTES NFR BLD: 29.5 %
MCH RBC QN AUTO: 29.1 PG (ref 27–31.3)
MCHC RBC AUTO-ENTMCNC: 33 % (ref 33–37)
MCV RBC AUTO: 88.1 FL (ref 79–92.2)
MONOCYTES # BLD: 0.7 K/UL (ref 0.2–0.8)
MONOCYTES NFR BLD: 11.2 %
NEUTROPHILS # BLD: 3.4 K/UL (ref 1.4–6.5)
NEUTS SEG NFR BLD: 53.2 %
PLATELET # BLD AUTO: 159 K/UL (ref 130–400)
POTASSIUM SERPL-SCNC: 4.8 MEQ/L (ref 3.4–4.9)
RBC # BLD AUTO: 4.33 M/UL (ref 4.7–6.1)
SODIUM SERPL-SCNC: 134 MEQ/L (ref 135–144)
WBC # BLD AUTO: 6.4 K/UL (ref 4.8–10.8)

## 2023-05-22 PROCEDURE — 86140 C-REACTIVE PROTEIN: CPT

## 2023-05-22 PROCEDURE — 85025 COMPLETE CBC W/AUTO DIFF WBC: CPT

## 2023-05-22 PROCEDURE — 80048 BASIC METABOLIC PNL TOTAL CA: CPT

## 2023-05-29 ENCOUNTER — HOSPITAL ENCOUNTER (OUTPATIENT)
Age: 78
Setting detail: SPECIMEN
Discharge: HOME | End: 2023-05-29
Payer: COMMERCIAL

## 2023-05-29 LAB
ANION GAP SERPL CALCULATED.3IONS-SCNC: 7 MEQ/L (ref 9–15)
BUN SERPL-MCNC: 27 MG/DL (ref 8–23)
CALCIUM SERPL-MCNC: 8.4 MG/DL (ref 8.5–9.9)
CHLORIDE SERPL-SCNC: 101 MEQ/L (ref 95–107)
CO2 SERPL-SCNC: 25 MEQ/L (ref 20–31)
CREAT SERPL-MCNC: 0.95 MG/DL (ref 0.7–1.2)
CRP SERPL HS-MCNC: 4.7 MG/L (ref 0–5)
GLUCOSE SERPL-MCNC: 124 MG/DL (ref 70–99)
POTASSIUM SERPL-SCNC: 4.3 MEQ/L (ref 3.4–4.9)
SODIUM SERPL-SCNC: 133 MEQ/L (ref 135–144)

## 2023-05-29 PROCEDURE — 86140 C-REACTIVE PROTEIN: CPT

## 2023-05-29 PROCEDURE — 80048 BASIC METABOLIC PNL TOTAL CA: CPT

## 2023-06-02 RX ORDER — CARVEDILOL 12.5 MG/1
TABLET ORAL
Qty: 180 TABLET | Refills: 1 | Status: SHIPPED | OUTPATIENT
Start: 2023-06-02

## 2023-06-02 NOTE — TELEPHONE ENCOUNTER
Comments:     Last Office Visit (last PCP visit):   3/2/2023    Next Visit Date:  Future Appointments   Date Time Provider Aracely Estela   6/6/2023 12:30 PM [address]   6/27/2023  9:30 AM Patty Canavan, MD [facility] EMERGENCY [facility] AT [place]   7/20/2023 11:00 AM Slava Adams MD [facility] EMERGENC[facility] AT [place]       **If hasn't been seen in over a year OR hasn't followed up according to last diabetes/ADHD visit, make appointment for patient before sending refill to provider.     Rx requested:  Requested Prescriptions     Pending Prescriptions Disp Refills    carvedilol (COREG) 12.5 MG tablet [Pharmacy Med Name: CARVEDILOL 12.5 MG TABLET] 180 tablet 1     Sig: take 1 tablet by mouth twice a day with food

## 2023-06-05 ENCOUNTER — HOSPITAL ENCOUNTER (OUTPATIENT)
Age: 78
Setting detail: SPECIMEN
Discharge: HOME | End: 2023-06-05
Payer: COMMERCIAL

## 2023-06-05 LAB
ANION GAP SERPL CALCULATED.3IONS-SCNC: 8 MEQ/L (ref 9–15)
BASOPHILS # BLD: 0 K/UL (ref 0–0.2)
BASOPHILS NFR BLD: 0.7 %
BUN SERPL-MCNC: 24 MG/DL (ref 8–23)
CALCIUM SERPL-MCNC: 9.1 MG/DL (ref 8.5–9.9)
CHLORIDE SERPL-SCNC: 100 MEQ/L (ref 95–107)
CO2 SERPL-SCNC: 26 MEQ/L (ref 20–31)
CREAT SERPL-MCNC: 0.83 MG/DL (ref 0.7–1.2)
CRP SERPL HS-MCNC: <3 MG/L (ref 0–5)
EOSINOPHIL # BLD: 0.4 K/UL (ref 0–0.7)
EOSINOPHIL NFR BLD: 5.6 %
ERYTHROCYTE [DISTWIDTH] IN BLOOD BY AUTOMATED COUNT: 15.4 % (ref 11.5–14.5)
GLUCOSE SERPL-MCNC: 93 MG/DL (ref 70–99)
HCT VFR BLD AUTO: 40.6 % (ref 42–52)
HGB BLD-MCNC: 13.4 G/DL (ref 14–18)
LYMPHOCYTES # BLD: 1.8 K/UL (ref 1–4.8)
LYMPHOCYTES NFR BLD: 28.8 %
MCH RBC QN AUTO: 29 PG (ref 27–31.3)
MCHC RBC AUTO-ENTMCNC: 33 % (ref 33–37)
MCV RBC AUTO: 87.7 FL (ref 79–92.2)
MONOCYTES # BLD: 0.6 K/UL (ref 0.2–0.8)
MONOCYTES NFR BLD: 9.8 %
NEUTROPHILS # BLD: 3.5 K/UL (ref 1.4–6.5)
NEUTS SEG NFR BLD: 55.1 %
PLATELET # BLD AUTO: 171 K/UL (ref 130–400)
POTASSIUM SERPL-SCNC: 4.6 MEQ/L (ref 3.4–4.9)
RBC # BLD AUTO: 4.63 M/UL (ref 4.7–6.1)
SODIUM SERPL-SCNC: 134 MEQ/L (ref 135–144)
WBC # BLD AUTO: 6.3 K/UL (ref 4.8–10.8)

## 2023-06-05 PROCEDURE — 85025 COMPLETE CBC W/AUTO DIFF WBC: CPT

## 2023-06-05 PROCEDURE — 86140 C-REACTIVE PROTEIN: CPT

## 2023-06-05 PROCEDURE — 80048 BASIC METABOLIC PNL TOTAL CA: CPT

## 2023-06-06 DIAGNOSIS — E03.9 HYPOTHYROIDISM, UNSPECIFIED TYPE: ICD-10-CM

## 2023-06-06 RX ORDER — LEVOTHYROXINE SODIUM 0.05 MG/1
TABLET ORAL
Qty: 90 TABLET | Refills: 1 | Status: SHIPPED | OUTPATIENT
Start: 2023-06-06

## 2023-06-27 ENCOUNTER — OFFICE VISIT (OUTPATIENT)
Dept: FAMILY MEDICINE CLINIC | Age: 78
End: 2023-06-27
Payer: COMMERCIAL

## 2023-06-27 VITALS
TEMPERATURE: 97.7 F | OXYGEN SATURATION: 98 % | WEIGHT: 186.4 LBS | BODY MASS INDEX: 28.25 KG/M2 | HEART RATE: 67 BPM | SYSTOLIC BLOOD PRESSURE: 120 MMHG | HEIGHT: 68 IN | DIASTOLIC BLOOD PRESSURE: 80 MMHG

## 2023-06-27 DIAGNOSIS — N13.8 BPH WITH OBSTRUCTION/LOWER URINARY TRACT SYMPTOMS: ICD-10-CM

## 2023-06-27 DIAGNOSIS — E78.2 MIXED HYPERLIPIDEMIA: ICD-10-CM

## 2023-06-27 DIAGNOSIS — E03.9 HYPOTHYROIDISM, UNSPECIFIED TYPE: ICD-10-CM

## 2023-06-27 DIAGNOSIS — N40.1 BPH WITH OBSTRUCTION/LOWER URINARY TRACT SYMPTOMS: ICD-10-CM

## 2023-06-27 DIAGNOSIS — Z00.00 MEDICARE ANNUAL WELLNESS VISIT, SUBSEQUENT: Primary | ICD-10-CM

## 2023-06-27 DIAGNOSIS — I10 ESSENTIAL HYPERTENSION: ICD-10-CM

## 2023-06-27 DIAGNOSIS — I25.10 CORONARY ARTERY DISEASE INVOLVING NATIVE CORONARY ARTERY OF NATIVE HEART WITHOUT ANGINA PECTORIS: ICD-10-CM

## 2023-06-27 PROCEDURE — 3074F SYST BP LT 130 MM HG: CPT | Performed by: FAMILY MEDICINE

## 2023-06-27 PROCEDURE — 3079F DIAST BP 80-89 MM HG: CPT | Performed by: FAMILY MEDICINE

## 2023-06-27 PROCEDURE — 1123F ACP DISCUSS/DSCN MKR DOCD: CPT | Performed by: FAMILY MEDICINE

## 2023-06-27 PROCEDURE — G0439 PPPS, SUBSEQ VISIT: HCPCS | Performed by: FAMILY MEDICINE

## 2023-06-27 ASSESSMENT — PATIENT HEALTH QUESTIONNAIRE - PHQ9
SUM OF ALL RESPONSES TO PHQ QUESTIONS 1-9: 0
SUM OF ALL RESPONSES TO PHQ9 QUESTIONS 1 & 2: 0
SUM OF ALL RESPONSES TO PHQ QUESTIONS 1-9: 0
2. FEELING DOWN, DEPRESSED OR HOPELESS: 0
SUM OF ALL RESPONSES TO PHQ QUESTIONS 1-9: 0
SUM OF ALL RESPONSES TO PHQ QUESTIONS 1-9: 0
1. LITTLE INTEREST OR PLEASURE IN DOING THINGS: 0

## 2023-06-27 ASSESSMENT — LIFESTYLE VARIABLES
HOW MANY STANDARD DRINKS CONTAINING ALCOHOL DO YOU HAVE ON A TYPICAL DAY: PATIENT DOES NOT DRINK
HOW OFTEN DO YOU HAVE A DRINK CONTAINING ALCOHOL: NEVER

## 2023-07-02 DIAGNOSIS — N40.1 BPH WITH OBSTRUCTION/LOWER URINARY TRACT SYMPTOMS: ICD-10-CM

## 2023-07-02 DIAGNOSIS — N13.8 BPH WITH OBSTRUCTION/LOWER URINARY TRACT SYMPTOMS: ICD-10-CM

## 2023-07-03 RX ORDER — TAMSULOSIN HYDROCHLORIDE 0.4 MG/1
CAPSULE ORAL
Qty: 180 CAPSULE | Refills: 1 | Status: SHIPPED | OUTPATIENT
Start: 2023-07-03

## 2023-07-20 ENCOUNTER — OFFICE VISIT (OUTPATIENT)
Dept: FAMILY MEDICINE CLINIC | Age: 78
End: 2023-07-20
Payer: COMMERCIAL

## 2023-07-20 VITALS — BODY MASS INDEX: 28.19 KG/M2 | WEIGHT: 186 LBS | HEIGHT: 68 IN | TEMPERATURE: 98.7 F

## 2023-07-20 DIAGNOSIS — L57.0 ACTINIC KERATOSES: Primary | ICD-10-CM

## 2023-07-20 PROCEDURE — 99214 OFFICE O/P EST MOD 30 MIN: CPT | Performed by: FAMILY MEDICINE

## 2023-07-20 PROCEDURE — 1123F ACP DISCUSS/DSCN MKR DOCD: CPT | Performed by: FAMILY MEDICINE

## 2023-07-20 RX ORDER — FLUOROURACIL 50 MG/G
CREAM TOPICAL
Qty: 40 G | Refills: 0 | Status: SHIPPED | OUTPATIENT
Start: 2023-07-20

## 2023-07-20 ASSESSMENT — ENCOUNTER SYMPTOMS: COLOR CHANGE: 1

## 2023-07-20 NOTE — PATIENT INSTRUCTIONS
Patient has been educated on 5-fluorouracil and the need to utilize the medication once there is less sun exposure. Near the end of August or beginning of September he will start application twice a day. Ideally the application is twice a day for 2 weeks. Then follow-up would be 2 weeks later at approximately the 4-week jasbir. However, sometimes people are very sensitive and cannot tolerate the medication at that frequency. Some people need to use it as little as Monday Wednesday and Friday evening. If this is necessary the treatment would be continued for 4 weeks instead of 2 weeks. Patient will do follow-up in this office somewhere between 4 to 6 weeks after starting 5-fluorouracil treatment.

## 2023-07-20 NOTE — PROGRESS NOTES
Creatinine 0.86 0.70 - 1.20 mg/dL    Est, Glom Filt Rate >60.0 >60    Calcium 8.7 8.5 - 9.9 mg/dL   CBC with Auto Differential    Collection Time: 05/15/23  3:36 PM   Result Value Ref Range    WBC 5.9 4.8 - 10.8 K/uL    RBC 4.13 (L) 4.70 - 6.10 M/uL    Hemoglobin 12.3 (L) 14.0 - 18.0 g/dL    Hematocrit 36.6 (L) 42.0 - 52.0 %    MCV 88.7 79.0 - 92.2 fL    MCH 29.8 27.0 - 31.3 pg    MCHC 33.5 33.0 - 37.0 %    RDW 15.7 (H) 11.5 - 14.5 %    Platelets 895 904 - 681 K/uL    Neutrophils % 50.7 %    Lymphocytes % 30.2 %    Monocytes % 10.2 %    Eosinophils % 7.9 %    Basophils % 1.0 %    Neutrophils Absolute 3.0 1.4 - 6.5 K/uL    Lymphocytes Absolute 1.8 1.0 - 4.8 K/uL    Monocytes Absolute 0.6 0.2 - 0.8 K/uL    Eosinophils Absolute 0.5 0.0 - 0.7 K/uL    Basophils Absolute 0.1 0.0 - 0.2 K/uL   C-Reactive Protein    Collection Time: 05/15/23  3:36 PM   Result Value Ref Range    CRP <3.0 0.0 - 5.0 mg/L   Basic Metabolic Panel    Collection Time: 05/22/23  1:16 PM   Result Value Ref Range    Sodium 134 (L) 135 - 144 mEq/L    Potassium 4.8 3.4 - 4.9 mEq/L    Chloride 102 95 - 107 mEq/L    CO2 24 20 - 31 mEq/L    Anion Gap 8 (L) 9 - 15 mEq/L    Glucose 100 (H) 70 - 99 mg/dL    BUN 21 8 - 23 mg/dL    Creatinine 0.88 0.70 - 1.20 mg/dL    Est, Glom Filt Rate >60.0 >60    Calcium 8.9 8.5 - 9.9 mg/dL   CBC with Auto Differential    Collection Time: 05/22/23  1:16 PM   Result Value Ref Range    WBC 6.4 4.8 - 10.8 K/uL    RBC 4.33 (L) 4.70 - 6.10 M/uL    Hemoglobin 12.6 (L) 14.0 - 18.0 g/dL    Hematocrit 38.2 (L) 42.0 - 52.0 %    MCV 88.1 79.0 - 92.2 fL    MCH 29.1 27.0 - 31.3 pg    MCHC 33.0 33.0 - 37.0 %    RDW 15.9 (H) 11.5 - 14.5 %    Platelets 797 932 - 698 K/uL    Neutrophils % 53.2 %    Lymphocytes % 29.5 %    Monocytes % 11.2 %    Eosinophils % 5.5 %    Basophils % 0.6 %    Neutrophils Absolute 3.4 1.4 - 6.5 K/uL    Lymphocytes Absolute 1.9 1.0 - 4.8 K/uL    Monocytes Absolute 0.7 0.2 - 0.8 K/uL    Eosinophils Absolute 0.4 0.0

## 2023-10-23 ENCOUNTER — OFFICE VISIT (OUTPATIENT)
Dept: FAMILY MEDICINE CLINIC | Age: 78
End: 2023-10-23
Payer: COMMERCIAL

## 2023-10-23 VITALS
HEART RATE: 65 BPM | BODY MASS INDEX: 29.4 KG/M2 | SYSTOLIC BLOOD PRESSURE: 132 MMHG | WEIGHT: 194 LBS | HEIGHT: 68 IN | OXYGEN SATURATION: 98 % | DIASTOLIC BLOOD PRESSURE: 80 MMHG

## 2023-10-23 DIAGNOSIS — N13.8 BPH WITH OBSTRUCTION/LOWER URINARY TRACT SYMPTOMS: ICD-10-CM

## 2023-10-23 DIAGNOSIS — E78.2 MIXED HYPERLIPIDEMIA: ICD-10-CM

## 2023-10-23 DIAGNOSIS — I10 ESSENTIAL HYPERTENSION: ICD-10-CM

## 2023-10-23 DIAGNOSIS — I25.10 CORONARY ARTERY DISEASE INVOLVING NATIVE CORONARY ARTERY OF NATIVE HEART WITHOUT ANGINA PECTORIS: ICD-10-CM

## 2023-10-23 DIAGNOSIS — L57.0 ACTINIC KERATOSES: ICD-10-CM

## 2023-10-23 DIAGNOSIS — E03.9 HYPOTHYROIDISM, UNSPECIFIED TYPE: Primary | ICD-10-CM

## 2023-10-23 DIAGNOSIS — N40.1 BPH WITH OBSTRUCTION/LOWER URINARY TRACT SYMPTOMS: ICD-10-CM

## 2023-10-23 PROCEDURE — 1123F ACP DISCUSS/DSCN MKR DOCD: CPT | Performed by: FAMILY MEDICINE

## 2023-10-23 PROCEDURE — 3075F SYST BP GE 130 - 139MM HG: CPT | Performed by: FAMILY MEDICINE

## 2023-10-23 PROCEDURE — 3079F DIAST BP 80-89 MM HG: CPT | Performed by: FAMILY MEDICINE

## 2023-10-23 PROCEDURE — 99213 OFFICE O/P EST LOW 20 MIN: CPT | Performed by: FAMILY MEDICINE

## 2023-10-23 RX ORDER — MELOXICAM 15 MG/1
TABLET ORAL
COMMUNITY
Start: 2023-09-22

## 2023-10-23 NOTE — PROGRESS NOTES
Perforated intestine    No Known Problems Sister     No Known Problems Brother     No Known Problems Sister     No Known Problems Brother     No Known Problems Son      Social History     Socioeconomic History    Marital status:      Spouse name: None    Number of children: None    Years of education: None    Highest education level: None   Tobacco Use    Smoking status: Former     Packs/day: 1.00     Years: 7.00     Additional pack years: 0.00     Total pack years: 7.00     Types: Cigarettes     Start date: 1961     Quit date: 3/17/1967     Years since quittin.6     Passive exposure: Past    Smokeless tobacco: Never    Tobacco comments:     Quit for [address] D[employer]   Vaping Use    Vaping Use: Never used   Substance and Sexual Activity    Alcohol use: No     Types: 30 Cans of beer per week     Comment: No alcohol  since     Drug use: No   Social History Narrative    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     Social Determinants of Health     Financial Resource Strain: [address]  (3/2/2023)    Overall Financial Resource Strain (CARDIA)     Difficulty of Paying Living Expenses: Not hard at all   Food Insecurity: No Food Insecurity (3/2/2023)    Hunger Vital Sign     Worried About Running Out of Food in the Last Year: Never true     [address] in the Last Year: Never true   Transportation Needs: Unknown (3/2/2023)    PRAPARE - Transportation     Lack of Transportation (Non-Medical):  No   Physical Activity: Sufficiently Active (2023)    Exercise Vital Sign     Days of Exercise per Week: 3 days     Minutes of Exercise per Session: 60 min   Housing Stability: Unknown (3/2/2023)    Housing Stability Vital Sign     Unstable Housing in the Last Year: No     Current Outpatient Medications   Medication Sig Dispense Refill    tamsulosin (FLOMAX) 0.4 MG capsule take 1 capsule by mouth twice a day 180 capsule 1    levothyroxine (SYNTHROID) 50 MCG tablet take 1 tablet by

## 2023-11-21 RX ORDER — CARVEDILOL 12.5 MG/1
TABLET ORAL
Qty: 180 TABLET | Refills: 1 | Status: SHIPPED | OUTPATIENT
Start: 2023-11-21

## 2023-11-25 DIAGNOSIS — E03.9 HYPOTHYROIDISM, UNSPECIFIED TYPE: ICD-10-CM

## 2023-11-27 RX ORDER — LEVOTHYROXINE SODIUM 0.05 MG/1
TABLET ORAL
Qty: 90 TABLET | Refills: 1 | Status: SHIPPED | OUTPATIENT
Start: 2023-11-27

## 2023-11-27 NOTE — TELEPHONE ENCOUNTER
Comments:     Last Office Visit (last PCP visit):   10/23/2023    Next Visit Date:  Future Appointments   Date Time Provider [address]   6/28/2024  9:30 AM Leydi Ma MD [facility] AT [place]       **If hasn't been seen in over a year OR hasn't followed up according to last diabetes/ADHD visit, make appointment for patient before sending refill to provider.     Rx requested:  Requested Prescriptions     Pending Prescriptions Disp Refills    levothyroxine (SYNTHROID) 50 MCG tablet [Pharmacy Med Name: LEVOTHYROXINE 50 MCG TABLET] 90 tablet 1     Sig: take 1 tablet by mouth once daily

## 2023-12-23 DIAGNOSIS — M19.90 ARTHRITIS: Primary | ICD-10-CM

## 2023-12-30 RX ORDER — MELOXICAM 15 MG/1
TABLET ORAL
Qty: 30 TABLET | Refills: 3 | Status: SHIPPED | OUTPATIENT
Start: 2023-12-30 | End: 2024-01-29

## 2024-01-02 ENCOUNTER — OFFICE VISIT (OUTPATIENT)
Dept: ORTHOPEDIC SURGERY | Facility: CLINIC | Age: 79
End: 2024-01-02
Payer: COMMERCIAL

## 2024-01-02 ENCOUNTER — ANCILLARY PROCEDURE (OUTPATIENT)
Dept: RADIOLOGY | Facility: CLINIC | Age: 79
End: 2024-01-02
Payer: COMMERCIAL

## 2024-01-02 DIAGNOSIS — Z96.641 STATUS POST RIGHT HIP REPLACEMENT: ICD-10-CM

## 2024-01-02 PROCEDURE — 73502 X-RAY EXAM HIP UNI 2-3 VIEWS: CPT | Mod: RIGHT SIDE | Performed by: ORTHOPAEDIC SURGERY

## 2024-01-02 PROCEDURE — 99213 OFFICE O/P EST LOW 20 MIN: CPT | Performed by: ORTHOPAEDIC SURGERY

## 2024-01-02 PROCEDURE — 1159F MED LIST DOCD IN RCRD: CPT | Performed by: ORTHOPAEDIC SURGERY

## 2024-01-02 PROCEDURE — 73502 X-RAY EXAM HIP UNI 2-3 VIEWS: CPT | Mod: RT

## 2024-01-02 NOTE — PROGRESS NOTES
Subjective    Patient ID: Chadwick    Chief Complaint:   Chief Complaint   Patient presents with    Right Hip - Follow-up     HAYDEN THR 12/12/22       History of present illness    78-year-old gentleman presenting to the clinic today for 13-month postop visit status post right total hip Hayden.  Overall doing extremely well in regards to the right hip.  He continues to participate in a daily exercise routine that includes abductor and adductor work.  He feels much stronger at this time but still not as strong on his left side.  No numbness tingling no fevers chills reported.  Ambulating without assistance.  Very pleased with the outcome.      Past medical , Surgical, Family and social history reviewed.      Physical exam  General: No acute distress and breathing comfortably.  Patient is pleasant and cooperative with the examination.    Extremity  Right hip is neurovascular intact.  Good range of motion.  2+ pulses bilateral lower extremity.  Capillary refill within normal is distally.  Good strength right lower extremity.  No signs of infection.    Diagnostics  [ none]  XR hip right with pelvis when performed 2 or 3 views    Result Date: 1/2/2024  Interpreted By:  Qasim Yusuf, STUDY: XR HIP RIGHT WITH PELVIS WHEN PERFORMED 2 OR 3 VIEWS;  ;  1/2/2024 10:48 am   INDICATION: Signs/Symptoms:PAIN.   ACCESSION NUMBER(S): [id number]   ORDERING CLINICIAN: QASIM YUSUF   FINDINGS: Two views show patient status post total hip replacement in good alignment.  No signs of fracture dislocation or other bony abnormality       Signed by: Qasim Yusuf 1/2/2024 10:52 AM Dictation workstation:   [id number]       Procedure  [ none]    Assessment  Status post right total hip Hayden    Treatment plan  1.  This time he was encouraged continue with exercise routine.  2.  We discussed other exercises he can add to his regimen.  His concern is going up stairs is not quite as smooth as it is with the left leg.  3.  He will  follow-up with us as needed.  4.  All of the patient's questions were answered.    This note was prepared using voice recognition software.  The details of this note are correct and have been reviewed, and corrected to the best of my ability.  Some grammatical areas may persist related to the Dragon software    Cristobal Chong PA-C, [facility]  [facility]    [phone]

## 2024-01-31 ENCOUNTER — TELEPHONE (OUTPATIENT)
Dept: FAMILY MEDICINE CLINIC | Age: 79
End: 2024-01-31

## 2024-01-31 DIAGNOSIS — E87.5 HYPERKALEMIA: Primary | ICD-10-CM

## 2024-01-31 NOTE — TELEPHONE ENCOUNTER
Eric from [facility] called to say that he will be following the pt as a secondary provider.    Eric would like to collaborate with Dr. Barrios with a goal to get the pt out of the hospital.    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    Eric stated that he has faxed visit notes from the in-home visit he had with the pt.    Care team office number [phone] after 5 pm  [phone] (cellphone) after business hours

## 2024-02-03 DIAGNOSIS — E78.2 MIXED HYPERLIPIDEMIA: ICD-10-CM

## 2024-02-05 DIAGNOSIS — E87.5 HYPERKALEMIA: ICD-10-CM

## 2024-02-05 LAB
ANION GAP SERPL CALCULATED.3IONS-SCNC: 8 MEQ/L (ref 9–15)
BUN SERPL-MCNC: 22 MG/DL (ref 8–23)
CALCIUM SERPL-MCNC: 8.9 MG/DL (ref 8.5–9.9)
CHLORIDE SERPL-SCNC: 102 MEQ/L (ref 95–107)
CO2 SERPL-SCNC: 27 MEQ/L (ref 20–31)
CREAT SERPL-MCNC: 0.89 MG/DL (ref 0.7–1.2)
GLUCOSE SERPL-MCNC: 95 MG/DL (ref 70–99)
POTASSIUM SERPL-SCNC: 4.5 MEQ/L (ref 3.4–4.9)
SODIUM SERPL-SCNC: 137 MEQ/L (ref 135–144)

## 2024-02-05 RX ORDER — ATORVASTATIN CALCIUM 80 MG/1
TABLET, FILM COATED ORAL
Qty: 90 TABLET | Refills: 0 | Status: SHIPPED | OUTPATIENT
Start: 2024-02-05

## 2024-02-05 NOTE — TELEPHONE ENCOUNTER
Comments:     Last Office Visit (last PCP visit):   10/23/2023    Next Visit Date:  Future Appointments   Date Time Provider Department Center   6/28/2024  9:30 AM Omkar Barrios MD [facility]       **If hasn't been seen in over a year OR hasn't followed up according to last diabetes/ADHD visit, make appointment for patient before sending refill to provider.    Rx requested:  Requested Prescriptions     Pending Prescriptions Disp Refills    atorvastatin (LIPITOR) 80 MG tablet [Pharmacy Med Name: Atorvastatin Calcium Oral Tablet 80 MG] 90 tablet 0     Sig: TAKE 1 TABLET BY MOUTH ONCE DAILY

## 2024-02-06 PROBLEM — M25.559 ARTHRALGIA OF HIP: Status: ACTIVE | Noted: 2024-02-06

## 2024-02-06 PROBLEM — I10 HYPERTENSION: Status: ACTIVE | Noted: 2024-02-06

## 2024-02-06 PROBLEM — M12.812 ROTATOR CUFF ARTHROPATHY OF BOTH SHOULDERS: Status: ACTIVE | Noted: 2024-02-06

## 2024-02-06 PROBLEM — Z86.010 HISTORY OF COLONIC POLYPS: Status: ACTIVE | Noted: 2024-02-06

## 2024-02-06 PROBLEM — M12.811 ROTATOR CUFF ARTHROPATHY OF BOTH SHOULDERS: Status: ACTIVE | Noted: 2024-02-06

## 2024-02-06 PROBLEM — R73.9 STRESS HYPERGLYCEMIA: Status: ACTIVE | Noted: 2021-07-31

## 2024-02-06 PROBLEM — M54.50 PAIN, LOW BACK: Status: ACTIVE | Noted: 2022-12-13

## 2024-02-06 PROBLEM — M67.919 DISORDER OF ROTATOR CUFF: Status: ACTIVE | Noted: 2024-02-06

## 2024-02-06 PROBLEM — E66.9 OBESITY (BMI 30.0-34.9): Status: ACTIVE | Noted: 2021-08-01

## 2024-02-06 PROBLEM — M25.551 ACUTE PAIN OF RIGHT HIP: Status: ACTIVE | Noted: 2024-02-06

## 2024-02-06 PROBLEM — E03.9 ACQUIRED HYPOTHYROIDISM: Status: ACTIVE | Noted: 2023-05-25

## 2024-02-06 PROBLEM — A49.01 METHICILLIN SUSCEPTIBLE STAPHYLOCOCCUS AUREUS INFECTION: Status: ACTIVE | Noted: 2023-04-25

## 2024-02-06 PROBLEM — T14.8XXA DISCHARGE FROM WOUND: Status: ACTIVE | Noted: 2024-02-06

## 2024-02-06 PROBLEM — G47.33 OSA (OBSTRUCTIVE SLEEP APNEA): Status: ACTIVE | Noted: 2023-05-25

## 2024-02-06 PROBLEM — N13.8 BENIGN PROSTATIC HYPERPLASIA WITH URINARY OBSTRUCTION: Status: ACTIVE | Noted: 2022-12-13

## 2024-02-06 PROBLEM — E66.811 OBESITY (BMI 30.0-34.9): Status: ACTIVE | Noted: 2021-08-01

## 2024-02-06 PROBLEM — M54.16 LUMBAR RADICULITIS: Status: ACTIVE | Noted: 2024-02-06

## 2024-02-06 PROBLEM — J98.11 ATELECTASIS: Status: ACTIVE | Noted: 2021-07-30

## 2024-02-06 PROBLEM — M19.90 OSTEOARTHRITIS: Status: ACTIVE | Noted: 2024-02-06

## 2024-02-06 PROBLEM — T14.8XXA OPEN WOUND: Status: ACTIVE | Noted: 2024-02-06

## 2024-02-06 PROBLEM — E66.3 OVERWEIGHT: Status: ACTIVE | Noted: 2017-12-22

## 2024-02-06 PROBLEM — M25.519 SHOULDER PAIN: Status: ACTIVE | Noted: 2019-12-10

## 2024-02-06 PROBLEM — R10.84 GENERALIZED ABDOMINAL PAIN: Status: ACTIVE | Noted: 2018-11-23

## 2024-02-06 PROBLEM — M19.012 OSTEOARTHRITIS OF LEFT SHOULDER: Status: ACTIVE | Noted: 2019-12-10

## 2024-02-06 PROBLEM — R33.9 RETENTION OF URINE: Status: ACTIVE | Noted: 2024-02-06

## 2024-02-06 PROBLEM — N40.1 BENIGN PROSTATIC HYPERPLASIA WITH URINARY OBSTRUCTION: Status: ACTIVE | Noted: 2022-12-13

## 2024-02-06 PROBLEM — Z86.0100 HISTORY OF COLONIC POLYPS: Status: ACTIVE | Noted: 2024-02-06

## 2024-02-06 PROBLEM — E78.5 DYSLIPIDEMIA: Status: ACTIVE | Noted: 2022-12-13

## 2024-02-06 PROBLEM — M19.90 ARTHRITIS: Status: ACTIVE | Noted: 2018-03-06

## 2024-02-06 PROBLEM — L57.0 ACTINIC KERATOSIS: Status: ACTIVE | Noted: 2024-02-06

## 2024-02-06 PROBLEM — S46.212A BICEPS STRAIN, LEFT, INITIAL ENCOUNTER: Status: ACTIVE | Noted: 2019-12-10

## 2024-02-06 PROBLEM — M71.011 ABSCESS OF BURSA OF RIGHT SHOULDER: Status: ACTIVE | Noted: 2023-04-23

## 2024-02-06 PROBLEM — Z98.890 STATUS POST DEBRIDEMENT: Status: ACTIVE | Noted: 2023-04-21

## 2024-02-06 PROBLEM — I25.10 ARTERIOSCLEROSIS OF CORONARY ARTERY: Status: ACTIVE | Noted: 2020-12-11

## 2024-02-06 PROBLEM — M25.9 DISORDER OF HIP REGION: Status: ACTIVE | Noted: 2024-02-06

## 2024-02-06 PROBLEM — S41.001A OPEN WOUND OF RIGHT SHOULDER: Status: ACTIVE | Noted: 2023-04-25

## 2024-02-06 PROBLEM — M75.100 TEAR OF ROTATOR CUFF: Status: ACTIVE | Noted: 2024-02-06

## 2024-02-06 PROBLEM — L91.0 HYPERTROPHIC SCAR: Status: ACTIVE | Noted: 2024-02-06

## 2024-02-06 PROBLEM — L08.9 INFECTION OF TOE: Status: ACTIVE | Noted: 2018-03-06

## 2024-02-06 PROBLEM — Z98.890 HISTORY OF SHOULDER SURGERY: Status: ACTIVE | Noted: 2024-02-06

## 2024-02-06 PROBLEM — M75.82 TENDINITIS OF LEFT ROTATOR CUFF: Status: ACTIVE | Noted: 2019-12-10

## 2024-02-06 PROBLEM — Z96.649 HISTORY OF TOTAL HIP REPLACEMENT: Status: ACTIVE | Noted: 2024-02-06

## 2024-02-06 PROBLEM — M25.512 ACUTE PAIN OF LEFT SHOULDER: Status: ACTIVE | Noted: 2019-12-10

## 2024-02-06 PROBLEM — I20.89 ANGINA AT REST (CMS-HCC): Status: ACTIVE | Noted: 2024-02-06

## 2024-02-06 PROBLEM — C44.519 BASAL CELL CARCINOMA (BCC) OF SKIN OF TRUNK: Status: ACTIVE | Noted: 2017-12-01

## 2024-02-06 PROBLEM — I25.2 HISTORY OF MI (MYOCARDIAL INFARCTION): Status: ACTIVE | Noted: 2020-12-11

## 2024-02-06 PROBLEM — M17.11 OSTEOARTHRITIS OF RIGHT KNEE: Status: ACTIVE | Noted: 2018-03-06

## 2024-02-06 PROBLEM — I10 BENIGN ESSENTIAL HYPERTENSION: Status: ACTIVE | Noted: 2021-07-30

## 2024-02-06 PROBLEM — R07.9 CHEST PAIN: Status: ACTIVE | Noted: 2021-07-24

## 2024-02-06 PROBLEM — E78.5 HYPERLIPIDEMIA: Status: ACTIVE | Noted: 2024-02-06

## 2024-02-06 PROBLEM — Z98.890 HISTORY OF REVERSE TOTAL REPLACEMENT OF RIGHT SHOULDER JOINT: Status: ACTIVE | Noted: 2023-03-30

## 2024-02-06 PROBLEM — Z96.659 HISTORY OF TOTAL KNEE REPLACEMENT: Status: ACTIVE | Noted: 2018-03-15

## 2024-02-06 PROBLEM — G89.18 ACUTE POSTOPERATIVE PAIN: Status: ACTIVE | Noted: 2023-04-25

## 2024-02-06 RX ORDER — ATORVASTATIN CALCIUM 80 MG/1
80 TABLET, FILM COATED ORAL DAILY
COMMUNITY

## 2024-02-06 RX ORDER — BETAMETHASONE SOD PHOSPH-WATER 6 MG/ML
VIAL (ML) INJECTION
COMMUNITY
Start: 2022-02-16

## 2024-02-06 RX ORDER — CEFAZOLIN SODIUM 10 G/1
INJECTION, POWDER, FOR SOLUTION INTRAVENOUS
COMMUNITY
Start: 2023-05-23

## 2024-02-06 RX ORDER — SODIUM CHLORIDE 9 MG/ML
INJECTION, SOLUTION INTRAMUSCULAR; INTRAVENOUS; SUBCUTANEOUS
COMMUNITY
Start: 2023-04-21

## 2024-02-06 RX ORDER — LEVOTHYROXINE SODIUM 50 UG/1
50 TABLET ORAL
COMMUNITY
Start: 2023-03-12

## 2024-02-06 RX ORDER — POLYETHYLENE GLYCOL 3350 17 G/17G
17 POWDER, FOR SOLUTION ORAL
COMMUNITY
Start: 2023-04-21

## 2024-02-06 RX ORDER — CEPHALEXIN 500 MG/1
500 CAPSULE ORAL
COMMUNITY
Start: 2023-04-19

## 2024-02-06 RX ORDER — LEVOTHYROXINE SODIUM 50 UG/1
50 TABLET ORAL DAILY
COMMUNITY
Start: 2022-10-19

## 2024-02-06 RX ORDER — PREDNISONE 10 MG/1
10 TABLET ORAL
COMMUNITY
Start: 2021-11-24

## 2024-02-06 RX ORDER — METHYLPREDNISOLONE 4 MG/1
4 TABLET ORAL
COMMUNITY
Start: 2023-01-31

## 2024-02-06 RX ORDER — NAPROXEN SODIUM 220 MG/1
81 TABLET, FILM COATED ORAL
COMMUNITY
Start: 2021-08-04

## 2024-02-06 RX ORDER — MULTIVIT-MIN/IRON/FOLIC ACID/K 18-600-40
CAPSULE ORAL
COMMUNITY

## 2024-02-06 RX ORDER — ACETAMINOPHEN 325 MG/1
325 TABLET ORAL
COMMUNITY
Start: 2021-08-04

## 2024-02-06 RX ORDER — MELOXICAM 7.5 MG/1
7.5 TABLET ORAL
COMMUNITY
Start: 2021-11-11

## 2024-02-06 RX ORDER — SIMVASTATIN 40 MG/1
40 TABLET, FILM COATED ORAL
COMMUNITY

## 2024-02-06 RX ORDER — PREDNISOLONE ACETATE 10 MG/ML
1 SUSPENSION/ DROPS OPHTHALMIC
COMMUNITY
Start: 2023-06-21

## 2024-02-06 RX ORDER — NAPROXEN SODIUM 220 MG/1
81 TABLET, FILM COATED ORAL DAILY
COMMUNITY
Start: 2021-07-24

## 2024-02-06 RX ORDER — ATORVASTATIN CALCIUM 40 MG/1
40 TABLET, FILM COATED ORAL DAILY
COMMUNITY

## 2024-02-06 RX ORDER — DICLOFENAC SODIUM 75 MG/1
75 TABLET, DELAYED RELEASE ORAL
COMMUNITY

## 2024-02-06 RX ORDER — DOCUSATE SODIUM 50MG AND SENNOSIDES 8.6MG 8.6; 5 MG/1; MG/1
1 TABLET, FILM COATED ORAL 2 TIMES DAILY
COMMUNITY
Start: 2023-04-22 | End: 2023-05-02

## 2024-02-06 RX ORDER — TAMSULOSIN HYDROCHLORIDE 0.4 MG/1
0.4 CAPSULE ORAL
COMMUNITY
Start: 2015-06-19

## 2024-02-06 RX ORDER — HYDROCHLOROTHIAZIDE 25 MG/1
25 TABLET ORAL
COMMUNITY

## 2024-02-06 RX ORDER — NITROGLYCERIN 0.4 MG/1
0.4 TABLET SUBLINGUAL
COMMUNITY
Start: 2022-04-19

## 2024-02-06 RX ORDER — CYCLOBENZAPRINE HCL 5 MG
5 TABLET ORAL
COMMUNITY
Start: 2021-11-24

## 2024-02-06 RX ORDER — FLUOROURACIL 50 MG/G
CREAM TOPICAL 2 TIMES DAILY
COMMUNITY
Start: 2023-07-20

## 2024-02-06 RX ORDER — CARVEDILOL 12.5 MG/1
12.5 TABLET ORAL
COMMUNITY
Start: 2021-08-04

## 2024-02-06 RX ORDER — SULFAMETHOXAZOLE AND TRIMETHOPRIM 400; 80 MG/1; MG/1
1 TABLET ORAL 2 TIMES DAILY
COMMUNITY
Start: 2023-04-19

## 2024-02-06 RX ORDER — ASCORBIC ACID 250 MG
250 TABLET ORAL DAILY
COMMUNITY

## 2024-02-06 RX ORDER — FINASTERIDE 5 MG
5 TABLET ORAL
COMMUNITY
Start: 2015-04-28

## 2024-02-06 RX ORDER — ADHESIVE BANDAGE
30 BANDAGE TOPICAL
COMMUNITY
Start: 2021-08-04

## 2024-02-06 RX ORDER — CLOPIDOGREL BISULFATE 75 MG/1
75 TABLET ORAL DAILY
COMMUNITY

## 2024-02-06 RX ORDER — OXYCODONE AND ACETAMINOPHEN 5; 325 MG/1; MG/1
1 TABLET ORAL EVERY 6 HOURS
COMMUNITY
Start: 2023-03-29

## 2024-02-06 RX ORDER — VIT C/E/ZN/COPPR/LUTEIN/ZEAXAN 250MG-90MG
CAPSULE ORAL
COMMUNITY

## 2024-02-06 RX ORDER — LANOLIN ALCOHOL/MO/W.PET/CERES
400 CREAM (GRAM) TOPICAL DAILY
COMMUNITY
Start: 2023-04-21

## 2024-04-24 DIAGNOSIS — E78.2 MIXED HYPERLIPIDEMIA: ICD-10-CM

## 2024-04-24 RX ORDER — ATORVASTATIN CALCIUM 80 MG/1
80 TABLET, FILM COATED ORAL DAILY
Qty: 90 TABLET | Refills: 0 | Status: SHIPPED | OUTPATIENT
Start: 2024-04-24

## 2024-04-29 DIAGNOSIS — E78.2 MIXED HYPERLIPIDEMIA: ICD-10-CM

## 2024-04-29 RX ORDER — ATORVASTATIN CALCIUM 80 MG/1
80 TABLET, FILM COATED ORAL DAILY
Qty: 90 TABLET | Refills: 0 | Status: SHIPPED | OUTPATIENT
Start: 2024-04-29

## 2024-04-29 NOTE — TELEPHONE ENCOUNTER
Comments: pharmacy called patient told patient that  they needed to contact us or a new script since that was not transferred with everything else when patient switched pt also states that he has enough for this week but needs this refilled.     Last Office Visit (last PCP visit):   10/23/2023    Next Visit Date:  Future Appointments   Date Time Provider Department Center   6/28/2024  9:30 AM Omkar Barrios MD [facility] Norma Rees       **If hasn't been seen in over a year OR hasn't followed up according to last diabetes/ADHD visit, make appointment for patient before sending refill to provider.    Rx requested:  Requested Prescriptions     Pending Prescriptions Disp Refills    atorvastatin (LIPITOR) 80 MG tablet 90 tablet 0     Sig: Take 1 tablet by mouth daily

## 2024-04-29 NOTE — TELEPHONE ENCOUNTER
Comments:     Last Office Visit (last PCP visit):   10/23/2023    Next Visit Date:  Future Appointments   Date Time Provider Department Center   6/28/2024  9:30 AM Omkar Barrios MD [facility] Rees       **If hasn't been seen in over a year OR hasn't followed up according to last diabetes/ADHD visit, make appointment for patient before sending refill to provider.    Rx requested:  Requested Prescriptions     Pending Prescriptions Disp Refills    atorvastatin (LIPITOR) 80 MG tablet 90 tablet 0     Sig: Take 1 tablet by mouth daily

## 2024-05-13 DIAGNOSIS — N40.1 BPH WITH OBSTRUCTION/LOWER URINARY TRACT SYMPTOMS: ICD-10-CM

## 2024-05-13 DIAGNOSIS — N13.8 BPH WITH OBSTRUCTION/LOWER URINARY TRACT SYMPTOMS: ICD-10-CM

## 2024-05-13 RX ORDER — FINASTERIDE 5 MG/1
5 TABLET, FILM COATED ORAL DAILY
Qty: 60 TABLET | Refills: 0 | Status: SHIPPED | OUTPATIENT
Start: 2024-05-13

## 2024-05-21 ENCOUNTER — OFFICE VISIT (OUTPATIENT)
Dept: FAMILY MEDICINE CLINIC | Age: 79
End: 2024-05-21

## 2024-05-21 VITALS — WEIGHT: 183.4 LBS | BODY MASS INDEX: 27.8 KG/M2 | HEIGHT: 68 IN | TEMPERATURE: 97.8 F

## 2024-05-21 DIAGNOSIS — Z12.83 SKIN CANCER SCREENING: Primary | ICD-10-CM

## 2024-05-21 DIAGNOSIS — L57.0 ACTINIC KERATOSES: ICD-10-CM

## 2024-05-21 DIAGNOSIS — B35.1 ONYCHOMYCOSIS: ICD-10-CM

## 2024-05-21 DIAGNOSIS — L60.3 BRITTLE NAILS: ICD-10-CM

## 2024-05-21 SDOH — ECONOMIC STABILITY: FOOD INSECURITY: WITHIN THE PAST 12 MONTHS, THE FOOD YOU BOUGHT JUST DIDN'T LAST AND YOU DIDN'T HAVE MONEY TO GET MORE.: NEVER TRUE

## 2024-05-21 SDOH — ECONOMIC STABILITY: FOOD INSECURITY: WITHIN THE PAST 12 MONTHS, YOU WORRIED THAT YOUR FOOD WOULD RUN OUT BEFORE YOU GOT MONEY TO BUY MORE.: NEVER TRUE

## 2024-05-21 SDOH — ECONOMIC STABILITY: INCOME INSECURITY: HOW HARD IS IT FOR YOU TO PAY FOR THE VERY BASICS LIKE FOOD, HOUSING, MEDICAL CARE, AND HEATING?: NOT HARD AT ALL

## 2024-05-21 ASSESSMENT — PATIENT HEALTH QUESTIONNAIRE - PHQ9
SUM OF ALL RESPONSES TO PHQ QUESTIONS 1-9: 0
SUM OF ALL RESPONSES TO PHQ9 QUESTIONS 1 & 2: 0
SUM OF ALL RESPONSES TO PHQ QUESTIONS 1-9: 0
2. FEELING DOWN, DEPRESSED OR HOPELESS: NOT AT ALL
SUM OF ALL RESPONSES TO PHQ QUESTIONS 1-9: 0
SUM OF ALL RESPONSES TO PHQ QUESTIONS 1-9: 0
1. LITTLE INTEREST OR PLEASURE IN DOING THINGS: NOT AT ALL

## 2024-05-21 ASSESSMENT — ENCOUNTER SYMPTOMS: COLOR CHANGE: 1

## 2024-05-21 NOTE — PROGRESS NOTES
keratosis were elected to be removed by liquid nitrogen therapy today.    Cryotherapy instructions    Post op instructions given. A printed copy provided.    It is best to leave blisters alone if they form for the first 1-3 days to allow the desired damaged tissue (precancer lesion, wart, or whatever lesion is being removed) to separate from healthy tissue.     The area should be covered with a bandage to prevent blister breakage and dirt exposure.      The wounds should remain dry while there is a blister, therefore if this is a sweaty location, like the foot ,you may need to change clothing, such as socks, multiple times per day.       Remember that the reason for cryosurgery to be done is to damage skin that is not normal so that it will be removed.  Therefore the area could be red or pink, can sting or burn for the first day or 2, will appear raw when the skin sloughs off.    When the blister(s) pop or patient removes the top as instructed between day 3-5, apply antibiotic (NOT triple antibiotic, one brand is Neosporin) ointment, usually Bacitracin, and a bandage to affected area(s).  The ointment should be applied to the open area as long as it is not covered with skin.  Exposed tissue is meant to be moist.      Once a scab is formed the patient may stop applying ointment.  The scab may appear yellow while moist, don't confuse this with infection.  If the wound is infection pus will drain from the site. If this treatment was for a large wart you may note that a plug of skin may fall out of the area that was treated.  That is the center of the wart and it is appropriate for it to come out.  If exposed skin remains, treat that area as you would a ruptured blister as mentioned above.    Bacitracin sample supplied               DO NOT USE TRIPLE ANTIBIOTIC ON THE WOUND    It is best to leave blisters alone if they form. They should be covered with a bandage to prevent blister breakage and dirt exposure.  The wounds

## 2024-06-06 ENCOUNTER — OFFICE VISIT (OUTPATIENT)
Dept: CARDIOLOGY | Facility: CLINIC | Age: 79
End: 2024-06-06
Payer: COMMERCIAL

## 2024-06-06 VITALS
HEART RATE: 52 BPM | BODY MASS INDEX: 29.76 KG/M2 | SYSTOLIC BLOOD PRESSURE: 112 MMHG | DIASTOLIC BLOOD PRESSURE: 60 MMHG | OXYGEN SATURATION: 95 % | HEIGHT: 67 IN

## 2024-06-06 DIAGNOSIS — E78.5 DYSLIPIDEMIA: ICD-10-CM

## 2024-06-06 DIAGNOSIS — I25.10 ARTERIOSCLEROSIS OF CORONARY ARTERY: Primary | ICD-10-CM

## 2024-06-06 DIAGNOSIS — I10 BENIGN ESSENTIAL HYPERTENSION: ICD-10-CM

## 2024-06-06 DIAGNOSIS — G47.33 OSA (OBSTRUCTIVE SLEEP APNEA): ICD-10-CM

## 2024-06-06 PROBLEM — I20.89 ANGINA AT REST (CMS-HCC): Status: RESOLVED | Noted: 2024-02-06 | Resolved: 2024-06-06

## 2024-06-06 PROCEDURE — 1126F AMNT PAIN NOTED NONE PRSNT: CPT | Performed by: INTERNAL MEDICINE

## 2024-06-06 PROCEDURE — 1036F TOBACCO NON-USER: CPT | Performed by: INTERNAL MEDICINE

## 2024-06-06 PROCEDURE — 1157F ADVNC CARE PLAN IN RCRD: CPT | Performed by: INTERNAL MEDICINE

## 2024-06-06 PROCEDURE — 99214 OFFICE O/P EST MOD 30 MIN: CPT | Performed by: INTERNAL MEDICINE

## 2024-06-06 PROCEDURE — 93000 ELECTROCARDIOGRAM COMPLETE: CPT | Performed by: INTERNAL MEDICINE

## 2024-06-06 PROCEDURE — 1159F MED LIST DOCD IN RCRD: CPT | Performed by: INTERNAL MEDICINE

## 2024-06-06 PROCEDURE — 3078F DIAST BP <80 MM HG: CPT | Performed by: INTERNAL MEDICINE

## 2024-06-06 PROCEDURE — 3074F SYST BP LT 130 MM HG: CPT | Performed by: INTERNAL MEDICINE

## 2024-06-06 PROCEDURE — 1160F RVW MEDS BY RX/DR IN RCRD: CPT | Performed by: INTERNAL MEDICINE

## 2024-06-06 ASSESSMENT — PAIN SCALES - GENERAL: PAINLEVEL: 0-NO PAIN

## 2024-06-06 ASSESSMENT — PATIENT HEALTH QUESTIONNAIRE - PHQ9
SUM OF ALL RESPONSES TO PHQ9 QUESTIONS 1 AND 2: 0
2. FEELING DOWN, DEPRESSED OR HOPELESS: NOT AT ALL
1. LITTLE INTEREST OR PLEASURE IN DOING THINGS: NOT AT ALL

## 2024-06-06 NOTE — PROGRESS NOTES
Chief Complaint:   Annual Exam     History Of Present Illness:    Chadwick Tello is a 78 y.o. male with history of CAD s/p CABG (LIMA-LAD July 2021), dyslipidemia, NSTEMI July 2021, and obesity here for follow-up.     Tells me that his major complaint is feeling fatigued.  Has had a sleep study about 3 years ago or so.  Tells me that he was diagnosed with sleep apnea and was recommended to start on CPAP but never did.  Does awaken every hour and a half to urinate.    Denies any exertional chest pain or shortness of breath.  Denies any leg swelling.     Limited echocardiogram 8/2/2021 for pericardial effusion: Technically difficult study. EF 55 to 65%. Trivial posterior pericardial effusion. Anterior and apical epicardial fat pad noted.     Single-vessel CABG 7/30/2021: LIMA-LAD     Left heart catheterization 7/26/2021: LAD with proximal 99% in-stent restenosis. Mild disease in the LCx and dominant RCA.     PCI to LAD and first OM 12/2020: Angioplasty of LAD in-stent restenosis with NC 3.5 x 12 mm balloon. PCI of OM1 with Xience 2.25 x 15 mm OZZY.     MI 2012 with OZZY x4      Past Medical History:  He has no past medical history on file.    Past Surgical History:  He has a past surgical history that includes Other surgical history (11/11/2021); Other surgical history (11/11/2021); Other surgical history (11/11/2021); Other surgical history (11/11/2021); Other surgical history (11/11/2021); US aspiration injection intermediate joint (1/26/2022); and US aspiration injection intermediate joint (2/16/2022).      Social History:  He reports that he has never smoked. He has never been exposed to tobacco smoke. He has never used smokeless tobacco. No history on file for alcohol use and drug use.    Family History:  No family history on file.     Allergies:  Ace inhibitors and Ticagrelor    Outpatient Medications:  Current Outpatient Medications   Medication Instructions    0.9 % sodium chloride (sodium chloride, PF, 0.9%)  injection intravenous    acetaminophen (TYLENOL) 325 mg, oral    ascorbic acid (VITAMIN C) 250 mg, oral, Daily    ascorbic acid, vitamin C, 500 mg capsule oral    aspirin 81 mg, Enteral, Daily RT    aspirin 81 mg, oral, Daily    atorvastatin (LIPITOR) 80 mg, oral, Daily    atorvastatin (LIPITOR) 40 mg, oral, Daily    betamethasone sod phosph-water (CELESTONE) 6 mg/mL solution Betamethasone Sodium Phosphate 6 MG/ML Injection Solution INJECT 2 ML Intra-articular Quantity: 0 Refills: 0 Ordered: 16-Feb-2022 Budinsky MD, Cole Start : 16-Feb-2022 Complete    carvedilol (COREG) 12.5 mg    ceFAZolin (Ancef) 10 gram injection     cephalexin (KEFLEX) 500 mg    cholecalciferol (Vitamin D-3) 25 MCG (1000 UT) capsule oral    clopidogrel (PLAVIX) 75 mg, oral, Daily    cyclobenzaprine (FLEXERIL) 5 mg, oral    diclofenac (VOLTAREN) 75 mg, oral    Flomax 0.4 mg    fluorouracil (Efudex) 5 % cream Topical, 2 times daily, to affected area    hydroCHLOROthiazide (HYDRODIURIL) 25 mg    levothyroxine (SYNTHROID, LEVOXYL) 50 mcg, oral, Daily RT    levothyroxine (SYNTHROID, LEVOXYL) 50 mcg, oral, Daily    magnesium hydroxide (Milk of Magnesia) 400 mg/5 mL suspension 30 mL, oral    magnesium oxide (MAG-OX) 400 mg, oral, Daily    meloxicam (MOBIC) 7.5 mg, oral, Daily RT    methylPREDNISolone (MEDROL DOSPAK) 4 mg, oral    multivitamin with minerals (multivitamin) tablet 1 tablet, oral, Daily with breakfast    nitroglycerin (NITROSTAT) 0.4 mg, sublingual    oxyCODONE-acetaminophen (Percocet) 5-325 mg tablet 1 tablet, oral, Every 6 hours    polyethylene glycol (GLYCOLAX, MIRALAX) 17 g, oral    prednisoLONE acetate (Pred-Forte) 1 % ophthalmic suspension 1 drop, ophthalmic (eye)    predniSONE (DELTASONE) 10 mg, oral    Proscar 5 mg    simvastatin (ZOCOR) 40 mg    sulfamethoxazole-trimethoprim (Bactrim) 400-80 mg tablet 1 tablet, oral, 2 times daily    triamcinolone acetonide (KENALOG) 40 mg/mL kit Triamcinolone Acetonide 40 MG/ML Injection  "Suspension INJECT 2 ML Intra-articular Quantity: 0 Refills: 0 Ordered: 26-Jan-2022 Budinsky MD, Cole Start : 26-Jan-2022 Complete       Last Recorded Vitals:  Visit Vitals  /60   Pulse 52   Ht 1.702 m (5' 7\")   SpO2 95%   BMI 29.76 kg/m²   Smoking Status Never   BSA 2.02 m²      LASTWT(3):   Wt Readings from Last 3 Encounters:   06/01/23 86.2 kg (190 lb)   11/29/22 84.4 kg (186 lb)   05/09/22 79.8 kg (176 lb)       Physical Exam:  In general: alert and in no acute distress.   HEENT: Carotid upstrokes normal with no bruits. JVP is normal.   Pulmonary: Clear to auscultation bilaterally.  Cardiovascular: S1,S2, regular. No appreciable murmurs, rubs or gallops.   Lower extremities: Warm. 2+ distal pulses. No edema.       Last Labs:  CBC -  Recent Labs     12/13/22  0519 12/01/22  1144   WBC 10.1 7.8   HGB 12.3* 14.9   HCT 37.5* 45.6   * 212   MCV 91 91       CMP -  Recent Labs     12/13/22  0519 12/01/22  1144    138   K 4.5 4.6    100   CO2 29 32   ANIONGAP 10 11   BUN 23 19   CREATININE 1.22 0.93     Recent Labs     12/01/22  1144   ALBUMIN 4.0   ALKPHOS 73   ALT 18   AST 20   BILITOT 0.8       LIPID PANEL -   No results for input(s): \"CHOL\", \"LDLCALC\", \"LDLF\", \"HDL\", \"TRIG\" in the last 18509 hours.    Lipid panel March 2023 with total cholesterol 122, triglycerides 88, HDL 48 and LDL 56.    Recent Labs     03/18/23  0925 10/08/22  0950   HGBA1C 5.6 5.8           Assessment/Plan   1) CAD: PCI to the LAD and OM 1 in the past.  Single-vessel LIMA to LAD July 2021.  No complaints of exertional chest pain or shortness of breath to suspect worsening of underlying coronary disease.    Last echocardiogram 2021.  Will consider repeat echocardiography 5 years from that time or sooner if needed.     2) dyslipidemia: Continue high intensity statin.  LDL at goal.     3) hypertension: Controlled.     4) cognitive decline: Explained that cognitive decline could be explained by poor sleep.  See below.     5) " PACs: Has been seen on prior ECGs as well.  Of no clinical concern.  Continue to observe    6) obstructive sleep apnea: Asked him to see the sleep medicine team.  Explained that his disturbed sleep could very well be causing his concern for cognitive decline and definitely will be contributing to his daytime fatigue.    7) follow-up: 12 months or sooner if needed.         Prateek Hirsch MD

## 2024-06-17 DIAGNOSIS — N13.8 BPH WITH OBSTRUCTION/LOWER URINARY TRACT SYMPTOMS: ICD-10-CM

## 2024-06-17 DIAGNOSIS — N40.1 BPH WITH OBSTRUCTION/LOWER URINARY TRACT SYMPTOMS: ICD-10-CM

## 2024-06-17 DIAGNOSIS — E78.2 MIXED HYPERLIPIDEMIA: ICD-10-CM

## 2024-06-17 DIAGNOSIS — E03.9 HYPOTHYROIDISM, UNSPECIFIED TYPE: ICD-10-CM

## 2024-06-17 RX ORDER — CARVEDILOL 12.5 MG/1
12.5 TABLET ORAL 2 TIMES DAILY WITH MEALS
Qty: 180 TABLET | Refills: 1 | Status: SHIPPED | OUTPATIENT
Start: 2024-06-17

## 2024-06-17 RX ORDER — ATORVASTATIN CALCIUM 80 MG/1
80 TABLET, FILM COATED ORAL DAILY
Qty: 90 TABLET | Refills: 0 | Status: SHIPPED | OUTPATIENT
Start: 2024-06-17

## 2024-06-17 RX ORDER — LEVOTHYROXINE SODIUM 0.05 MG/1
50 TABLET ORAL DAILY
Qty: 90 TABLET | Refills: 1 | Status: SHIPPED | OUTPATIENT
Start: 2024-06-17

## 2024-06-17 RX ORDER — MELOXICAM 15 MG/1
TABLET ORAL
Qty: 30 TABLET | Refills: 5 | Status: SHIPPED | OUTPATIENT
Start: 2024-06-17

## 2024-06-17 RX ORDER — FINASTERIDE 5 MG/1
5 TABLET, FILM COATED ORAL DAILY
Qty: 60 TABLET | Refills: 0 | Status: SHIPPED | OUTPATIENT
Start: 2024-06-17

## 2024-06-17 RX ORDER — TAMSULOSIN HYDROCHLORIDE 0.4 MG/1
0.4 CAPSULE ORAL 2 TIMES DAILY
Qty: 180 CAPSULE | Refills: 1 | Status: SHIPPED | OUTPATIENT
Start: 2024-06-17

## 2024-06-17 NOTE — TELEPHONE ENCOUNTER
Comments:     Last Office Visit (last PCP visit):   10/23/2023    Next Visit Date:  Future Appointments   Date Time Provider Department Center   6/28/2024  9:30 AM Omkar Barrios MD [facility]   5/22/2025 10:00 AM King Michelle MD [facility]       **If hasn't been seen in over a year OR hasn't followed up according to last diabetes/ADHD visit, make appointment for patient before sending refill to provider.    Rx requested:  Requested Prescriptions     Pending Prescriptions Disp Refills    levothyroxine (SYNTHROID) 50 MCG tablet 90 tablet 1     Sig: Take 1 tablet by mouth daily    tamsulosin (FLOMAX) 0.4 MG capsule 180 capsule 1     Sig: Take 1 capsule by mouth 2 times daily    atorvastatin (LIPITOR) 80 MG tablet 90 tablet 0     Sig: Take 1 tablet by mouth daily    finasteride (PROSCAR) 5 MG tablet 60 tablet 0     Sig: Take 1 tablet by mouth daily    carvedilol (COREG) 12.5 MG tablet 180 tablet 1     Sig: Take 1 tablet by mouth 2 times daily (with meals)    meloxicam (MOBIC) 15 MG tablet 30 tablet

## 2024-06-28 ENCOUNTER — OFFICE VISIT (OUTPATIENT)
Dept: FAMILY MEDICINE CLINIC | Age: 79
End: 2024-06-28
Payer: COMMERCIAL

## 2024-06-28 VITALS
DIASTOLIC BLOOD PRESSURE: 70 MMHG | OXYGEN SATURATION: 98 % | HEIGHT: 68 IN | SYSTOLIC BLOOD PRESSURE: 130 MMHG | WEIGHT: 191 LBS | BODY MASS INDEX: 28.95 KG/M2 | HEART RATE: 59 BPM | TEMPERATURE: 97.8 F

## 2024-06-28 DIAGNOSIS — I10 ESSENTIAL HYPERTENSION: ICD-10-CM

## 2024-06-28 DIAGNOSIS — Z00.00 MEDICARE ANNUAL WELLNESS VISIT, SUBSEQUENT: Primary | ICD-10-CM

## 2024-06-28 DIAGNOSIS — M19.90 OSTEOARTHRITIS, UNSPECIFIED OSTEOARTHRITIS TYPE, UNSPECIFIED SITE: ICD-10-CM

## 2024-06-28 DIAGNOSIS — E03.9 HYPOTHYROIDISM, UNSPECIFIED TYPE: ICD-10-CM

## 2024-06-28 DIAGNOSIS — M54.2 CERVICALGIA: ICD-10-CM

## 2024-06-28 DIAGNOSIS — Z12.5 SCREENING PSA (PROSTATE SPECIFIC ANTIGEN): ICD-10-CM

## 2024-06-28 DIAGNOSIS — I25.10 CORONARY ARTERY DISEASE INVOLVING NATIVE CORONARY ARTERY OF NATIVE HEART WITHOUT ANGINA PECTORIS: ICD-10-CM

## 2024-06-28 DIAGNOSIS — E78.2 MIXED HYPERLIPIDEMIA: ICD-10-CM

## 2024-06-28 PROCEDURE — G0439 PPPS, SUBSEQ VISIT: HCPCS | Performed by: FAMILY MEDICINE

## 2024-06-28 PROCEDURE — 3078F DIAST BP <80 MM HG: CPT | Performed by: FAMILY MEDICINE

## 2024-06-28 PROCEDURE — 3075F SYST BP GE 130 - 139MM HG: CPT | Performed by: FAMILY MEDICINE

## 2024-06-28 PROCEDURE — 1123F ACP DISCUSS/DSCN MKR DOCD: CPT | Performed by: FAMILY MEDICINE

## 2024-06-28 RX ORDER — TURMERIC 400 MG
CAPSULE ORAL
COMMUNITY

## 2024-06-28 RX ORDER — METHYLPREDNISOLONE 4 MG/1
TABLET ORAL
Qty: 1 KIT | Refills: 0 | Status: SHIPPED | OUTPATIENT
Start: 2024-06-28

## 2024-06-28 ASSESSMENT — PATIENT HEALTH QUESTIONNAIRE - PHQ9
SUM OF ALL RESPONSES TO PHQ QUESTIONS 1-9: 0
2. FEELING DOWN, DEPRESSED OR HOPELESS: NOT AT ALL
SUM OF ALL RESPONSES TO PHQ QUESTIONS 1-9: 0
SUM OF ALL RESPONSES TO PHQ9 QUESTIONS 1 & 2: 0
1. LITTLE INTEREST OR PLEASURE IN DOING THINGS: NOT AT ALL
SUM OF ALL RESPONSES TO PHQ QUESTIONS 1-9: 0
SUM OF ALL RESPONSES TO PHQ QUESTIONS 1-9: 0

## 2024-06-28 ASSESSMENT — LIFESTYLE VARIABLES
HOW OFTEN DO YOU HAVE A DRINK CONTAINING ALCOHOL: NEVER
HOW MANY STANDARD DRINKS CONTAINING ALCOHOL DO YOU HAVE ON A TYPICAL DAY: PATIENT DOES NOT DRINK

## 2024-06-28 NOTE — PATIENT INSTRUCTIONS
attack. These may include:    Chest pain or pressure, or a strange feeling in the chest.     Sweating.     Shortness of breath.     Pain, pressure, or a strange feeling in the back, neck, jaw, or upper belly or in one or both shoulders or arms.     Lightheadedness or sudden weakness.     A fast or irregular heartbeat.   After you call 911, the  may tell you to chew 1 adult-strength or 2 to 4 low-dose aspirin. Wait for an ambulance. Do not try to drive yourself.  Watch closely for changes in your health, and be sure to contact your doctor if you have any problems.  Where can you learn more?  Go to https://[email or web address].net/patientEd and enter F075 to learn more about \"A Healthy Heart: Care Instructions.\"  Current as of: June 24, 2023  Content Version: 14.1  © 7117-6552 [employer].   Care instructions adapted under license by [employer]. If you have questions about a medical condition or this instruction, always ask your healthcare professional. [employer] disclaims any warranty or liability for your use of this information.      Personalized Preventive Plan for Jonah Medina - 6/28/2024  Medicare offers a range of preventive health benefits. Some of the tests and screenings are paid in full while other may be subject to a deductible, co-insurance, and/or copay.    Some of these benefits include a comprehensive review of your medical history including lifestyle, illnesses that may run in your family, and various assessments and screenings as appropriate.    After reviewing your medical record and screening and assessments performed today your provider may have ordered immunizations, labs, imaging, and/or referrals for you.  A list of these orders (if applicable) as well as your Preventive Care list are included within your After Visit Summary for your review.    Other Preventive Recommendations:    A preventive eye exam performed by an eye specialist is recommended every 1-2

## 2024-06-28 NOTE — PROGRESS NOTES
clear, MMM  Skin: without rash or jaundice  Neck: no significant lymphadenopathy or thyromegaly  Lungs: CTA B w/out Rales/Wheezes/Rhonchi, Good respiratory effort   Heart: RRR, S1S2, w/out M/R/G, non-displaced PMI   Ext: No C/C/E Bilaterally.   Neuro: Neurovascularly intact w/ Sensory/Motor intact UE/LE Bilaterally.      Allergies   Allergen Reactions    Ace Inhibitors Other (See Comments)     Cough      Brilinta [Ticagrelor] Other (See Comments)     Severe bleeding      Lisinopril Other (See Comments)     Cough       Prior to Visit Medications    Medication Sig Taking? Authorizing Provider   Turmeric 400 MG CAPS Take by mouth Yes Devin Ivy MD   methylPREDNISolone (MEDROL DOSEPACK) 4 MG tablet Take by mouth. Yes Omkar Barrios MD   levothyroxine (SYNTHROID) 50 MCG tablet Take 1 tablet by mouth daily Yes Omkar Barrios MD   tamsulosin (FLOMAX) 0.4 MG capsule Take 1 capsule by mouth 2 times daily Yes Omkar Barrios MD   atorvastatin (LIPITOR) 80 MG tablet Take 1 tablet by mouth daily Yes Omkar Barrios MD   finasteride (PROSCAR) 5 MG tablet Take 1 tablet by mouth daily Yes Omkar Barrios MD   carvedilol (COREG) 12.5 MG tablet Take 1 tablet by mouth 2 times daily (with meals) Yes Omkar Barrios MD   meloxicam (MOBIC) 15 MG tablet take 1 tablet by mouth once daily if needed for ARTHRITIS PAIN Yes Omkar Barrios MD   nitroGLYCERIN (NITROSTAT) 0.4 MG SL tablet Place 1 tablet under the tongue every 5 minutes as needed for Chest pain Dissolve 1 tab under tongue at first sign of chest pain every 5 minutes as needed. If pain persists after taking 3 tabs in a 15-minute period, or the pain is different than is typically experienced, call 9-1-1 immediately. Yes Omkar Barrios MD   Ascorbic Acid (VITAMIN C) 250 MG tablet Take 1 tablet by mouth daily Yes Devin Ivy MD   Niacin (VITAMIN B-3 PO) Take by mouth Yes Devin Ivy MD   acetaminophen (TYLENOL) 325 MG tablet Take 1-2 tablets by mouth every 6 hours

## 2024-07-02 DIAGNOSIS — I10 ESSENTIAL HYPERTENSION: ICD-10-CM

## 2024-07-02 DIAGNOSIS — E03.9 HYPOTHYROIDISM, UNSPECIFIED TYPE: ICD-10-CM

## 2024-07-02 DIAGNOSIS — Z12.5 SCREENING PSA (PROSTATE SPECIFIC ANTIGEN): ICD-10-CM

## 2024-07-02 LAB
ALBUMIN SERPL-MCNC: 4 G/DL (ref 3.5–4.6)
ALP SERPL-CCNC: 72 U/L (ref 35–104)
ALT SERPL-CCNC: 21 U/L (ref 0–41)
ANION GAP SERPL CALCULATED.3IONS-SCNC: 8 MEQ/L (ref 9–15)
AST SERPL-CCNC: 24 U/L (ref 0–40)
BILIRUB SERPL-MCNC: 0.4 MG/DL (ref 0.2–0.7)
BUN SERPL-MCNC: 19 MG/DL (ref 8–23)
CALCIUM SERPL-MCNC: 9 MG/DL (ref 8.5–9.9)
CHLORIDE SERPL-SCNC: 102 MEQ/L (ref 95–107)
CHOLEST SERPL-MCNC: 171 MG/DL (ref 0–199)
CO2 SERPL-SCNC: 29 MEQ/L (ref 20–31)
CREAT SERPL-MCNC: 0.87 MG/DL (ref 0.7–1.2)
ERYTHROCYTE [DISTWIDTH] IN BLOOD BY AUTOMATED COUNT: 14.5 % (ref 11.5–14.5)
GLOBULIN SER CALC-MCNC: 2.8 G/DL (ref 2.3–3.5)
GLUCOSE SERPL-MCNC: 108 MG/DL (ref 70–99)
HCT VFR BLD AUTO: 46.3 % (ref 42–52)
HDLC SERPL-MCNC: 68 MG/DL (ref 40–59)
HGB BLD-MCNC: 15.1 G/DL (ref 14–18)
LDLC SERPL CALC-MCNC: 91 MG/DL (ref 0–129)
MCH RBC QN AUTO: 29.7 PG (ref 27–31.3)
MCHC RBC AUTO-ENTMCNC: 32.6 % (ref 33–37)
MCV RBC AUTO: 91.1 FL (ref 79–92.2)
PLATELET # BLD AUTO: 194 K/UL (ref 130–400)
POTASSIUM SERPL-SCNC: 4.8 MEQ/L (ref 3.4–4.9)
PROT SERPL-MCNC: 6.8 G/DL (ref 6.3–8)
PSA SERPL-MCNC: 0.23 NG/ML (ref 0–4)
RBC # BLD AUTO: 5.08 M/UL (ref 4.7–6.1)
SODIUM SERPL-SCNC: 139 MEQ/L (ref 135–144)
TRIGL SERPL-MCNC: 62 MG/DL (ref 0–150)
TSH SERPL-MCNC: 1.95 UIU/ML (ref 0.44–3.86)
WBC # BLD AUTO: 10.8 K/UL (ref 4.8–10.8)

## 2024-08-01 DIAGNOSIS — E78.2 MIXED HYPERLIPIDEMIA: ICD-10-CM

## 2024-08-01 RX ORDER — ATORVASTATIN CALCIUM 80 MG/1
80 TABLET, FILM COATED ORAL DAILY
Qty: 90 TABLET | Refills: 0 | Status: SHIPPED | OUTPATIENT
Start: 2024-08-01

## 2024-08-01 NOTE — TELEPHONE ENCOUNTER
Comments:     Last Office Visit (last PCP visit):   6/28/2024    Next Visit Date:  Future Appointments   Date Time Provider Department Center   5/22/2025 10:00 AM King Michelle MD [facility]   7/8/2025  9:00 AM Omkar Barrios MD [facility]       **If hasn't been seen in over a year OR hasn't followed up according to last diabetes/ADHD visit, make appointment for patient before sending refill to provider.    Rx requested:  Requested Prescriptions     Pending Prescriptions Disp Refills    atorvastatin (LIPITOR) 80 MG tablet 90 tablet 0     Sig: Take 1 tablet by mouth daily

## 2024-08-24 DIAGNOSIS — N13.8 BPH WITH OBSTRUCTION/LOWER URINARY TRACT SYMPTOMS: ICD-10-CM

## 2024-08-24 DIAGNOSIS — N40.1 BPH WITH OBSTRUCTION/LOWER URINARY TRACT SYMPTOMS: ICD-10-CM

## 2024-08-26 DIAGNOSIS — N40.1 BPH WITH OBSTRUCTION/LOWER URINARY TRACT SYMPTOMS: ICD-10-CM

## 2024-08-26 DIAGNOSIS — N13.8 BPH WITH OBSTRUCTION/LOWER URINARY TRACT SYMPTOMS: ICD-10-CM

## 2024-08-26 RX ORDER — FINASTERIDE 5 MG/1
5 TABLET, FILM COATED ORAL DAILY
Qty: 90 TABLET | Refills: 3 | Status: SHIPPED | OUTPATIENT
Start: 2024-08-26

## 2024-08-26 RX ORDER — FINASTERIDE 5 MG/1
TABLET, FILM COATED ORAL
Qty: 60 TABLET | Refills: 0 | Status: SHIPPED | OUTPATIENT
Start: 2024-08-26 | End: 2024-08-26 | Stop reason: SDUPTHER

## 2024-08-26 NOTE — TELEPHONE ENCOUNTER
Comments:     Last Office Visit (last PCP visit):   6/28/2024    Next Visit Date:  Future Appointments   Date Time Provider Department Center   5/22/2025 10:00 AM King Michelle MD [facility]   7/8/2025  9:00 AM Omkar Barrios MD [facility]       **If hasn't been seen in over a year OR hasn't followed up according to last diabetes/ADHD visit, make appointment for patient before sending refill to provider.    Rx requested:  Requested Prescriptions     Pending Prescriptions Disp Refills    finasteride (PROSCAR) 5 MG tablet [Pharmacy Med Name: Finasteride Oral Tablet 5 MG] 60 tablet 0     Sig: TAKE 1 TABLET BY MOUTH ONCE DAILY - PATIENT NEEDS AN APPOINTMENT FOR FUTURE REFILLS

## 2024-10-03 ENCOUNTER — APPOINTMENT (OUTPATIENT)
Dept: SLEEP MEDICINE | Facility: CLINIC | Age: 79
End: 2024-10-03
Payer: COMMERCIAL

## 2024-10-03 VITALS
SYSTOLIC BLOOD PRESSURE: 151 MMHG | BODY MASS INDEX: 28.57 KG/M2 | HEIGHT: 68 IN | DIASTOLIC BLOOD PRESSURE: 76 MMHG | WEIGHT: 188.5 LBS | RESPIRATION RATE: 18 BRPM | HEART RATE: 70 BPM

## 2024-10-03 DIAGNOSIS — G47.33 OSA (OBSTRUCTIVE SLEEP APNEA): Primary | ICD-10-CM

## 2024-10-03 DIAGNOSIS — Z98.890 HISTORY OF UVULOPALATOPHARYNGOPLASTY: ICD-10-CM

## 2024-10-03 PROCEDURE — 1157F ADVNC CARE PLAN IN RCRD: CPT | Performed by: GENERAL PRACTICE

## 2024-10-03 PROCEDURE — G2211 COMPLEX E/M VISIT ADD ON: HCPCS | Performed by: GENERAL PRACTICE

## 2024-10-03 PROCEDURE — 1036F TOBACCO NON-USER: CPT | Performed by: GENERAL PRACTICE

## 2024-10-03 PROCEDURE — 3077F SYST BP >= 140 MM HG: CPT | Performed by: GENERAL PRACTICE

## 2024-10-03 PROCEDURE — 1159F MED LIST DOCD IN RCRD: CPT | Performed by: GENERAL PRACTICE

## 2024-10-03 PROCEDURE — 1160F RVW MEDS BY RX/DR IN RCRD: CPT | Performed by: GENERAL PRACTICE

## 2024-10-03 PROCEDURE — 99204 OFFICE O/P NEW MOD 45 MIN: CPT | Performed by: GENERAL PRACTICE

## 2024-10-03 PROCEDURE — 3078F DIAST BP <80 MM HG: CPT | Performed by: GENERAL PRACTICE

## 2024-10-03 ASSESSMENT — PATIENT HEALTH QUESTIONNAIRE - PHQ9
2. FEELING DOWN, DEPRESSED OR HOPELESS: NOT AT ALL
1. LITTLE INTEREST OR PLEASURE IN DOING THINGS: NOT AT ALL
SUM OF ALL RESPONSES TO PHQ9 QUESTIONS 1 AND 2: 0

## 2024-10-03 ASSESSMENT — COLUMBIA-SUICIDE SEVERITY RATING SCALE - C-SSRS
6. HAVE YOU EVER DONE ANYTHING, STARTED TO DO ANYTHING, OR PREPARED TO DO ANYTHING TO END YOUR LIFE?: NO
1. IN THE PAST MONTH, HAVE YOU WISHED YOU WERE DEAD OR WISHED YOU COULD GO TO SLEEP AND NOT WAKE UP?: NO
2. HAVE YOU ACTUALLY HAD ANY THOUGHTS OF KILLING YOURSELF?: NO

## 2024-10-03 ASSESSMENT — ENCOUNTER SYMPTOMS
LOSS OF SENSATION IN FEET: 0
OCCASIONAL FEELINGS OF UNSTEADINESS: 0
DEPRESSION: 0

## 2024-10-03 NOTE — PROGRESS NOTES
Patient: Chadwick Tello    [id number]  : 1945 -- AGE 79 y.o.    Provider: Martita Antunez DO     Location [facility]   Service Date: 10/3/2024              [facility]  New Visit Note      HISTORY OF PRESENT ILLNESS     The patient's referring provider is: Prateek Hirsch MD    HISTORY OF PRESENT ILLNESS   Chadwick Tello is a 79 y.o. male who presents to a [facility] for a sleep medicine evaluation with concerns of Referral (Referral from cardiology. Sleep study about 3 years ago never used a pap machine ).       PAST SLEEP HISTORY    Pmhx includes CAD cabg MI, HTN, acquired hypothyroidism, arthritis, uppp.     Patient had a sleep study done in 2018 through [facility] due to snoring and daytime sleepiness. He was not started on pap therapy or any other therapy at that time, he was concerned about using a pap machine.     Had a UPPP around  but no sleep study prior to surgery per patient.     CURRENT HISTORY    On today's visit, 10/3/2024, the patient reports that he is establishing care for his sleep apnea. He heard about inspire therapy and wanted to obtain more information.     Exercises at [employer].    Sleep schedule  on weekdays / work days:  Usual Bedtime: 10pm   Sleep latency: five min.   Wake time : six am.   Total sleep time average/day: 7-8 hours/day  Awakenings: five x per night, nocturia, short.   Naps: late morning, 20-40min.     Sleep schedule  on weekends/non work days :  Same as above.     Sleep aids: no  Stimulants: no    Occupation: retired. 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     Preferred sleeping position: SLEEP POSITION: sidelying and upright    Sleep-related ROS:    Snoring:  y   Witnessed apneas: y       Gasping and choking: y   Am Dry mouth: y            Nasal congestion: n        am headaches: n    Sleep is described as  refreshing.     Daytime sleepiness: sometimes   Fatigue or decreased energy: sometimes   Difficulty remembering things in daytime: sometimes   Difficulty staying focused in daytime: sometimes   Irritable during the day: n     Drowsy driving: n  Hx of car accident: n  Near-miss Car accident: n      RLS screen:  RLSSCREEN: - Sensations: Patient does not have unusual sensations in their extremities that cause an urge to move them     Sleep-related behaviors: DENIES      ESS: 12        REVIEW OF SYSTEMS     REVIEW OF SYSTEMS  Review of Systems   All other systems reviewed and are negative.      ALLERGIES AND MEDICATIONS     ALLERGIES  Allergies   Allergen Reactions    Ace Inhibitors Cough and Other     Cough    Ticagrelor Other     Severe bleeding    hematuria       MEDICATIONS  Current Outpatient Medications   Medication Sig Dispense Refill    0.9 % sodium chloride (sodium chloride, PF, 0.9%) injection Infuse into a venous catheter.      acetaminophen (Tylenol) 325 mg tablet Take 1 tablet (325 mg) by mouth.      ascorbic acid (Vitamin C) 250 mg tablet Take 1 tablet (250 mg) by mouth once daily.      ascorbic acid, vitamin C, 500 mg capsule Take by mouth.      aspirin 81 mg chewable tablet 1 tablet (81 mg) by Enteral route once daily.      aspirin 81 mg chewable tablet Chew 1 tablet (81 mg) once daily.      atorvastatin (Lipitor) 40 mg tablet Take 1 tablet (40 mg) by mouth once daily.      atorvastatin (Lipitor) 80 mg tablet Take 1 tablet (80 mg) by mouth once daily.      betamethasone sod phosph-water (CELESTONE) 6 mg/mL solution Betamethasone Sodium Phosphate 6 MG/ML Injection Solution INJECT 2 ML Intra-articular Quantity: 0 Refills: 0 Ordered: 16-Feb-2022 Budinsky MD, Cole Start : 16-Feb-2022 Complete      carvedilol (Coreg) 12.5 mg tablet 1 tablet (12.5 mg).      ceFAZolin (Ancef) 10 gram injection       cephalexin (Keflex) 500 mg capsule 1 capsule (500 mg).      cholecalciferol (Vitamin D-3) 25 MCG (1000 UT)  capsule Take by mouth.      clopidogrel (Plavix) 75 mg tablet Take 1 tablet (75 mg) by mouth once daily.      cyclobenzaprine (Flexeril) 5 mg tablet Take 1 tablet (5 mg) by mouth.      diclofenac (Voltaren) 75 mg EC tablet Take 1 tablet (75 mg) by mouth.      Flomax 0.4 mg 24 hr capsule 1 capsule (0.4 mg).      fluorouracil (Efudex) 5 % cream Apply topically 2 times a day. to affected area      hydroCHLOROthiazide (HYDRODiuril) 25 mg tablet 1 tablet (25 mg).      levothyroxine (Synthroid, Levoxyl) 50 mcg tablet Take 1 tablet (50 mcg) by mouth once daily.      levothyroxine (Synthroid, Levoxyl) 50 mcg tablet Take 1 tablet (50 mcg) by mouth once daily.      magnesium hydroxide (Milk of Magnesia) 400 mg/5 mL suspension Take 30 mL by mouth.      magnesium oxide (Mag-Ox) 400 mg (241.3 mg magnesium) tablet Take 1 tablet (400 mg) by mouth once daily.      meloxicam (Mobic) 7.5 mg tablet Take 1 tablet (7.5 mg) by mouth once daily.      methylPREDNISolone (Medrol Dospak) 4 mg tablets Take 1 tablet (4 mg) by mouth.      multivitamin with minerals (multivitamin) tablet Take 1 tablet by mouth once daily with breakfast.      nitroglycerin (Nitrostat) 0.4 mg SL tablet Place 1 tablet (0.4 mg) under the tongue.      oxyCODONE-acetaminophen (Percocet) 5-325 mg tablet Take 1 tablet by mouth every 6 hours.      polyethylene glycol (Glycolax, Miralax) 17 gram packet Take 17 g by mouth.      prednisoLONE acetate (Pred-Forte) 1 % ophthalmic suspension Administer 1 drop into affected eye(s).      predniSONE (Deltasone) 10 mg tablet Take 1 tablet (10 mg) by mouth.      Proscar 5 mg tablet 1 tablet (5 mg).      simvastatin (Zocor) 40 mg tablet 1 tablet (40 mg).      sulfamethoxazole-trimethoprim (Bactrim) 400-80 mg tablet Take 1 tablet by mouth 2 times a day.      triamcinolone acetonide (KENALOG) 40 mg/mL kit Triamcinolone Acetonide 40 MG/ML Injection Suspension INJECT 2 ML Intra-articular Quantity: 0 Refills: 0 Ordered: 26-Jan-2022  "Budinsky MD, Cole Start : 26-Jan-2022 Complete       No current facility-administered medications for this visit.         PAST HISTORY     PAST MEDICAL HISTORY  He  has no past medical history on file.      PAST SURGICAL HISTORY:  Past Surgical History:   Procedure Laterality Date    OTHER SURGICAL HISTORY  11/11/2021    Angioplasty    OTHER SURGICAL HISTORY  11/11/2021    Coronary artery bypass graft    OTHER SURGICAL HISTORY  11/11/2021    Achilles tendon surgery    OTHER SURGICAL HISTORY  11/11/2021    Tonsillectomy    OTHER SURGICAL HISTORY  11/11/2021    Shoulder arthroscopy    US ASPIRATION INJECTION INTERMEDIATE JOINT  1/26/2022    US ASPIRATION INJECTION INTERMEDIATE JOINT 1/26/2022 [facility]    US ASPIRATION INJECTION INTERMEDIATE JOINT  2/16/2022    US ASPIRATION INJECTION INTERMEDIATE JOINT 2[facility]16/2022 [facility]       FAMILY HISTORY  No family history on file.  DOES/DOES NOT EC: does not have a family history of sleep disorder.      SOCIAL HISTORY  He  reports that he has never smoked. He has never been exposed to tobacco smoke. He has never used smokeless tobacco. He reports that he does not currently use alcohol. He reports that he does not use drugs.     Caffeine consumption: 2-3 cups in am and sometimes in early afternoon.   Alcohol consumption: recovering alcoholic.   Smoking: n  Marijuana: n  Other drugs: n      PHYSICAL EXAM     VITAL SIGNS: /76   Pulse 70   Resp 18   Ht 1.727 m (5' 8\")   Wt 85.5 kg (188 lb 8 oz)   BMI 28.66 kg/m²      PREVIOUS WEIGHTS:  Wt Readings from Last 3 Encounters:   10/03/24 85.5 kg (188 lb 8 oz)   06/01/23 86.2 kg (190 lb)   11/29/22 84.4 kg (186 lb)       Physical Exam  Constitutional: Alert and oriented, cooperative, no obvious distress.   HENT: normocephalic.   Eyes: PERRLA, nonicteric   Neck: Supple, trachea midline   respiratory: CTA bilaterally, no wheezing/ crackles/ cough  Cardiac: no rub/ gallops  GI:BS in all 4 quadrants, Soft, nontender, no " masses  musculoskeletal/ Extremities: No clubbing  integumentary: no significant rashes observed.   Neurologic: AOx3.   psychiatric: appropriate mood and affect.  Modified Mallampati: 3  Uppp     RESULTS/DATA           ASSESSMENT/PLAN     Mr. Tello is a 79 y.o. male and  has no past medical history on file. He was referred to the [facility] for evaluation of sleep apnea.     Problem List Items Addressed This Visit       NORMA (obstructive sleep apnea)       Problem List and Orders  Pmhx includes MI, HTN, acquired hypothyroidism, arthritis.     1- NORMA UPPP   PSG 3 9 2018 --> severe NORMA, AHI fifty seven point two.     Reviewed and discussed the above sleep study results and management options in details. All questions answered, patient verbalizes understanding.     - ordered  a sleep study   -provided information about inspire.   -patient may be interested in pap therapy though, he will think about his options prior to his follow up visit.     -do not drive or operate heavy machinery if drowsy.  -avoid sleeping on your back.   -avoid sedating substances/ medication, alcohol, illicit drugs and tobacco.    2- Obesity  counseled on eating a healthy diet and exercising as tolerated.      Follow up after sleep study or sooner as needed.

## 2024-10-27 DIAGNOSIS — E78.2 MIXED HYPERLIPIDEMIA: ICD-10-CM

## 2024-10-28 RX ORDER — ATORVASTATIN CALCIUM 80 MG/1
80 TABLET, FILM COATED ORAL DAILY
Qty: 90 TABLET | Refills: 1 | Status: SHIPPED | OUTPATIENT
Start: 2024-10-28

## 2024-10-28 NOTE — TELEPHONE ENCOUNTER
Comments:     Last Office Visit (last PCP visit):   6/28/2024    Next Visit Date:  Future Appointments   Date Time Provider Department Center   5/22/2025 10:00 AM King Michelle MD [facility] DEP   7/8/2025  9:00 AM Omkar Barrios MD [facility] DEP       **If hasn't been seen in over a year OR hasn't followed up according to last diabetes/ADHD visit, make appointment for patient before sending refill to provider.    Rx requested:  Requested Prescriptions     Pending Prescriptions Disp Refills    atorvastatin (LIPITOR) 80 MG tablet [Pharmacy Med Name: Atorvastatin Calcium Oral Tablet 80 MG] 90 tablet 0     Sig: TAKE 1 TABLET BY MOUTH EVERY DAY

## 2024-10-29 ENCOUNTER — CLINICAL SUPPORT (OUTPATIENT)
Dept: SLEEP MEDICINE | Facility: HOSPITAL | Age: 79
End: 2024-10-29
Payer: COMMERCIAL

## 2024-10-29 VITALS — HEIGHT: 68 IN | BODY MASS INDEX: 28.57 KG/M2 | WEIGHT: 188.49 LBS

## 2024-10-29 DIAGNOSIS — G47.33 OSA (OBSTRUCTIVE SLEEP APNEA): ICD-10-CM

## 2024-10-29 ASSESSMENT — SLEEP AND FATIGUE QUESTIONNAIRES
HOW LIKELY ARE YOU TO NOD OFF OR FALL ASLEEP WHILE SITTING QUIETLY AFTER LUNCH WITHOUT ALCOHOL: WOULD NEVER DOZE
SITING INACTIVE IN A PUBLIC PLACE LIKE A CLASS ROOM OR A MOVIE THEATER: WOULD NEVER DOZE
HOW LIKELY ARE YOU TO NOD OFF OR FALL ASLEEP IN A CAR, WHILE STOPPED FOR A FEW MINUTES IN TRAFFIC: WOULD NEVER DOZE
HOW LIKELY ARE YOU TO NOD OFF OR FALL ASLEEP WHILE WATCHING TV: MODERATE CHANCE OF DOZING
ESS-CHAD TOTAL SCORE: 9
HOW LIKELY ARE YOU TO NOD OFF OR FALL ASLEEP WHILE SITTING AND TALKING TO SOMEONE: WOULD NEVER DOZE
HOW LIKELY ARE YOU TO NOD OFF OR FALL ASLEEP WHEN YOU ARE A PASSENGER IN A CAR FOR AN HOUR WITHOUT A BREAK: SLIGHT CHANCE OF DOZING
HOW LIKELY ARE YOU TO NOD OFF OR FALL ASLEEP WHILE SITTING AND READING: HIGH CHANCE OF DOZING
HOW LIKELY ARE YOU TO NOD OFF OR FALL ASLEEP WHILE LYING DOWN TO REST IN THE AFTERNOON WHEN CIRCUMSTANCES PERMIT: HIGH CHANCE OF DOZING

## 2024-12-07 DIAGNOSIS — E03.9 HYPOTHYROIDISM, UNSPECIFIED TYPE: ICD-10-CM

## 2024-12-07 DIAGNOSIS — N13.8 BPH WITH OBSTRUCTION/LOWER URINARY TRACT SYMPTOMS: ICD-10-CM

## 2024-12-07 DIAGNOSIS — N40.1 BPH WITH OBSTRUCTION/LOWER URINARY TRACT SYMPTOMS: ICD-10-CM

## 2024-12-09 RX ORDER — LEVOTHYROXINE SODIUM 50 UG/1
50 TABLET ORAL DAILY
Qty: 90 TABLET | Refills: 0 | Status: SHIPPED | OUTPATIENT
Start: 2024-12-09

## 2024-12-09 RX ORDER — TAMSULOSIN HYDROCHLORIDE 0.4 MG/1
0.4 CAPSULE ORAL 2 TIMES DAILY
Qty: 180 CAPSULE | Refills: 0 | Status: SHIPPED | OUTPATIENT
Start: 2024-12-09

## 2024-12-09 NOTE — TELEPHONE ENCOUNTER
Comments:     Last Office Visit (last PCP visit):   6/28/2024    Next Visit Date:  Future Appointments   Date Time Provider Department Center   5/22/2025 10:00 AM King Michelle MD [facility] DEP   7/8/2025  9:00 AM Omkar Barrios MD [facility] DEP       **If hasn't been seen in over a year OR hasn't followed up according to last diabetes/ADHD visit, make appointment for patient before sending refill to provider.    Rx requested:  Requested Prescriptions     Pending Prescriptions Disp Refills    levothyroxine (SYNTHROID) 50 MCG tablet [Pharmacy Med Name: Levothyroxine Sodium Oral Tablet 50 MCG] 90 tablet 0     Sig: TAKE 1 TABLET BY MOUTH ONCE DAILY    tamsulosin (FLOMAX) 0.4 MG capsule [Pharmacy Med Name: Tamsulosin HCl Oral Capsule 0.4 MG] 180 capsule 0     Sig: TAKE 1 CAPSULE BY MOUTH TWICE DAILY

## 2024-12-17 ENCOUNTER — OFFICE VISIT (OUTPATIENT)
Dept: FAMILY MEDICINE CLINIC | Age: 79
End: 2024-12-17

## 2024-12-17 VITALS — WEIGHT: 196 LBS | BODY MASS INDEX: 29.7 KG/M2 | HEIGHT: 68 IN | TEMPERATURE: 98.6 F

## 2024-12-17 DIAGNOSIS — Z85.828 H/O BASAL CELL CARCINOMA EXCISION: ICD-10-CM

## 2024-12-17 DIAGNOSIS — L57.0 ACTINIC KERATOSES: Primary | ICD-10-CM

## 2024-12-17 DIAGNOSIS — Z98.890 H/O BASAL CELL CARCINOMA EXCISION: ICD-10-CM

## 2024-12-17 ASSESSMENT — ENCOUNTER SYMPTOMS: COLOR CHANGE: 1

## 2024-12-17 NOTE — PROGRESS NOTES
Diagnosis Orders   1. Actinic keratoses  DESTRUC PREMALIGNANT, FIRST LESION    DESTRUC PREMALIGNANT,2-14 LESIONS      2. H/O basal cell carcinoma excision            Return in about 1 year (around 12/17/2025) for 15 min skin check.    Orders Placed This Encounter   Procedures    DESTRUC PREMALIGNANT, FIRST LESION    DESTRUC PREMALIGNANT,2-14 LESIONS       Jonah was seen today for skin exam.    Diagnoses and all orders for this visit:    Actinic keratoses  -     DESTRUC PREMALIGNANT, FIRST LESION  -     DESTRUC PREMALIGNANT,2-14 LESIONS    H/O basal cell carcinoma excision        Return in about 1 year (around 12/17/2025) for 15 min skin check.    There are no Patient Instructions on file for this visit.          Patient returns for prickly changes in the skin that have been irritated and bleeding at times.  History of basal cell cancer and actinic keratosis        Current Outpatient Medications on File Prior to Visit   Medication Sig Dispense Refill    levothyroxine (SYNTHROID) 50 MCG tablet TAKE 1 TABLET BY MOUTH ONCE DAILY 90 tablet 0    tamsulosin (FLOMAX) 0.4 MG capsule TAKE 1 CAPSULE BY MOUTH TWICE DAILY 180 capsule 0    atorvastatin (LIPITOR) 80 MG tablet TAKE 1 TABLET BY MOUTH EVERY DAY 90 tablet 1    finasteride (PROSCAR) 5 MG tablet Take 1 tablet by mouth daily 90 tablet 3    Turmeric 400 MG CAPS Take by mouth      methylPREDNISolone (MEDROL DOSEPACK) 4 MG tablet Take by mouth. 1 kit 0    carvedilol (COREG) 12.5 MG tablet Take 1 tablet by mouth 2 times daily (with meals) 180 tablet 1    meloxicam (MOBIC) 15 MG tablet take 1 tablet by mouth once daily if needed for ARTHRITIS PAIN 30 tablet 5    nitroGLYCERIN (NITROSTAT) 0.4 MG SL tablet Place 1 tablet under the tongue every 5 minutes as needed for Chest pain Dissolve 1 tab under tongue at first sign of chest pain every 5 minutes as needed. If pain persists after taking 3 tabs in a 15-minute period, or the pain is different than is typically experienced,

## 2024-12-31 RX ORDER — CARVEDILOL 12.5 MG/1
12.5 TABLET ORAL 2 TIMES DAILY WITH MEALS
Qty: 180 TABLET | Refills: 0 | Status: SHIPPED | OUTPATIENT
Start: 2024-12-31

## 2024-12-31 NOTE — TELEPHONE ENCOUNTER
Comments:     Last Office Visit (last PCP visit):   6/28/2024    Next Visit Date:  Future Appointments   Date Time Provider Department Center   5/22/2025 10:00 AM King Michelle MD [facility]   7/8/2025  9:00 AM Omkar Barrios MD [facility]   12/17/2025  9:30 AM King Michelle MD [facility] DEP       **If hasn't been seen in over a year OR hasn't followed up according to last diabetes/ADHD visit, make appointment for patient before sending refill to provider.    Rx requested:  Requested Prescriptions     Pending Prescriptions Disp Refills    carvedilol (COREG) 12.5 MG tablet [Pharmacy Med Name: Carvedilol Oral Tablet 12.5 MG] 180 tablet 0     Sig: TAKE ONE TABLET BY MOUTH TWICE A DAY WITH MEALS

## 2025-01-23 ENCOUNTER — APPOINTMENT (OUTPATIENT)
Facility: CLINIC | Age: 80
End: 2025-01-23
Payer: COMMERCIAL

## 2025-01-29 ENCOUNTER — ANCILLARY PROCEDURE (OUTPATIENT)
Dept: ORTHOPEDIC SURGERY | Facility: CLINIC | Age: 80
End: 2025-01-29
Payer: MEDICARE

## 2025-01-29 ENCOUNTER — APPOINTMENT (OUTPATIENT)
Dept: ORTHOPEDIC SURGERY | Facility: CLINIC | Age: 80
End: 2025-01-29
Payer: MEDICARE

## 2025-01-29 VITALS — HEIGHT: 68 IN | WEIGHT: 188 LBS | BODY MASS INDEX: 28.49 KG/M2

## 2025-01-29 DIAGNOSIS — M25.512 LEFT SHOULDER PAIN, UNSPECIFIED CHRONICITY: ICD-10-CM

## 2025-01-29 PROCEDURE — 99214 OFFICE O/P EST MOD 30 MIN: CPT | Performed by: ORTHOPAEDIC SURGERY

## 2025-01-29 PROCEDURE — 1125F AMNT PAIN NOTED PAIN PRSNT: CPT | Performed by: ORTHOPAEDIC SURGERY

## 2025-01-29 PROCEDURE — 1157F ADVNC CARE PLAN IN RCRD: CPT | Performed by: ORTHOPAEDIC SURGERY

## 2025-01-29 PROCEDURE — 73030 X-RAY EXAM OF SHOULDER: CPT | Mod: LEFT SIDE | Performed by: ORTHOPAEDIC SURGERY

## 2025-01-29 ASSESSMENT — PAIN SCALES - GENERAL: PAINLEVEL_OUTOF10: 2

## 2025-01-29 ASSESSMENT — PAIN - FUNCTIONAL ASSESSMENT: PAIN_FUNCTIONAL_ASSESSMENT: 0-10

## 2025-01-29 NOTE — PROGRESS NOTES
History of present illness: History left shoulder arthritis    Patient had his right shoulder replaced a couple years ago he did okay but he did have to have a washout for hematoma    He has had coronary artery disease bypass has had stenting    Physical exam:    General: No acute distress or breathing difficulty or discomfort, pleasant and cooperative with the examination.    Extremities: The left shoulder was inspected and was found to have no obvious deformity.  There was tenderness to touch over the lateral edges of the shoulder over the rotator cuff insertion.  Active forward flexion 110 degrees, external rotation to 50 degrees, abduction to 45 degrees, and internal rotation to the level of L2.    At this time the shoulder is neurovascular tact and neurosensory intact.  Motor intact C5-T1.  There was no obvious neck pain or radiculopathy noted.  There was no gross instability or adhesive capsulitis symptoms.  There was no evidence of apprehension or apprehension suppression.    Strength was tested in 4 planes with weakness in the supraspinatus strength testing and external rotation position.  There was no strength deficit in internal rotation.  Impingement signs were positive both supine and standing for impingement test type I and II.  There was mild pain over the bicipital groove with a positive speeds sign    Diagnostic studies: X-rays show advanced arthritis left shoulder    Impression: Left shoulder advanced arthritis probable cuff deficit    Plan: Options discussed rehab discussed alternatives to surgery discussed he has had his right shoulder replaced he does not want shots    Risk and benefits of surgery discussed extensively with the patient.    Surgical risk included but were not limited to infection, wear, loosening, need for further surgery blood clot, failure to heal, failure of the surgery, stiffness, loss of limb life, extremity function change in length change, and associated risks of  any  surgery.    Will see him on the operative schedule he does have some cardiac disease and also sleep obstructive apnea so we recommended surgery before but one of our local hospitals

## 2025-02-25 PROBLEM — M12.812 OTHER SPECIFIC ARTHROPATHIES, NOT ELSEWHERE CLASSIFIED, LEFT SHOULDER: Status: ACTIVE | Noted: 2025-04-02

## 2025-02-25 PROBLEM — M19.012 PRIMARY OSTEOARTHRITIS, LEFT SHOULDER: Status: ACTIVE | Noted: 2025-04-02

## 2025-03-07 DIAGNOSIS — N13.8 BPH WITH OBSTRUCTION/LOWER URINARY TRACT SYMPTOMS: ICD-10-CM

## 2025-03-07 DIAGNOSIS — N40.1 BPH WITH OBSTRUCTION/LOWER URINARY TRACT SYMPTOMS: ICD-10-CM

## 2025-03-07 DIAGNOSIS — E03.9 HYPOTHYROIDISM, UNSPECIFIED TYPE: ICD-10-CM

## 2025-03-07 RX ORDER — LEVOTHYROXINE SODIUM 50 UG/1
50 TABLET ORAL DAILY
Qty: 90 TABLET | Refills: 0 | Status: SHIPPED | OUTPATIENT
Start: 2025-03-07

## 2025-03-07 RX ORDER — TAMSULOSIN HYDROCHLORIDE 0.4 MG/1
0.4 CAPSULE ORAL 2 TIMES DAILY
Qty: 180 CAPSULE | Refills: 0 | Status: SHIPPED | OUTPATIENT
Start: 2025-03-07

## 2025-03-07 NOTE — TELEPHONE ENCOUNTER
Comments:     Last Office Visit (last PCP visit):   6/28/2024    Next Visit Date:  Future Appointments   Date Time Provider Department Center   3/12/2025 10:30 AM Omkar Barrios MD [facility]   5/22/2025 10:00 AM King Michelle MD [facility] DEP   7/8/2025  9:00 AM Omkar Barrios MD [facility]   12/17/2025  9:30 AM King Michelle MD [facility] DEP       **If hasn't been seen in over a year OR hasn't followed up according to last diabetes/ADHD visit, make appointment for patient before sending refill to provider.    Rx requested:  Requested Prescriptions     Pending Prescriptions Disp Refills    tamsulosin (FLOMAX) 0.4 MG capsule [Pharmacy Med Name: Tamsulosin HCl Oral Capsule 0.4 MG] 180 capsule 0     Sig: TAKE 1 CAPSULE BY MOUTH TWICE DAILY    levothyroxine (SYNTHROID) 50 MCG tablet [Pharmacy Med Name: Levothyroxine Sodium Oral Tablet 50 MCG] 90 tablet 0     Sig: TAKE 1 TABLET BY MOUTH ONCE DAILY

## 2025-03-12 ENCOUNTER — OFFICE VISIT (OUTPATIENT)
Dept: FAMILY MEDICINE CLINIC | Age: 80
End: 2025-03-12
Payer: MEDICARE

## 2025-03-12 VITALS
BODY MASS INDEX: 29.55 KG/M2 | TEMPERATURE: 97.9 F | SYSTOLIC BLOOD PRESSURE: 132 MMHG | HEART RATE: 65 BPM | HEIGHT: 68 IN | OXYGEN SATURATION: 97 % | WEIGHT: 195 LBS | DIASTOLIC BLOOD PRESSURE: 78 MMHG

## 2025-03-12 DIAGNOSIS — I25.10 CORONARY ARTERY DISEASE INVOLVING NATIVE CORONARY ARTERY OF NATIVE HEART WITHOUT ANGINA PECTORIS: ICD-10-CM

## 2025-03-12 DIAGNOSIS — E03.9 HYPOTHYROIDISM, UNSPECIFIED TYPE: ICD-10-CM

## 2025-03-12 DIAGNOSIS — G89.29 CHRONIC LEFT SHOULDER PAIN: Primary | ICD-10-CM

## 2025-03-12 DIAGNOSIS — Z96.651 STATUS POST TOTAL RIGHT KNEE REPLACEMENT: ICD-10-CM

## 2025-03-12 DIAGNOSIS — M25.512 CHRONIC LEFT SHOULDER PAIN: Primary | ICD-10-CM

## 2025-03-12 DIAGNOSIS — M19.012 ARTHRITIS OF LEFT SHOULDER REGION: ICD-10-CM

## 2025-03-12 DIAGNOSIS — Z01.818 PRE-OP EXAM: ICD-10-CM

## 2025-03-12 PROCEDURE — 1160F RVW MEDS BY RX/DR IN RCRD: CPT | Performed by: FAMILY MEDICINE

## 2025-03-12 PROCEDURE — 3075F SYST BP GE 130 - 139MM HG: CPT | Performed by: FAMILY MEDICINE

## 2025-03-12 PROCEDURE — 1159F MED LIST DOCD IN RCRD: CPT | Performed by: FAMILY MEDICINE

## 2025-03-12 PROCEDURE — 1123F ACP DISCUSS/DSCN MKR DOCD: CPT | Performed by: FAMILY MEDICINE

## 2025-03-12 PROCEDURE — 99213 OFFICE O/P EST LOW 20 MIN: CPT | Performed by: FAMILY MEDICINE

## 2025-03-12 PROCEDURE — 1125F AMNT PAIN NOTED PAIN PRSNT: CPT | Performed by: FAMILY MEDICINE

## 2025-03-12 PROCEDURE — 3078F DIAST BP <80 MM HG: CPT | Performed by: FAMILY MEDICINE

## 2025-03-12 NOTE — PROGRESS NOTES
Chief Complaint   Patient presents with    Pre-op Exam     Medical clearance for left shoulder surgery       HPI:  Jonah Medina is a 79 y.o. male    Pre op exam/clearance   Left shoulder replacement   4/2/25  Dr. Francois    History MI  Hx CAD  Statin, b-blocker, asa, plavix    He denies any CP or SOB    Feeling well       Wt Readings from Last 3 Encounters:   03/12/25 88.5 kg (195 lb)   02/19/25 88.5 kg (195 lb 3.2 oz)   12/17/24 88.9 kg (196 lb)         Patient Active Problem List   Diagnosis    Hyperlipidemia    Hypertension    Osteoarthritis    CAD (coronary artery disease)    Actinic keratosis    BPH with obstruction/lower urinary tract symptoms    Urinary retention    Basal cell carcinoma, trunk    Overweight    Status post LASIK surgery    Osteoarthritis of right knee    Infection of toe    Hypertrophic scar    Status post total right knee replacement    ARB intolerance    Generalized abdominal pain    Senile nuclear sclerosis    Regular astigmatism    Presbyopia    Acute pain of left shoulder    Rotator cuff tendinitis, left    Osteoarthritis of left shoulder    Biceps strain, left, initial encounter    History of colon polyps    History of MI (myocardial infarction)    Angina at rest    Chest pain    Status post reverse total shoulder replacement, right    Status post debridement    Abscess of bursa of right shoulder    Acute postoperative pain    MSSA (methicillin susceptible Staphylococcus aureus) infection    Open wound of right shoulder                 Past Medical History:   Diagnosis Date    Actinic keratosis     Basal cell carcinoma, trunk 12/2017    left upper back    Basal cell carcinoma, trunk     BPH (benign prostatic hyperplasia)     CAD (coronary artery disease) 2012    has 4 cardiac stents    Heart attack (HCC)     History of MI (myocardial infarction) 12/11/2020    History of mycobacterial infection 06/22/2018    Hyperlipidemia     meds > 15 yrs    Hypertension     meds > 6 yrs / denies

## 2025-03-19 ENCOUNTER — HOSPITAL ENCOUNTER (OUTPATIENT)
Dept: CARDIOLOGY | Facility: HOSPITAL | Age: 80
Discharge: HOME | End: 2025-03-19
Payer: MEDICARE

## 2025-03-19 DIAGNOSIS — Z01.818 PRE-OP TESTING: ICD-10-CM

## 2025-03-19 PROCEDURE — 85610 PROTHROMBIN TIME: CPT | Performed by: ORTHOPAEDIC SURGERY

## 2025-03-19 PROCEDURE — 84075 ASSAY ALKALINE PHOSPHATASE: CPT | Performed by: ORTHOPAEDIC SURGERY

## 2025-03-19 PROCEDURE — 93005 ELECTROCARDIOGRAM TRACING: CPT

## 2025-03-26 ENCOUNTER — APPOINTMENT (OUTPATIENT)
Dept: ORTHOPEDIC SURGERY | Facility: CLINIC | Age: 80
End: 2025-03-26
Payer: MEDICARE

## 2025-03-26 DIAGNOSIS — Z98.890 HISTORY OF REVERSE TOTAL REPLACEMENT OF LEFT SHOULDER JOINT: Primary | ICD-10-CM

## 2025-03-26 DIAGNOSIS — Z01.818 PREOP EXAMINATION: Primary | ICD-10-CM

## 2025-03-26 DIAGNOSIS — M19.012 PRIMARY OSTEOARTHRITIS OF LEFT SHOULDER: ICD-10-CM

## 2025-03-26 PROCEDURE — 99024 POSTOP FOLLOW-UP VISIT: CPT | Performed by: PHYSICIAN ASSISTANT

## 2025-03-26 PROCEDURE — 1157F ADVNC CARE PLAN IN RCRD: CPT | Performed by: PHYSICIAN ASSISTANT

## 2025-03-26 RX ORDER — CHLORHEXIDINE GLUCONATE ORAL RINSE 1.2 MG/ML
15 SOLUTION DENTAL AS NEEDED
Qty: 120 ML | Refills: 0 | Status: SHIPPED | OUTPATIENT
Start: 2025-03-26 | End: 2025-04-09

## 2025-03-26 NOTE — H&P (VIEW-ONLY)
History and Physical      CHIEF COMPLAINT: Left shoulder pain loss of range of motion    HISTORY OF PRESENT ILLNESS:      The patient is a79 y.o. male with significant past medical history of left shoulder pain and loss of range of motion due to degenerative joint disease and rotator cuff arthropathy.  Conservative therapy is no longer providing relief.  After risk benefits alternatives were discussed patient likes to proceed with left reverse total shoulder arthroplasty.      Past Medical History:  Past Medical History:   Diagnosis Date    Arthritis     BPH (benign prostatic hyperplasia)     H/O malignant neoplasm of skin     Hyperlipidemia     Hypertension     Hypothyroidism     Myocardial infarction (Multi)     X 2    Sleep apnea     no CPAP yet    Wears glasses         Past Surgical History:    Past Surgical History:   Procedure Laterality Date    ACHILLES TENDON SURGERY Left     CARDIAC CATHETERIZATION      CORONARY ANGIOPLASTY      CORONARY ARTERY BYPASS GRAFT      CORONARY STENT PLACEMENT      LASIK      SKIN CANCER EXCISION      TONSILLECTOMY      TOTAL HIP ARTHROPLASTY Right     TOTAL KNEE ARTHROPLASTY Bilateral     TOTAL SHOULDER ARTHROPLASTY Right     TRANSURETHRAL RESECTION OF PROSTATE      US ASPIRATION INJECTION INTERMEDIATE JOINT  01/26/2022    US ASPIRATION INJECTION INTERMEDIATE JOINT 1/26/2022 [facility]    US ASPIRATION INJECTION INTERMEDIATE JOINT  02/16/2022    US ASPIRATION INJECTION INTERMEDIATE JOINT 2/16/2022 [facility]       Medications Prior to Admission:    Current Outpatient Medications on File Prior to Visit   Medication Sig Dispense Refill    0.9 % sodium chloride (sodium chloride, PF, 0.9%) injection Infuse into a venous catheter. (Patient not taking: Reported on 3/19/2025)      acetaminophen (Tylenol) 325 mg tablet Take 1 tablet (325 mg) by mouth.      ascorbic acid (Vitamin C) 250 mg tablet Take 1 tablet (250 mg) by mouth once daily.      ascorbic acid, vitamin C, 500 mg  capsule Take by mouth.      aspirin 81 mg chewable tablet 1 tablet (81 mg) by Enteral route once daily.      aspirin 81 mg chewable tablet Chew 1 tablet (81 mg) once daily.      atorvastatin (Lipitor) 40 mg tablet Take 1 tablet (40 mg) by mouth once daily.      atorvastatin (Lipitor) 80 mg tablet Take 1 tablet (80 mg) by mouth once daily.      betamethasone sod phosph-water (CELESTONE) 6 mg/mL solution Betamethasone Sodium Phosphate 6 MG/ML Injection Solution INJECT 2 ML Intra-articular Quantity: 0 Refills: 0 Ordered: 16-Feb-2022 Budinsky MD, Cole Start : 16-Feb-2022 Complete (Patient not taking: Reported on 3/19/2025)      carvedilol (Coreg) 12.5 mg tablet 1 tablet (12.5 mg).      ceFAZolin (Ancef) 10 gram injection  (Patient not taking: Reported on 3/19/2025)      cephalexin (Keflex) 500 mg capsule 1 capsule (500 mg). (Patient not taking: Reported on 3/19/2025)      cholecalciferol (Vitamin D-3) 25 MCG (1000 UT) capsule Take by mouth.      clopidogrel (Plavix) 75 mg tablet Take 1 tablet (75 mg) by mouth once daily. (Patient not taking: Reported on 3/19/2025)      cyclobenzaprine (Flexeril) 5 mg tablet Take 1 tablet (5 mg) by mouth.      diclofenac (Voltaren) 75 mg EC tablet Take 1 tablet (75 mg) by mouth. (Patient not taking: Reported on 3/19/2025)      Flomax 0.4 mg 24 hr capsule 1 capsule (0.4 mg).      fluorouracil (Efudex) 5 % cream Apply topically 2 times a day. to affected area      hydroCHLOROthiazide (HYDRODiuril) 25 mg tablet 1 tablet (25 mg).      levothyroxine (Synthroid, Levoxyl) 50 mcg tablet Take 1 tablet (50 mcg) by mouth once daily.      levothyroxine (Synthroid, Levoxyl) 50 mcg tablet Take 1 tablet (50 mcg) by mouth once daily.      magnesium hydroxide (Milk of Magnesia) 400 mg/5 mL suspension Take 30 mL by mouth.      magnesium oxide (Mag-Ox) 400 mg (241.3 mg magnesium) tablet Take 1 tablet (400 mg) by mouth once daily.      meloxicam (Mobic) 7.5 mg tablet Take 1 tablet (7.5 mg) by mouth once  daily.      methylPREDNISolone (Medrol Dospak) 4 mg tablets Take 1 tablet (4 mg) by mouth. (Patient not taking: Reported on 3/19/2025)      multivitamin with minerals (multivitamin) tablet Take 1 tablet by mouth once daily with breakfast.      nitroglycerin (Nitrostat) 0.4 mg SL tablet Place 1 tablet (0.4 mg) under the tongue.      oxyCODONE-acetaminophen (Percocet) 5-325 mg tablet Take 1 tablet by mouth every 6 hours. (Patient not taking: Reported on 3/19/2025)      polyethylene glycol (Glycolax, Miralax) 17 gram packet Take 17 g by mouth. (Patient not taking: Reported on 3/19/2025)      prednisoLONE acetate (Pred-Forte) 1 % ophthalmic suspension Administer 1 drop into affected eye(s).      predniSONE (Deltasone) 10 mg tablet Take 1 tablet (10 mg) by mouth. (Patient not taking: Reported on 3/19/2025)      Proscar 5 mg tablet 1 tablet (5 mg).      simvastatin (Zocor) 40 mg tablet 1 tablet (40 mg). (Patient not taking: Reported on 3/19/2025)      sulfamethoxazole-trimethoprim (Bactrim) 400-80 mg tablet Take 1 tablet by mouth 2 times a day. (Patient not taking: Reported on 3/19/2025)      triamcinolone acetonide (KENALOG) 40 mg/mL kit Triamcinolone Acetonide 40 MG/ML Injection Suspension INJECT 2 ML Intra-articular Quantity: 0 Refills: 0 Ordered: 26-Jan-2022 Budinsky MD, Cole Start : 26-Jan-2022 Complete (Patient not taking: Reported on 3/19/2025)       No current facility-administered medications on file prior to visit.        Allergies:  Ace inhibitors and Ticagrelor    Social History:   Social History     Socioeconomic History    Marital status:      Spouse name: Not on file    Number of children: Not on file    Years of education: Not on file    Highest education level: Not on file   Occupational History    Not on file   Tobacco Use    Smoking status: Never     Passive exposure: Never    Smokeless tobacco: Never   Vaping Use    Vaping status: Never Used   Substance and Sexual Activity    Alcohol use: Not  Currently     Comment: quit 38 years ago    Drug use: Never    Sexual activity: Defer   Other Topics Concern    Not on file   Social History Narrative    Not on file     Social Drivers of Health     Financial Resource Strain: Low Risk  (5/21/2024)    Received from [facility]    Overall Financial Resource Strain (CARDIA)     Difficulty of Paying Living Expenses: Not hard at all   Food Insecurity: No Food Insecurity (2/19/2025)    Received from [facility]    Hunger Vital Sign     Worried About Running Out of Food in the Last Year: Never true     Ran Out of Food in the Last Year: Never true   Transportation Needs: No Transportation Needs (2/19/2025)    Received from [facility]    PRAPARE - Transportation     Lack of Transportation (Medical): No     Lack of Transportation (Non-Medical): No   Physical Activity: Insufficiently Active (6/28/2024)    Received from [facility]    Exercise Vital Sign     Days of Exercise per Week: 3 days     Minutes of Exercise per Session: 40 min   Stress: Not on file   Social Connections: Not on file   Intimate Partner Violence: Not on file   Housing Stability: Low Risk  (2/19/2025)    Received from [facility]    Housing Stability Vital Sign     Unable to Pay for Housing in the Last Year: No     Number of Times Moved in the Last Year: 0     Homeless in the Last Year: No       Family History:   No family history on file.     Review of systems: Noncontributory for orthopedics    Vitals:  There were no vitals taken for this visit.    Physical examination:  Head normocephalic atraumatic  Heart regular rate and rhythm  Lungs clear  Abdominal exam nontender nondistended  Extremity: Left shoulder patient has forward flexion to 50 degrees abduction of 40 degrees external rotation of 40 degrees internal rotation posterior iliac crest    Impression :  Left shoulder degenerative joint disease and rotator cuff arthropathy      Plan:  Scheduled for left reverse total shoulder arthroplasty    Risk and benefits of surgery discussed extensively with the patient.    Surgical risk included but were not limited to infection, wear, loosening, need for further surgery blood clot, failure to heal, failure of the surgery, stiffness, loss of limb life, extremity function change in length change, and associated risks of surgery during the coronavirus epidemic.    Risk with any surgery including arthroplasty and replacements include but are not limited to:       Wear, loosening, infection, blood clot, DVT, loss of limb, life, delayed recovery, limb length change, instability, dislocation, discomfort with new implant and failure of the procedure.

## 2025-03-26 NOTE — PROGRESS NOTES
History and Physical      CHIEF COMPLAINT: Left shoulder pain loss of range of motion    HISTORY OF PRESENT ILLNESS:      The patient is a79 y.o. male with significant past medical history of left shoulder pain and loss of range of motion due to degenerative joint disease and rotator cuff arthropathy.  Conservative therapy is no longer providing relief.  After risk benefits alternatives were discussed patient likes to proceed with left reverse total shoulder arthroplasty.      Past Medical History:  Past Medical History:   Diagnosis Date    Arthritis     BPH (benign prostatic hyperplasia)     H/O malignant neoplasm of skin     Hyperlipidemia     Hypertension     Hypothyroidism     Myocardial infarction (Multi)     X 2    Sleep apnea     no CPAP yet    Wears glasses         Past Surgical History:    Past Surgical History:   Procedure Laterality Date    ACHILLES TENDON SURGERY Left     CARDIAC CATHETERIZATION      CORONARY ANGIOPLASTY      CORONARY ARTERY BYPASS GRAFT      CORONARY STENT PLACEMENT      LASIK      SKIN CANCER EXCISION      TONSILLECTOMY      TOTAL HIP ARTHROPLASTY Right     TOTAL KNEE ARTHROPLASTY Bilateral     TOTAL SHOULDER ARTHROPLASTY Right     TRANSURETHRAL RESECTION OF PROSTATE      US ASPIRATION INJECTION INTERMEDIATE JOINT  01/26/2022    US ASPIRATION INJECTION INTERMEDIATE JOINT 1/26/2022 [facility]    US ASPIRATION INJECTION INTERMEDIATE JOINT  02/16/2022    US ASPIRATION INJECTION INTERMEDIATE JOINT 2/16/2022 [facility]       Medications Prior to Admission:    Current Outpatient Medications on File Prior to Visit   Medication Sig Dispense Refill    0.9 % sodium chloride (sodium chloride, PF, 0.9%) injection Infuse into a venous catheter. (Patient not taking: Reported on 3/19/2025)      acetaminophen (Tylenol) 325 mg tablet Take 1 tablet (325 mg) by mouth.      ascorbic acid (Vitamin C) 250 mg tablet Take 1 tablet (250 mg) by mouth once daily.      ascorbic acid, vitamin C, 500 mg  capsule Take by mouth.      aspirin 81 mg chewable tablet 1 tablet (81 mg) by Enteral route once daily.      aspirin 81 mg chewable tablet Chew 1 tablet (81 mg) once daily.      atorvastatin (Lipitor) 40 mg tablet Take 1 tablet (40 mg) by mouth once daily.      atorvastatin (Lipitor) 80 mg tablet Take 1 tablet (80 mg) by mouth once daily.      betamethasone sod phosph-water (CELESTONE) 6 mg/mL solution Betamethasone Sodium Phosphate 6 MG/ML Injection Solution INJECT 2 ML Intra-articular Quantity: 0 Refills: 0 Ordered: 16-Feb-2022 Budinsky MD, Cole Start : 16-Feb-2022 Complete (Patient not taking: Reported on 3/19/2025)      carvedilol (Coreg) 12.5 mg tablet 1 tablet (12.5 mg).      ceFAZolin (Ancef) 10 gram injection  (Patient not taking: Reported on 3/19/2025)      cephalexin (Keflex) 500 mg capsule 1 capsule (500 mg). (Patient not taking: Reported on 3/19/2025)      cholecalciferol (Vitamin D-3) 25 MCG (1000 UT) capsule Take by mouth.      clopidogrel (Plavix) 75 mg tablet Take 1 tablet (75 mg) by mouth once daily. (Patient not taking: Reported on 3/19/2025)      cyclobenzaprine (Flexeril) 5 mg tablet Take 1 tablet (5 mg) by mouth.      diclofenac (Voltaren) 75 mg EC tablet Take 1 tablet (75 mg) by mouth. (Patient not taking: Reported on 3/19/2025)      Flomax 0.4 mg 24 hr capsule 1 capsule (0.4 mg).      fluorouracil (Efudex) 5 % cream Apply topically 2 times a day. to affected area      hydroCHLOROthiazide (HYDRODiuril) 25 mg tablet 1 tablet (25 mg).      levothyroxine (Synthroid, Levoxyl) 50 mcg tablet Take 1 tablet (50 mcg) by mouth once daily.      levothyroxine (Synthroid, Levoxyl) 50 mcg tablet Take 1 tablet (50 mcg) by mouth once daily.      magnesium hydroxide (Milk of Magnesia) 400 mg/5 mL suspension Take 30 mL by mouth.      magnesium oxide (Mag-Ox) 400 mg (241.3 mg magnesium) tablet Take 1 tablet (400 mg) by mouth once daily.      meloxicam (Mobic) 7.5 mg tablet Take 1 tablet (7.5 mg) by mouth once  daily.      methylPREDNISolone (Medrol Dospak) 4 mg tablets Take 1 tablet (4 mg) by mouth. (Patient not taking: Reported on 3/19/2025)      multivitamin with minerals (multivitamin) tablet Take 1 tablet by mouth once daily with breakfast.      nitroglycerin (Nitrostat) 0.4 mg SL tablet Place 1 tablet (0.4 mg) under the tongue.      oxyCODONE-acetaminophen (Percocet) 5-325 mg tablet Take 1 tablet by mouth every 6 hours. (Patient not taking: Reported on 3/19/2025)      polyethylene glycol (Glycolax, Miralax) 17 gram packet Take 17 g by mouth. (Patient not taking: Reported on 3/19/2025)      prednisoLONE acetate (Pred-Forte) 1 % ophthalmic suspension Administer 1 drop into affected eye(s).      predniSONE (Deltasone) 10 mg tablet Take 1 tablet (10 mg) by mouth. (Patient not taking: Reported on 3/19/2025)      Proscar 5 mg tablet 1 tablet (5 mg).      simvastatin (Zocor) 40 mg tablet 1 tablet (40 mg). (Patient not taking: Reported on 3/19/2025)      sulfamethoxazole-trimethoprim (Bactrim) 400-80 mg tablet Take 1 tablet by mouth 2 times a day. (Patient not taking: Reported on 3/19/2025)      triamcinolone acetonide (KENALOG) 40 mg/mL kit Triamcinolone Acetonide 40 MG/ML Injection Suspension INJECT 2 ML Intra-articular Quantity: 0 Refills: 0 Ordered: 26-Jan-2022 Budinsky MD, Cole Start : 26-Jan-2022 Complete (Patient not taking: Reported on 3/19/2025)       No current facility-administered medications on file prior to visit.        Allergies:  Ace inhibitors and Ticagrelor    Social History:   Social History     Socioeconomic History    Marital status:      Spouse name: Not on file    Number of children: Not on file    Years of education: Not on file    Highest education level: Not on file   Occupational History    Not on file   Tobacco Use    Smoking status: Never     Passive exposure: Never    Smokeless tobacco: Never   Vaping Use    Vaping status: Never Used   Substance and Sexual Activity    Alcohol use: Not  Currently     Comment: quit 38 years ago    Drug use: Never    Sexual activity: Defer   Other Topics Concern    Not on file   Social History Narrative    Not on file     Social Drivers of Health     Financial Resource Strain: Low Risk  (5/21/2024)    Received from [facility]    Overall Financial Resource Strain (CARDIA)     Difficulty of Paying Living Expenses: Not hard at all   Food Insecurity: No Food Insecurity (2/19/2025)    Received from [facility]    Hunger Vital Sign     Worried About Running Out of Food in the Last Year: Never true     Ran Out of Food in the Last Year: Never true   Transportation Needs: No Transportation Needs (2/19/2025)    Received from [facility]    PRAPARE - Transportation     Lack of Transportation (Medical): No     Lack of Transportation (Non-Medical): No   Physical Activity: Insufficiently Active (6/28/2024)    Received from [facility]    Exercise Vital Sign     Days of Exercise per Week: 3 days     Minutes of Exercise per Session: 40 min   Stress: Not on file   Social Connections: Not on file   Intimate Partner Violence: Not on file   Housing Stability: Low Risk  (2/19/2025)    Received from [facility]    Housing Stability Vital Sign     Unable to Pay for Housing in the Last Year: No     Number of Times Moved in the Last Year: 0     Homeless in the Last Year: No       Family History:   No family history on file.     Review of systems: Noncontributory for orthopedics    Vitals:  There were no vitals taken for this visit.    Physical examination:  Head normocephalic atraumatic  Heart regular rate and rhythm  Lungs clear  Abdominal exam nontender nondistended  Extremity: Left shoulder patient has forward flexion to 50 degrees abduction of 40 degrees external rotation of 40 degrees internal rotation posterior iliac crest    Impression :  Left shoulder degenerative joint disease and rotator cuff arthropathy      Plan:  Scheduled for left reverse total shoulder arthroplasty    Risk and benefits of surgery discussed extensively with the patient.    Surgical risk included but were not limited to infection, wear, loosening, need for further surgery blood clot, failure to heal, failure of the surgery, stiffness, loss of limb life, extremity function change in length change, and associated risks of surgery during the coronavirus epidemic.    Risk with any surgery including arthroplasty and replacements include but are not limited to:       Wear, loosening, infection, blood clot, DVT, loss of limb, life, delayed recovery, limb length change, instability, dislocation, discomfort with new implant and failure of the procedure.

## 2025-03-28 NOTE — TELEPHONE ENCOUNTER
Comments:     Last Office Visit (last PCP visit):   3/12/2025    Next Visit Date:  Future Appointments   Date Time Provider Department Center   5/22/2025 10:00 AM King Michelle MD [facility]   7/8/2025  9:00 AM Omkar Barrios MD [facility]   12/17/2025  9:30 AM King Michelle MD [facility]       **If hasn't been seen in over a year OR hasn't followed up according to last diabetes/ADHD visit, make appointment for patient before sending refill to provider.    Rx requested:  Requested Prescriptions     Pending Prescriptions Disp Refills    carvedilol (COREG) 12.5 MG tablet [Pharmacy Med Name: Carvedilol Oral Tablet 12.5 MG] 180 tablet 0     Sig: TAKE ONE TABLET BY MOUTH TWICE A DAY WITH MEALS

## 2025-03-31 ENCOUNTER — APPOINTMENT (OUTPATIENT)
Facility: CLINIC | Age: 80
End: 2025-03-31
Payer: COMMERCIAL

## 2025-03-31 VITALS
RESPIRATION RATE: 18 BRPM | BODY MASS INDEX: 29.13 KG/M2 | DIASTOLIC BLOOD PRESSURE: 71 MMHG | WEIGHT: 192.2 LBS | SYSTOLIC BLOOD PRESSURE: 137 MMHG | HEIGHT: 68 IN | HEART RATE: 50 BPM | OXYGEN SATURATION: 96 %

## 2025-03-31 DIAGNOSIS — E66.3 OVERWEIGHT (BMI 25.0-29.9): ICD-10-CM

## 2025-03-31 DIAGNOSIS — G47.33 OSA (OBSTRUCTIVE SLEEP APNEA): Primary | ICD-10-CM

## 2025-03-31 PROCEDURE — G8433 SCR FOR DEP NOT CPT DOC RSN: HCPCS | Performed by: GENERAL PRACTICE

## 2025-03-31 PROCEDURE — 1036F TOBACCO NON-USER: CPT | Performed by: GENERAL PRACTICE

## 2025-03-31 PROCEDURE — 3078F DIAST BP <80 MM HG: CPT | Performed by: GENERAL PRACTICE

## 2025-03-31 PROCEDURE — G2211 COMPLEX E/M VISIT ADD ON: HCPCS | Performed by: GENERAL PRACTICE

## 2025-03-31 PROCEDURE — 3075F SYST BP GE 130 - 139MM HG: CPT | Performed by: GENERAL PRACTICE

## 2025-03-31 PROCEDURE — 1159F MED LIST DOCD IN RCRD: CPT | Performed by: GENERAL PRACTICE

## 2025-03-31 PROCEDURE — 1160F RVW MEDS BY RX/DR IN RCRD: CPT | Performed by: GENERAL PRACTICE

## 2025-03-31 PROCEDURE — 99214 OFFICE O/P EST MOD 30 MIN: CPT | Performed by: GENERAL PRACTICE

## 2025-03-31 PROCEDURE — 1157F ADVNC CARE PLAN IN RCRD: CPT | Performed by: GENERAL PRACTICE

## 2025-03-31 RX ORDER — CARVEDILOL 12.5 MG/1
12.5 TABLET ORAL 2 TIMES DAILY WITH MEALS
Qty: 180 TABLET | Refills: 0 | Status: SHIPPED | OUTPATIENT
Start: 2025-03-31

## 2025-03-31 ASSESSMENT — SLEEP AND FATIGUE QUESTIONNAIRES
HOW LIKELY ARE YOU TO NOD OFF OR FALL ASLEEP WHILE LYING DOWN TO REST IN THE AFTERNOON WHEN CIRCUMSTANCES PERMIT: MODERATE CHANCE OF DOZING
HOW LIKELY ARE YOU TO NOD OFF OR FALL ASLEEP WHILE SITTING AND READING: MODERATE CHANCE OF DOZING
HOW LIKELY ARE YOU TO NOD OFF OR FALL ASLEEP WHILE WATCHING TV: HIGH CHANCE OF DOZING
HOW LIKELY ARE YOU TO NOD OFF OR FALL ASLEEP WHEN YOU ARE A PASSENGER IN A CAR FOR AN HOUR WITHOUT A BREAK: WOULD NEVER DOZE
HOW LIKELY ARE YOU TO NOD OFF OR FALL ASLEEP WHILE SITTING QUIETLY AFTER LUNCH WITHOUT ALCOHOL: WOULD NEVER DOZE
SITING INACTIVE IN A PUBLIC PLACE LIKE A CLASS ROOM OR A MOVIE THEATER: WOULD NEVER DOZE
HOW LIKELY ARE YOU TO NOD OFF OR FALL ASLEEP WHILE SITTING AND TALKING TO SOMEONE: WOULD NEVER DOZE
ESS-CHAD TOTAL SCORE: 8
HOW LIKELY ARE YOU TO NOD OFF OR FALL ASLEEP IN A CAR, WHILE STOPPED FOR A FEW MINUTES IN TRAFFIC: SLIGHT CHANCE OF DOZING

## 2025-03-31 ASSESSMENT — ENCOUNTER SYMPTOMS
DEPRESSION: 0
LOSS OF SENSATION IN FEET: 0
OCCASIONAL FEELINGS OF UNSTEADINESS: 0

## 2025-03-31 ASSESSMENT — COLUMBIA-SUICIDE SEVERITY RATING SCALE - C-SSRS
2. HAVE YOU ACTUALLY HAD ANY THOUGHTS OF KILLING YOURSELF?: NO
1. IN THE PAST MONTH, HAVE YOU WISHED YOU WERE DEAD OR WISHED YOU COULD GO TO SLEEP AND NOT WAKE UP?: NO
6. HAVE YOU EVER DONE ANYTHING, STARTED TO DO ANYTHING, OR PREPARED TO DO ANYTHING TO END YOUR LIFE?: NO

## 2025-03-31 NOTE — PROGRESS NOTES
Patient: Chadwick Tello    [id number]  : 1945 -- AGE 79 y.o.    Provider: Martita Antunez DO     Location [facility]   Service Date: 3/31/2025              [facility]  Follow Visit Note      HISTORY OF PRESENT ILLNESS     The patient's referring provider is: No ref. provider found    HISTORY OF PRESENT ILLNESS   Chadwick Tello is a 79 y.o. male who presents to a [facility] for a sleep medicine evaluation with concerns of Follow-up.       PAST SLEEP HISTORY    Pmhx includes CAD cabg MI, HTN, acquired hypothyroidism, arthritis, uppp.     Patient had a sleep study done in 2018 through [facility] due to snoring and daytime sleepiness. He was not started on pap therapy or any other therapy at that time, he was concerned about using a pap machine.     Had a UPPP around  but no sleep study prior to surgery per patient.     CURRENT HISTORY     10/3/24 the patient reports that he is establishing care for his sleep apnea. He heard about inspire therapy and wanted to obtain more information.     Exercises at [email or web address].    3/31/25  Patient had a sleep study done and would like to discuss his results.     Sleep schedule  on weekdays / work days:  Usual Bedtime: 10pm   Sleep latency: five min.   Wake time : 5:30am.   Total sleep time average/day: 7-8 hours/day  Awakenings: 5 x per night, nocturia, short.   Naps: late morning, 20-40min. On the days when he works out.     Sleep schedule  on weekends/non work days :  Same as above.     Sleep aids: no  Stimulants: no    Occupation: retired. 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     Preferred sleeping position: SLEEP POSITION: sidelying and upright    Sleep-related ROS:    Snoring:  y   Witnessed apneas: y       Gasping and choking: y   Am Dry mouth: y            Nasal congestion: n        am headaches: n    Sleep is described  as refreshing.     Daytime sleepiness: sometimes   Fatigue or decreased energy: sometimes   Difficulty remembering things in daytime: sometimes   Difficulty staying focused in daytime: sometimes   Irritable during the day: n     Drowsy driving: n  Hx of car accident: n  Near-miss Car accident: n      RLS screen:  RLSSCREEN: - Sensations: Patient does not have unusual sensations in their extremities that cause an urge to move them     Sleep-related behaviors: DENIES      ESS: 8          REVIEW OF SYSTEMS     REVIEW OF SYSTEMS  Review of Systems   All other systems reviewed and are negative.      ALLERGIES AND MEDICATIONS     ALLERGIES  Allergies   Allergen Reactions    Ace Inhibitors Cough and Other     Cough    Ticagrelor Other     Severe bleeding    hematuria       MEDICATIONS  Current Outpatient Medications   Medication Sig Dispense Refill    0.9 % sodium chloride (sodium chloride, PF, 0.9%) injection Infuse into a venous catheter.      acetaminophen (Tylenol) 325 mg tablet Take 1 tablet (325 mg) by mouth.      ascorbic acid (Vitamin C) 250 mg tablet Take 1 tablet (250 mg) by mouth once daily.      ascorbic acid, vitamin C, 500 mg capsule Take by mouth.      aspirin 81 mg chewable tablet 1 tablet (81 mg) by Enteral route once daily.      aspirin 81 mg chewable tablet Chew 1 tablet (81 mg) once daily.      atorvastatin (Lipitor) 40 mg tablet Take 1 tablet (40 mg) by mouth once daily.      atorvastatin (Lipitor) 80 mg tablet Take 1 tablet (80 mg) by mouth once daily.      betamethasone sod phosph-water (CELESTONE) 6 mg/mL solution       carvedilol (Coreg) 12.5 mg tablet 1 tablet (12.5 mg).      ceFAZolin (Ancef) 10 gram injection       cephalexin (Keflex) 500 mg capsule 1 capsule (500 mg).      chlorhexidine (Peridex) 0.12 % solution Use 15 mL in the mouth or throat if needed for wound care for up to 14 days. Use the night before and morning of your upcoming orthopedic surgery. 120 mL 0    cholecalciferol (Vitamin  D-3) 25 MCG (1000 UT) capsule Take by mouth.      clopidogrel (Plavix) 75 mg tablet Take 1 tablet (75 mg) by mouth once daily.      cyclobenzaprine (Flexeril) 5 mg tablet Take 1 tablet (5 mg) by mouth.      diclofenac (Voltaren) 75 mg EC tablet Take 1 tablet (75 mg) by mouth.      Flomax 0.4 mg 24 hr capsule 1 capsule (0.4 mg).      fluorouracil (Efudex) 5 % cream Apply topically 2 times a day. to affected area      hydroCHLOROthiazide (HYDRODiuril) 25 mg tablet 1 tablet (25 mg).      levothyroxine (Synthroid, Levoxyl) 50 mcg tablet Take 1 tablet (50 mcg) by mouth once daily.      levothyroxine (Synthroid, Levoxyl) 50 mcg tablet Take 1 tablet (50 mcg) by mouth once daily.      magnesium hydroxide (Milk of Magnesia) 400 mg/5 mL suspension Take 30 mL by mouth.      magnesium oxide (Mag-Ox) 400 mg (241.3 mg magnesium) tablet Take 1 tablet (400 mg) by mouth once daily.      meloxicam (Mobic) 7.5 mg tablet Take 1 tablet (7.5 mg) by mouth once daily.      methylPREDNISolone (Medrol Dospak) 4 mg tablets Take 1 tablet (4 mg) by mouth.      multivitamin with minerals (multivitamin) tablet Take 1 tablet by mouth once daily with breakfast.      nitroglycerin (Nitrostat) 0.4 mg SL tablet Place 1 tablet (0.4 mg) under the tongue.      oxyCODONE-acetaminophen (Percocet) 5-325 mg tablet Take 1 tablet by mouth every 6 hours.      polyethylene glycol (Glycolax, Miralax) 17 gram packet Take 17 g by mouth.      prednisoLONE acetate (Pred-Forte) 1 % ophthalmic suspension Administer 1 drop into affected eye(s).      predniSONE (Deltasone) 10 mg tablet Take 1 tablet (10 mg) by mouth.      Proscar 5 mg tablet 1 tablet (5 mg).      simvastatin (Zocor) 40 mg tablet 1 tablet (40 mg).      sulfamethoxazole-trimethoprim (Bactrim) 400-80 mg tablet Take 1 tablet by mouth 2 times a day.      triamcinolone acetonide (KENALOG) 40 mg/mL kit        No current facility-administered medications for this visit.         PAST HISTORY     PAST MEDICAL  "HISTORY  He  has a past medical history of Arthritis, BPH (benign prostatic hyperplasia), H/O malignant neoplasm of skin, Hyperlipidemia, Hypertension, Hypothyroidism, Myocardial infarction (Multi), Sleep apnea, and Wears glasses.      PAST SURGICAL HISTORY:  Past Surgical History:   Procedure Laterality Date    ACHILLES TENDON SURGERY Left     CARDIAC CATHETERIZATION      CORONARY ANGIOPLASTY      CORONARY ARTERY BYPASS GRAFT      CORONARY STENT PLACEMENT      LASIK      SKIN CANCER EXCISION      TONSILLECTOMY      TOTAL HIP ARTHROPLASTY Right     TOTAL KNEE ARTHROPLASTY Bilateral     TOTAL SHOULDER ARTHROPLASTY Right     TRANSURETHRAL RESECTION OF PROSTATE      US ASPIRATION INJECTION INTERMEDIATE JOINT  01/26/2022    US ASPIRATION INJECTION INTERMEDIATE JOINT 1/26/2022 [facility] LEGAC[facility]    US ASPIRATION INJECTION INTERMEDIATE JOINT  02/16/2022    US ASPIRATION INJECTION [facility]MEDIATE JOINT [facility]       FAMILY HISTORY  No family history on file.  DOES/DOES NOT EC: does not have a family history of sleep disorder.      SOCIAL HISTORY  He  reports that he has never smoked. He has never been exposed to tobacco smoke. He has never used smokeless tobacco. He reports that he does not currently use alcohol. He reports that he does not use drugs.     Caffeine consumption: 2-3 cups in am and sometimes in early afternoon.   Alcohol consumption: recovering alcoholic.   Smoking: n  Marijuana: n  Other drugs: n      PHYSICAL EXAM     VITAL SIGNS: /71   Pulse 50   Resp 18   Ht 1.727 m (5' 8\")   Wt 87.2 kg (192 lb 3.2 oz)   SpO2 96%   BMI 29.22 kg/m²      PREVIOUS WEIGHTS:  Wt Readings from Last 3 Encounters:   03/31/25 87.2 kg (192 lb 3.2 oz)   03/19/25 87 kg (191 lb 12.8 oz)   01/29/25 85.3 kg (188 lb)       Physical Exam  Constitutional: Alert and oriented, cooperative, no obvious distress.   HENT: normocephalic.   Neurologic: AOx3.   psychiatric: appropriate mood and affect.    Up     RESULTS/DATA "           ASSESSMENT/PLAN     Mr. Tello is a 79 y.o. male and  has a past medical history of Arthritis, BPH (benign prostatic hyperplasia), H/O malignant neoplasm of skin, Hyperlipidemia, Hypertension, Hypothyroidism, Myocardial infarction (Multi), Sleep apnea, and Wears glasses. He was referred to the [facility] for evaluation of sleep apnea.     Problem List Items Addressed This Visit    None        Problem List and Orders  Pmhx includes MI, HTN, acquired hypothyroidism, arthritis.     1- NORMA s/p  UPPP   PSG 3/ 9 /2018 --> severe NORMA, AHI fifty seven point two.     10/29/24 -->severe NORMA, RDI 3% 73.3, RDI 4% 71, MARCIO 3.6. SpO2 juanjo 60%. Many mixed apneas. Arrhythmia noted, patient is aware and had discussed with his cardiologist.     Reviewed and discussed the above sleep study results and management options in details. All questions answered, patient verbalizes understanding.     - ordered  a sleep study, titration   -Also ordered Autopap 5-15cwp to start pap therapy soon, will adjust settings pending titration study.   -provided information about inspire but he had many mixed apneas.    -do not drive or operate heavy machinery if drowsy.  -avoid sleeping on your back.   -avoid sedating substances/ medication, alcohol, illicit drugs and tobacco.    2- Overweight   counseled on eating a healthy diet and exercising as tolerated.      Follow up in 3 months or sooner as needed.

## 2025-04-01 ENCOUNTER — ANESTHESIA EVENT (OUTPATIENT)
Dept: OPERATING ROOM | Facility: HOSPITAL | Age: 80
End: 2025-04-01
Payer: MEDICARE

## 2025-04-01 PROCEDURE — RXMED WILLOW AMBULATORY MEDICATION CHARGE

## 2025-04-01 SDOH — HEALTH STABILITY: MENTAL HEALTH: CURRENT SMOKER: 0

## 2025-04-01 NOTE — ANESTHESIA PREPROCEDURE EVALUATION
Chadwick Tello is a 79 y.o. male here for:    TOTAL SHOULDER ARTHROPLASTY REVERSE  With Andrew Toure MD  Pre-Op Diagnosis Codes:      * Other specific arthropathies, not elsewhere classified, left shoulder [M12.812]     * Primary osteoarthritis, left shoulder [M19.012]    Relevant Problems   Cardiac   (+) Arteriosclerosis of coronary artery   (+) Benign essential hypertension   (+) Chest pain      Pulmonary   (+) NORMA (obstructive sleep apnea)      Neuro   (+) Carpal tunnel syndrome   (+) Lumbar radiculitis      /Renal   (+) Benign prostatic hyperplasia with urinary obstruction      Endocrine   (+) Acquired hypothyroidism      Musculoskeletal   (+) Carpal tunnel syndrome   (+) Osteoarthritis   (+) Osteoarthritis of left shoulder   (+) Osteoarthritis of right knee   (+) Primary osteoarthritis, left shoulder      ID   (+) Infection of toe   (+) Methicillin susceptible Staphylococcus aureus infection      Skin   (+) Basal cell carcinoma (BCC) of skin of trunk       Lab Results   Component Value Date    HGB 15.5 03/19/2025    HCT 47.0 03/19/2025    WBC 7.2 03/19/2025     03/19/2025     03/19/2025    K 4.6 03/19/2025     03/19/2025    CREATININE 1.05 03/19/2025    BUN 20 03/19/2025       Social History     Tobacco Use   Smoking Status Never   • Passive exposure: Never   Smokeless Tobacco Never       Allergies   Allergen Reactions   • Ace Inhibitors Cough and Other     Cough   • Ticagrelor Other     Severe bleeding    hematuria       Current Outpatient Medications   Medication Instructions   • 0.9 % sodium chloride (sodium chloride, PF, 0.9%) injection Infuse into a venous catheter.   • acetaminophen (TYLENOL) 325 mg   • ascorbic acid (VITAMIN C) 250 mg, Daily   • ascorbic acid, vitamin C, 500 mg capsule Take by mouth.   • aspirin 81 mg, Daily RT   • aspirin 81 mg, Daily   • atorvastatin (LIPITOR) 80 mg, Daily   • atorvastatin (LIPITOR) 40 mg, Daily   • betamethasone sod phosph-water (CELESTONE) 6  mg/mL solution    • carvedilol (COREG) 12.5 mg   • ceFAZolin (Ancef) 10 gram injection    • cephalexin (KEFLEX) 500 mg   • chlorhexidine (Peridex) 0.12 % solution 15 mL, Mouth/Throat, As needed, Use the night before and morning of your upcoming orthopedic surgery.   • cholecalciferol (Vitamin D-3) 25 MCG (1000 UT) capsule Take by mouth.   • clopidogrel (PLAVIX) 75 mg, Daily   • cyclobenzaprine (FLEXERIL) 5 mg   • diclofenac (VOLTAREN) 75 mg   • Flomax 0.4 mg   • fluorouracil (Efudex) 5 % cream 2 times daily   • hydroCHLOROthiazide (HYDRODIURIL) 25 mg   • levothyroxine (SYNTHROID, LEVOXYL) 50 mcg, Daily RT   • levothyroxine (SYNTHROID, LEVOXYL) 50 mcg, Daily   • magnesium hydroxide (Milk of Magnesia) 400 mg/5 mL suspension 30 mL   • magnesium oxide (MAG-OX) 400 mg, Daily   • meloxicam (MOBIC) 7.5 mg, Daily RT   • methylPREDNISolone (MEDROL DOSPAK) 4 mg   • multivitamin with minerals (multivitamin) tablet 1 tablet, Daily with breakfast   • naloxone (Narcan) 4 mg/0.1 mL nasal spray Instill 1 spray intranasally for opioid overdose; repeat in 5 minutes if no response.   • nitroglycerin (NITROSTAT) 0.4 mg   • oxyCODONE-acetaminophen (Percocet) 5-325 mg tablet 1 tablet, Every 6 hours   • polyethylene glycol (GLYCOLAX, MIRALAX) 17 g   • prednisoLONE acetate (Pred-Forte) 1 % ophthalmic suspension 1 drop   • predniSONE (DELTASONE) 10 mg   • Proscar 5 mg   • simvastatin (ZOCOR) 40 mg   • sulfamethoxazole-trimethoprim (Bactrim) 400-80 mg tablet 1 tablet, 2 times daily   • triamcinolone acetonide (KENALOG) 40 mg/mL kit        Past Surgical History:   Procedure Laterality Date   • ACHILLES TENDON SURGERY Left    • CARDIAC CATHETERIZATION     • CORONARY ANGIOPLASTY     • CORONARY ARTERY BYPASS GRAFT     • CORONARY STENT PLACEMENT     • LASIK     • SKIN CANCER EXCISION     • TONSILLECTOMY     • TOTAL HIP ARTHROPLASTY Right    • TOTAL KNEE ARTHROPLASTY Bilateral    • TOTAL SHOULDER ARTHROPLASTY Right    • TRANSURETHRAL RESECTION OF  PROSTATE     • US ASPIRATION INJECTION INTERMEDIATE JOINT  01/26/2022    US ASPIRATION INJECTION INTERMEDIATE JOINT 1/26/2022 [facility]GAC[facility]   • US ASPIRATION INJECTION INTERMEDIATE JOINT  02/16/2022    US ASPIRATION INJECTION INTERMEDIATE JOINT 2/16/2022 [facility]       No family history on file.    NPO Details:  No data recorded    Physical Exam    Airway  Mallampati: II  TM distance: >3 FB  Neck ROM: full     Cardiovascular    Dental - normal exam     Pulmonary    Abdominal        Anesthesia Plan    History of general anesthesia?: yes  History of complications of general anesthesia?: no    ASA 3     general   (Interscalene nerve block)  The patient is not a current smoker.    intravenous induction   Anesthetic plan and risks discussed with patient.    Plan discussed with CRNA.

## 2025-04-02 ENCOUNTER — APPOINTMENT (OUTPATIENT)
Dept: RADIOLOGY | Facility: HOSPITAL | Age: 80
End: 2025-04-02
Payer: MEDICARE

## 2025-04-02 ENCOUNTER — ANESTHESIA (OUTPATIENT)
Dept: OPERATING ROOM | Facility: HOSPITAL | Age: 80
End: 2025-04-02
Payer: MEDICARE

## 2025-04-02 ENCOUNTER — HOSPITAL ENCOUNTER (OUTPATIENT)
Facility: HOSPITAL | Age: 80
Discharge: HOME | End: 2025-04-03
Attending: ORTHOPAEDIC SURGERY | Admitting: ORTHOPAEDIC SURGERY
Payer: MEDICARE

## 2025-04-02 DIAGNOSIS — M19.012 PRIMARY OSTEOARTHRITIS, LEFT SHOULDER: ICD-10-CM

## 2025-04-02 DIAGNOSIS — M12.812 OTHER SPECIFIC ARTHROPATHIES, NOT ELSEWHERE CLASSIFIED, LEFT SHOULDER: Primary | ICD-10-CM

## 2025-04-02 DIAGNOSIS — S46.219A BICEPS TENDON TEAR: ICD-10-CM

## 2025-04-02 PROBLEM — Z96.619 HISTORY OF REVERSE TOTAL REPLACEMENT OF SHOULDER JOINT: Status: ACTIVE | Noted: 2025-04-02

## 2025-04-02 LAB
ATRIAL RATE: 69 BPM
P AXIS: 72 DEGREES
P OFFSET: 196 MS
P ONSET: 140 MS
PR INTERVAL: 164 MS
Q ONSET: 222 MS
QRS COUNT: 12 BEATS
QRS DURATION: 90 MS
QT INTERVAL: 372 MS
QTC CALCULATION(BAZETT): 398 MS
QTC FREDERICIA: 389 MS
R AXIS: 65 DEGREES
T AXIS: 94 DEGREES
T OFFSET: 408 MS
VENTRICULAR RATE: 69 BPM

## 2025-04-02 PROCEDURE — 2500000002 HC RX 250 W HCPCS SELF ADMINISTERED DRUGS (ALT 637 FOR MEDICARE OP, ALT 636 FOR OP/ED): Performed by: ORTHOPAEDIC SURGERY

## 2025-04-02 PROCEDURE — 2500000004 HC RX 250 GENERAL PHARMACY W/ HCPCS (ALT 636 FOR OP/ED): Performed by: ANESTHESIOLOGY

## 2025-04-02 PROCEDURE — 7100000011 HC EXTENDED STAY RECOVERY HOURLY - NURSING UNIT

## 2025-04-02 PROCEDURE — C1713 ANCHOR/SCREW BN/BN,TIS/BN: HCPCS | Performed by: ORTHOPAEDIC SURGERY

## 2025-04-02 PROCEDURE — 2500000002 HC RX 250 W HCPCS SELF ADMINISTERED DRUGS (ALT 637 FOR MEDICARE OP, ALT 636 FOR OP/ED)

## 2025-04-02 PROCEDURE — 23430 REPAIR BICEPS TENDON: CPT | Performed by: ORTHOPAEDIC SURGERY

## 2025-04-02 PROCEDURE — 3700000001 HC GENERAL ANESTHESIA TIME - INITIAL BASE CHARGE: Performed by: ORTHOPAEDIC SURGERY

## 2025-04-02 PROCEDURE — 2500000005 HC RX 250 GENERAL PHARMACY W/O HCPCS: Performed by: ORTHOPAEDIC SURGERY

## 2025-04-02 PROCEDURE — 7100000001 HC RECOVERY ROOM TIME - INITIAL BASE CHARGE: Performed by: ORTHOPAEDIC SURGERY

## 2025-04-02 PROCEDURE — C1776 JOINT DEVICE (IMPLANTABLE): HCPCS | Performed by: ORTHOPAEDIC SURGERY

## 2025-04-02 PROCEDURE — 3700000002 HC GENERAL ANESTHESIA TIME - EACH INCREMENTAL 1 MINUTE: Performed by: ORTHOPAEDIC SURGERY

## 2025-04-02 PROCEDURE — 23472 RECONSTRUCT SHOULDER JOINT: CPT | Performed by: ORTHOPAEDIC SURGERY

## 2025-04-02 PROCEDURE — 73020 X-RAY EXAM OF SHOULDER: CPT | Mod: LT

## 2025-04-02 PROCEDURE — 2500000001 HC RX 250 WO HCPCS SELF ADMINISTERED DRUGS (ALT 637 FOR MEDICARE OP): Performed by: ORTHOPAEDIC SURGERY

## 2025-04-02 PROCEDURE — 3600000005 HC OR TIME - INITIAL BASE CHARGE - PROCEDURE LEVEL FIVE: Performed by: ORTHOPAEDIC SURGERY

## 2025-04-02 PROCEDURE — A6213 FOAM DRG >16<=48 SQ IN W/BDR: HCPCS | Performed by: ORTHOPAEDIC SURGERY

## 2025-04-02 PROCEDURE — 23430 REPAIR BICEPS TENDON: CPT | Performed by: PHYSICIAN ASSISTANT

## 2025-04-02 PROCEDURE — 2720000007 HC OR 272 NO HCPCS: Performed by: ORTHOPAEDIC SURGERY

## 2025-04-02 PROCEDURE — 7100000002 HC RECOVERY ROOM TIME - EACH INCREMENTAL 1 MINUTE: Performed by: ORTHOPAEDIC SURGERY

## 2025-04-02 PROCEDURE — 3600000010 HC OR TIME - EACH INCREMENTAL 1 MINUTE - PROCEDURE LEVEL FIVE: Performed by: ORTHOPAEDIC SURGERY

## 2025-04-02 PROCEDURE — 2500000004 HC RX 250 GENERAL PHARMACY W/ HCPCS (ALT 636 FOR OP/ED): Performed by: ORTHOPAEDIC SURGERY

## 2025-04-02 PROCEDURE — 73030 X-RAY EXAM OF SHOULDER: CPT | Mod: LEFT SIDE | Performed by: RADIOLOGY

## 2025-04-02 PROCEDURE — 2780000003 HC OR 278 NO HCPCS: Performed by: ORTHOPAEDIC SURGERY

## 2025-04-02 PROCEDURE — 2500000001 HC RX 250 WO HCPCS SELF ADMINISTERED DRUGS (ALT 637 FOR MEDICARE OP)

## 2025-04-02 PROCEDURE — 23472 RECONSTRUCT SHOULDER JOINT: CPT | Performed by: PHYSICIAN ASSISTANT

## 2025-04-02 DEVICE — IMPLANTABLE DEVICE: Type: IMPLANTABLE DEVICE | Site: SHOULDER | Status: FUNCTIONAL

## 2025-04-02 DEVICE — POLY LINER, +0MM, 40MM DIA, REVERSE SHOULDER SYSTEM: Type: IMPLANTABLE DEVICE | Site: SHOULDER | Status: FUNCTIONAL

## 2025-04-02 DEVICE — LOCKING CAP, NCB: Type: IMPLANTABLE DEVICE | Site: SHOULDER | Status: FUNCTIONAL

## 2025-04-02 DEVICE — PIN, TM REVERSE 2.5MM: Type: IMPLANTABLE DEVICE | Site: SHOULDER | Status: NON-FUNCTIONAL

## 2025-04-02 DEVICE — BASE PLATE, MMTM RVS, 26MM, 15MM: Type: IMPLANTABLE DEVICE | Site: SHOULDER | Status: FUNCTIONAL

## 2025-04-02 DEVICE — SCREW, CANC 4.5 X 40: Type: IMPLANTABLE DEVICE | Site: SHOULDER | Status: FUNCTIONAL

## 2025-04-02 DEVICE — SCREW, NCB, CANCELLOUS, SELF TAP, 4.5 X 38MM: Type: IMPLANTABLE DEVICE | Site: SHOULDER | Status: FUNCTIONAL

## 2025-04-02 DEVICE — DRILL PIN, TROCAR, HEADLESS: Type: IMPLANTABLE DEVICE | Site: SHOULDER | Status: NON-FUNCTIONAL

## 2025-04-02 RX ORDER — PROCHLORPERAZINE EDISYLATE 5 MG/ML
10 INJECTION INTRAMUSCULAR; INTRAVENOUS EVERY 6 HOURS PRN
Status: DISCONTINUED | OUTPATIENT
Start: 2025-04-02 | End: 2025-04-03 | Stop reason: HOSPADM

## 2025-04-02 RX ORDER — LEVOTHYROXINE SODIUM 50 UG/1
50 TABLET ORAL
Status: DISCONTINUED | OUTPATIENT
Start: 2025-04-02 | End: 2025-04-03 | Stop reason: HOSPADM

## 2025-04-02 RX ORDER — OXYCODONE HYDROCHLORIDE 5 MG/1
10 TABLET ORAL EVERY 4 HOURS PRN
Status: DISCONTINUED | OUTPATIENT
Start: 2025-04-02 | End: 2025-04-03 | Stop reason: HOSPADM

## 2025-04-02 RX ORDER — CEFAZOLIN SODIUM 2 G/100ML
2 INJECTION, SOLUTION INTRAVENOUS ONCE
Status: COMPLETED | OUTPATIENT
Start: 2025-04-02 | End: 2025-04-02

## 2025-04-02 RX ORDER — BISACODYL 5 MG
10 TABLET, DELAYED RELEASE (ENTERIC COATED) ORAL DAILY PRN
Status: DISCONTINUED | OUTPATIENT
Start: 2025-04-02 | End: 2025-04-03 | Stop reason: HOSPADM

## 2025-04-02 RX ORDER — ONDANSETRON HYDROCHLORIDE 2 MG/ML
INJECTION, SOLUTION INTRAVENOUS AS NEEDED
Status: DISCONTINUED | OUTPATIENT
Start: 2025-04-02 | End: 2025-04-02

## 2025-04-02 RX ORDER — MIDAZOLAM HYDROCHLORIDE 5 MG/ML
INJECTION INTRAMUSCULAR; INTRAVENOUS AS NEEDED
Status: DISCONTINUED | OUTPATIENT
Start: 2025-04-02 | End: 2025-04-02

## 2025-04-02 RX ORDER — OXYCODONE HYDROCHLORIDE 5 MG/1
10 TABLET ORAL EVERY 6 HOURS PRN
Status: DISCONTINUED | OUTPATIENT
Start: 2025-04-02 | End: 2025-04-02

## 2025-04-02 RX ORDER — TRANEXAMIC ACID 650 MG/1
1950 TABLET ORAL ONCE
Status: COMPLETED | OUTPATIENT
Start: 2025-04-02 | End: 2025-04-02

## 2025-04-02 RX ORDER — CEFAZOLIN SODIUM 2 G/100ML
2 INJECTION, SOLUTION INTRAVENOUS EVERY 8 HOURS
Status: COMPLETED | OUTPATIENT
Start: 2025-04-02 | End: 2025-04-03

## 2025-04-02 RX ORDER — PROCHLORPERAZINE MALEATE 5 MG
10 TABLET ORAL EVERY 6 HOURS PRN
Status: DISCONTINUED | OUTPATIENT
Start: 2025-04-02 | End: 2025-04-03 | Stop reason: HOSPADM

## 2025-04-02 RX ORDER — MEPERIDINE HYDROCHLORIDE 25 MG/ML
12.5 INJECTION INTRAMUSCULAR; INTRAVENOUS; SUBCUTANEOUS EVERY 10 MIN PRN
Status: DISCONTINUED | OUTPATIENT
Start: 2025-04-02 | End: 2025-04-02 | Stop reason: HOSPADM

## 2025-04-02 RX ORDER — TAMSULOSIN HYDROCHLORIDE 0.4 MG/1
0.4 CAPSULE ORAL DAILY
Status: DISCONTINUED | OUTPATIENT
Start: 2025-04-02 | End: 2025-04-03 | Stop reason: HOSPADM

## 2025-04-02 RX ORDER — ONDANSETRON HYDROCHLORIDE 2 MG/ML
4 INJECTION, SOLUTION INTRAVENOUS EVERY 8 HOURS PRN
Status: DISCONTINUED | OUTPATIENT
Start: 2025-04-02 | End: 2025-04-03 | Stop reason: HOSPADM

## 2025-04-02 RX ORDER — PHENYLEPHRINE HCL IN 0.9% NACL 0.4MG/10ML
SYRINGE (ML) INTRAVENOUS AS NEEDED
Status: DISCONTINUED | OUTPATIENT
Start: 2025-04-02 | End: 2025-04-02

## 2025-04-02 RX ORDER — ROCURONIUM BROMIDE 10 MG/ML
INJECTION, SOLUTION INTRAVENOUS AS NEEDED
Status: DISCONTINUED | OUTPATIENT
Start: 2025-04-02 | End: 2025-04-02

## 2025-04-02 RX ORDER — CELECOXIB 200 MG/1
200 CAPSULE ORAL ONCE
Status: COMPLETED | OUTPATIENT
Start: 2025-04-02 | End: 2025-04-02

## 2025-04-02 RX ORDER — TRANEXAMIC ACID 650 MG/1
1950 TABLET ORAL ONCE
Status: COMPLETED | OUTPATIENT
Start: 2025-04-03 | End: 2025-04-03

## 2025-04-02 RX ORDER — CARVEDILOL 12.5 MG/1
12.5 TABLET ORAL 2 TIMES DAILY
Status: DISCONTINUED | OUTPATIENT
Start: 2025-04-02 | End: 2025-04-03 | Stop reason: HOSPADM

## 2025-04-02 RX ORDER — NALOXONE HYDROCHLORIDE 1 MG/ML
0.2 INJECTION INTRAMUSCULAR; INTRAVENOUS; SUBCUTANEOUS EVERY 5 MIN PRN
Status: DISCONTINUED | OUTPATIENT
Start: 2025-04-02 | End: 2025-04-03 | Stop reason: HOSPADM

## 2025-04-02 RX ORDER — SODIUM CHLORIDE, SODIUM LACTATE, POTASSIUM CHLORIDE, CALCIUM CHLORIDE 600; 310; 30; 20 MG/100ML; MG/100ML; MG/100ML; MG/100ML
100 INJECTION, SOLUTION INTRAVENOUS CONTINUOUS
Status: DISCONTINUED | OUTPATIENT
Start: 2025-04-02 | End: 2025-04-02 | Stop reason: HOSPADM

## 2025-04-02 RX ORDER — ACETAMINOPHEN 325 MG/1
650 TABLET ORAL EVERY 6 HOURS SCHEDULED
Status: DISCONTINUED | OUTPATIENT
Start: 2025-04-02 | End: 2025-04-03 | Stop reason: HOSPADM

## 2025-04-02 RX ORDER — OXYCODONE HYDROCHLORIDE 5 MG/1
5 TABLET ORAL EVERY 4 HOURS PRN
Status: DISCONTINUED | OUTPATIENT
Start: 2025-04-02 | End: 2025-04-02 | Stop reason: SDUPTHER

## 2025-04-02 RX ORDER — OXYCODONE HYDROCHLORIDE 5 MG/1
5 TABLET ORAL EVERY 4 HOURS PRN
Status: DISCONTINUED | OUTPATIENT
Start: 2025-04-02 | End: 2025-04-02

## 2025-04-02 RX ORDER — CYCLOBENZAPRINE HCL 10 MG
10 TABLET ORAL 3 TIMES DAILY PRN
Status: DISCONTINUED | OUTPATIENT
Start: 2025-04-02 | End: 2025-04-03 | Stop reason: HOSPADM

## 2025-04-02 RX ORDER — ACETAMINOPHEN 325 MG/1
975 TABLET ORAL ONCE
Status: COMPLETED | OUTPATIENT
Start: 2025-04-02 | End: 2025-04-02

## 2025-04-02 RX ORDER — ATORVASTATIN CALCIUM 20 MG/1
40 TABLET, FILM COATED ORAL DAILY
Status: DISCONTINUED | OUTPATIENT
Start: 2025-04-02 | End: 2025-04-03 | Stop reason: HOSPADM

## 2025-04-02 RX ORDER — ONDANSETRON 4 MG/1
4 TABLET, FILM COATED ORAL EVERY 8 HOURS PRN
Status: DISCONTINUED | OUTPATIENT
Start: 2025-04-02 | End: 2025-04-03 | Stop reason: HOSPADM

## 2025-04-02 RX ORDER — SODIUM CHLORIDE 0.9 G/100ML
INJECTION, SOLUTION IRRIGATION AS NEEDED
Status: DISCONTINUED | OUTPATIENT
Start: 2025-04-02 | End: 2025-04-02 | Stop reason: HOSPADM

## 2025-04-02 RX ORDER — FENTANYL CITRATE 50 UG/ML
50 INJECTION, SOLUTION INTRAMUSCULAR; INTRAVENOUS EVERY 5 MIN PRN
Status: DISCONTINUED | OUTPATIENT
Start: 2025-04-02 | End: 2025-04-02 | Stop reason: HOSPADM

## 2025-04-02 RX ORDER — GABAPENTIN 300 MG/1
300 CAPSULE ORAL ONCE
Status: COMPLETED | OUTPATIENT
Start: 2025-04-02 | End: 2025-04-02

## 2025-04-02 RX ORDER — SODIUM CHLORIDE, SODIUM LACTATE, POTASSIUM CHLORIDE, CALCIUM CHLORIDE 600; 310; 30; 20 MG/100ML; MG/100ML; MG/100ML; MG/100ML
50 INJECTION, SOLUTION INTRAVENOUS CONTINUOUS
Status: ACTIVE | OUTPATIENT
Start: 2025-04-02 | End: 2025-04-02

## 2025-04-02 RX ORDER — FINASTERIDE 5 MG/1
5 TABLET, FILM COATED ORAL DAILY
Status: DISCONTINUED | OUTPATIENT
Start: 2025-04-02 | End: 2025-04-03 | Stop reason: HOSPADM

## 2025-04-02 RX ORDER — MORPHINE SULFATE 2 MG/ML
2 INJECTION, SOLUTION INTRAMUSCULAR; INTRAVENOUS EVERY 2 HOUR PRN
Status: DISCONTINUED | OUTPATIENT
Start: 2025-04-02 | End: 2025-04-03 | Stop reason: HOSPADM

## 2025-04-02 RX ORDER — DOCUSATE SODIUM 100 MG/1
100 CAPSULE, LIQUID FILLED ORAL 2 TIMES DAILY
Status: DISCONTINUED | OUTPATIENT
Start: 2025-04-02 | End: 2025-04-03 | Stop reason: HOSPADM

## 2025-04-02 RX ORDER — SODIUM CHLORIDE, SODIUM LACTATE, POTASSIUM CHLORIDE, CALCIUM CHLORIDE 600; 310; 30; 20 MG/100ML; MG/100ML; MG/100ML; MG/100ML
50 INJECTION, SOLUTION INTRAVENOUS CONTINUOUS
Status: DISCONTINUED | OUTPATIENT
Start: 2025-04-02 | End: 2025-04-02

## 2025-04-02 RX ORDER — PROCHLORPERAZINE 25 MG/1
25 SUPPOSITORY RECTAL EVERY 12 HOURS PRN
Status: DISCONTINUED | OUTPATIENT
Start: 2025-04-02 | End: 2025-04-03 | Stop reason: HOSPADM

## 2025-04-02 RX ORDER — ONDANSETRON HYDROCHLORIDE 2 MG/ML
4 INJECTION, SOLUTION INTRAVENOUS ONCE AS NEEDED
Status: DISCONTINUED | OUTPATIENT
Start: 2025-04-02 | End: 2025-04-02 | Stop reason: HOSPADM

## 2025-04-02 RX ORDER — OXYCODONE HYDROCHLORIDE 5 MG/1
5 TABLET ORAL EVERY 4 HOURS PRN
Status: DISCONTINUED | OUTPATIENT
Start: 2025-04-02 | End: 2025-04-03 | Stop reason: HOSPADM

## 2025-04-02 RX ORDER — PROPOFOL 10 MG/ML
INJECTION, EMULSION INTRAVENOUS AS NEEDED
Status: DISCONTINUED | OUTPATIENT
Start: 2025-04-02 | End: 2025-04-02

## 2025-04-02 RX ORDER — LIDOCAINE HYDROCHLORIDE 20 MG/ML
INJECTION, SOLUTION INFILTRATION; PERINEURAL AS NEEDED
Status: DISCONTINUED | OUTPATIENT
Start: 2025-04-02 | End: 2025-04-02

## 2025-04-02 RX ADMIN — DEXAMETHASONE SODIUM PHOSPHATE: 10 INJECTION, SOLUTION INTRAMUSCULAR; INTRAVENOUS at 07:44

## 2025-04-02 RX ADMIN — ACETAMINOPHEN 650 MG: 325 TABLET ORAL at 17:00

## 2025-04-02 RX ADMIN — ONDANSETRON 4 MG: 2 INJECTION, SOLUTION INTRAMUSCULAR; INTRAVENOUS at 10:10

## 2025-04-02 RX ADMIN — SUGAMMADEX 200 MG: 100 INJECTION, SOLUTION INTRAVENOUS at 10:16

## 2025-04-02 RX ADMIN — FINASTERIDE 5 MG: 5 TABLET, FILM COATED ORAL at 14:55

## 2025-04-02 RX ADMIN — Medication 100 MCG: at 08:54

## 2025-04-02 RX ADMIN — PROPOFOL 200 MG: 10 INJECTION, EMULSION INTRAVENOUS at 08:42

## 2025-04-02 RX ADMIN — TRANEXAMIC ACID 1950 MG: 650 TABLET ORAL at 16:58

## 2025-04-02 RX ADMIN — CARVEDILOL 12.5 MG: 12.5 TABLET, FILM COATED ORAL at 20:39

## 2025-04-02 RX ADMIN — TAMSULOSIN HYDROCHLORIDE 0.4 MG: 0.4 CAPSULE ORAL at 14:55

## 2025-04-02 RX ADMIN — Medication 100 MCG: at 09:14

## 2025-04-02 RX ADMIN — CEFAZOLIN SODIUM 2 G: 2 INJECTION, SOLUTION INTRAVENOUS at 08:53

## 2025-04-02 RX ADMIN — ROCURONIUM BROMIDE 50 MG: 10 SOLUTION INTRAVENOUS at 08:42

## 2025-04-02 RX ADMIN — CELECOXIB 200 MG: 200 CAPSULE ORAL at 07:03

## 2025-04-02 RX ADMIN — CEFAZOLIN SODIUM 2 G: 2 INJECTION, SOLUTION INTRAVENOUS at 16:58

## 2025-04-02 RX ADMIN — GABAPENTIN 300 MG: 300 CAPSULE ORAL at 07:03

## 2025-04-02 RX ADMIN — TRANEXAMIC ACID 1950 MG: 650 TABLET ORAL at 07:03

## 2025-04-02 RX ADMIN — LIDOCAINE HYDROCHLORIDE 50 MG: 20 INJECTION, SOLUTION INFILTRATION; PERINEURAL at 08:42

## 2025-04-02 RX ADMIN — MIDAZOLAM 5 MG: 5 INJECTION INTRAMUSCULAR; INTRAVENOUS at 07:37

## 2025-04-02 RX ADMIN — ATORVASTATIN CALCIUM 40 MG: 20 TABLET, FILM COATED ORAL at 14:55

## 2025-04-02 RX ADMIN — SODIUM CHLORIDE, POTASSIUM CHLORIDE, SODIUM LACTATE AND CALCIUM CHLORIDE 50 ML/HR: 600; 310; 30; 20 INJECTION, SOLUTION INTRAVENOUS at 12:55

## 2025-04-02 RX ADMIN — DEXAMETHASONE SODIUM PHOSPHATE 4 MG: 4 INJECTION, SOLUTION INTRAMUSCULAR; INTRAVENOUS at 08:59

## 2025-04-02 RX ADMIN — ACETAMINOPHEN 975 MG: 325 TABLET ORAL at 07:03

## 2025-04-02 RX ADMIN — LEVOTHYROXINE SODIUM 50 MCG: 0.05 TABLET ORAL at 14:55

## 2025-04-02 RX ADMIN — POVIDONE-IODINE 1 APPLICATION: 5 SOLUTION TOPICAL at 07:04

## 2025-04-02 RX ADMIN — ACETAMINOPHEN 650 MG: 325 TABLET ORAL at 12:55

## 2025-04-02 RX ADMIN — SODIUM CHLORIDE, POTASSIUM CHLORIDE, SODIUM LACTATE AND CALCIUM CHLORIDE 50 ML/HR: 600; 310; 30; 20 INJECTION, SOLUTION INTRAVENOUS at 07:03

## 2025-04-02 RX ADMIN — DOCUSATE SODIUM 100 MG: 100 CAPSULE, LIQUID FILLED ORAL at 20:39

## 2025-04-02 SDOH — SOCIAL STABILITY: SOCIAL INSECURITY: WITHIN THE LAST YEAR, HAVE YOU BEEN AFRAID OF YOUR PARTNER OR EX-PARTNER?: NO

## 2025-04-02 SDOH — SOCIAL STABILITY: SOCIAL INSECURITY
WITHIN THE LAST YEAR, HAVE YOU BEEN KICKED, HIT, SLAPPED, OR OTHERWISE PHYSICALLY HURT BY YOUR PARTNER OR EX-PARTNER?: NO

## 2025-04-02 SDOH — ECONOMIC STABILITY: FOOD INSECURITY: WITHIN THE PAST 12 MONTHS, THE FOOD YOU BOUGHT JUST DIDN'T LAST AND YOU DIDN'T HAVE MONEY TO GET MORE.: NEVER TRUE

## 2025-04-02 SDOH — SOCIAL STABILITY: SOCIAL INSECURITY
WITHIN THE LAST YEAR, HAVE YOU BEEN RAPED OR FORCED TO HAVE ANY KIND OF SEXUAL ACTIVITY BY YOUR PARTNER OR EX-PARTNER?: NO

## 2025-04-02 SDOH — SOCIAL STABILITY: SOCIAL INSECURITY: ARE YOU OR HAVE YOU BEEN THREATENED OR ABUSED PHYSICALLY, EMOTIONALLY, OR SEXUALLY BY ANYONE?: NO

## 2025-04-02 SDOH — HEALTH STABILITY: MENTAL HEALTH: HOW OFTEN DO YOU HAVE SIX OR MORE DRINKS ON ONE OCCASION?: NEVER

## 2025-04-02 SDOH — SOCIAL STABILITY: SOCIAL INSECURITY: HAVE YOU HAD THOUGHTS OF HARMING ANYONE ELSE?: NO

## 2025-04-02 SDOH — SOCIAL STABILITY: SOCIAL INSECURITY: WITHIN THE LAST YEAR, HAVE YOU BEEN HUMILIATED OR EMOTIONALLY ABUSED IN OTHER WAYS BY YOUR PARTNER OR EX-PARTNER?: NO

## 2025-04-02 SDOH — SOCIAL STABILITY: SOCIAL INSECURITY: WERE YOU ABLE TO COMPLETE ALL THE BEHAVIORAL HEALTH SCREENINGS?: YES

## 2025-04-02 SDOH — ECONOMIC STABILITY: INCOME INSECURITY: IN THE PAST 12 MONTHS HAS THE ELECTRIC, GAS, OIL, OR WATER COMPANY THREATENED TO SHUT OFF SERVICES IN YOUR HOME?: NO

## 2025-04-02 SDOH — SOCIAL STABILITY: SOCIAL INSECURITY: DO YOU FEEL ANYONE HAS EXPLOITED OR TAKEN ADVANTAGE OF YOU FINANCIALLY OR OF YOUR PERSONAL PROPERTY?: NO

## 2025-04-02 SDOH — SOCIAL STABILITY: SOCIAL INSECURITY: DOES ANYONE TRY TO KEEP YOU FROM HAVING/CONTACTING OTHER FRIENDS OR DOING THINGS OUTSIDE YOUR HOME?: NO

## 2025-04-02 SDOH — ECONOMIC STABILITY: FOOD INSECURITY: WITHIN THE PAST 12 MONTHS, YOU WORRIED THAT YOUR FOOD WOULD RUN OUT BEFORE YOU GOT THE MONEY TO BUY MORE.: NEVER TRUE

## 2025-04-02 SDOH — HEALTH STABILITY: MENTAL HEALTH: HOW OFTEN DO YOU HAVE A DRINK CONTAINING ALCOHOL?: NEVER

## 2025-04-02 SDOH — SOCIAL STABILITY: SOCIAL INSECURITY
ASK PARENT OR GUARDIAN: ARE THERE TIMES WHEN YOU, YOUR CHILD(REN), OR ANY MEMBER OF YOUR HOUSEHOLD FEEL UNSAFE, HARMED, OR THREATENED AROUND PERSONS WITH WHOM YOU KNOW OR LIVE?: NO

## 2025-04-02 SDOH — SOCIAL STABILITY: SOCIAL INSECURITY: DO YOU FEEL UNSAFE GOING BACK TO THE PLACE WHERE YOU ARE LIVING?: NO

## 2025-04-02 SDOH — SOCIAL STABILITY: SOCIAL INSECURITY: ABUSE: ADULT

## 2025-04-02 SDOH — HEALTH STABILITY: MENTAL HEALTH: HOW MANY DRINKS CONTAINING ALCOHOL DO YOU HAVE ON A TYPICAL DAY WHEN YOU ARE DRINKING?: PATIENT DOES NOT DRINK

## 2025-04-02 SDOH — SOCIAL STABILITY: SOCIAL INSECURITY: ARE THERE ANY APPARENT SIGNS OF INJURIES/BEHAVIORS THAT COULD BE RELATED TO ABUSE/NEGLECT?: NO

## 2025-04-02 SDOH — SOCIAL STABILITY: SOCIAL INSECURITY: HAS ANYONE EVER THREATENED TO HURT YOUR FAMILY OR YOUR PETS?: NO

## 2025-04-02 ASSESSMENT — COGNITIVE AND FUNCTIONAL STATUS - GENERAL
HELP NEEDED FOR BATHING: A LITTLE
TOILETING: A LITTLE
EATING MEALS: A LITTLE
WALKING IN HOSPITAL ROOM: A LITTLE
TURNING FROM BACK TO SIDE WHILE IN FLAT BAD: A LITTLE
EATING MEALS: A LITTLE
HELP NEEDED FOR BATHING: A LITTLE
PATIENT BASELINE BEDBOUND: NO
DRESSING REGULAR LOWER BODY CLOTHING: A LITTLE
TURNING FROM BACK TO SIDE WHILE IN FLAT BAD: A LITTLE
CLIMB 3 TO 5 STEPS WITH RAILING: A LITTLE
DRESSING REGULAR LOWER BODY CLOTHING: A LITTLE
DRESSING REGULAR UPPER BODY CLOTHING: A LITTLE
MOBILITY SCORE: 19
DRESSING REGULAR UPPER BODY CLOTHING: A LITTLE
DAILY ACTIVITIY SCORE: 18
TOILETING: A LITTLE
PERSONAL GROOMING: A LITTLE
MOVING TO AND FROM BED TO CHAIR: A LITTLE
CLIMB 3 TO 5 STEPS WITH RAILING: A LITTLE
PERSONAL GROOMING: A LITTLE
MOVING TO AND FROM BED TO CHAIR: A LITTLE
DAILY ACTIVITIY SCORE: 18
WALKING IN HOSPITAL ROOM: A LITTLE
STANDING UP FROM CHAIR USING ARMS: A LITTLE
MOBILITY SCORE: 19
STANDING UP FROM CHAIR USING ARMS: A LITTLE

## 2025-04-02 ASSESSMENT — ACTIVITIES OF DAILY LIVING (ADL)
HEARING - LEFT EAR: FUNCTIONAL
JUDGMENT_ADEQUATE_SAFELY_COMPLETE_DAILY_ACTIVITIES: YES
DRESSING YOURSELF: NEEDS ASSISTANCE
LACK_OF_TRANSPORTATION: NO
ADEQUATE_TO_COMPLETE_ADL: YES
FEEDING YOURSELF: INDEPENDENT
TOILETING: NEEDS ASSISTANCE
PATIENT'S MEMORY ADEQUATE TO SAFELY COMPLETE DAILY ACTIVITIES?: YES
GROOMING: INDEPENDENT
BATHING: NEEDS ASSISTANCE
HEARING - RIGHT EAR: FUNCTIONAL
LACK_OF_TRANSPORTATION: NO
WALKS IN HOME: INDEPENDENT

## 2025-04-02 ASSESSMENT — PATIENT HEALTH QUESTIONNAIRE - PHQ9
1. LITTLE INTEREST OR PLEASURE IN DOING THINGS: NOT AT ALL
2. FEELING DOWN, DEPRESSED OR HOPELESS: NOT AT ALL
SUM OF ALL RESPONSES TO PHQ9 QUESTIONS 1 & 2: 0

## 2025-04-02 ASSESSMENT — PAIN SCALES - GENERAL
PAINLEVEL_OUTOF10: 0 - NO PAIN
PAIN_LEVEL: 0
PAINLEVEL_OUTOF10: 0 - NO PAIN

## 2025-04-02 ASSESSMENT — COLUMBIA-SUICIDE SEVERITY RATING SCALE - C-SSRS
1. IN THE PAST MONTH, HAVE YOU WISHED YOU WERE DEAD OR WISHED YOU COULD GO TO SLEEP AND NOT WAKE UP?: NO
6. HAVE YOU EVER DONE ANYTHING, STARTED TO DO ANYTHING, OR PREPARED TO DO ANYTHING TO END YOUR LIFE?: NO
2. HAVE YOU ACTUALLY HAD ANY THOUGHTS OF KILLING YOURSELF?: NO

## 2025-04-02 ASSESSMENT — PAIN SCALES - WONG BAKER: WONGBAKER_NUMERICALRESPONSE: NO HURT

## 2025-04-02 ASSESSMENT — PAIN - FUNCTIONAL ASSESSMENT
PAIN_FUNCTIONAL_ASSESSMENT: 0-10

## 2025-04-02 ASSESSMENT — LIFESTYLE VARIABLES
SKIP TO QUESTIONS 9-10: 1
AUDIT-C TOTAL SCORE: 0

## 2025-04-02 NOTE — OP NOTE
TOTAL SHOULDER ARTHROPLASTY REVERSE, BICEPS TENODESIS (L) Operative Note  Preop diagnosis: Left shoulder rotator cuff arthropathy    Postop diagnosis: Left shoulder rotator cuff arthropathy, left shoulder proximal long head bicep tendon tear      Assistant: Andrew Rice physician assistant (PAC) was required and present throughout the entire case.  Given the nature of the disease process and the procedure, a skilled surgical first assistant was necessary during the entire case.  The assistant was necessary to hold retractors and manipulate extremity during the procedure.  A certified scrub tech was also present at the back table managing instruments with supplies for the surgical case    Date: 2025  OR Location: [facility]    Name: Chadwick Tello, : 1945, Age: 79 y.o., MRN: [id number], Sex: male    Diagnosis  Pre-op Diagnosis      * Other specific arthropathies, not elsewhere classified, left shoulder [M12.812]     * Primary osteoarthritis, left shoulder [M19.012] Post-op Diagnosis     * Other specific arthropathies, not elsewhere classified, left shoulder [M12.812]     * Primary osteoarthritis, left shoulder [M19.012]     * Biceps tendon tear [S46.219A]     Procedures  Left reverse total shoulder replacement 42430  Left shoulder proximal long head bicep tenodesis 24721    Surgeons      * Andrew Toure - Primary    Resident/Fellow/Other Assistant:  Surgeons and Role:     * Andrew Rice PA-C - Assisting    Staff:   Circulator: Danica Madrigal Person: Celi  [place] Person: Elaine    Anesthesia Staff: Anesthesiologist: Enio Rodrigues MD    Procedure Summary  Anesthesia: General  ASA: III  Estimated Blood Loss: Less than 20 mL  Intra-op Medications:   Administrations occurring from 0815 to 1035 on 25:   Medication Name Total Dose   sodium chloride 0.9 % irrigation solution 1,000 mL   dexAMETHasone (Decadron) 4 mg/mL 4 mg   lidocaine (Xylocaine) injection 2 % 50 mg    phenylephrine 40 mcg/mL syringe 10 mL 200 mcg   propofol (Diprivan) injection 10 mg/mL 200 mg   rocuronium (ZeMuron) 50 mg/5 mL injection 50 mg   ceFAZolin (Ancef) 2 g in dextrose (iso)  mL 2 g              Anesthesia Record               Intraprocedure I/O Totals       None           Specimen: No specimens collected              Drains and/or Catheters:   Closed/Suction Drain 1 Left Shoulder Bulb 10 Fr. (Active)       Tourniquet Times:         Implants:  Implants       Type Name Action Serial No.      Screw HUMERAL STEM, 16MM MORIS X 130MM LGTH, TM REVERSE SHLDR SYS - [id number] Implanted      Joint BASE PLATE, MMTM RVS, 26MM, 15MM - [id number] Implanted      Joint POLY LINER, +0MM, 40MM MORIS, REVERSE SHOULDER SYSTEM - [id number] Implanted      Joint POLY LINER, +12MM SPACER, REVERSE SHOULDER - [id number] Implanted               Findings: see procedure details    Indications: Chadwick Tello is an 79 y.o. male who is having surgery for Other specific arthropathies, not elsewhere classified, left shoulder [M12.812]  Primary osteoarthritis, left shoulder [M19.012].     The patient was seen in the preoperative area. The risks, benefits, complications, treatment options, non-operative alternatives, expected recovery and outcomes were discussed with the patient. The possibilities of reaction to medication, pulmonary aspiration, injury to surrounding structures, bleeding, recurrent infection, the need for additional procedures, failure to diagnose a condition, and creating a complication requiring transfusion or operation were discussed with the patient. The patient concurred with the proposed plan, giving informed consent.  The site of surgery was properly noted/marked if necessary per policy. The patient has been actively warmed in preoperative area. Preoperative antibiotics have been ordered and given within 1 hours of incision.    Procedure Details:   The patient was placed in the beachchair position and  underwent routine prep and drape of the left shoulder  after induction of anesethia    Standard deltopectoral approach was used splitting skin for roughly 8-10 cm starting at the coracoid and extending down the arm.  Subcutaneous flaps were created and we exposed the deltopectoral interval.  Cephalic vein and deltoid were taken superior laterally and the pectoralis major released inferiorly.  We released part of the pectoralis off the humerus to aid with exposure.  We bluntly dissected under the conjoined tendon and placed a retractor medially against the conjoined tendon to protect the neurovascular structures.  Laterally we retracted off the deltoid attacking the vein.    The subscapularis partly intact.  The inferior two thirds was peeled off the lesser tuberosity.  It was tagged to be repaired later at the end of the case through if it hold the prosthesis with a #2 FiberWire.  There was severe end-stage arthrosis with significant bony erosion and posterior wear of the glenoid and humeral head.  There was absent supraspinatus tendon superiorly.  We proceeded with reverse total shoulder replacement      The long head of the biceps tendon inflamed and encased in a large synovial sheath.  Sheath was open the bicep tendon was frayed and torn as it entered the shoulder joint.  The bicep tendon was tenodesed to the anterior cortex of the humerus below the bicipital groove with a #2 FiberWire through a small drill hole.  This restored normal resting length and tension of the bicep construct.  The intra-articular portion of the bicep was discarded    The humeral head was easily dislocated at this time with external rotation.  The osteophytes were removed circumferentially around the humerus and we began hand reaming the humerus to a final diameter of 16 mm.    Using the appropriate cutting guide the humeral head was cut at 20° retroversion and further osteophytes were cleaned removed posteriorly to aid in the exposure  "and implant insertion.    At this time retractors were placed on the posteriorly,  superiorly,  and anteriorly along the glenoid rim to allow for complete exposure to the glenoid face.   We then cleaned and removed degenerative labral tissue and old bicep tissue from around the rim of the glenoid to further provide exposure to the glenoid.     A centering pin was placed in the glenoid to correct version, a centering hole was then created and we planed the glenoid to a smooth flush surface.  After final preparation the baseplate was inserted with a 15 mm stem.  The purchase was excellent and we proceeded to fill the baseplate with 2 bicortical screw 38 mm and 40 mm  in length, and then placed  \"end caps\" on the screws so as to convert them to locking screws.  A 40 mm glenosphere with +5 mm of lateral offset was fixed on the baseplate and its locking mechanism was also confirmed.    At this time final preparation of the humeral stem was completed and we inserted the 16 mm reverse compatible trabecular metal stem at 20° retroversion at the appropriate height and inclination.    We then trialed liners and after multiple reductions we placed a final buildup of 12 mm metal spacer with a 0 mm polyliner with a 60 degree constraint radius.    Once we completed the reduction of the shoulder, it was stable in all planes checked with no subcoracoid or subacromial impingement.  We lavaged and  irrigated and we closed what remnant of the subscapularis remained to  further enhance  stability.  Final  lavaging and irrigaion throughout the entire wound was completed and  hemostasis was confirmed.      We allow the deltopectoral interval to close loosely with 2-0 Vicryl, closure of the subcutaneous tissue with 2-0 Vicryl and a running 4-0 Monocryl was completed followed by application of a compressive dressing.  A drain was used if needed and the patient was taken to recovery room and an xr was taken and is pending " review.      Complications:  None; patient tolerated the procedure well.    Disposition: PACU - hemodynamically stable.  Condition: stable         Andrew Toure  Phone Number: [phone]

## 2025-04-02 NOTE — CARE PLAN
The patient's goals for the shift include remain safe    The clinical goals for the shift include pain control    Problem: Pain - Adult  Goal: Verbalizes/displays adequate comfort level or baseline comfort level  Outcome: Progressing     Problem: Safety - Adult  Goal: Free from fall injury  Outcome: Progressing     Problem: Discharge Planning  Goal: Discharge to home or other facility with appropriate resources  Outcome: Progressing     Problem: Chronic Conditions and Co-morbidities  Goal: Patient's chronic conditions and co-morbidity symptoms are monitored and maintained or improved  Outcome: Progressing

## 2025-04-02 NOTE — ANESTHESIA POSTPROCEDURE EVALUATION
Patient: Chadwick Tello    Procedure Summary       Date: 04/02/25 Room / Location: [facility] / [facility]    Anesthesia Start: 0836 Anesthesia Stop: 1026    Procedure: TOTAL SHOULDER ARTHROPLASTY REVERSE, BICEPS TENODESIS (Left: Shoulder) Diagnosis:       Other specific arthropathies, not elsewhere classified, left shoulder      Primary osteoarthritis, left shoulder      Biceps tendon tear      (Other specific arthropathies, not elsewhere classified, left shoulder [M12.812])      (Primary osteoarthritis, left shoulder [M19.012])    Surgeons: Andrew Toure MD Responsible Provider: Enio Rodrigues MD    Anesthesia Type: general ASA Status: 3            Anesthesia Type: general    Vitals Value Taken Time   /69 04/02/25 1026   Temp 36.1 04/02/25 1026   Pulse 58 04/02/25 1026   Resp 15 04/02/25 1026   SpO2 100% 04/02/25 1026       Anesthesia Post Evaluation    Patient location during evaluation: bedside  Patient participation: complete - patient participated  Level of consciousness: sleepy but conscious  Pain score: 0  Pain management: adequate  Multimodal analgesia pain management approach  Airway patency: patent  Cardiovascular status: acceptable  Respiratory status: acceptable and face mask  Hydration status: acceptable  Postoperative Nausea and Vomiting: none        There were no known notable events for this encounter.

## 2025-04-02 NOTE — PROGRESS NOTES
04/02/25 1738   Discharge Planning   Living Arrangements Spouse/significant other   Support Systems Spouse/significant other;Family members   Assistance Needed none, PTA ind ADLS and IADLS no AD, retired, drives, no falls. Pt owns walk-in shower with built in seat, grab bars, toilet riser, walker, rollator, cane, crutches   Type of Residence Private residence   Number of Stairs to Enter Residence 3   Number of Stairs Within Residence 0   Do you have animals or pets at home? No   Home or Post Acute Services None   Expected Discharge Disposition Home   Does the patient need discharge transport arranged? No   Financial Resource Strain   How hard is it for you to pay for the very basics like food, housing, medical care, and heating? Not hard   Housing Stability   In the last 12 months, was there a time when you were not able to pay the mortgage or rent on time? N   In the past 12 months, how many times have you moved where you were living? 0   At any time in the past 12 months, were you homeless or living in a shelter (including now)? N   Transportation Needs   In the past 12 months, has lack of transportation kept you from medical appointments or from getting medications? no   In the past 12 months, has lack of transportation kept you from meetings, work, or from getting things needed for daily living? No   Stroke Family Assessment   Stroke Family Assessment Needed No   Intensity of Service   Intensity of Service 0-30 min     Pt is s/p Elective Left reverse total shoulder arthroplasty 4/2/25 with Dr. Andrew Tuore. Pt is no active movement of operative shoulder. Patient to remain in sling at all times unless with therapy. Patient can come out of the sling for active range of motion of elbow, wrist, hand and fingers with therapy only. NWB to operative extremity. PT/OT eval [facility] scores are currently pending. Pt anticipates home no needs. CT team will monitor case for progression and potential DC needs.

## 2025-04-02 NOTE — PROGRESS NOTES
Patient seen and evaluated upon arrival to floor s/p reverse left total shoulder arthroplasty. Doing well post operatively. Denies any pain at this time. He is resting comfortably in bed at this time.    Able to move fingers LUE. Reports slight numbness/tingling to operative extremity. Dressing remains clean, dry, and intact with CHRISTINA drain and sling in place.     PT/OT: No active movement of operative shoulder. Patient to remain in sling at all times unless with therapy. Patient can come out of the sling for active range of motion of elbow, wrist, hand and fingers with therapy only. NWB to operative extremity.    Reviewed and discussed home medications, reordered as appropriate for this hospitalization.     Patient plans to return home no additional needs anticipated upon discharge. TCC and SW following for discharge planning.

## 2025-04-02 NOTE — DISCHARGE INSTRUCTIONS
Total Shoulder Replacement  Discharge Instructions    Surgical Site Care:  Change dressing once a day and PRN (as needed). Apply 4 x 4 sponge and light tape. If glue present, leave open to air.  You may leave wound open to air after initial dressing removal, if wound is clean, dry and intact  If Aquacel Ag dressing is present, do not remove dressing for 7 days, unless heavily saturated. If heavily saturated, remove dressing and start using instructions above  Staples will be removed on post-operative day 14 and steri-strips applied  Showering is permitted starting POD1 if waterproof Aquacel dressing is present or when the incision is covered with 4 x 4 and Tegaderm waterproof dressing  Until all areas of incision are healed.    Physical Therapy:  Weight Bearing Status:  NWB ( none weight bearing)  No ROM of Shoulder  Active and passive range of motion of elbow, wrist, hand  Per Physical Therapy handout  Sling to be applied at all times when out of bed. Ok to remove sling for hygiene and when awake in bed with support    Pain Medications  You were given  Oxycodone  Wean off pain medications as you deem appropriate as long as pain is under control  Do not exceed 4,000 mg of acetaminophen from all sources in a 24 hour period  Contact the [facility] if you notice any reactions to the medication or need a refill    Cold packs/Ice packs/Machine  May be used 5 times daily for 15-30 minutes as necessary  Be sure to have a barrier (cloth, clothing, towel) between the site and the ice pack to prevent frostbite    Contact the [facility] office if  Increased redness, swelling, drainage of any kind, and/or pain to surgery site.  As well as new onset fevers and or chills.  These could signify an infection.  Arm tenderness to touch as well as increased swelling or redness.  This could signify a clot formation.  Numbness or tingling to an area around the incision site or below the incision site  (fingers).  Any rash appears, increased  or new onset nausea/vomiting occur.  This may indicate a reaction to a medication.  Phone # [phone].  Follow up with Surgeon in 2 weeks

## 2025-04-02 NOTE — ANESTHESIA PROCEDURE NOTES
Airway  Date/Time: 4/2/2025 8:44 AM  Urgency: elective    Airway not difficult    Staffing  Performed: attending   Authorized by: Enio Rodrigues MD    Performed by: Enio Rodrigues MD  Patient location during procedure: OR    Indications and Patient Condition  Indications for airway management: anesthesia and airway protection  Spontaneous Ventilation: absent  Sedation level: deep  Preoxygenated: yes  Patient position: sniffing  MILS maintained throughout  Mask difficulty assessment: 1 - vent by mask  Planned trial extubation    Final Airway Details  Final airway type: endotracheal airway      Successful airway: ETT  Cuffed: yes   Successful intubation technique: direct laryngoscopy  Endotracheal tube insertion site: oral  Blade: Mcgrath  Blade size: #2  ETT size (mm): 7.5  Cormack-Lehane Classification: grade I - full view of glottis  Placement verified by: chest auscultation, capnometry and palpation of cuff   Measured from: lips  ETT to lips (cm): 23  Number of attempts at approach: 1  Number of other approaches attempted: 0

## 2025-04-02 NOTE — ANESTHESIA PROCEDURE NOTES
Peripheral Block    Patient location during procedure: holding area  Start time: 4/2/2025 7:37 AM  End time: 4/2/2025 7:41 AM  Reason for block: at surgeon's request and post-op pain management  Staffing  Performed: attending   Authorized by: Enio Rodrigues MD    Performed by: Enio Rodrigues MD  Preanesthetic Checklist  Completed: patient identified, IV checked, site marked, risks and benefits discussed, surgical consent, monitors and equipment checked, pre-op evaluation and timeout performed   Timeout performed at: 4/2/2025 7:37 AM  Peripheral Block  Patient position: laying flat  Prep: ChloraPrep  Patient monitoring: heart rate, cardiac monitor and continuous pulse ox  Block type: interscalene  Laterality: left  Injection technique: single-shot  Guidance: ultrasound guided  Local infiltration: lidocaine  Infiltration strength: 1 %  Dose: 1 mL  Needle  Needle type: short-bevel   Needle gauge: 21 G  Needle length: 10 cm  Needle localization: ultrasound guidance     image stored in chart  Needle insertion depth: 2 cm  Assessment  Injection assessment: negative aspiration for heme, no paresthesia on injection, incremental injection and local visualized surrounding nerve on ultrasound  Paresthesia pain: none  Heart rate change: no  Slow fractionated injection: yes  Additional Notes  Risks, benefits, complications and alternatives discussed with patient.  Patient agrees to proceed with procedure.  Midazolam 5mg IV.  US guided, lateral IP approach.  Total of 20cc 0.5% ropivacaine with epi 1:200k and decadron 5mg injected in 3-5cc aliquots around plexus after negative aspirations.  No complications noted.

## 2025-04-03 ENCOUNTER — PHARMACY VISIT (OUTPATIENT)
Dept: PHARMACY | Facility: CLINIC | Age: 80
End: 2025-04-03
Payer: COMMERCIAL

## 2025-04-03 VITALS
DIASTOLIC BLOOD PRESSURE: 57 MMHG | OXYGEN SATURATION: 94 % | HEART RATE: 75 BPM | RESPIRATION RATE: 16 BRPM | WEIGHT: 187.39 LBS | BODY MASS INDEX: 28.4 KG/M2 | SYSTOLIC BLOOD PRESSURE: 122 MMHG | HEIGHT: 68 IN | TEMPERATURE: 96.6 F

## 2025-04-03 LAB
ANION GAP SERPL CALC-SCNC: 9 MMOL/L (ref 10–20)
BUN SERPL-MCNC: 18 MG/DL (ref 6–23)
CALCIUM SERPL-MCNC: 8.2 MG/DL (ref 8.6–10.3)
CHLORIDE SERPL-SCNC: 107 MMOL/L (ref 98–107)
CO2 SERPL-SCNC: 24 MMOL/L (ref 21–32)
CREAT SERPL-MCNC: 0.86 MG/DL (ref 0.5–1.3)
EGFRCR SERPLBLD CKD-EPI 2021: 89 ML/MIN/1.73M*2
ERYTHROCYTE [DISTWIDTH] IN BLOOD BY AUTOMATED COUNT: 13.2 % (ref 11.5–14.5)
GLUCOSE SERPL-MCNC: 146 MG/DL (ref 74–99)
HCT VFR BLD AUTO: 39.6 % (ref 41–52)
HGB BLD-MCNC: 13.1 G/DL (ref 13.5–17.5)
MCH RBC QN AUTO: 29.8 PG (ref 26–34)
MCHC RBC AUTO-ENTMCNC: 33.1 G/DL (ref 32–36)
MCV RBC AUTO: 90 FL (ref 80–100)
NRBC BLD-RTO: 0 /100 WBCS (ref 0–0)
PLATELET # BLD AUTO: 166 X10*3/UL (ref 150–450)
POTASSIUM SERPL-SCNC: 4.2 MMOL/L (ref 3.5–5.3)
RBC # BLD AUTO: 4.39 X10*6/UL (ref 4.5–5.9)
SODIUM SERPL-SCNC: 136 MMOL/L (ref 136–145)
WBC # BLD AUTO: 17.5 X10*3/UL (ref 4.4–11.3)

## 2025-04-03 PROCEDURE — 2500000002 HC RX 250 W HCPCS SELF ADMINISTERED DRUGS (ALT 637 FOR MEDICARE OP, ALT 636 FOR OP/ED)

## 2025-04-03 PROCEDURE — 85027 COMPLETE CBC AUTOMATED: CPT | Performed by: ORTHOPAEDIC SURGERY

## 2025-04-03 PROCEDURE — 2500000002 HC RX 250 W HCPCS SELF ADMINISTERED DRUGS (ALT 637 FOR MEDICARE OP, ALT 636 FOR OP/ED): Performed by: ORTHOPAEDIC SURGERY

## 2025-04-03 PROCEDURE — 2500000001 HC RX 250 WO HCPCS SELF ADMINISTERED DRUGS (ALT 637 FOR MEDICARE OP): Performed by: ORTHOPAEDIC SURGERY

## 2025-04-03 PROCEDURE — 97535 SELF CARE MNGMENT TRAINING: CPT | Mod: GO

## 2025-04-03 PROCEDURE — 7100000011 HC EXTENDED STAY RECOVERY HOURLY - NURSING UNIT

## 2025-04-03 PROCEDURE — 36415 COLL VENOUS BLD VENIPUNCTURE: CPT | Performed by: ORTHOPAEDIC SURGERY

## 2025-04-03 PROCEDURE — 80048 BASIC METABOLIC PNL TOTAL CA: CPT | Performed by: ORTHOPAEDIC SURGERY

## 2025-04-03 PROCEDURE — 2500000004 HC RX 250 GENERAL PHARMACY W/ HCPCS (ALT 636 FOR OP/ED): Performed by: ORTHOPAEDIC SURGERY

## 2025-04-03 PROCEDURE — 2500000001 HC RX 250 WO HCPCS SELF ADMINISTERED DRUGS (ALT 637 FOR MEDICARE OP)

## 2025-04-03 PROCEDURE — 97161 PT EVAL LOW COMPLEX 20 MIN: CPT | Mod: GP | Performed by: PHYSICAL THERAPIST

## 2025-04-03 PROCEDURE — 97165 OT EVAL LOW COMPLEX 30 MIN: CPT | Mod: GO

## 2025-04-03 PROCEDURE — RXMED WILLOW AMBULATORY MEDICATION CHARGE

## 2025-04-03 RX ORDER — ACETAMINOPHEN 325 MG/1
650 TABLET ORAL EVERY 6 HOURS SCHEDULED
Qty: 56 TABLET | Refills: 0 | Status: SHIPPED | OUTPATIENT
Start: 2025-04-03 | End: 2025-04-10

## 2025-04-03 RX ORDER — CYCLOBENZAPRINE HCL 5 MG
5 TABLET ORAL 3 TIMES DAILY PRN
Qty: 21 TABLET | Refills: 0 | Status: SHIPPED | OUTPATIENT
Start: 2025-04-03 | End: 2025-04-10

## 2025-04-03 RX ORDER — DOCUSATE SODIUM 100 MG/1
100 CAPSULE, LIQUID FILLED ORAL 2 TIMES DAILY
Qty: 20 CAPSULE | Refills: 0 | Status: SHIPPED | OUTPATIENT
Start: 2025-04-03 | End: 2025-04-13

## 2025-04-03 RX ORDER — OXYCODONE HYDROCHLORIDE 5 MG/1
5 TABLET ORAL EVERY 6 HOURS PRN
Qty: 28 TABLET | Refills: 0 | Status: SHIPPED | OUTPATIENT
Start: 2025-04-03 | End: 2025-04-10

## 2025-04-03 RX ADMIN — ACETAMINOPHEN 650 MG: 325 TABLET ORAL at 06:17

## 2025-04-03 RX ADMIN — TRANEXAMIC ACID 1950 MG: 650 TABLET ORAL at 06:17

## 2025-04-03 RX ADMIN — ATORVASTATIN CALCIUM 40 MG: 20 TABLET, FILM COATED ORAL at 09:29

## 2025-04-03 RX ADMIN — FINASTERIDE 5 MG: 5 TABLET, FILM COATED ORAL at 09:29

## 2025-04-03 RX ADMIN — ACETAMINOPHEN 650 MG: 325 TABLET ORAL at 00:23

## 2025-04-03 RX ADMIN — DOCUSATE SODIUM 100 MG: 100 CAPSULE, LIQUID FILLED ORAL at 09:29

## 2025-04-03 RX ADMIN — LEVOTHYROXINE SODIUM 50 MCG: 0.05 TABLET ORAL at 06:17

## 2025-04-03 RX ADMIN — CEFAZOLIN SODIUM 2 G: 2 INJECTION, SOLUTION INTRAVENOUS at 00:23

## 2025-04-03 RX ADMIN — CARVEDILOL 12.5 MG: 12.5 TABLET, FILM COATED ORAL at 09:29

## 2025-04-03 RX ADMIN — TAMSULOSIN HYDROCHLORIDE 0.4 MG: 0.4 CAPSULE ORAL at 09:29

## 2025-04-03 RX ADMIN — ACETAMINOPHEN 650 MG: 325 TABLET ORAL at 12:05

## 2025-04-03 ASSESSMENT — COGNITIVE AND FUNCTIONAL STATUS - GENERAL
TOILETING: A LITTLE
WALKING IN HOSPITAL ROOM: A LITTLE
MOVING TO AND FROM BED TO CHAIR: A LITTLE
STANDING UP FROM CHAIR USING ARMS: A LITTLE
EATING MEALS: A LITTLE
MOBILITY SCORE: 24
CLIMB 3 TO 5 STEPS WITH RAILING: A LITTLE
DRESSING REGULAR LOWER BODY CLOTHING: A LITTLE
MOBILITY SCORE: 19
HELP NEEDED FOR BATHING: A LITTLE
EATING MEALS: A LITTLE
PERSONAL GROOMING: A LITTLE
DAILY ACTIVITIY SCORE: 18
HELP NEEDED FOR BATHING: A LITTLE
DRESSING REGULAR UPPER BODY CLOTHING: A LITTLE
DAILY ACTIVITIY SCORE: 18
DRESSING REGULAR UPPER BODY CLOTHING: A LITTLE
PERSONAL GROOMING: A LITTLE
DRESSING REGULAR LOWER BODY CLOTHING: A LITTLE
TOILETING: A LITTLE
TURNING FROM BACK TO SIDE WHILE IN FLAT BAD: A LITTLE

## 2025-04-03 ASSESSMENT — PAIN SCALES - GENERAL
PAINLEVEL_OUTOF10: 1
PAINLEVEL_OUTOF10: 0 - NO PAIN
PAINLEVEL_OUTOF10: 0 - NO PAIN

## 2025-04-03 ASSESSMENT — PAIN - FUNCTIONAL ASSESSMENT
PAIN_FUNCTIONAL_ASSESSMENT: 0-10
PAIN_FUNCTIONAL_ASSESSMENT: 0-10

## 2025-04-03 ASSESSMENT — ACTIVITIES OF DAILY LIVING (ADL)
HOME_MANAGEMENT_TIME_ENTRY: 10
BATHING_ASSISTANCE: MINIMAL

## 2025-04-03 NOTE — DISCHARGE SUMMARY
Discharge summary  This patient Chadwick Tello was admitted to the hospital on 4/2/2025  after undergoing Procedure(s) (LRB):  TOTAL SHOULDER ARTHROPLASTY REVERSE, BICEPS TENODESIS (Left) without complications that morning.    During the postoperative period,while in hospital, patient was medically managed by the hospitalist. Please see medial notes and H&P for patients additional diagnoses.  Ortho agrees with all medical diagnoses and treatments while patient in hospital.  No significant or unexpected findings or abnormal ortho imaging were noted during the hospital stay    Hospital course      Patient tolerated surgical procedure well and there was no complications. Patient progressed adequately through their recovery during hospital stay including PT and rehabilitation.    Patient was then D/C on  to home  in stable condition.  Patient was instructed on the use of pain medications, the signs and symptoms of infection, and was given our number to call should they have any questions or concerns following discharge.    Based on my clinical judgment, the patient was provided with a 7-day prescription for opioid medication at 30 MED, indicated for treatment of acute pain in the setting of recent surgery. OARRS report was run and has demonstrated an appropriate time course.  The patient has been provided with counseling pertaining to safe use of opioid medication.    No acute events during hospitalization. Patient stable for discharge.     Patient is to be NWB to operative extremity. Therapy per TSA handout. Sling to be worn at all times with exception of skin care and hygiene.   Aquacel dressing to be removed pod7 and incision left open to air    Follow up with surgeon in 2 weeks

## 2025-04-03 NOTE — CARE PLAN
Problem: Pain - Adult  Goal: Verbalizes/displays adequate comfort level or baseline comfort level  Outcome: Adequate for Discharge     Problem: Safety - Adult  Goal: Free from fall injury  Outcome: Adequate for Discharge     Problem: Discharge Planning  Goal: Discharge to home or other facility with appropriate resources  Outcome: Adequate for Discharge     Problem: Chronic Conditions and Co-morbidities  Goal: Patient's chronic conditions and co-morbidity symptoms are monitored and maintained or improved  Outcome: Adequate for Discharge     Problem: Nutrition  Goal: Nutrient intake appropriate for maintaining nutritional needs  Outcome: Adequate for Discharge   The patient's goals for the shift include remain safe    The clinical goals for the shift include Pain management

## 2025-04-03 NOTE — PROGRESS NOTES
04/03/25 1036   Discharge Planning   Home or Post Acute Services None   Expected Discharge Disposition Home     Pt is s/p POD #1 Elective Left reverse total shoulder arthroplasty 4/2/25 with Dr. Andrew Toure. Pt is no active movement of operative shoulder. Patient to remain in sling at all times unless with therapy. Patient can come out of the sling for active range of motion of elbow, wrist, hand and fingers with therapy only. NWB to operative extremity. Pt dressing to stay on 7 days then pt can peel off and was instructed by CNP of this information. [facility] scores are PT (24) OT (18)- no further acute PT needs. Pt discharge preference remains home. No discharge needs identified.

## 2025-04-03 NOTE — PROGRESS NOTES
Occupational Therapy    Evaluation    Patient Name: Chadwick Tello  MRN: [id number]  Today's Date: 4/3/2025  Time Calculation  Start Time: 0849  Stop Time: 0909  Time Calculation (min): 20 min  604/604-A    Assessment  IP OT Assessment  OT Assessment: Pt is s/p L reverse TSA which impacts his ADL status. Pt would benefit from continued OT to address deficits and educate on compensatory strategies to maximize dafety and IND with ADLs.  Prognosis: Excellent  Barriers to Discharge Home: No anticipated barriers  Evaluation/Treatment Tolerance: Patient tolerated treatment well  Medical Staff Made Aware: Yes  End of Session Patient Position: Bed, 3 rail up, Alarm on    Plan:  Treatment Interventions: ADL retraining, UE strengthening/ROM, Patient/family training  OT Frequency: 2 times per week  OT Discharge Recommendations: Low intensity level of continued care  OT Recommended Transfer Status: Independent  OT - OK to Discharge: Yes (ok to d/c to next level of care once medically cleared)    Subjective     Current Problem:  1. Other specific arthropathies, not elsewhere classified, left shoulder  acetaminophen (Tylenol) 325 mg tablet    docusate sodium (Colace) 100 mg capsule    oxyCODONE (Roxicodone) 5 mg immediate release tablet    cyclobenzaprine (Flexeril) 5 mg tablet      2. Primary osteoarthritis, left shoulder        3. Biceps tendon tear            General:  General  Reason for Referral: s/p Left Reverse TSA; left shoulder proximal long head biceps tenodesis 4/2/25  Referred By: PT/OT 4/2 PHIL Toure  Past Medical History Relevant to Rehab: HLD, HTN, MI, arthritis, hypothyroidism, BPH, R PRINCESS, CABG, R TSA, HALLE TKA  Family/Caregiver Present: No  Patient Position Received: Up in chair, Alarm on (drain L shoulder)  General Comment: Pt is s/p L reverse TSA by Dr. PHIL Toure on 4/2.    Precautions:  Hearing/Visual Limitations: Glasses  UE Weight Bearing Status: Left Non-Weight Bearing  Prosthesis/Orthosis Used:  (KAYODE  ultrasling in place throughout eval and adjusted)  Precautions Comment: No AROM L shoulder. Pt is to remain in sling at all times unless with therapy. Pt can come out of sling for AROM of elbow,wrist, hand, and fingers with therapy only. NWB LUE.    Pain:  Pain Assessment  Pain Assessment: 0-10  0-10 (Numeric) Pain Score: 0 - No pain    Objective     Cognition:  Overall Cognitive Status: Within Functional Limits  Orientation Level: Oriented X4             Home Living:  Home Living Comments: Pt lives with his spouse in a 2 level home with 3 KIMMY. Has a first floor set-up (bed/bath on 1st floor). Has a walk in shower with seat and grab bars. Owns FWW, cane, crutches, rollator.     Prior Function:  Prior Function Comments: PTA, pt reports amb IND without device, IND with ADLs/iADLs. No recent falls. Drives.  ADL:  Eating Assistance:  (S/U)  Grooming Assistance:  (S/U)  Bathing Assistance: Minimal  UE Dressing Assistance: Minimal (Pt instructed in donning/doffing LUE ultrasling. Pt overall required MIN A to don/doff sling, and don t-shirt while standing.)  LE Dressing Assistance: Minimal (Pt required MIN A to pull L side of pants and underwear over hips in unsupported stance, able to thread BLE while seated.)  Toileting Assistance with Device: Minimal    Activity Tolerance:  Endurance: Endurance does not limit participation in activity    Bed Mobility/Transfers:   Bed Mobility  Bed Mobility:  (Transition from sit EOB to supine with use of bed rail with MOD I)  Transfers  Transfer:  (Sit to/from stand without device IND)    Ambulation/Gait Training:  Functional Mobility  Functional Mobility Performed:  (Pt completed functional mobility throughout room without device IND)    Sitting Balance:  Static Sitting Balance  Static Sitting-Comment/Number of Minutes: Good  Dynamic Sitting Balance  Dynamic Sitting-Comments: Good    Standing Balance:  Static Standing Balance  Static Standing-Comment/Number of Minutes: Good  Dynamic  Standing Balance  Dynamic Standing-Comments: Good    Strength:  Strength Comments: LUE NT d/t surgery and WB restrictions, R shoulder 4-/5, elbow/ 5/5    Extremities: DUKE WALL : Within Functional Limits (full AROM) and KAYODE HEADLEY:  (Elbow/wrist/hand WFL. L shoulder NT d/t ROM restrictions s/p surgery)    Outcome Measures: [facility] Daily Activity  Putting on and taking off regular lower body clothing: A little  Bathing (including washing, rinsing, drying): A little  Putting on and taking off regular upper body clothing: A little  Toileting, which includes using toilet, bedpan or urinal: A little  Taking care of personal grooming such as brushing teeth: A little  Eating Meals: A little  Daily Activity - Total Score: 18                       EDUCATION:  Education  Individual(s) Educated: Patient  Education Provided: Fall precautons, Risk and benefits of OT discussed with patient or other, POC discussed and agreed upon  Patient Response to Education: Patient/Caregiver Verbalized Understanding of Information  Education Comment: Pt educated extensively on L shoulder precautions and compensatory UB/LB dressing strategies. Issued shoulder packet. Pt verbalized/demo good understanding of all education. Pt would benefit from continued education to ensure carryover.  Education Documentation  Handouts, taught by Yadira Sharp OT at 4/3/2025  9:43 AM.  Learner: Patient  Readiness: Acceptance  Method: Explanation, Demonstration, Handout  Response: Verbalizes Understanding, Demonstrated Understanding, Needs Reinforcement    Body Mechanics, taught by Yadira Sharp OT at 4/3/2025  9:43 AM.  Learner: Patient  Readiness: Acceptance  Method: Explanation, Demonstration, Handout  Response: Verbalizes Understanding, Demonstrated Understanding, Needs Reinforcement    Precautions, taught by Yadria Sharp OT at 4/3/2025  9:43 AM.  Learner: Patient  Readiness: Acceptance  Method: Explanation, Demonstration, Handout  Response:  Verbalizes Understanding, Demonstrated Understanding, Needs Reinforcement    ADL Training, taught by Yadira Sharp, OT at 4/3/2025  9:43 AM.  Learner: Patient  Readiness: Acceptance  Method: Explanation, Demonstration, Handout  Response: Verbalizes Understanding, Demonstrated Understanding, Needs Reinforcement  Goals:   Encounter Problems       Encounter Problems (Active)       OT Goals       Pt will don/doff ultra sling IND. (Progressing)       Start:  04/03/25    Expected End:  04/17/25            Pt will complete UB dressing with MOD I. (Progressing)       Start:  04/03/25    Expected End:  04/17/25            Pt will complete LB dressing with MOD I. (Progressing)       Start:  04/03/25    Expected End:  04/17/25            Pt will demo IND with L shoulder precautions during ADL tasks. (Progressing)       Start:  04/03/25    Expected End:  04/17/25            Pt will complete AROM of L elbow/wrist/hand.  (Progressing)       Start:  04/03/25    Expected End:  04/17/25

## 2025-04-03 NOTE — CARE PLAN
Problem: Pain - Adult  Goal: Verbalizes/displays adequate comfort level or baseline comfort level  Outcome: Progressing     Problem: Safety - Adult  Goal: Free from fall injury  Outcome: Progressing     Problem: Discharge Planning  Goal: Discharge to home or other facility with appropriate resources  Outcome: Progressing     Problem: Chronic Conditions and Co-morbidities  Goal: Patient's chronic conditions and co-morbidity symptoms are monitored and maintained or improved  Outcome: Progressing     Problem: Nutrition  Goal: Nutrient intake appropriate for maintaining nutritional needs  Outcome: Progressing   The patient's goals for the shift include remain safe    The clinical goals for the shift include Pain management and safety

## 2025-04-03 NOTE — PROGRESS NOTES
Orthopedic Surgery Progress Note  Chadwick Tello  4/3/2025    Subjective:     Post-Operative Day # 1   Status Post left Reverse Total Shoulder Arthroplasty     Systemic or Specific Complaints: No Complaints    Objective:     Visit Vitals  /57 (Patient Position: Sitting)   Pulse 75   Temp 35.9 °C (96.6 °F)   Resp 16       Intake/Output Summary (Last 24 hours) at 4/3/2025 0838  Last data filed at 4/2/2025 2100  Gross per 24 hour   Intake 2004.17 ml   Output 300 ml   Net 1704.17 ml     DRAIN/TUBE OUTPUT:         General: alert and oriented, in no acute distress, appears stated age, and cooperative   Wound: no erythema, no edema, no drainage, and Mepilex dressing remains clean, dry, and intact. CHRISTINA drain with no output, will discontinue   Extremity: Sling on extremity.  Distal NVI. Moving fingers. Mild edema present   DVT Exam: No evidence of DVT seen on physical exam.  Negative Abbi's sign.  No significant calf/ankle edema.     Data Review  Labs reviewed:     CBC:   Lab Results   Component Value Date    WBC 17.5 (H) 04/03/2025    WBC 7.2 03/19/2025    WBC 10.1 12/13/2022    HGB 13.1 (L) 04/03/2025    HGB 15.5 03/19/2025    HGB 12.3 (L) 12/13/2022       BMP:    Lab Results   Component Value Date     04/03/2025     03/19/2025     12/13/2022    K 4.2 04/03/2025    K 4.6 03/19/2025    K 4.5 12/13/2022     04/03/2025     03/19/2025     12/13/2022    CO2 24 04/03/2025    CO2 30 03/19/2025    CO2 29 12/13/2022    BUN 18 04/03/2025    BUN 20 03/19/2025    BUN 23 12/13/2022    CREATININE 0.86 04/03/2025    CREATININE 1.05 03/19/2025    CREATININE 1.22 12/13/2022          I&O  I/O last 3 completed shifts:  In: 2004.2 (23.6 mL/kg) [P.O.:700; I.V.:1204.2 (14.2 mL/kg); IV Piggyback:100]  Out: 300 (3.5 mL/kg) [Urine:300 (0.1 mL/kg/hr)]  Dosing Weight: 85 kg       Assessment:     Status Post left Reverse Total Shoulder Arthroplasty, doing well postoperatively. No acute events  overnight.    Patient up to chair this morning, doing well. Reports has had minimal pain since surgery. CHRISTINA drain to be removed today.     Leukocytosis: WBC count this morning on labs 17.5 . There is no clinical indication of acute infection. Patient is afebrile, denies cough, abdominal pain, or urinary symptoms. Transient elevation in white blood count is most likely secondary to stress and inflammatory process from surgery.    Plan:      1.  Continue pain control, scripts sent  2. Okay to discontinue CHRISTINA drain  3. PT/OT: Non weightbearing to operative shoulder with no ROM. Ok for ROM to elbow, hand, and wrist.   4.  Anticipate discharge home today. Follow up with surgeon in office in 2 weeks    BERTRAND Carmen   4/3/2025 8:38 AM

## 2025-04-09 ENCOUNTER — TELEPHONE (OUTPATIENT)
Dept: ORTHOPEDIC SURGERY | Facility: CLINIC | Age: 80
End: 2025-04-09
Payer: MEDICARE

## 2025-04-09 NOTE — TELEPHONE ENCOUNTER
Patient called to inform that he took off the surgical dressing, wound looks good, no active bleeding. Patient informed that he has smaller bandages at home, so put one on, but not sure if he is supposed to or leave wound open to air?  S/P Left shoulder rotator cuff arthropathy, left shoulder proximal long head bicep tendon tear 4/2/2025  Patient would like a call back [phone]

## 2025-04-15 DIAGNOSIS — E78.2 MIXED HYPERLIPIDEMIA: ICD-10-CM

## 2025-04-15 RX ORDER — ATORVASTATIN CALCIUM 80 MG/1
80 TABLET, FILM COATED ORAL DAILY
Qty: 90 TABLET | Refills: 0 | Status: SHIPPED | OUTPATIENT
Start: 2025-04-15

## 2025-04-15 NOTE — TELEPHONE ENCOUNTER
Comments:     Last Office Visit (last PCP visit):   3/12/2025    Next Visit Date:  Future Appointments   Date Time Provider Department Center   5/22/2025 10:00 AM King Michelle MD [facility]   7/8/2025  9:00 AM Omkar Barrios MD [facility]   12/17/2025  9:30 AM King Michelle MD [facility]       **If hasn't been seen in over a year OR hasn't followed up according to last diabetes/ADHD visit, make appointment for patient before sending refill to provider.    Rx requested:  Requested Prescriptions     Pending Prescriptions Disp Refills    atorvastatin (LIPITOR) 80 MG tablet [Pharmacy Med Name: Atorvastatin Calcium Oral Tablet 80 MG] 90 tablet 0     Sig: TAKE 1 TABLET BY MOUTH EVERY DAY

## 2025-04-16 ENCOUNTER — ANCILLARY PROCEDURE (OUTPATIENT)
Dept: ORTHOPEDIC SURGERY | Facility: CLINIC | Age: 80
End: 2025-04-16
Payer: MEDICARE

## 2025-04-16 ENCOUNTER — APPOINTMENT (OUTPATIENT)
Dept: ORTHOPEDIC SURGERY | Facility: CLINIC | Age: 80
End: 2025-04-16
Payer: MEDICARE

## 2025-04-16 DIAGNOSIS — Z98.890 HISTORY OF REVERSE TOTAL REPLACEMENT OF LEFT SHOULDER JOINT: ICD-10-CM

## 2025-04-16 PROCEDURE — 99024 POSTOP FOLLOW-UP VISIT: CPT | Performed by: PHYSICIAN ASSISTANT

## 2025-04-16 PROCEDURE — 1157F ADVNC CARE PLAN IN RCRD: CPT | Performed by: PHYSICIAN ASSISTANT

## 2025-04-16 PROCEDURE — 73030 X-RAY EXAM OF SHOULDER: CPT | Mod: LEFT SIDE | Performed by: ORTHOPAEDIC SURGERY

## 2025-04-16 NOTE — PROGRESS NOTES
History of present illness status post left reverse total shoulder arthroplasty.  Patient presents in sling.  Other than some soreness he has no pain.      Physical exam:      General: No acute distress or breathing difficulty or discomfort, pleasant and cooperative with the examination.    Extremities: Left shoulder incisions are clean dry and intact he is hand wrist and elbow intact      Diagnostic studies: Left shoulder x-rays 2 views show well aligned well-positioned left reverse total shoulder arthroplasty    Impression: Status post left reverse total shoulder arthroplasty    Plan: We downsized the sling he will continue to wear when he is up and about.  We discussed hand wrist and elbow range of motion as well as supine stretches which he may perform once daily.  He will follow-up in 2 to 3 weeks with x-rays 2 views of the left shoulder and range of motion check.

## 2025-04-25 ENCOUNTER — OFFICE VISIT (OUTPATIENT)
Dept: ORTHOPEDIC SURGERY | Facility: CLINIC | Age: 80
End: 2025-04-25
Payer: MEDICARE

## 2025-04-25 ENCOUNTER — HOSPITAL ENCOUNTER (OUTPATIENT)
Dept: RADIOLOGY | Facility: HOSPITAL | Age: 80
Discharge: HOME | End: 2025-04-25
Payer: MEDICARE

## 2025-04-25 DIAGNOSIS — Z98.890 HISTORY OF REVERSE TOTAL REPLACEMENT OF LEFT SHOULDER JOINT: Primary | ICD-10-CM

## 2025-04-25 DIAGNOSIS — Z98.890 HISTORY OF REVERSE TOTAL REPLACEMENT OF LEFT SHOULDER JOINT: ICD-10-CM

## 2025-04-25 PROCEDURE — 73030 X-RAY EXAM OF SHOULDER: CPT | Mod: LT

## 2025-04-25 NOTE — PROGRESS NOTES
Acute Injury Established Patient Visit    HPI: Chadwick is a 79 y.o.male who presents today with new complaints of left shoulder injury.  Of note, he is status post left reverse total shoulder replacement with Dr. Andrew Toure on 4/2/2025.  He had been doing well until last night, when he was going to sit down in his chair, pulled his chair forward, and felt a pop or crack across the anterior shoulder.  He denies any significant increase in swelling or bruising.  He continues to have some pain with any movements, but is fairly comfortable at rest.    Plan: For this concern for acromion fracture versus occult fracture at the left shoulder status post left reverse total shoulder surgery, will obtain a stat CT of the left shoulder with 3D recon and follow-up with his surgeon, Dr. Andrew Toure with the results.  Did discuss the importance of maintaining his sling at all times except for showering, and not using his arm for any activity whatsoever, especially until follow-up.  Additionally, discussed conservative treatment measures including rest, ice, elevation, compression, and over-the-counter analgesia as needed and as appropriate.  Risks of NSAID use, steroid use, and muscle relaxers discussed in depth and considered in light of medical comorbidities.  Patient, and parent/guardian as applicable, understand agree with plan.  Follow-up: With Dr. 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      Procedure:  Procedures    Physical Exam:  GENERAL:  No obvious acute distress.  NEURO:  Distally neurovascularly intact.  Sensation intact  to light touch.  Extremity: Left shoulder exam shows:  Skin is intact;  No erythema or warmth;  No edema or ecchymosis;  Incision is healing well with no signs of surrounding erythema, warmth, fluctuance, drainage, or bleeding that would be concerning for infection;  TENDER to palpation over the anterior and lateral shoulder overlying the acromion;  No clinical signs of infection; and  Neurovascularly intact.       Orders Placed This Encounter    XR shoulder left 2+ views    CT shoulder left wo IV contrast    CT 3D reconstruction      At the conclusion of the visit there were no further questions by the patient/family regarding their plan of care.  Patient was instructed to call or return with any issues, questions, or concerns regarding their injury and/or treatment plan described above.     04/25/25 at 11:47 AM - Berto Tate, DO    Office: [phone]    This note was prepared using voice recognition software.  The details of this note are correct and have been reviewed, and corrected to the best of my ability.  Some grammatical errors may persist related to the Dragon software.

## 2025-04-28 ENCOUNTER — HOSPITAL ENCOUNTER (OUTPATIENT)
Dept: RADIOLOGY | Facility: HOSPITAL | Age: 80
Discharge: HOME | End: 2025-04-28
Payer: MEDICARE

## 2025-04-28 DIAGNOSIS — Z98.890 HISTORY OF REVERSE TOTAL REPLACEMENT OF LEFT SHOULDER JOINT: ICD-10-CM

## 2025-04-28 PROCEDURE — 76377 3D RENDER W/INTRP POSTPROCES: CPT | Mod: LEFT SIDE | Performed by: RADIOLOGY

## 2025-04-28 PROCEDURE — 73200 CT UPPER EXTREMITY W/O DYE: CPT | Mod: LEFT SIDE | Performed by: RADIOLOGY

## 2025-04-28 PROCEDURE — 73200 CT UPPER EXTREMITY W/O DYE: CPT | Mod: LT

## 2025-04-28 PROCEDURE — 76377 3D RENDER W/INTRP POSTPROCES: CPT

## 2025-04-30 ENCOUNTER — APPOINTMENT (OUTPATIENT)
Dept: ORTHOPEDIC SURGERY | Facility: CLINIC | Age: 80
End: 2025-04-30
Payer: MEDICARE

## 2025-04-30 ENCOUNTER — ANCILLARY PROCEDURE (OUTPATIENT)
Dept: ORTHOPEDIC SURGERY | Facility: CLINIC | Age: 80
End: 2025-04-30
Payer: MEDICARE

## 2025-04-30 DIAGNOSIS — M19.012 PRIMARY OSTEOARTHRITIS OF LEFT SHOULDER: ICD-10-CM

## 2025-04-30 PROCEDURE — 99024 POSTOP FOLLOW-UP VISIT: CPT | Performed by: ORTHOPAEDIC SURGERY

## 2025-04-30 PROCEDURE — 1157F ADVNC CARE PLAN IN RCRD: CPT | Performed by: ORTHOPAEDIC SURGERY

## 2025-04-30 NOTE — PROGRESS NOTES
History of present illness patient is status post left reverse total shoulder arthroplasty.  He had an incident where he was pulling a chair closer to home and he felt a pop and pain.  Went to the emergency room.      Physical exam:      General: No acute distress or breathing difficulty or discomfort, pleasant and cooperative with the examination.    Extremities: Left shoulder incisions completely clean dry and intact he is neurovascularly intact with hands together he can forward flex to 90 degrees      Diagnostic studies: X-rays taken today show no evidence of fracture dislocation there read by Dr. Andrew Toure.  Patient also had a CT scan which showed no evidence of acromial stress fracture.    Impression: Status post left reverse total shoulder arthroplasty    Plan: Patient most likely had one of the absorbable sutures at the pectoralis/deltoid interval release.  There is currently no drainage he has some soreness but is more anterior at the interval where the incision was made.  He can continue wear the sling when he is up and about but may start his supine stretches self-guided again.  He will follow-up if he has any increased pain otherwise we will see him in 1 month with x-rays 2 views of the left shoulder and range of motion check.

## 2025-05-27 ENCOUNTER — OFFICE VISIT (OUTPATIENT)
Dept: FAMILY MEDICINE CLINIC | Age: 80
End: 2025-05-27
Payer: MEDICARE

## 2025-05-27 VITALS
BODY MASS INDEX: 28.95 KG/M2 | HEIGHT: 68 IN | OXYGEN SATURATION: 97 % | WEIGHT: 191 LBS | HEART RATE: 70 BPM | TEMPERATURE: 97.7 F

## 2025-05-27 DIAGNOSIS — Z85.828 H/O BASAL CELL CARCINOMA EXCISION: ICD-10-CM

## 2025-05-27 DIAGNOSIS — L57.0 ACTINIC KERATOSES: ICD-10-CM

## 2025-05-27 DIAGNOSIS — Z12.83 SKIN CANCER SCREENING: Primary | ICD-10-CM

## 2025-05-27 DIAGNOSIS — L98.9 PAINFUL SKIN LESION: ICD-10-CM

## 2025-05-27 DIAGNOSIS — Z98.890 H/O BASAL CELL CARCINOMA EXCISION: ICD-10-CM

## 2025-05-27 PROCEDURE — 1160F RVW MEDS BY RX/DR IN RCRD: CPT | Performed by: FAMILY MEDICINE

## 2025-05-27 PROCEDURE — 1159F MED LIST DOCD IN RCRD: CPT | Performed by: FAMILY MEDICINE

## 2025-05-27 PROCEDURE — 1123F ACP DISCUSS/DSCN MKR DOCD: CPT | Performed by: FAMILY MEDICINE

## 2025-05-27 PROCEDURE — 99213 OFFICE O/P EST LOW 20 MIN: CPT | Performed by: FAMILY MEDICINE

## 2025-05-27 PROCEDURE — 17000 DESTRUCT PREMALG LESION: CPT | Performed by: FAMILY MEDICINE

## 2025-05-27 PROCEDURE — 17003 DESTRUCT PREMALG LES 2-14: CPT | Performed by: FAMILY MEDICINE

## 2025-05-27 ASSESSMENT — ENCOUNTER SYMPTOMS: COLOR CHANGE: 1

## 2025-05-27 NOTE — PROGRESS NOTES
healthy tissue.     The area should be covered with a bandage to prevent blister breakage and dirt exposure.      The wounds should remain dry while there is a blister, therefore if this is a sweaty location, like the foot ,you may need to change clothing, such as socks, multiple times per day.       Remember that the reason for cryosurgery to be done is to damage skin that is not normal so that it will be removed.  Therefore the area could be red or pink, can sting or burn for the first day or 2, will appear raw when the skin sloughs off.    When the blister(s) pop or patient removes the top as instructed between day 3-5, apply antibiotic (NOT triple antibiotic, one brand is Neosporin) ointment, usually Bacitracin, and a bandage to affected area(s).  The ointment should be applied to the open area as long as it is not covered with skin.  Exposed tissue is meant to be moist.      Once a scab is formed the patient may stop applying ointment.  The scab may appear yellow while moist, don't confuse this with infection.  If the wound is infection pus will drain from the site. If this treatment was for a large wart you may note that a plug of skin may fall out of the area that was treated.  That is the center of the wart and it is appropriate for it to come out.  If exposed skin remains, treat that area as you would a ruptured blister as mentioned above.    Bacitracin sample supplied               DO NOT USE TRIPLE ANTIBIOTIC ON THE WOUND    It is best to leave blisters alone if they form. They should be covered with a bandage to prevent blister breakage and dirt exposure.  The wounds should remain dry while there is a blister, therefore if this is a sweaty location like the foot you may need to change socks multiple times per day.   If blister(s) pop or patient pops with a sterilized needle, apply antibiotic (NOT triple antibiotic, one brand is Neosporin) ointment and a bandage to affected area(s).  The ointment should

## 2025-05-27 NOTE — PATIENT INSTRUCTIONS
Patient educated on benign nature of most of the findings.    Actinic keratosis were elected to be removed by liquid nitrogen therapy.    Cryotherapy instructions    Post op instructions given. A printed copy provided.    It is best to leave blisters alone if they form for the first 1-3 days to allow the desired damaged tissue (precancer lesion, wart, or whatever lesion is being removed) to separate from healthy tissue.     The area should be covered with a bandage to prevent blister breakage and dirt exposure.      The wounds should remain dry while there is a blister, therefore if this is a sweaty location, like the foot ,you may need to change clothing, such as socks, multiple times per day.       Remember that the reason for cryosurgery to be done is to damage skin that is not normal so that it will be removed.  Therefore the area could be red or pink, can sting or burn for the first day or 2, will appear raw when the skin sloughs off.    When the blister(s) pop or patient removes the top as instructed between day 3-5, apply antibiotic (NOT triple antibiotic, one brand is Neosporin) ointment, usually Bacitracin, and a bandage to affected area(s).  The ointment should be applied to the open area as long as it is not covered with skin.  Exposed tissue is meant to be moist.      Once a scab is formed the patient may stop applying ointment.  The scab may appear yellow while moist, don't confuse this with infection.  If the wound is infection pus will drain from the site. If this treatment was for a large wart you may note that a plug of skin may fall out of the area that was treated.  That is the center of the wart and it is appropriate for it to come out.  If exposed skin remains, treat that area as you would a ruptured blister as mentioned above.    Bacitracin sample supplied

## 2025-06-02 DIAGNOSIS — E03.9 HYPOTHYROIDISM, UNSPECIFIED TYPE: ICD-10-CM

## 2025-06-02 DIAGNOSIS — N40.1 BPH WITH OBSTRUCTION/LOWER URINARY TRACT SYMPTOMS: ICD-10-CM

## 2025-06-02 DIAGNOSIS — N13.8 BPH WITH OBSTRUCTION/LOWER URINARY TRACT SYMPTOMS: ICD-10-CM

## 2025-06-03 RX ORDER — TAMSULOSIN HYDROCHLORIDE 0.4 MG/1
0.4 CAPSULE ORAL 2 TIMES DAILY
Qty: 180 CAPSULE | Refills: 0 | Status: SHIPPED | OUTPATIENT
Start: 2025-06-03

## 2025-06-03 RX ORDER — LEVOTHYROXINE SODIUM 50 UG/1
50 TABLET ORAL DAILY
Qty: 90 TABLET | Refills: 0 | Status: SHIPPED | OUTPATIENT
Start: 2025-06-03

## 2025-06-03 NOTE — TELEPHONE ENCOUNTER
Comments:     Last Office Visit (last PCP visit):   3/12/2025    Next Visit Date:  Future Appointments   Date Time Provider Department Center   7/7/2025  2:00 PM King Michelle MD [facility] DEP   7/8/2025  9:00 AM Omkar Barrios MD [facility] DE[facility]   6/3/2026  9:30 AM King Michelle MD [facility] DE[facility]       **If hasn't been seen in over a year OR hasn't followed up according to last diabetes/ADHD visit, make appointment for patient before sending refill to provider.    Rx requested:  Requested Prescriptions     Pending Prescriptions Disp Refills    levothyroxine (SYNTHROID) 50 MCG tablet [Pharmacy Med Name: Levothyroxine Sodium Oral Tablet 50 MCG] 90 tablet 0     Sig: TAKE 1 TABLET BY MOUTH ONCE DAILY    tamsulosin (FLOMAX) 0.4 MG capsule [Pharmacy Med Name: Tamsulosin HCl Oral Capsule 0.4 MG] 180 capsule 0     Sig: TAKE 1 CAPSULE BY MOUTH TWICE DAILY               PT REPORTS FALLING OFF SCOOTER TODAY INJURYING RIGHT WRIST.

## 2025-06-04 ENCOUNTER — APPOINTMENT (OUTPATIENT)
Dept: ORTHOPEDIC SURGERY | Facility: CLINIC | Age: 80
End: 2025-06-04
Payer: MEDICARE

## 2025-06-04 ENCOUNTER — ANCILLARY PROCEDURE (OUTPATIENT)
Dept: ORTHOPEDIC SURGERY | Facility: CLINIC | Age: 80
End: 2025-06-04
Payer: MEDICARE

## 2025-06-04 DIAGNOSIS — Z98.890 HISTORY OF REVERSE TOTAL REPLACEMENT OF LEFT SHOULDER JOINT: ICD-10-CM

## 2025-06-04 PROCEDURE — 99024 POSTOP FOLLOW-UP VISIT: CPT | Performed by: PHYSICIAN ASSISTANT

## 2025-06-04 PROCEDURE — 73030 X-RAY EXAM OF SHOULDER: CPT | Mod: LEFT SIDE | Performed by: ORTHOPAEDIC SURGERY

## 2025-06-04 NOTE — PROGRESS NOTES
History of present illness patient is status post left reverse total shoulder arthroplasty.  Patient has no pain he states it is loosening up as time goes on.  He has achieved most of his range of motion back.      Physical exam:      General: No acute distress or breathing difficulty or discomfort, pleasant and cooperative with the examination.    Extremities: Left shoulder incisions well-healed intact he has forward flexion up to 120 degrees      Diagnostic studies: X-rays 2 views left shoulder show alignment position left reverse total shoulder arthroplasty there read by Dr. Andrew Toure    Impression: Status post left reverse total shoulder arthroplasty    Plan: Patient will continue to be cautious for 1 more month.  He is already back to doing all his normal activities of daily living.  He will follow-up with us on as-needed basis for his left shoulder.

## 2025-06-12 ENCOUNTER — APPOINTMENT (OUTPATIENT)
Dept: CARDIOLOGY | Facility: CLINIC | Age: 80
End: 2025-06-12
Payer: COMMERCIAL

## 2025-06-12 VITALS
DIASTOLIC BLOOD PRESSURE: 70 MMHG | WEIGHT: 182 LBS | HEART RATE: 116 BPM | HEIGHT: 68 IN | BODY MASS INDEX: 27.58 KG/M2 | OXYGEN SATURATION: 96 % | SYSTOLIC BLOOD PRESSURE: 110 MMHG

## 2025-06-12 DIAGNOSIS — R00.0 TACHYCARDIA: ICD-10-CM

## 2025-06-12 DIAGNOSIS — I25.10 ARTERIOSCLEROSIS OF CORONARY ARTERY: Primary | ICD-10-CM

## 2025-06-12 DIAGNOSIS — I10 BENIGN ESSENTIAL HYPERTENSION: ICD-10-CM

## 2025-06-12 DIAGNOSIS — E78.5 DYSLIPIDEMIA: ICD-10-CM

## 2025-06-12 DIAGNOSIS — R06.09 DYSPNEA ON EXERTION: ICD-10-CM

## 2025-06-12 PROCEDURE — 99212 OFFICE O/P EST SF 10 MIN: CPT | Mod: 25

## 2025-06-12 PROCEDURE — 3078F DIAST BP <80 MM HG: CPT | Performed by: INTERNAL MEDICINE

## 2025-06-12 PROCEDURE — 93010 ELECTROCARDIOGRAM REPORT: CPT | Performed by: INTERNAL MEDICINE

## 2025-06-12 PROCEDURE — 3074F SYST BP LT 130 MM HG: CPT | Performed by: INTERNAL MEDICINE

## 2025-06-12 PROCEDURE — 1159F MED LIST DOCD IN RCRD: CPT | Performed by: INTERNAL MEDICINE

## 2025-06-12 PROCEDURE — 93005 ELECTROCARDIOGRAM TRACING: CPT | Performed by: INTERNAL MEDICINE

## 2025-06-12 PROCEDURE — 99214 OFFICE O/P EST MOD 30 MIN: CPT | Performed by: INTERNAL MEDICINE

## 2025-06-12 RX ORDER — REGADENOSON 0.08 MG/ML
0.4 INJECTION, SOLUTION INTRAVENOUS
OUTPATIENT
Start: 2025-06-12

## 2025-06-12 RX ORDER — AMINOPHYLLINE 25 MG/ML
125 INJECTION, SOLUTION INTRAVENOUS ONCE AS NEEDED
OUTPATIENT
Start: 2025-06-12

## 2025-06-12 RX ORDER — ATORVASTATIN CALCIUM 80 MG/1
80 TABLET, FILM COATED ORAL DAILY
Start: 2025-06-12 | End: 2026-06-12

## 2025-06-12 NOTE — PROGRESS NOTES
Chief Complaint:   No chief complaint on file.     History Of Present Illness:    Chadwick Tello is a 79 y.o. male with history of CAD s/p CABG (LIMA-LAD July 2021), dyslipidemia, hypertension, and obesity here for follow-up.    Still complains of fatigue.  Says that if he walks up a flight of stairs he is more fatigued and finds himself panting.  Denies any chest pain.  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  In the past he can walk down to the end, grab the mail, and walk back without an issue.  Over the past several years he has noticed that he only goes about 200 feet and then feels fatigued.    Tells me that about 5 years ago he was cleaning the floor with bleach and inhaled the bleach fumes.  He felt a burning in his chest.  He has not had any other specific exposures since that time.  He does not believe that he has had any pulmonary testing since that time.    Denies any palpitations or lower extremity swelling.    ECG today 3/19/2025: Sinus rhythm with PACs.  Mild NV depressions.  Inferior leads.  Nonspecific ST changes in the lateral leads.     Limited echocardiogram 8/2/2021 for pericardial effusion: Technically difficult study. EF 55 to 65%. Trivial posterior pericardial effusion. Anterior and apical epicardial fat pad noted.     Single-vessel CABG 7/30/2021: LIMA-LAD     Left heart catheterization 7/26/2021: LAD with proximal 99% in-stent restenosis. Mild disease in the LCx and dominant RCA.     PCI to LAD and first OM 12/2020: Angioplasty of LAD in-stent restenosis with NC 3.5 x 12 mm balloon. PCI of OM1 with Xience 2.25 x 15 mm OZZY.     Cath 2012 with OZZY x4      Allergies:  Ace inhibitors and Ticagrelor    Outpatient Medications:  Current Outpatient Medications   Medication Instructions    acetaminophen (TYLENOL) 325 mg    ascorbic acid, vitamin C, 500 mg capsule Take by mouth.    aspirin 81 mg, Daily RT    carvedilol (COREG) 12.5 mg    cholecalciferol (Vitamin D-3) 25 MCG (1000 UT) capsule  "Take by mouth.    Flomax 0.4 mg    levothyroxine (SYNTHROID, LEVOXYL) 50 mcg, Daily RT    meloxicam (MOBIC) 7.5 mg, Daily RT    multivitamin with minerals (multivitamin) tablet 1 tablet, Daily with breakfast    Proscar 5 mg    simvastatin (ZOCOR) 40 mg       Last Recorded Vitals:  Visit Vitals  /70 (BP Location: Right arm, Patient Position: Sitting)   Pulse (!) 116   Ht 1.727 m (5' 8\")   Wt 82.6 kg (182 lb)   SpO2 96%   BMI 27.67 kg/m²   Smoking Status Never   BSA 1.99 m²      LASTWT(3):   Wt Readings from Last 3 Encounters:   06/12/25 82.6 kg (182 lb)   04/02/25 85 kg (187 lb 6.3 oz)   03/31/25 87.2 kg (192 lb 3.2 oz)       Physical Exam:  In general: alert and in no acute distress.   HEENT: Carotid upstrokes normal with no bruits. JVP is normal.   Pulmonary: Clear to auscultation bilaterally.  Cardiovascular: S1,S2, regular. No appreciable murmurs, rubs or gallops.   Lower extremities: Warm. 2+ distal pulses. No edema.       Last Labs:  CBC -  Recent Labs     04/03/25  0436 03/19/25  1044 12/13/22  0519   WBC 17.5* 7.2 10.1   HGB 13.1* 15.5 12.3*   HCT 39.6* 47.0 37.5*    206 140*   MCV 90 91 91       CMP -  Recent Labs     04/03/25  0437 03/19/25  1044 12/13/22  0519    138 139   K 4.2 4.6 4.5    102 105   CO2 24 30 29   ANIONGAP 9* 11 10   BUN 18 20 23   CREATININE 0.86 1.05 1.22   EGFR 89 73  --      Recent Labs     03/19/25  1044 12/01/22  1144   ALBUMIN 3.8 4.0   ALKPHOS 51 73   ALT 20 18   AST 21 20   BILITOT 0.8 0.8       LIPID PANEL -   No results for input(s): \"CHOL\", \"LDLCALC\", \"LDLF\", \"HDL\", \"TRIG\" in the last 70605 hours.    Lipid panel 7/2/2024: Total cholesterol 171, triglycerides 62, HDL 68 and LDL 91.    Recent Labs     03/19/25  1044 03/18/23  0925 10/08/22  0950   HGBA1C 5.4 5.6 5.8           Assessment/Plan   1) CAD: PCI to the LAD and OM 1 in the past.  Single-vessel LIMA to LAD July 2021.  Does complain of dyspnea on exertion which could be an anginal equivalent.  ECG " is unchanged.  Exam is unremarkable.  Would like a Lexiscan nuclear stress test to evaluate for ischemia as a possible etiology.    Otherwise no change in his current medical therapy.     2) dyslipidemia: He is on atorvastatin and believes he is on 80 mg once a day.  Will call us if he is on 40 mg daily so we can update the record.     3) hypertension: Controlled.     4) dyspnea exertion: As noted above we will order Lexiscan nuclear stress test.  I would also like to repeat an echocardiogram.    If stress testing and echocardiography do not reveal a cardiac etiology of his symptoms then I would wait until he is treated for sleep apnea to reassess.  If he is still short of breath despite treatment for sleep apnea then I would asked him to speak to his primary care physician about speaking to a pulmonologist.     5) PACs: Has been seen on prior ECGs as well.  Of no clinical concern.  Continue to observe    6) obstructive sleep apnea: Continue with sleep medicine.    7) follow-up: 12 months or sooner if needed.         Prateek Hirsch MD

## 2025-06-23 ENCOUNTER — TELEPHONE (OUTPATIENT)
Facility: CLINIC | Age: 80
End: 2025-06-23
Payer: MEDICARE

## 2025-06-23 ENCOUNTER — TELEPHONE (OUTPATIENT)
Dept: CARDIOLOGY | Facility: CLINIC | Age: 80
End: 2025-06-23
Payer: MEDICARE

## 2025-06-23 NOTE — TELEPHONE ENCOUNTER
Patient called in regarding the dosage of his atorvastatin.  He reports that he is taking 40mg of Atorvastatin.

## 2025-06-23 NOTE — TELEPHONE ENCOUNTER
Called patient to ask if and where his sleep study was done. He is scheduled 6/30/25 with Dr. Antunez. If no sleep study ask patient if he planned on completing if yes give sleep study scheduling line

## 2025-06-24 ENCOUNTER — HOSPITAL ENCOUNTER (OUTPATIENT)
Dept: RADIOLOGY | Facility: HOSPITAL | Age: 80
Discharge: HOME | End: 2025-06-24
Payer: MEDICARE

## 2025-06-24 ENCOUNTER — HOSPITAL ENCOUNTER (OUTPATIENT)
Dept: CARDIOLOGY | Facility: HOSPITAL | Age: 80
Discharge: HOME | End: 2025-06-24
Payer: MEDICARE

## 2025-06-24 DIAGNOSIS — R06.09 DYSPNEA ON EXERTION: ICD-10-CM

## 2025-06-24 DIAGNOSIS — I25.10 ARTERIOSCLEROSIS OF CORONARY ARTERY: ICD-10-CM

## 2025-06-24 DIAGNOSIS — E78.5 DYSLIPIDEMIA: ICD-10-CM

## 2025-06-24 PROCEDURE — A9502 TC99M TETROFOSMIN: HCPCS | Performed by: INTERNAL MEDICINE

## 2025-06-24 PROCEDURE — 78452 HT MUSCLE IMAGE SPECT MULT: CPT | Performed by: RADIOLOGY

## 2025-06-24 PROCEDURE — 93017 CV STRESS TEST TRACING ONLY: CPT

## 2025-06-24 PROCEDURE — 3430000001 HC RX 343 DIAGNOSTIC RADIOPHARMACEUTICALS: Performed by: INTERNAL MEDICINE

## 2025-06-24 PROCEDURE — 78452 HT MUSCLE IMAGE SPECT MULT: CPT

## 2025-06-24 RX ORDER — ATORVASTATIN CALCIUM 80 MG/1
80 TABLET, FILM COATED ORAL DAILY
Qty: 90 TABLET | Refills: 3 | Status: SHIPPED | OUTPATIENT
Start: 2025-06-24 | End: 2026-06-24

## 2025-06-24 RX ADMIN — TETROFOSMIN 9.7 MILLICURIE: 0.23 INJECTION, POWDER, LYOPHILIZED, FOR SOLUTION INTRAVENOUS at 08:25

## 2025-06-24 RX ADMIN — TETROFOSMIN 32.6 MILLICURIE: 0.23 INJECTION, POWDER, LYOPHILIZED, FOR SOLUTION INTRAVENOUS at 10:19

## 2025-06-24 NOTE — TELEPHONE ENCOUNTER
Called patient and left detailed message per Dr. Hirsch's message regarding the increase in medication dosage and instructions regarding blood work in three months.  Advised to call back to office if any questions.

## 2025-06-26 RX ORDER — CARVEDILOL 12.5 MG/1
12.5 TABLET ORAL 2 TIMES DAILY WITH MEALS
Qty: 180 TABLET | Refills: 0 | Status: SHIPPED | OUTPATIENT
Start: 2025-06-26

## 2025-06-26 NOTE — TELEPHONE ENCOUNTER
Comments:     Last Office Visit (last PCP visit):   3/12/2025    Next Visit Date:  Future Appointments   Date Time Provider Department Center   7/8/2025  9:00 AM Omkar Barrios MD [facility]   7/9/2025 10:45 AM King Michelle MD [facility]   6/3/2026  9:30 AM King Michelle MD [facility] DEP       **If hasn't been seen in over a year OR hasn't followed up according to last diabetes/ADHD visit, make appointment for patient before sending refill to provider.    Rx requested:  Requested Prescriptions     Pending Prescriptions Disp Refills    carvedilol (COREG) 12.5 MG tablet [Pharmacy Med Name: Carvedilol Oral Tablet 12.5 MG] 180 tablet 0     Sig: TAKE ONE TABLET BY MOUTH TWICE A DAY WITH MEALS

## 2025-06-30 ENCOUNTER — APPOINTMENT (OUTPATIENT)
Facility: CLINIC | Age: 80
End: 2025-06-30
Payer: MEDICARE

## 2025-07-08 ENCOUNTER — OFFICE VISIT (OUTPATIENT)
Dept: FAMILY MEDICINE CLINIC | Age: 80
End: 2025-07-08
Payer: MEDICARE

## 2025-07-08 VITALS
BODY MASS INDEX: 29.43 KG/M2 | WEIGHT: 194.2 LBS | TEMPERATURE: 97.7 F | DIASTOLIC BLOOD PRESSURE: 80 MMHG | SYSTOLIC BLOOD PRESSURE: 118 MMHG | HEIGHT: 68 IN | OXYGEN SATURATION: 94 % | HEART RATE: 66 BPM

## 2025-07-08 DIAGNOSIS — E03.9 HYPOTHYROIDISM, UNSPECIFIED TYPE: ICD-10-CM

## 2025-07-08 DIAGNOSIS — I25.10 CORONARY ARTERY DISEASE INVOLVING NATIVE CORONARY ARTERY OF NATIVE HEART WITHOUT ANGINA PECTORIS: ICD-10-CM

## 2025-07-08 DIAGNOSIS — R73.9 HYPERGLYCEMIA: ICD-10-CM

## 2025-07-08 DIAGNOSIS — I10 ESSENTIAL HYPERTENSION: ICD-10-CM

## 2025-07-08 DIAGNOSIS — E78.2 MIXED HYPERLIPIDEMIA: ICD-10-CM

## 2025-07-08 DIAGNOSIS — Z12.5 SCREENING PSA (PROSTATE SPECIFIC ANTIGEN): ICD-10-CM

## 2025-07-08 DIAGNOSIS — Z00.00 MEDICARE ANNUAL WELLNESS VISIT, SUBSEQUENT: Primary | ICD-10-CM

## 2025-07-08 LAB
CHOLEST SERPL-MCNC: 158 MG/DL (ref 0–199)
HDLC SERPL-MCNC: 47 MG/DL (ref 40–59)
LDLC SERPL CALC-MCNC: 90 MG/DL (ref 0–129)
PSA SERPL-MCNC: 0.35 NG/ML (ref 0–4)
TRIGL SERPL-MCNC: 104 MG/DL (ref 0–150)
TSH REFLEX: 3.48 UIU/ML (ref 0.44–3.86)

## 2025-07-08 PROCEDURE — 1125F AMNT PAIN NOTED PAIN PRSNT: CPT | Performed by: FAMILY MEDICINE

## 2025-07-08 PROCEDURE — 3079F DIAST BP 80-89 MM HG: CPT | Performed by: FAMILY MEDICINE

## 2025-07-08 PROCEDURE — 1160F RVW MEDS BY RX/DR IN RCRD: CPT | Performed by: FAMILY MEDICINE

## 2025-07-08 PROCEDURE — 1123F ACP DISCUSS/DSCN MKR DOCD: CPT | Performed by: FAMILY MEDICINE

## 2025-07-08 PROCEDURE — 3074F SYST BP LT 130 MM HG: CPT | Performed by: FAMILY MEDICINE

## 2025-07-08 PROCEDURE — G0439 PPPS, SUBSEQ VISIT: HCPCS | Performed by: FAMILY MEDICINE

## 2025-07-08 PROCEDURE — 1159F MED LIST DOCD IN RCRD: CPT | Performed by: FAMILY MEDICINE

## 2025-07-08 ASSESSMENT — PATIENT HEALTH QUESTIONNAIRE - PHQ9
2. FEELING DOWN, DEPRESSED OR HOPELESS: NOT AT ALL
SUM OF ALL RESPONSES TO PHQ QUESTIONS 1-9: 0
1. LITTLE INTEREST OR PLEASURE IN DOING THINGS: NOT AT ALL
SUM OF ALL RESPONSES TO PHQ QUESTIONS 1-9: 0

## 2025-07-08 NOTE — PATIENT INSTRUCTIONS
of pain reliever, such as acetaminophen (Tylenol).   When should you call for help?   Call 911 if you have symptoms of a heart attack. These may include:    Chest pain or pressure, or a strange feeling in the chest.     Sweating.     Shortness of breath.     Pain, pressure, or a strange feeling in the back, neck, jaw, or upper belly or in one or both shoulders or arms.     Lightheadedness or sudden weakness.     A fast or irregular heartbeat.   After you call 911, the  may tell you to chew 1 adult-strength or 2 to 4 low-dose aspirin. Wait for an ambulance. Do not try to drive yourself.  Watch closely for changes in your health, and be sure to contact your doctor if you have any problems.  Where can you learn more?  Go to https://www.[employer].net/patientEd and enter F075 to learn more about \"A Healthy Heart: Care Instructions.\"  Current as of: July 31, 2024  Content Version: 14.5  © 6911-1250 [employer].   Care instructions adapted under license by [employer]. If you have questions about a medical condition or this instruction, always ask your healthcare professional. [employer] disclaims any warranty or liability for your use of this information.    Personalized Preventive Plan for Jonah Medina - 7/8/2025  Medicare offers a range of preventive health benefits. Some of the tests and screenings are paid in full while other may be subject to a deductible, co-insurance, and/or copay.  Some of these benefits include a comprehensive review of your medical history including lifestyle, illnesses that may run in your family, and various assessments and screenings as appropriate.  After reviewing your medical record and screening and assessments performed today your provider may have ordered immunizations, labs, imaging, and/or referrals for you.  A list of these orders (if applicable) as well as your Preventive Care list are included within your After Visit Summary for your

## 2025-07-08 NOTE — PROGRESS NOTES
Medicare Annual Wellness Visit    Jonah Medina is here for Medicare AWV    Assessment & Plan   Medicare annual wellness visit, subsequent  Hypothyroidism, unspecified type  -     TSH reflex to FT4; Future  Mixed hyperlipidemia  -     Lipid Panel; Future  Coronary artery disease involving native coronary artery of native heart without angina pectoris  Essential hypertension  Hyperglycemia  -     Hemoglobin A1C; Future  Screening PSA (prostate specific antigen)  -     PSA Screening; Future     No follow-ups on file.     Subjective   Chief Complaint   Patient presents with    Medicare AWV       Patient's complete Health Risk Assessment and screening values have been reviewed and are found in Flowsheets. The following problems were reviewed today and where indicated follow up appointments were made and/or referrals ordered.    Positive Risk Factor Screenings with Interventions:     Cognitive:   Clock Drawing Test (CDT): (!) Abnormal  Words recalled: 2 Words Recalled  Total Score: (!) 2  Total Score Interpretation: Abnormal Mini-Cog  Interventions:  See AVS for additional education material  See A/P for plan and any pertinent orders           General HRA Questions:  Select all that apply: (!) New or Increased Fatigue  Interventions Fatigue:  See AVS for additional education material  See A/P for plan and any pertinent orders           Vision Screen:  Do you have difficulty driving, watching TV, or doing any of your daily activities because of your eyesight?: (!) Yes  Have you had an eye exam within the past year?: Yes  Interventions:   See AVS for additional education material  See A/P for any pertinent orders                    Objective   Vitals:    07/08/25 0852   BP: 118/80   BP Site: Left Upper Arm   Pulse: 66   Temp: 97.7 °F (36.5 °C)   SpO2: 94%   Weight: 88.1 kg (194 lb 3.2 oz)   Height: 1.727 m (5' 8\")      Body mass index is 29.53 kg/m².          Gen: Well, NAD, Alert, Oriented x 3   HEENT: EOMI, eyes

## 2025-07-09 LAB
ESTIMATED AVERAGE GLUCOSE: 111 MG/DL
HBA1C MFR BLD: 5.5 % (ref 4–6)

## 2025-07-24 ENCOUNTER — APPOINTMENT (OUTPATIENT)
Dept: CARDIOLOGY | Facility: HOSPITAL | Age: 80
End: 2025-07-24
Payer: MEDICARE

## 2025-07-26 DIAGNOSIS — N13.8 BPH WITH OBSTRUCTION/LOWER URINARY TRACT SYMPTOMS: ICD-10-CM

## 2025-07-26 DIAGNOSIS — N40.1 BPH WITH OBSTRUCTION/LOWER URINARY TRACT SYMPTOMS: ICD-10-CM

## 2025-08-04 RX ORDER — FINASTERIDE 5 MG/1
5 TABLET, FILM COATED ORAL DAILY
Qty: 60 TABLET | Refills: 0 | Status: SHIPPED | OUTPATIENT
Start: 2025-08-04

## 2025-08-19 ENCOUNTER — CLINICAL SUPPORT (OUTPATIENT)
Dept: SLEEP MEDICINE | Facility: HOSPITAL | Age: 80
End: 2025-08-19
Payer: MEDICARE

## 2025-08-19 VITALS — BODY MASS INDEX: 27.6 KG/M2 | HEIGHT: 68 IN | WEIGHT: 182.1 LBS

## 2025-08-19 DIAGNOSIS — G47.30 SLEEP APNEA, UNSPECIFIED: ICD-10-CM

## 2025-08-19 DIAGNOSIS — G47.33 OSA (OBSTRUCTIVE SLEEP APNEA): ICD-10-CM

## 2025-08-19 PROCEDURE — 95811 POLYSOM 6/>YRS CPAP 4/> PARM: CPT | Performed by: GENERAL PRACTICE

## 2025-08-19 ASSESSMENT — SLEEP AND FATIGUE QUESTIONNAIRES
HOW LIKELY ARE YOU TO NOD OFF OR FALL ASLEEP IN A CAR, WHILE STOPPED FOR A FEW MINUTES IN TRAFFIC: SLIGHT CHANCE OF DOZING
HOW LIKELY ARE YOU TO NOD OFF OR FALL ASLEEP WHILE WATCHING TV: HIGH CHANCE OF DOZING
HOW LIKELY ARE YOU TO NOD OFF OR FALL ASLEEP WHILE LYING DOWN TO REST IN THE AFTERNOON WHEN CIRCUMSTANCES PERMIT: HIGH CHANCE OF DOZING
HOW LIKELY ARE YOU TO NOD OFF OR FALL ASLEEP WHEN YOU ARE A PASSENGER IN A CAR FOR AN HOUR WITHOUT A BREAK: MODERATE CHANCE OF DOZING
ESS-CHAD TOTAL SCORE: 16
SITING INACTIVE IN A PUBLIC PLACE LIKE A CLASS ROOM OR A MOVIE THEATER: MODERATE CHANCE OF DOZING
HOW LIKELY ARE YOU TO NOD OFF OR FALL ASLEEP WHILE SITTING AND TALKING TO SOMEONE: WOULD NEVER DOZE
HOW LIKELY ARE YOU TO NOD OFF OR FALL ASLEEP WHILE SITTING QUIETLY AFTER LUNCH WITHOUT ALCOHOL: MODERATE CHANCE OF DOZING
HOW LIKELY ARE YOU TO NOD OFF OR FALL ASLEEP WHILE SITTING AND READING: HIGH CHANCE OF DOZING

## 2025-08-25 DIAGNOSIS — E03.9 HYPOTHYROIDISM, UNSPECIFIED TYPE: ICD-10-CM

## 2025-08-25 RX ORDER — LEVOTHYROXINE SODIUM 50 UG/1
50 TABLET ORAL DAILY
Qty: 90 TABLET | Refills: 1 | Status: SHIPPED | OUTPATIENT
Start: 2025-08-25

## 2025-08-27 ENCOUNTER — HOSPITAL ENCOUNTER (OUTPATIENT)
Dept: CARDIOLOGY | Facility: HOSPITAL | Age: 80
Discharge: HOME | End: 2025-08-27
Payer: MEDICARE

## 2025-08-27 DIAGNOSIS — I25.10 ARTERIOSCLEROSIS OF CORONARY ARTERY: ICD-10-CM

## 2025-08-27 DIAGNOSIS — R06.09 DYSPNEA ON EXERTION: ICD-10-CM

## 2025-08-27 DIAGNOSIS — N13.8 BPH WITH OBSTRUCTION/LOWER URINARY TRACT SYMPTOMS: ICD-10-CM

## 2025-08-27 DIAGNOSIS — N40.1 BPH WITH OBSTRUCTION/LOWER URINARY TRACT SYMPTOMS: ICD-10-CM

## 2025-08-27 PROCEDURE — 93306 TTE W/DOPPLER COMPLETE: CPT

## 2025-08-27 RX ORDER — TAMSULOSIN HYDROCHLORIDE 0.4 MG/1
0.4 CAPSULE ORAL 2 TIMES DAILY
Qty: 180 CAPSULE | Refills: 0 | Status: SHIPPED | OUTPATIENT
Start: 2025-08-27

## 2025-09-02 LAB
AORTIC VALVE PEAK VELOCITY: 1.36 M/S
AV PEAK GRADIENT: 7 MMHG
AVA (PEAK VEL): 3.09 CM2
EJECTION FRACTION APICAL 4 CHAMBER: 47.7
EJECTION FRACTION: 53 %
LEFT ATRIUM VOLUME AREA LENGTH INDEX BSA: 17.4 ML/M2
LEFT VENTRICLE INTERNAL DIMENSION DIASTOLE: 5.2 CM (ref 3.5–6)
LEFT VENTRICULAR OUTFLOW TRACT DIAMETER: 2.19 CM
LV EJECTION FRACTION BIPLANE: 52 %
MITRAL VALVE E/A RATIO: 0.94
RIGHT VENTRICLE FREE WALL PEAK S': 10.52 CM/S
RIGHT VENTRICLE PEAK SYSTOLIC PRESSURE: 35 MMHG
TRICUSPID ANNULAR PLANE SYSTOLIC EXCURSION: 1.4 CM

## 2025-10-02 ENCOUNTER — APPOINTMENT (OUTPATIENT)
Facility: CLINIC | Age: 80
End: 2025-10-02
Payer: MEDICARE

## (undated) DEVICE — 3M™ STERI-DRAPE™ U-DRAPE 1015: Brand: STERI-DRAPE™

## (undated) DEVICE — SPONGE GZ W4XL4IN COT 12 PLY TYP VII WVN C FLD DSGN STERILE

## (undated) DEVICE — PIN FIX L60MM DIA3MM STD S STL THRD SGL SHRP FOR HUM RESECT
Type: IMPLANTABLE DEVICE | Site: SHOULDER | Status: NON-FUNCTIONAL
Removed: 2023-03-30

## (undated) DEVICE — DRILL CANNULATED SHLDR 6MM L 15MM

## (undated) DEVICE — SLEEVE CMPR SM STD CALF SCD ANEMB LF

## (undated) DEVICE — EVACUATOR, WOUND, SUCTION, CLOSED, JACKSON-PRATT, 100 CC, SILICONE

## (undated) DEVICE — 4-PORT MANIFOLD: Brand: NEPTUNE 2

## (undated) DEVICE — COUNTER NDL 40 COUNT HLD 70 FOAM BLK ADH W/ MAG

## (undated) DEVICE — WOUND SYSTEM, DEBRIDEMENT & CLEANING, O.R DUOPAK

## (undated) DEVICE — INTENDED FOR TISSUE SEPARATION, AND OTHER PROCEDURES THAT REQUIRE A SHARP SURGICAL BLADE TO PUNCTURE OR CUT.: Brand: BARD-PARKER ® CARBON RIB-BACK BLADES

## (undated) DEVICE — 2000CC GUARDIAN II: Brand: GUARDIAN

## (undated) DEVICE — CONVERTED USE 393139 JELLY LUBE ST 3 GM PACKET MEDICHOICE

## (undated) DEVICE — STOCKINETTE, IMPERVIOUS, LARGE, 9IN X 48IN

## (undated) DEVICE — GOWN,AURORA,NONRNF,XL,30/CS: Brand: MEDLINE

## (undated) DEVICE — SPLINT KNEE UNIV FOR LESS THAN 36IN L20IN FOAM LAM E CNTCT

## (undated) DEVICE — PAD THER XL AD MULTIUSE W E STRP WRAPON

## (undated) DEVICE — APPLICATOR MEDICATED 26 CC SOLUTION HI LT ORNG CHLORAPREP

## (undated) DEVICE — SOLUTION, IRRIGATION, SODIUM CHLORIDE 0.9%, 1000 ML, POUR BOTTLE

## (undated) DEVICE — TRAY PREP DRY W/ PREM GLV 2 APPL 6 SPNG 2 UNDPD 1 OVERWRAP

## (undated) DEVICE — BANDAGE, COFLEX, 4 X 5 YDS, TAN, STERILE, LF

## (undated) DEVICE — LABEL MED MINI W/ MARKER

## (undated) DEVICE — Device

## (undated) DEVICE — PENCIL SMK EVAC 10 FT BLADE ELECTRD ROCKER FOR TELSCP

## (undated) DEVICE — FOUR LEAD ARTHROSCOPIC IRRIGATION SET

## (undated) DEVICE — KIT PT CARE SCHLEIN ULT SHLDR POS

## (undated) DEVICE — SIPS DUAL 2 MINUTE TIP

## (undated) DEVICE — PAD,ABDOMINAL,8"X10",ST,LF: Brand: MEDLINE

## (undated) DEVICE — DRAPE, INCISE, ANTIMICROBIAL, IOBAN 2, LARGE, 17 X 23 IN, DISPOSABLE, STERILE

## (undated) DEVICE — MAT FLR ABSRB ECODRI-SAFE

## (undated) DEVICE — BLADE SAW W125XL70MM THK064MM CUT THK094MM REPL RECIP DBL

## (undated) DEVICE — CAUTERY, PENCIL, PUSH BUTTON, SMOKE EVAC, 70MM

## (undated) DEVICE — DRESSING PETRO W3XL8IN OIL EMUL N ADH GZ KNIT IMPREG CELOS

## (undated) DEVICE — SUTURE, VICRYL 0, TAPER POINT, CT-1 VIOLET 27 INCH

## (undated) DEVICE — SPONGE,LAP,4"X18",XR,ST,5/PK,40PK/CS: Brand: MEDLINE INDUSTRIES, INC.

## (undated) DEVICE — GLOVE ORANGE PI 8   MSG9080

## (undated) DEVICE — DRAPE,TOP,102X53,STERILE: Brand: MEDLINE

## (undated) DEVICE — DRAPE,U/ SHT,SPLIT,PLAS,STERIL: Brand: MEDLINE

## (undated) DEVICE — UNDERCAST PADDING: Brand: DEROYAL

## (undated) DEVICE — CHLORAPREP 26ML ORANGE

## (undated) DEVICE — ELECTRODE PT RET AD L9FT HI MOIST COND ADH HYDRGEL CORDED

## (undated) DEVICE — DRESSING, MEPILEX BORDER, POST-OP AG, 4 X 10 IN

## (undated) DEVICE — COVER LT HNDL BLU PLAS

## (undated) DEVICE — SINGLE PORT MANIFOLD: Brand: NEPTUNE 2

## (undated) DEVICE — NEPTUNE E-SEP SMOKE EVACUATION PENCIL, COATED, 70MM BLADE, PUSH BUTTON SWITCH: Brand: NEPTUNE E-SEP

## (undated) DEVICE — SUTURE VCRL SZ 0 L36IN ABSRB UD L40MM CT 1/2 CIR TAPERPOINT J958H

## (undated) DEVICE — SUTURE, VICRYL 2-0, TAPER POINT, CT-1 UNDYED 27 INCH

## (undated) DEVICE — TIBURON TOP SHEET: Brand: CONVERTORS

## (undated) DEVICE — GLOVE ORTHO 7 1/2   MSG9475

## (undated) DEVICE — BLADE ES L6IN ELASTOMERIC COAT EXT DURABLE BEND UPTO 90DEG

## (undated) DEVICE — STERILE PATIENT PROTECTIVE PAD FOR IMP® KNEE POSITIONERS & COHESIVE WRAP (10 / CASE): Brand: DE MAYO KNEE POSITIONER®

## (undated) DEVICE — Z DISCONTINUED PER MEDLINE USE 2741944 DRESSING AQUACEL 12 IN SURG W9XL30CM SIL CVR WTRPRF VIR BACT BARR ANTIMIC

## (undated) DEVICE — SYRINGE MED 30ML STD CLR PLAS LUERLOCK TIP N CTRL DISP

## (undated) DEVICE — GOWN,AURORA,NONREINFORCED,LARGE: Brand: MEDLINE

## (undated) DEVICE — GLOVE, SURGICAL, PROTEXIS PI , 7.5, PF, LF

## (undated) DEVICE — STRAP, VELCRO, BODY, 4 X 60IN, NS

## (undated) DEVICE — SUTURE, FIBERWIRE 2, T-5 TAPER NEEDLE, 38"

## (undated) DEVICE — SKIN MARKER,REGULAR TIP WITH RULER: Brand: DEVON

## (undated) DEVICE — SYRINGE IRRIG 60ML SFT PLIABLE BLB EZ TO GRP 1 HND USE W/

## (undated) DEVICE — INTRODUCER HEMSTAS 8FRX12CM ACT SHTH W/ .038IN GWIRE MAX

## (undated) DEVICE — GOWN,SIRUS,POLYRNF,BRTHSLV,XLN/XL,20/CS: Brand: MEDLINE

## (undated) DEVICE — TOWEL,OR,DSP,ST,BLUE,STD,4/PK,20PK/CS: Brand: MEDLINE

## (undated) DEVICE — BAG URIN DRNGE SM 2000ML BS CNTR ENTRY W/ ANTI REFLX CHMBR

## (undated) DEVICE — DRAIN, JACKSON-PRATT, ROUND/W TROCAR, SILICONE, 10FR

## (undated) DEVICE — KIT EVAC 400CC PVC RADPQ Y CONN

## (undated) DEVICE — GLOVE ORANGE PI 7 1/2   MSG9075

## (undated) DEVICE — MARKER SURG SKIN GENTIAN VLT REG TIP W/ 6IN RUL

## (undated) DEVICE — DRAPE SURG EXT FEN REINF ST W O FLD PCH STD

## (undated) DEVICE — TUBE SET 96 MM 64 MM H2O PERISTALTIC STD AUX CHANNEL

## (undated) DEVICE — 3M™ IOBAN™ 2 ANTIMICROBIAL INCISE DRAPE 6650EZ: Brand: IOBAN™ 2

## (undated) DEVICE — FLUID CONTROL SHOULDER DRAPE PACK: Brand: CONVERTORS

## (undated) DEVICE — SPONGE GZ W4XL4IN COT 12 PLY TYP VII WVN C FLD DSGN

## (undated) DEVICE — SPONGE,LAP,18"X18",DLX,XR,ST,5/PK,40/PK: Brand: MEDLINE

## (undated) DEVICE — ADHESIVE, SKIN, DERMABOND ADVANCED, 15CM, PEN-STYLE

## (undated) DEVICE — CYSTO/BLADDER IRRIGATION SET, REGULATING CLAMP

## (undated) DEVICE — T4 HOOD

## (undated) DEVICE — SUTURE FIBERWIRE SZ 2 W/ TAPERED NEEDLE BLUE L38IN NONABSORB BLU L26.5MM 1/2 CIRCLE AR7200

## (undated) DEVICE — TUBING, SUCTION, 1/4" X 10', STRAIGHT: Brand: MEDLINE

## (undated) DEVICE — PACK ARTHRO III ST SIRUS

## (undated) DEVICE — YANKAUER,SMOOTH HANDLE,HIGH CAPACITY: Brand: MEDLINE INDUSTRIES, INC.

## (undated) DEVICE — SUTURE MCRYL + SZ 3-0 L36IN ABSRB UD CT-1 L36MM 1/2 CIR MCP944H

## (undated) DEVICE — HIGH FLOW TIP

## (undated) DEVICE — SOLUTION, IRRIGATION, STERILE WATER, 1000 ML, POUR BOTTLE

## (undated) DEVICE — ZIMMER® STERILE DISPOSABLE TOURNIQUET CUFF, DUAL PORT, SINGLE BLADDER, 34 IN. (86 CM)

## (undated) DEVICE — ELECTRODE ES L275IN 275IN BLDE TIP COAT PTFE TEF W EVAC

## (undated) DEVICE — BLADE RMR L35MM PAT PILOT H

## (undated) DEVICE — SPONGE LAP W18XL18IN WHT COT 4 PLY FLD STRUNG RADPQ DISP ST 2 PER PACK

## (undated) DEVICE — SUTURE VCRL SZ 1 L36IN ABSRB UD L36MM CT-1 1/2 CIR J947H

## (undated) DEVICE — STERILE LATEX POWDER-FREE SURGICAL GLOVESWITH NITRILE COATING: Brand: PROTEXIS

## (undated) DEVICE — BLADE,CARBON-STEEL,15,STRL,DISPOSABLE,TB: Brand: MEDLINE

## (undated) DEVICE — TM REVERSE 2.5MM PIN STERTM REVERSE 2.5MM PIN STER

## (undated) DEVICE — DRAIN SURG 10FR L1/8IN RND CHN FULL FLUT HEAT POLISHED PERF

## (undated) DEVICE — ENDO CARRY-ON PROCEDURE KIT: Brand: ENDO CARRY-ON PROCEDURE KIT

## (undated) DEVICE — 3M™ STERI-STRIP™ REINFORCED ADHESIVE SKIN CLOSURES, R1547, 1/2 IN X 4 IN (12 MM X 100 MM), 6 STRIPS/ENVELOPE: Brand: 3M™ STERI-STRIP™

## (undated) DEVICE — BRUSH ENDO CLN L90.5IN SHTH DIA1.7MM BRIST DIA5-7MM 2-6MM

## (undated) DEVICE — TUBING, SUCTION, 9/32" X 12', STRAIGHT: Brand: MEDLINE INDUSTRIES, INC.

## (undated) DEVICE — SUTURE MCRYL SZ 4-0 L27IN ABSRB UD L19MM PS-2 1/2 CIR PRIM Y426H

## (undated) DEVICE — BANDAGE COMPR W4INXL5YD WHT BGE POLY COT M E WRP WV HK AND

## (undated) DEVICE — SYRINGE MED 10ML LUERLOCK TIP W/O SFTY DISP

## (undated) DEVICE — DBD-PACK,CYSTOSCOPY,PK VI,AURORA: Brand: MEDLINE

## (undated) DEVICE — ELASTIC BANDAGE: Brand: DEROYAL

## (undated) DEVICE — TUBING SET, GRAVITY, 4-SPIKE: Brand: CONMED

## (undated) DEVICE — CATHETER URETH 20FR 30CC BLLN RED F 3 W SPEC M RND TIP TWO 0125RL20] BARD INC]

## (undated) DEVICE — TUBING, IRRIGATION, HIGH FLOW, HAND PIECE SET

## (undated) DEVICE — BLADE, SAW, SAGITTAL, DUAL CUT, 18 X 90 X 1.27

## (undated) DEVICE — Device: Brand: STABLECUT®

## (undated) DEVICE — STRAP, ARM BOARD, 32 X 1.5

## (undated) DEVICE — BATH BLANKET STERILE

## (undated) DEVICE — PACK,ORTHOPEDIC I: Brand: MEDLINE

## (undated) DEVICE — TRAY CATH CATH OD16FR BG F HYDROCOATED

## (undated) DEVICE — Z CONVERTED USE 2271043 CONTAINER SPEC COLL 4OZ SCR ON LID PEEL PCH

## (undated) DEVICE — MEDI-VAC NON-CONDUCTIVE SUCTION TUBING: Brand: CARDINAL HEALTH

## (undated) DEVICE — TAPE,ELASTIC,FOAM,CURAD,3"X5.5YD,LF: Brand: CURAD

## (undated) DEVICE — PADDING, CAST, SPECIALIST, 4 IN X 4 YD, STERILE

## (undated) DEVICE — BAG DRAINAGE URIN LIGEMAN W/ ADPT SUCTION HOSE CYSTO URO

## (undated) DEVICE — NEEDLE HYPO 25GA L1.5IN BLU POLYPR HUB S STL REG BVL STR

## (undated) DEVICE — DRAPE, INSTRUMENT, W/POUCH, STERI DRAPE, 7 X 11 IN, DISPOSABLE, STERILE

## (undated) DEVICE — ULTRAMIX BOWL (NEW) KNEE

## (undated) DEVICE — FAN SPRAY KIT: Brand: PULSAVAC®

## (undated) DEVICE — KIT, SCHLEIN, BEACH CHAIR, LF

## (undated) DEVICE — SUTURE, MONOCRYL, 4-0, 27 IN, PS-2, UNDYED

## (undated) DEVICE — PREP, IODOPHOR, W/ALCOHOL, DURAPREP, W/APPLICATOR, 26 CC

## (undated) DEVICE — GLOVE, SURGICAL, PROTEXIS PI BLUE W/NEUTHERA, 8.0, PF, LF

## (undated) DEVICE — NEEDLE SPNL 18GA L3.5IN W/ QNCKE SHARPER BVL DURA CLICK

## (undated) DEVICE — STOCKINETTE,IMPERVIOUS,12X48,STERILE: Brand: MEDLINE

## (undated) DEVICE — GLOVE SURG SZ 8 L12IN FNGR THK94MIL TRNSLUC YEL LTX HYDRGEL

## (undated) DEVICE — SUTURE VCRL SZ 0 L36IN ABSRB UD L36MM CT-1 1/2 CIR J946H

## (undated) DEVICE — GLOVE, SURGICAL, PROTEXIS PI , 8.0, PF, LF

## (undated) DEVICE — SUTURE VCRL SZ 2-0 L36IN ABSRB UD L40MM CT 1/2 CIR J957H

## (undated) DEVICE — PACK PROCEDURE SURG TOTAL KNEE PACK

## (undated) DEVICE — MAT, FLOOR, STANDARD FLUID BARRIER, 32X44, GREEN

## (undated) DEVICE — SYRINGE MED 50ML LUERLOCK TIP

## (undated) DEVICE — SUTURE NONABSORBABLE MONOFILAMENT 3-0 PS-1 18 IN BLK ETHILON 1663H

## (undated) DEVICE — SUTURE VCRL SZ 2-0 L36IN ABSRB UD L36MM CT-1 1/2 CIR J945H

## (undated) DEVICE — Device: Brand: ENDO SMARTCAP

## (undated) DEVICE — MANIFOLD, 4 PORT NEPTUNE STANDARD

## (undated) DEVICE — 2108 SERIES SAGITTAL BLADE (20.7 X 0.88 X 85.0MM)

## (undated) DEVICE — TIP, SUCTION, YANKAUER, FLEXIBLE

## (undated) DEVICE — BANDAGE, ELASTIC, 4 X 10YD, BEIGE, LF

## (undated) DEVICE — 3M™ STERI-DRAPE™ INSTRUMENT POUCH 1018: Brand: STERI-DRAPE™

## (undated) DEVICE — TIBURON SPLIT SHEET: Brand: CONVERTORS

## (undated) DEVICE — BANDAGE,GAUZE,BULKEE II,4.5"X4.1YD,STRL: Brand: MEDLINE